# Patient Record
Sex: FEMALE | Race: WHITE | NOT HISPANIC OR LATINO | Employment: OTHER | URBAN - METROPOLITAN AREA
[De-identification: names, ages, dates, MRNs, and addresses within clinical notes are randomized per-mention and may not be internally consistent; named-entity substitution may affect disease eponyms.]

---

## 2017-01-12 ENCOUNTER — HOSPITAL ENCOUNTER (OUTPATIENT)
Dept: RADIOLOGY | Age: 77
Discharge: HOME | End: 2017-01-12
Payer: MEDICARE

## 2017-01-12 DIAGNOSIS — M85.80 OTHER SPECIFIED DISORDERS OF BONE DENSITY AND STRUCTURE, UNSPECIFIED SITE: ICD-10-CM

## 2017-01-12 PROCEDURE — 77080 DXA BONE DENSITY AXIAL: CPT

## 2017-01-13 ENCOUNTER — GENERIC CONVERSION - ENCOUNTER (OUTPATIENT)
Dept: OTHER | Facility: OTHER | Age: 77
End: 2017-01-13

## 2017-04-24 ENCOUNTER — TRANSCRIBE ORDERS (OUTPATIENT)
Dept: ADMINISTRATIVE | Age: 77
End: 2017-04-24

## 2017-04-24 ENCOUNTER — APPOINTMENT (OUTPATIENT)
Dept: LAB | Age: 77
End: 2017-04-24
Payer: MEDICARE

## 2017-04-24 DIAGNOSIS — E78.5 HYPERLIPIDEMIA: ICD-10-CM

## 2017-04-24 DIAGNOSIS — I10 ESSENTIAL (PRIMARY) HYPERTENSION: ICD-10-CM

## 2017-04-24 LAB
ALBUMIN SERPL BCP-MCNC: 3.5 G/DL (ref 3.5–5)
ALP SERPL-CCNC: 79 U/L (ref 46–116)
ALT SERPL W P-5'-P-CCNC: 34 U/L (ref 12–78)
ANION GAP SERPL CALCULATED.3IONS-SCNC: 8 MMOL/L (ref 4–13)
AST SERPL W P-5'-P-CCNC: 13 U/L (ref 5–45)
BASOPHILS # BLD AUTO: 0.04 THOUSANDS/ΜL (ref 0–0.1)
BASOPHILS NFR BLD AUTO: 1 % (ref 0–1)
BILIRUB SERPL-MCNC: 0.77 MG/DL (ref 0.2–1)
BUN SERPL-MCNC: 19 MG/DL (ref 5–25)
CA-I BLD-SCNC: 1.34 MMOL/L (ref 1.12–1.32)
CALCIUM SERPL-MCNC: 9.9 MG/DL (ref 8.3–10.1)
CHLORIDE SERPL-SCNC: 110 MMOL/L (ref 100–108)
CHOLEST SERPL-MCNC: 143 MG/DL (ref 50–200)
CO2 SERPL-SCNC: 24 MMOL/L (ref 21–32)
CREAT SERPL-MCNC: 0.78 MG/DL (ref 0.6–1.3)
EOSINOPHIL # BLD AUTO: 0.53 THOUSAND/ΜL (ref 0–0.61)
EOSINOPHIL NFR BLD AUTO: 8 % (ref 0–6)
ERYTHROCYTE [DISTWIDTH] IN BLOOD BY AUTOMATED COUNT: 13.6 % (ref 11.6–15.1)
GFR SERPL CREATININE-BSD FRML MDRD: >60 ML/MIN/1.73SQ M
GLUCOSE P FAST SERPL-MCNC: 93 MG/DL (ref 65–99)
HCT VFR BLD AUTO: 44.2 % (ref 34.8–46.1)
HDLC SERPL-MCNC: 51 MG/DL (ref 40–60)
HGB BLD-MCNC: 14.9 G/DL (ref 11.5–15.4)
LDLC SERPL DIRECT ASSAY-MCNC: 74 MG/DL (ref 0–100)
LYMPHOCYTES # BLD AUTO: 1.92 THOUSANDS/ΜL (ref 0.6–4.47)
LYMPHOCYTES NFR BLD AUTO: 29 % (ref 14–44)
MCH RBC QN AUTO: 31 PG (ref 26.8–34.3)
MCHC RBC AUTO-ENTMCNC: 33.7 G/DL (ref 31.4–37.4)
MCV RBC AUTO: 92 FL (ref 82–98)
MONOCYTES # BLD AUTO: 0.75 THOUSAND/ΜL (ref 0.17–1.22)
MONOCYTES NFR BLD AUTO: 11 % (ref 4–12)
NEUTROPHILS # BLD AUTO: 3.37 THOUSANDS/ΜL (ref 1.85–7.62)
NEUTS SEG NFR BLD AUTO: 51 % (ref 43–75)
NRBC BLD AUTO-RTO: 0 /100 WBCS
PLATELET # BLD AUTO: 225 THOUSANDS/UL (ref 149–390)
PMV BLD AUTO: 9.9 FL (ref 8.9–12.7)
POTASSIUM SERPL-SCNC: 4 MMOL/L (ref 3.5–5.3)
PROT SERPL-MCNC: 7.2 G/DL (ref 6.4–8.2)
PTH-INTACT SERPL-MCNC: 163.6 PG/ML (ref 14–72)
RBC # BLD AUTO: 4.8 MILLION/UL (ref 3.81–5.12)
SODIUM SERPL-SCNC: 142 MMOL/L (ref 136–145)
TRIGL SERPL-MCNC: 123 MG/DL
WBC # BLD AUTO: 6.63 THOUSAND/UL (ref 4.31–10.16)

## 2017-04-24 PROCEDURE — 83721 ASSAY OF BLOOD LIPOPROTEIN: CPT

## 2017-04-24 PROCEDURE — 80061 LIPID PANEL: CPT

## 2017-04-24 PROCEDURE — 83970 ASSAY OF PARATHORMONE: CPT

## 2017-04-24 PROCEDURE — 80053 COMPREHEN METABOLIC PANEL: CPT

## 2017-04-24 PROCEDURE — 85025 COMPLETE CBC W/AUTO DIFF WBC: CPT

## 2017-04-24 PROCEDURE — 36415 COLL VENOUS BLD VENIPUNCTURE: CPT

## 2017-04-24 PROCEDURE — 82330 ASSAY OF CALCIUM: CPT

## 2017-06-12 ENCOUNTER — ALLSCRIPTS OFFICE VISIT (OUTPATIENT)
Dept: OTHER | Facility: OTHER | Age: 77
End: 2017-06-12

## 2017-07-22 ENCOUNTER — TRANSCRIBE ORDERS (OUTPATIENT)
Dept: ADMINISTRATIVE | Age: 77
End: 2017-07-22

## 2017-07-22 ENCOUNTER — APPOINTMENT (OUTPATIENT)
Dept: LAB | Age: 77
End: 2017-07-22
Payer: MEDICARE

## 2017-07-22 DIAGNOSIS — K50.119 CROHN'S COLITIS, UNSPECIFIED COMPLICATION (HCC): Primary | ICD-10-CM

## 2017-07-22 DIAGNOSIS — K62.5 HEMORRHAGE OF RECTUM AND ANUS: ICD-10-CM

## 2017-07-22 DIAGNOSIS — K52.9 ACUTE GASTROENTERITIS: ICD-10-CM

## 2017-07-22 DIAGNOSIS — K21.9 GASTROESOPHAGEAL REFLUX DISEASE WITHOUT ESOPHAGITIS: ICD-10-CM

## 2017-07-22 LAB
BASOPHILS # BLD AUTO: 0.04 THOUSANDS/ΜL (ref 0–0.1)
BASOPHILS NFR BLD AUTO: 1 % (ref 0–1)
EOSINOPHIL # BLD AUTO: 0.43 THOUSAND/ΜL (ref 0–0.61)
EOSINOPHIL NFR BLD AUTO: 7 % (ref 0–6)
ERYTHROCYTE [DISTWIDTH] IN BLOOD BY AUTOMATED COUNT: 13.8 % (ref 11.6–15.1)
HCT VFR BLD AUTO: 41.6 % (ref 34.8–46.1)
HGB BLD-MCNC: 14.1 G/DL (ref 11.5–15.4)
LYMPHOCYTES # BLD AUTO: 1.67 THOUSANDS/ΜL (ref 0.6–4.47)
LYMPHOCYTES NFR BLD AUTO: 28 % (ref 14–44)
MCH RBC QN AUTO: 31.1 PG (ref 26.8–34.3)
MCHC RBC AUTO-ENTMCNC: 33.9 G/DL (ref 31.4–37.4)
MCV RBC AUTO: 92 FL (ref 82–98)
MONOCYTES # BLD AUTO: 0.65 THOUSAND/ΜL (ref 0.17–1.22)
MONOCYTES NFR BLD AUTO: 11 % (ref 4–12)
NEUTROPHILS # BLD AUTO: 3.14 THOUSANDS/ΜL (ref 1.85–7.62)
NEUTS SEG NFR BLD AUTO: 53 % (ref 43–75)
NRBC BLD AUTO-RTO: 0 /100 WBCS
PLATELET # BLD AUTO: 252 THOUSANDS/UL (ref 149–390)
PMV BLD AUTO: 10.2 FL (ref 8.9–12.7)
RBC # BLD AUTO: 4.53 MILLION/UL (ref 3.81–5.12)
WBC # BLD AUTO: 5.95 THOUSAND/UL (ref 4.31–10.16)

## 2017-07-22 PROCEDURE — 85025 COMPLETE CBC W/AUTO DIFF WBC: CPT

## 2017-07-22 PROCEDURE — 36415 COLL VENOUS BLD VENIPUNCTURE: CPT

## 2017-07-28 ENCOUNTER — GENERIC CONVERSION - ENCOUNTER (OUTPATIENT)
Dept: OTHER | Facility: OTHER | Age: 77
End: 2017-07-28

## 2017-07-28 ENCOUNTER — ANESTHESIA (OUTPATIENT)
Dept: GASTROENTEROLOGY | Facility: HOSPITAL | Age: 77
End: 2017-07-28
Payer: MEDICARE

## 2017-07-28 ENCOUNTER — HOSPITAL ENCOUNTER (OUTPATIENT)
Facility: HOSPITAL | Age: 77
Setting detail: OUTPATIENT SURGERY
Discharge: HOME | End: 2017-07-28
Attending: COLON & RECTAL SURGERY | Admitting: COLON & RECTAL SURGERY
Payer: MEDICARE

## 2017-07-28 ENCOUNTER — ANESTHESIA EVENT (OUTPATIENT)
Dept: GASTROENTEROLOGY | Facility: HOSPITAL | Age: 77
End: 2017-07-28
Payer: MEDICARE

## 2017-07-28 VITALS
BODY MASS INDEX: 30.93 KG/M2 | WEIGHT: 185.63 LBS | TEMPERATURE: 97.1 F | HEART RATE: 59 BPM | HEIGHT: 65 IN | RESPIRATION RATE: 16 BRPM | DIASTOLIC BLOOD PRESSURE: 65 MMHG | SYSTOLIC BLOOD PRESSURE: 147 MMHG | OXYGEN SATURATION: 98 %

## 2017-07-28 DIAGNOSIS — K21.9 GERD (GASTROESOPHAGEAL REFLUX DISEASE): ICD-10-CM

## 2017-07-28 DIAGNOSIS — K62.5 RECTAL BLEEDING: ICD-10-CM

## 2017-07-28 PROCEDURE — 88342 IMHCHEM/IMCYTCHM 1ST ANTB: CPT | Performed by: INTERNAL MEDICINE

## 2017-07-28 PROCEDURE — 88305 TISSUE EXAM BY PATHOLOGIST: CPT | Performed by: INTERNAL MEDICINE

## 2017-07-28 PROCEDURE — 88305 TISSUE EXAM BY PATHOLOGIST: CPT | Performed by: COLON & RECTAL SURGERY

## 2017-07-28 PROCEDURE — 88342 IMHCHEM/IMCYTCHM 1ST ANTB: CPT | Performed by: COLON & RECTAL SURGERY

## 2017-07-28 RX ORDER — PROPOFOL 10 MG/ML
INJECTION, EMULSION INTRAVENOUS AS NEEDED
Status: DISCONTINUED | OUTPATIENT
Start: 2017-07-28 | End: 2017-07-28 | Stop reason: SURG

## 2017-07-28 RX ORDER — ATENOLOL 25 MG/1
25 TABLET ORAL 2 TIMES DAILY
COMMUNITY
End: 2021-05-11 | Stop reason: SDUPTHER

## 2017-07-28 RX ORDER — IRBESARTAN 300 MG/1
300 TABLET ORAL
COMMUNITY
End: 2021-11-02 | Stop reason: SDUPTHER

## 2017-07-28 RX ORDER — RANITIDINE 150 MG/1
150 TABLET ORAL DAILY
COMMUNITY
End: 2018-12-31 | Stop reason: ALTCHOICE

## 2017-07-28 RX ORDER — LANSOPRAZOLE 30 MG/1
30 CAPSULE, DELAYED RELEASE ORAL DAILY
COMMUNITY
End: 2017-08-21

## 2017-07-28 RX ORDER — MESALAMINE 800 MG/1
800 TABLET, DELAYED RELEASE ORAL 3 TIMES DAILY
Qty: 270 TABLET | Refills: 4 | Status: SHIPPED | OUTPATIENT
Start: 2017-07-28 | End: 2019-05-06 | Stop reason: SDUPTHER

## 2017-07-28 RX ORDER — HYDROCHLOROTHIAZIDE 25 MG/1
12.5 TABLET ORAL DAILY
COMMUNITY
End: 2021-06-30 | Stop reason: ALTCHOICE

## 2017-07-28 RX ORDER — BRINZOLAMIDE 10 MG/ML
1 SUSPENSION/ DROPS OPHTHALMIC 2 TIMES DAILY
COMMUNITY

## 2017-07-28 RX ORDER — OMEGA-3-ACID ETHYL ESTERS 1 G/1
2 CAPSULE, LIQUID FILLED ORAL 2 TIMES DAILY
COMMUNITY
End: 2021-09-24

## 2017-07-28 RX ORDER — ROSUVASTATIN CALCIUM 5 MG/1
7.5 TABLET, COATED ORAL DAILY
COMMUNITY
End: 2021-10-27 | Stop reason: SDUPTHER

## 2017-07-28 RX ORDER — SODIUM CHLORIDE, SODIUM LACTATE, POTASSIUM CHLORIDE, CALCIUM CHLORIDE 600; 310; 30; 20 MG/100ML; MG/100ML; MG/100ML; MG/100ML
125 INJECTION, SOLUTION INTRAVENOUS CONTINUOUS
Status: DISCONTINUED | OUTPATIENT
Start: 2017-07-28 | End: 2017-07-28 | Stop reason: HOSPADM

## 2017-07-28 RX ADMIN — PROPOFOL 100 MG: 10 INJECTION, EMULSION INTRAVENOUS at 08:03

## 2017-07-28 RX ADMIN — SODIUM CHLORIDE, SODIUM LACTATE, POTASSIUM CHLORIDE, AND CALCIUM CHLORIDE 125 ML/HR: .6; .31; .03; .02 INJECTION, SOLUTION INTRAVENOUS at 07:25

## 2017-07-28 RX ADMIN — PROPOFOL 100 MG: 10 INJECTION, EMULSION INTRAVENOUS at 08:10

## 2017-07-28 RX ADMIN — PROPOFOL 100 MG: 10 INJECTION, EMULSION INTRAVENOUS at 07:42

## 2017-07-28 RX ADMIN — PROPOFOL 100 MG: 10 INJECTION, EMULSION INTRAVENOUS at 07:45

## 2017-08-03 ENCOUNTER — GENERIC CONVERSION - ENCOUNTER (OUTPATIENT)
Dept: OTHER | Facility: OTHER | Age: 77
End: 2017-08-03

## 2017-08-18 ENCOUNTER — APPOINTMENT (OUTPATIENT)
Dept: LAB | Facility: HOSPITAL | Age: 77
DRG: 386 | End: 2017-08-18
Attending: COLON & RECTAL SURGERY
Payer: MEDICARE

## 2017-08-18 DIAGNOSIS — K62.5 HEMORRHAGE OF RECTUM AND ANUS: ICD-10-CM

## 2017-08-18 DIAGNOSIS — K50.119 CROHN'S COLITIS, UNSPECIFIED COMPLICATION (HCC): ICD-10-CM

## 2017-08-18 DIAGNOSIS — K52.9 ACUTE GASTROENTERITIS: ICD-10-CM

## 2017-08-18 LAB
ALBUMIN SERPL BCP-MCNC: 2.8 G/DL (ref 3.5–5)
ANION GAP SERPL CALCULATED.3IONS-SCNC: 9 MMOL/L (ref 4–13)
BASOPHILS # BLD AUTO: 0.04 THOUSANDS/ΜL (ref 0–0.1)
BASOPHILS NFR BLD AUTO: 0 % (ref 0–1)
BUN SERPL-MCNC: 20 MG/DL (ref 5–25)
CALCIUM ALBUM COR SERPL-MCNC: 11.4 MG/DL (ref 8.3–10.1)
CALCIUM SERPL-MCNC: 10.4 MD/DL (ref 8.3–10.1)
CALCIUM SERPL-MCNC: 10.4 MG/DL (ref 8.3–10.1)
CHLORIDE SERPL-SCNC: 100 MMOL/L (ref 100–108)
CO2 SERPL-SCNC: 23 MMOL/L (ref 21–32)
CREAT SERPL-MCNC: 1.55 MG/DL (ref 0.6–1.3)
EOSINOPHIL # BLD AUTO: 0.16 THOUSAND/ΜL (ref 0–0.61)
EOSINOPHIL NFR BLD AUTO: 1 % (ref 0–6)
ERYTHROCYTE [DISTWIDTH] IN BLOOD BY AUTOMATED COUNT: 13.4 % (ref 11.6–15.1)
GFR SERPL CREATININE-BSD FRML MDRD: 33 ML/MIN/1.73SQ M
GLUCOSE P FAST SERPL-MCNC: 101 MG/DL (ref 65–99)
HCT VFR BLD AUTO: 40.2 % (ref 34.8–46.1)
HGB BLD-MCNC: 13.6 G/DL (ref 11.5–15.4)
LYMPHOCYTES # BLD AUTO: 1.19 THOUSANDS/ΜL (ref 0.6–4.47)
LYMPHOCYTES NFR BLD AUTO: 8 % (ref 14–44)
MCH RBC QN AUTO: 30.7 PG (ref 26.8–34.3)
MCHC RBC AUTO-ENTMCNC: 33.8 G/DL (ref 31.4–37.4)
MCV RBC AUTO: 91 FL (ref 82–98)
MONOCYTES # BLD AUTO: 1.84 THOUSAND/ΜL (ref 0.17–1.22)
MONOCYTES NFR BLD AUTO: 12 % (ref 4–12)
NEUTROPHILS # BLD AUTO: 11.57 THOUSANDS/ΜL (ref 1.85–7.62)
NEUTS SEG NFR BLD AUTO: 79 % (ref 43–75)
NRBC BLD AUTO-RTO: 0 /100 WBCS
PLATELET # BLD AUTO: 290 THOUSANDS/UL (ref 149–390)
PMV BLD AUTO: 10.3 FL (ref 8.9–12.7)
POTASSIUM SERPL-SCNC: 3.3 MMOL/L (ref 3.5–5.3)
RBC # BLD AUTO: 4.43 MILLION/UL (ref 3.81–5.12)
SODIUM SERPL-SCNC: 132 MMOL/L (ref 136–145)
WBC # BLD AUTO: 14.89 THOUSAND/UL (ref 4.31–10.16)

## 2017-08-18 PROCEDURE — 80048 BASIC METABOLIC PNL TOTAL CA: CPT

## 2017-08-18 PROCEDURE — 82040 ASSAY OF SERUM ALBUMIN: CPT

## 2017-08-18 PROCEDURE — 82310 ASSAY OF CALCIUM: CPT

## 2017-08-18 PROCEDURE — 36415 COLL VENOUS BLD VENIPUNCTURE: CPT

## 2017-08-18 PROCEDURE — 85025 COMPLETE CBC W/AUTO DIFF WBC: CPT

## 2017-08-21 ENCOUNTER — APPOINTMENT (EMERGENCY)
Dept: RADIOLOGY | Facility: HOSPITAL | Age: 77
DRG: 386 | End: 2017-08-21
Payer: MEDICARE

## 2017-08-21 ENCOUNTER — HOSPITAL ENCOUNTER (INPATIENT)
Facility: HOSPITAL | Age: 77
LOS: 3 days | Discharge: HOME | DRG: 386 | End: 2017-08-24
Attending: EMERGENCY MEDICINE | Admitting: COLON & RECTAL SURGERY
Payer: MEDICARE

## 2017-08-21 DIAGNOSIS — E87.1 HYPONATREMIA: ICD-10-CM

## 2017-08-21 DIAGNOSIS — R11.2 NAUSEA AND VOMITING: ICD-10-CM

## 2017-08-21 DIAGNOSIS — K62.5 RECTAL BLEEDING: ICD-10-CM

## 2017-08-21 DIAGNOSIS — N17.9 AKI (ACUTE KIDNEY INJURY) (HCC): ICD-10-CM

## 2017-08-21 DIAGNOSIS — R10.819 ABDOMINAL TENDERNESS: ICD-10-CM

## 2017-08-21 DIAGNOSIS — K50.90 CROHN'S DISEASE (HCC): Primary | ICD-10-CM

## 2017-08-21 DIAGNOSIS — D72.829 LEUKOCYTOSIS: ICD-10-CM

## 2017-08-21 PROBLEM — K52.9 INFLAMMATORY BOWEL DISEASE: Status: ACTIVE | Noted: 2017-08-21

## 2017-08-21 LAB
ALBUMIN SERPL BCP-MCNC: 2.4 G/DL (ref 3.5–5)
ALP SERPL-CCNC: 148 U/L (ref 46–116)
ALT SERPL W P-5'-P-CCNC: 48 U/L (ref 12–78)
ANION GAP SERPL CALCULATED.3IONS-SCNC: 9 MMOL/L (ref 4–13)
AST SERPL W P-5'-P-CCNC: 34 U/L (ref 5–45)
BASOPHILS # BLD MANUAL: 0.13 THOUSAND/UL (ref 0–0.1)
BASOPHILS NFR MAR MANUAL: 1 % (ref 0–1)
BILIRUB SERPL-MCNC: 0.55 MG/DL (ref 0.2–1)
BUN SERPL-MCNC: 15 MG/DL (ref 5–25)
CALCIUM SERPL-MCNC: 9.8 MG/DL (ref 8.3–10.1)
CHLORIDE SERPL-SCNC: 94 MMOL/L (ref 100–108)
CO2 SERPL-SCNC: 24 MMOL/L (ref 21–32)
CREAT SERPL-MCNC: 1.36 MG/DL (ref 0.6–1.3)
EOSINOPHIL # BLD MANUAL: 0.39 THOUSAND/UL (ref 0–0.4)
EOSINOPHIL NFR BLD MANUAL: 3 % (ref 0–6)
ERYTHROCYTE [DISTWIDTH] IN BLOOD BY AUTOMATED COUNT: 13 % (ref 11.6–15.1)
GFR SERPL CREATININE-BSD FRML MDRD: 38 ML/MIN/1.73SQ M
GLUCOSE SERPL-MCNC: 107 MG/DL (ref 65–140)
HCT VFR BLD AUTO: 36.3 % (ref 34.8–46.1)
HGB BLD-MCNC: 12.9 G/DL (ref 11.5–15.4)
LACTATE SERPL-SCNC: 1.5 MMOL/L (ref 0.5–2)
LIPASE SERPL-CCNC: 146 U/L (ref 73–393)
LYMPHOCYTES # BLD AUTO: 0.92 THOUSAND/UL (ref 0.6–4.47)
LYMPHOCYTES # BLD AUTO: 7 % (ref 14–44)
MAGNESIUM SERPL-MCNC: 2 MG/DL (ref 1.6–2.6)
MCH RBC QN AUTO: 30.9 PG (ref 26.8–34.3)
MCHC RBC AUTO-ENTMCNC: 35.5 G/DL (ref 31.4–37.4)
MCV RBC AUTO: 87 FL (ref 82–98)
MONOCYTES # BLD AUTO: 1.31 THOUSAND/UL (ref 0–1.22)
MONOCYTES NFR BLD: 10 % (ref 4–12)
NEUTROPHILS # BLD MANUAL: 10.36 THOUSAND/UL (ref 1.85–7.62)
NEUTS SEG NFR BLD AUTO: 79 % (ref 43–75)
NRBC BLD AUTO-RTO: 0 /100 WBCS
PHOSPHATE SERPL-MCNC: 2.7 MG/DL (ref 2.3–4.1)
PLATELET # BLD AUTO: 303 THOUSANDS/UL (ref 149–390)
PLATELET BLD QL SMEAR: ADEQUATE
PMV BLD AUTO: 9.3 FL (ref 8.9–12.7)
POIKILOCYTOSIS BLD QL SMEAR: PRESENT
POTASSIUM SERPL-SCNC: 2.8 MMOL/L (ref 3.5–5.3)
PROT SERPL-MCNC: 7 G/DL (ref 6.4–8.2)
RBC # BLD AUTO: 4.18 MILLION/UL (ref 3.81–5.12)
RBC MORPH BLD: PRESENT
SODIUM SERPL-SCNC: 127 MMOL/L (ref 136–145)
WBC # BLD AUTO: 13.12 THOUSAND/UL (ref 4.31–10.16)

## 2017-08-21 PROCEDURE — 84100 ASSAY OF PHOSPHORUS: CPT | Performed by: EMERGENCY MEDICINE

## 2017-08-21 PROCEDURE — 36415 COLL VENOUS BLD VENIPUNCTURE: CPT | Performed by: EMERGENCY MEDICINE

## 2017-08-21 PROCEDURE — 99285 EMERGENCY DEPT VISIT HI MDM: CPT

## 2017-08-21 PROCEDURE — 96375 TX/PRO/DX INJ NEW DRUG ADDON: CPT

## 2017-08-21 PROCEDURE — 83690 ASSAY OF LIPASE: CPT | Performed by: EMERGENCY MEDICINE

## 2017-08-21 PROCEDURE — 96365 THER/PROPH/DIAG IV INF INIT: CPT

## 2017-08-21 PROCEDURE — 87493 C DIFF AMPLIFIED PROBE: CPT | Performed by: EMERGENCY MEDICINE

## 2017-08-21 PROCEDURE — 80053 COMPREHEN METABOLIC PANEL: CPT | Performed by: EMERGENCY MEDICINE

## 2017-08-21 PROCEDURE — 85007 BL SMEAR W/DIFF WBC COUNT: CPT | Performed by: EMERGENCY MEDICINE

## 2017-08-21 PROCEDURE — 83605 ASSAY OF LACTIC ACID: CPT | Performed by: EMERGENCY MEDICINE

## 2017-08-21 PROCEDURE — 85027 COMPLETE CBC AUTOMATED: CPT | Performed by: EMERGENCY MEDICINE

## 2017-08-21 PROCEDURE — 83735 ASSAY OF MAGNESIUM: CPT | Performed by: EMERGENCY MEDICINE

## 2017-08-21 PROCEDURE — 74176 CT ABD & PELVIS W/O CONTRAST: CPT

## 2017-08-21 RX ORDER — POTASSIUM CHLORIDE 14.9 MG/ML
20 INJECTION INTRAVENOUS ONCE
Status: COMPLETED | OUTPATIENT
Start: 2017-08-21 | End: 2017-08-22

## 2017-08-21 RX ORDER — ONDANSETRON 2 MG/ML
4 INJECTION INTRAMUSCULAR; INTRAVENOUS ONCE
Status: COMPLETED | OUTPATIENT
Start: 2017-08-21 | End: 2017-08-21

## 2017-08-21 RX ORDER — ONDANSETRON 2 MG/ML
4 INJECTION INTRAMUSCULAR; INTRAVENOUS EVERY 6 HOURS PRN
Status: DISCONTINUED | OUTPATIENT
Start: 2017-08-21 | End: 2017-08-23

## 2017-08-21 RX ORDER — METHYLPREDNISOLONE SODIUM SUCCINATE 40 MG/ML
20 INJECTION, POWDER, LYOPHILIZED, FOR SOLUTION INTRAMUSCULAR; INTRAVENOUS DAILY
Status: DISCONTINUED | OUTPATIENT
Start: 2017-08-21 | End: 2017-08-24 | Stop reason: HOSPADM

## 2017-08-21 RX ORDER — OXYCODONE HYDROCHLORIDE 5 MG/1
5 TABLET ORAL EVERY 4 HOURS PRN
Status: DISCONTINUED | OUTPATIENT
Start: 2017-08-21 | End: 2017-08-24 | Stop reason: HOSPADM

## 2017-08-21 RX ORDER — PRAVASTATIN SODIUM 40 MG
40 TABLET ORAL
Status: DISCONTINUED | OUTPATIENT
Start: 2017-08-22 | End: 2017-08-24 | Stop reason: HOSPADM

## 2017-08-21 RX ORDER — POTASSIUM CHLORIDE 20 MEQ/1
40 TABLET, EXTENDED RELEASE ORAL ONCE
Status: COMPLETED | OUTPATIENT
Start: 2017-08-21 | End: 2017-08-21

## 2017-08-21 RX ORDER — HEPARIN SODIUM 5000 [USP'U]/ML
5000 INJECTION, SOLUTION INTRAVENOUS; SUBCUTANEOUS EVERY 8 HOURS SCHEDULED
Status: DISCONTINUED | OUTPATIENT
Start: 2017-08-21 | End: 2017-08-24 | Stop reason: HOSPADM

## 2017-08-21 RX ORDER — MESALAMINE 400 MG/1
800 CAPSULE, DELAYED RELEASE ORAL 3 TIMES DAILY
Status: DISCONTINUED | OUTPATIENT
Start: 2017-08-21 | End: 2017-08-23

## 2017-08-21 RX ORDER — SODIUM CHLORIDE 9 MG/ML
75 INJECTION, SOLUTION INTRAVENOUS CONTINUOUS
Status: DISCONTINUED | OUTPATIENT
Start: 2017-08-21 | End: 2017-08-22

## 2017-08-21 RX ORDER — OXYCODONE HYDROCHLORIDE 10 MG/1
10 TABLET ORAL EVERY 4 HOURS PRN
Status: DISCONTINUED | OUTPATIENT
Start: 2017-08-21 | End: 2017-08-24 | Stop reason: HOSPADM

## 2017-08-21 RX ORDER — MAGNESIUM HYDROXIDE/ALUMINUM HYDROXICE/SIMETHICONE 120; 1200; 1200 MG/30ML; MG/30ML; MG/30ML
30 SUSPENSION ORAL EVERY 6 HOURS PRN
Status: DISCONTINUED | OUTPATIENT
Start: 2017-08-21 | End: 2017-08-24 | Stop reason: HOSPADM

## 2017-08-21 RX ORDER — ACETAMINOPHEN 325 MG/1
650 TABLET ORAL EVERY 6 HOURS PRN
Status: DISCONTINUED | OUTPATIENT
Start: 2017-08-21 | End: 2017-08-24 | Stop reason: HOSPADM

## 2017-08-21 RX ORDER — BRINZOLAMIDE 10 MG/ML
1 SUSPENSION/ DROPS OPHTHALMIC 2 TIMES DAILY
Status: DISCONTINUED | OUTPATIENT
Start: 2017-08-22 | End: 2017-08-24 | Stop reason: HOSPADM

## 2017-08-21 RX ADMIN — ONDANSETRON 4 MG: 2 INJECTION INTRAMUSCULAR; INTRAVENOUS at 19:16

## 2017-08-21 RX ADMIN — METHYLPREDNISOLONE SODIUM SUCCINATE 20 MG: 40 INJECTION, POWDER, FOR SOLUTION INTRAMUSCULAR; INTRAVENOUS at 21:22

## 2017-08-21 RX ADMIN — HEPARIN SODIUM 5000 UNITS: 5000 INJECTION, SOLUTION INTRAVENOUS; SUBCUTANEOUS at 22:20

## 2017-08-21 RX ADMIN — POTASSIUM CHLORIDE 40 MEQ: 1500 TABLET, EXTENDED RELEASE ORAL at 22:20

## 2017-08-21 RX ADMIN — POTASSIUM CHLORIDE 20 MEQ: 200 INJECTION, SOLUTION INTRAVENOUS at 19:34

## 2017-08-21 RX ADMIN — POTASSIUM CHLORIDE 20 MEQ: 200 INJECTION, SOLUTION INTRAVENOUS at 22:20

## 2017-08-21 RX ADMIN — SODIUM CHLORIDE 1000 ML: 0.9 INJECTION, SOLUTION INTRAVENOUS at 19:16

## 2017-08-21 RX ADMIN — SODIUM CHLORIDE 100 ML/HR: 0.9 INJECTION, SOLUTION INTRAVENOUS at 22:22

## 2017-08-22 LAB
ANION GAP SERPL CALCULATED.3IONS-SCNC: 10 MMOL/L (ref 4–13)
ANION GAP SERPL CALCULATED.3IONS-SCNC: 12 MMOL/L (ref 4–13)
ANISOCYTOSIS BLD QL SMEAR: PRESENT
BASOPHILS # BLD MANUAL: 0 THOUSAND/UL (ref 0–0.1)
BASOPHILS NFR MAR MANUAL: 0 % (ref 0–1)
BUN SERPL-MCNC: 13 MG/DL (ref 5–25)
BUN SERPL-MCNC: 14 MG/DL (ref 5–25)
BURR CELLS BLD QL SMEAR: PRESENT
C DIFF TOX GENS STL QL NAA+PROBE: NORMAL
CALCIUM SERPL-MCNC: 10 MG/DL (ref 8.3–10.1)
CALCIUM SERPL-MCNC: 9.7 MG/DL (ref 8.3–10.1)
CHLORIDE SERPL-SCNC: 104 MMOL/L (ref 100–108)
CHLORIDE SERPL-SCNC: 98 MMOL/L (ref 100–108)
CO2 SERPL-SCNC: 22 MMOL/L (ref 21–32)
CO2 SERPL-SCNC: 23 MMOL/L (ref 21–32)
CREAT SERPL-MCNC: 0.91 MG/DL (ref 0.6–1.3)
CREAT SERPL-MCNC: 0.96 MG/DL (ref 0.6–1.3)
EOSINOPHIL # BLD MANUAL: 0 THOUSAND/UL (ref 0–0.4)
EOSINOPHIL NFR BLD MANUAL: 0 % (ref 0–6)
ERYTHROCYTE [DISTWIDTH] IN BLOOD BY AUTOMATED COUNT: 13.2 % (ref 11.6–15.1)
GFR SERPL CREATININE-BSD FRML MDRD: 58 ML/MIN/1.73SQ M
GFR SERPL CREATININE-BSD FRML MDRD: 62 ML/MIN/1.73SQ M
GLUCOSE SERPL-MCNC: 115 MG/DL (ref 65–140)
GLUCOSE SERPL-MCNC: 132 MG/DL (ref 65–140)
HCT VFR BLD AUTO: 34.8 % (ref 34.8–46.1)
HCT VFR BLD AUTO: 36.8 % (ref 34.8–46.1)
HGB BLD-MCNC: 12.4 G/DL (ref 11.5–15.4)
HGB BLD-MCNC: 12.6 G/DL (ref 11.5–15.4)
LYMPHOCYTES # BLD AUTO: 0.44 THOUSAND/UL (ref 0.6–4.47)
LYMPHOCYTES # BLD AUTO: 4 % (ref 14–44)
MAGNESIUM SERPL-MCNC: 2 MG/DL (ref 1.6–2.6)
MCH RBC QN AUTO: 30.1 PG (ref 26.8–34.3)
MCHC RBC AUTO-ENTMCNC: 34.2 G/DL (ref 31.4–37.4)
MCV RBC AUTO: 88 FL (ref 82–98)
MONOCYTES # BLD AUTO: 0.11 THOUSAND/UL (ref 0–1.22)
MONOCYTES NFR BLD: 1 % (ref 4–12)
NEUTROPHILS # BLD MANUAL: 10.41 THOUSAND/UL (ref 1.85–7.62)
NEUTS SEG NFR BLD AUTO: 95 % (ref 43–75)
NRBC BLD AUTO-RTO: 0 /100 WBCS
PLATELET # BLD AUTO: 296 THOUSANDS/UL (ref 149–390)
PLATELET # BLD AUTO: 397 THOUSANDS/UL (ref 149–390)
PLATELET BLD QL SMEAR: ADEQUATE
PMV BLD AUTO: 8.7 FL (ref 8.9–12.7)
PMV BLD AUTO: 9.3 FL (ref 8.9–12.7)
POIKILOCYTOSIS BLD QL SMEAR: PRESENT
POTASSIUM SERPL-SCNC: 3.3 MMOL/L (ref 3.5–5.3)
POTASSIUM SERPL-SCNC: 4.1 MMOL/L (ref 3.5–5.3)
RBC # BLD AUTO: 4.18 MILLION/UL (ref 3.81–5.12)
RBC MORPH BLD: PRESENT
SODIUM SERPL-SCNC: 132 MMOL/L (ref 136–145)
SODIUM SERPL-SCNC: 137 MMOL/L (ref 136–145)
WBC # BLD AUTO: 10.96 THOUSAND/UL (ref 4.31–10.16)

## 2017-08-22 PROCEDURE — 85007 BL SMEAR W/DIFF WBC COUNT: CPT | Performed by: SURGERY

## 2017-08-22 PROCEDURE — 85049 AUTOMATED PLATELET COUNT: CPT | Performed by: SURGERY

## 2017-08-22 PROCEDURE — 85027 COMPLETE CBC AUTOMATED: CPT | Performed by: SURGERY

## 2017-08-22 PROCEDURE — 85014 HEMATOCRIT: CPT | Performed by: STUDENT IN AN ORGANIZED HEALTH CARE EDUCATION/TRAINING PROGRAM

## 2017-08-22 PROCEDURE — 80048 BASIC METABOLIC PNL TOTAL CA: CPT | Performed by: SURGERY

## 2017-08-22 PROCEDURE — 85018 HEMOGLOBIN: CPT | Performed by: STUDENT IN AN ORGANIZED HEALTH CARE EDUCATION/TRAINING PROGRAM

## 2017-08-22 PROCEDURE — 83735 ASSAY OF MAGNESIUM: CPT | Performed by: SURGERY

## 2017-08-22 RX ORDER — POTASSIUM CHLORIDE 14.9 MG/ML
20 INJECTION INTRAVENOUS
Status: COMPLETED | OUTPATIENT
Start: 2017-08-22 | End: 2017-08-22

## 2017-08-22 RX ORDER — POTASSIUM CHLORIDE 29.8 MG/ML
40 INJECTION INTRAVENOUS ONCE
Status: DISCONTINUED | OUTPATIENT
Start: 2017-08-22 | End: 2017-08-22 | Stop reason: SDUPTHER

## 2017-08-22 RX ADMIN — BRINZOLAMIDE 1 DROP: 10 SUSPENSION/ DROPS OPHTHALMIC at 08:02

## 2017-08-22 RX ADMIN — PRAVASTATIN SODIUM 40 MG: 40 TABLET ORAL at 16:32

## 2017-08-22 RX ADMIN — ACETAMINOPHEN 650 MG: 325 TABLET, FILM COATED ORAL at 23:15

## 2017-08-22 RX ADMIN — BIMATOPROST 1 DROP: 0.1 SOLUTION/ DROPS OPHTHALMIC at 21:23

## 2017-08-22 RX ADMIN — HEPARIN SODIUM 5000 UNITS: 5000 INJECTION, SOLUTION INTRAVENOUS; SUBCUTANEOUS at 21:23

## 2017-08-22 RX ADMIN — SODIUM CHLORIDE 100 ML/HR: 0.9 INJECTION, SOLUTION INTRAVENOUS at 08:07

## 2017-08-22 RX ADMIN — BRINZOLAMIDE 1 DROP: 10 SUSPENSION/ DROPS OPHTHALMIC at 00:12

## 2017-08-22 RX ADMIN — BIMATOPROST 1 DROP: 0.1 SOLUTION/ DROPS OPHTHALMIC at 00:09

## 2017-08-22 RX ADMIN — BRINZOLAMIDE 1 DROP: 10 SUSPENSION/ DROPS OPHTHALMIC at 21:23

## 2017-08-22 RX ADMIN — HEPARIN SODIUM 5000 UNITS: 5000 INJECTION, SOLUTION INTRAVENOUS; SUBCUTANEOUS at 13:30

## 2017-08-22 RX ADMIN — METHYLPREDNISOLONE SODIUM SUCCINATE 20 MG: 40 INJECTION, POWDER, FOR SOLUTION INTRAMUSCULAR; INTRAVENOUS at 08:01

## 2017-08-22 RX ADMIN — MESALAMINE 800 MG: 400 CAPSULE, DELAYED RELEASE ORAL at 21:23

## 2017-08-22 RX ADMIN — ONDANSETRON 4 MG: 2 INJECTION INTRAMUSCULAR; INTRAVENOUS at 23:15

## 2017-08-22 RX ADMIN — MESALAMINE 800 MG: 400 CAPSULE, DELAYED RELEASE ORAL at 08:01

## 2017-08-22 RX ADMIN — POTASSIUM CHLORIDE 20 MEQ: 200 INJECTION, SOLUTION INTRAVENOUS at 03:50

## 2017-08-22 RX ADMIN — POTASSIUM CHLORIDE 20 MEQ: 200 INJECTION, SOLUTION INTRAVENOUS at 01:17

## 2017-08-22 RX ADMIN — HEPARIN SODIUM 5000 UNITS: 5000 INJECTION, SOLUTION INTRAVENOUS; SUBCUTANEOUS at 05:15

## 2017-08-22 RX ADMIN — MESALAMINE 800 MG: 400 CAPSULE, DELAYED RELEASE ORAL at 16:32

## 2017-08-22 RX ADMIN — MESALAMINE 800 MG: 400 CAPSULE, DELAYED RELEASE ORAL at 00:08

## 2017-08-23 LAB
ANION GAP SERPL CALCULATED.3IONS-SCNC: 9 MMOL/L (ref 4–13)
BASOPHILS # BLD MANUAL: 0 THOUSAND/UL (ref 0–0.1)
BASOPHILS NFR MAR MANUAL: 0 % (ref 0–1)
BUN SERPL-MCNC: 17 MG/DL (ref 5–25)
BURR CELLS BLD QL SMEAR: PRESENT
CALCIUM SERPL-MCNC: 10.2 MG/DL (ref 8.3–10.1)
CHLORIDE SERPL-SCNC: 105 MMOL/L (ref 100–108)
CO2 SERPL-SCNC: 22 MMOL/L (ref 21–32)
CREAT SERPL-MCNC: 0.85 MG/DL (ref 0.6–1.3)
EOSINOPHIL # BLD MANUAL: 0 THOUSAND/UL (ref 0–0.4)
EOSINOPHIL NFR BLD MANUAL: 0 % (ref 0–6)
ERYTHROCYTE [DISTWIDTH] IN BLOOD BY AUTOMATED COUNT: 13.6 % (ref 11.6–15.1)
GFR SERPL CREATININE-BSD FRML MDRD: 67 ML/MIN/1.73SQ M
GLUCOSE SERPL-MCNC: 119 MG/DL (ref 65–140)
HCT VFR BLD AUTO: 30.3 % (ref 34.8–46.1)
HCT VFR BLD AUTO: 32.3 % (ref 34.8–46.1)
HGB BLD-MCNC: 10.3 G/DL (ref 11.5–15.4)
HGB BLD-MCNC: 11 G/DL (ref 11.5–15.4)
LYMPHOCYTES # BLD AUTO: 1.02 THOUSAND/UL (ref 0.6–4.47)
LYMPHOCYTES # BLD AUTO: 5 % (ref 14–44)
MCH RBC QN AUTO: 30.4 PG (ref 26.8–34.3)
MCHC RBC AUTO-ENTMCNC: 34 G/DL (ref 31.4–37.4)
MCV RBC AUTO: 89 FL (ref 82–98)
METAMYELOCYTES NFR BLD MANUAL: 2 % (ref 0–1)
MONOCYTES # BLD AUTO: 0.82 THOUSAND/UL (ref 0–1.22)
MONOCYTES NFR BLD: 4 % (ref 4–12)
MYELOCYTES NFR BLD MANUAL: 2 % (ref 0–1)
NEUTROPHILS # BLD MANUAL: 17.75 THOUSAND/UL (ref 1.85–7.62)
NEUTS BAND NFR BLD MANUAL: 2 % (ref 0–8)
NEUTS SEG NFR BLD AUTO: 85 % (ref 43–75)
NRBC BLD AUTO-RTO: 0 /100 WBCS
PLATELET # BLD AUTO: 366 THOUSANDS/UL (ref 149–390)
PLATELET BLD QL SMEAR: ADEQUATE
PMV BLD AUTO: 9.5 FL (ref 8.9–12.7)
POIKILOCYTOSIS BLD QL SMEAR: PRESENT
POTASSIUM SERPL-SCNC: 3.7 MMOL/L (ref 3.5–5.3)
RBC # BLD AUTO: 3.39 MILLION/UL (ref 3.81–5.12)
RBC MORPH BLD: PRESENT
SODIUM SERPL-SCNC: 136 MMOL/L (ref 136–145)
TOXIC GRANULES BLD QL SMEAR: PRESENT
WBC # BLD AUTO: 20.4 THOUSAND/UL (ref 4.31–10.16)

## 2017-08-23 PROCEDURE — 85018 HEMOGLOBIN: CPT | Performed by: PHYSICIAN ASSISTANT

## 2017-08-23 PROCEDURE — 80048 BASIC METABOLIC PNL TOTAL CA: CPT | Performed by: PHYSICIAN ASSISTANT

## 2017-08-23 PROCEDURE — 85007 BL SMEAR W/DIFF WBC COUNT: CPT | Performed by: PHYSICIAN ASSISTANT

## 2017-08-23 PROCEDURE — 85014 HEMATOCRIT: CPT | Performed by: PHYSICIAN ASSISTANT

## 2017-08-23 PROCEDURE — 85027 COMPLETE CBC AUTOMATED: CPT | Performed by: PHYSICIAN ASSISTANT

## 2017-08-23 RX ORDER — MESALAMINE 400 MG/1
800 CAPSULE, DELAYED RELEASE ORAL 4 TIMES DAILY
Status: DISCONTINUED | OUTPATIENT
Start: 2017-08-23 | End: 2017-08-24 | Stop reason: HOSPADM

## 2017-08-23 RX ORDER — ONDANSETRON 2 MG/ML
4 INJECTION INTRAMUSCULAR; INTRAVENOUS EVERY 4 HOURS PRN
Status: DISCONTINUED | OUTPATIENT
Start: 2017-08-23 | End: 2017-08-24 | Stop reason: HOSPADM

## 2017-08-23 RX ADMIN — HEPARIN SODIUM 5000 UNITS: 5000 INJECTION, SOLUTION INTRAVENOUS; SUBCUTANEOUS at 05:48

## 2017-08-23 RX ADMIN — BRINZOLAMIDE 1 DROP: 10 SUSPENSION/ DROPS OPHTHALMIC at 08:38

## 2017-08-23 RX ADMIN — MESALAMINE 800 MG: 400 CAPSULE, DELAYED RELEASE ORAL at 17:25

## 2017-08-23 RX ADMIN — MESALAMINE 800 MG: 400 CAPSULE, DELAYED RELEASE ORAL at 21:48

## 2017-08-23 RX ADMIN — MESALAMINE 800 MG: 400 CAPSULE, DELAYED RELEASE ORAL at 08:37

## 2017-08-23 RX ADMIN — ONDANSETRON 4 MG: 2 INJECTION INTRAMUSCULAR; INTRAVENOUS at 17:29

## 2017-08-23 RX ADMIN — ONDANSETRON 4 MG: 2 INJECTION INTRAMUSCULAR; INTRAVENOUS at 21:48

## 2017-08-23 RX ADMIN — METHYLPREDNISOLONE SODIUM SUCCINATE 20 MG: 40 INJECTION, POWDER, FOR SOLUTION INTRAMUSCULAR; INTRAVENOUS at 08:37

## 2017-08-23 RX ADMIN — PRAVASTATIN SODIUM 40 MG: 40 TABLET ORAL at 17:25

## 2017-08-23 RX ADMIN — BRINZOLAMIDE 1 DROP: 10 SUSPENSION/ DROPS OPHTHALMIC at 21:48

## 2017-08-23 RX ADMIN — HEPARIN SODIUM 5000 UNITS: 5000 INJECTION, SOLUTION INTRAVENOUS; SUBCUTANEOUS at 13:31

## 2017-08-23 RX ADMIN — BIMATOPROST 1 DROP: 0.1 SOLUTION/ DROPS OPHTHALMIC at 21:48

## 2017-08-24 VITALS
HEART RATE: 92 BPM | SYSTOLIC BLOOD PRESSURE: 120 MMHG | RESPIRATION RATE: 18 BRPM | TEMPERATURE: 98.2 F | OXYGEN SATURATION: 97 % | HEIGHT: 64 IN | DIASTOLIC BLOOD PRESSURE: 63 MMHG | WEIGHT: 168 LBS | BODY MASS INDEX: 28.68 KG/M2

## 2017-08-24 RX ORDER — PREDNISONE 10 MG/1
40 TABLET ORAL DAILY
Qty: 300 TABLET | Refills: 0 | Status: SHIPPED | OUTPATIENT
Start: 2017-08-24 | End: 2018-12-31 | Stop reason: ALTCHOICE

## 2017-08-24 RX ORDER — ONDANSETRON 4 MG/1
4 TABLET, ORALLY DISINTEGRATING ORAL EVERY 8 HOURS PRN
Qty: 20 TABLET | Refills: 0 | Status: SHIPPED | OUTPATIENT
Start: 2017-08-24 | End: 2018-12-31 | Stop reason: ALTCHOICE

## 2017-08-24 RX ADMIN — METHYLPREDNISOLONE SODIUM SUCCINATE 20 MG: 40 INJECTION, POWDER, FOR SOLUTION INTRAMUSCULAR; INTRAVENOUS at 08:52

## 2017-08-24 RX ADMIN — MESALAMINE 800 MG: 400 CAPSULE, DELAYED RELEASE ORAL at 08:52

## 2017-08-24 RX ADMIN — HEPARIN SODIUM 5000 UNITS: 5000 INJECTION, SOLUTION INTRAVENOUS; SUBCUTANEOUS at 05:41

## 2017-08-24 RX ADMIN — BRINZOLAMIDE 1 DROP: 10 SUSPENSION/ DROPS OPHTHALMIC at 08:54

## 2017-09-05 ENCOUNTER — ALLSCRIPTS OFFICE VISIT (OUTPATIENT)
Dept: OTHER | Facility: OTHER | Age: 77
End: 2017-09-05

## 2017-09-13 ENCOUNTER — TRANSCRIBE ORDERS (OUTPATIENT)
Dept: ADMINISTRATIVE | Age: 77
End: 2017-09-13

## 2017-09-13 ENCOUNTER — APPOINTMENT (OUTPATIENT)
Dept: LAB | Age: 77
End: 2017-09-13
Payer: MEDICARE

## 2017-09-13 DIAGNOSIS — I10 ESSENTIAL HYPERTENSION, MALIGNANT: ICD-10-CM

## 2017-09-13 DIAGNOSIS — E78.5 OTHER AND UNSPECIFIED HYPERLIPIDEMIA: Primary | ICD-10-CM

## 2017-09-13 DIAGNOSIS — E78.5 OTHER AND UNSPECIFIED HYPERLIPIDEMIA: ICD-10-CM

## 2017-09-13 LAB
ALBUMIN SERPL BCP-MCNC: 3.2 G/DL (ref 3.5–5)
ALP SERPL-CCNC: 67 U/L (ref 46–116)
ALT SERPL W P-5'-P-CCNC: 27 U/L (ref 12–78)
ANION GAP SERPL CALCULATED.3IONS-SCNC: 8 MMOL/L (ref 4–13)
AST SERPL W P-5'-P-CCNC: 11 U/L (ref 5–45)
BASOPHILS # BLD AUTO: 0.02 THOUSANDS/ΜL (ref 0–0.1)
BASOPHILS NFR BLD AUTO: 0 % (ref 0–1)
BILIRUB SERPL-MCNC: 0.61 MG/DL (ref 0.2–1)
BUN SERPL-MCNC: 18 MG/DL (ref 5–25)
CALCIUM ALBUM COR SERPL-MCNC: 10.8 MG/DL (ref 8.3–10.1)
CALCIUM SERPL-MCNC: 10.2 MG/DL (ref 8.3–10.1)
CHLORIDE SERPL-SCNC: 107 MMOL/L (ref 100–108)
CO2 SERPL-SCNC: 26 MMOL/L (ref 21–32)
CREAT SERPL-MCNC: 0.86 MG/DL (ref 0.6–1.3)
CRP SERPL QL: <3 MG/L
EOSINOPHIL # BLD AUTO: 0.41 THOUSAND/ΜL (ref 0–0.61)
EOSINOPHIL NFR BLD AUTO: 4 % (ref 0–6)
ERYTHROCYTE [DISTWIDTH] IN BLOOD BY AUTOMATED COUNT: 16.7 % (ref 11.6–15.1)
ERYTHROCYTE [SEDIMENTATION RATE] IN BLOOD: 12 MM/HOUR (ref 0–20)
GFR SERPL CREATININE-BSD FRML MDRD: 66 ML/MIN/1.73SQ M
GLUCOSE SERPL-MCNC: 78 MG/DL (ref 65–140)
HCT VFR BLD AUTO: 33.8 % (ref 34.8–46.1)
HGB BLD-MCNC: 10.7 G/DL (ref 11.5–15.4)
LYMPHOCYTES # BLD AUTO: 1.43 THOUSANDS/ΜL (ref 0.6–4.47)
LYMPHOCYTES NFR BLD AUTO: 15 % (ref 14–44)
MCH RBC QN AUTO: 30.4 PG (ref 26.8–34.3)
MCHC RBC AUTO-ENTMCNC: 31.7 G/DL (ref 31.4–37.4)
MCV RBC AUTO: 96 FL (ref 82–98)
MONOCYTES # BLD AUTO: 0.66 THOUSAND/ΜL (ref 0.17–1.22)
MONOCYTES NFR BLD AUTO: 7 % (ref 4–12)
NEUTROPHILS # BLD AUTO: 7.19 THOUSANDS/ΜL (ref 1.85–7.62)
NEUTS SEG NFR BLD AUTO: 74 % (ref 43–75)
NRBC BLD AUTO-RTO: 0 /100 WBCS
PLATELET # BLD AUTO: 285 THOUSANDS/UL (ref 149–390)
PMV BLD AUTO: 9.6 FL (ref 8.9–12.7)
POTASSIUM SERPL-SCNC: 3.7 MMOL/L (ref 3.5–5.3)
PROT SERPL-MCNC: 6.5 G/DL (ref 6.4–8.2)
RBC # BLD AUTO: 3.52 MILLION/UL (ref 3.81–5.12)
SODIUM SERPL-SCNC: 141 MMOL/L (ref 136–145)
WBC # BLD AUTO: 9.74 THOUSAND/UL (ref 4.31–10.16)

## 2017-09-13 PROCEDURE — 86140 C-REACTIVE PROTEIN: CPT

## 2017-09-13 PROCEDURE — 85652 RBC SED RATE AUTOMATED: CPT

## 2017-09-13 PROCEDURE — 80053 COMPREHEN METABOLIC PANEL: CPT

## 2017-09-13 PROCEDURE — 36415 COLL VENOUS BLD VENIPUNCTURE: CPT

## 2017-09-13 PROCEDURE — 85025 COMPLETE CBC W/AUTO DIFF WBC: CPT

## 2017-09-20 ENCOUNTER — GENERIC CONVERSION - ENCOUNTER (OUTPATIENT)
Dept: OTHER | Facility: OTHER | Age: 77
End: 2017-09-20

## 2017-09-22 DIAGNOSIS — Z12.31 ENCOUNTER FOR SCREENING MAMMOGRAM FOR MALIGNANT NEOPLASM OF BREAST: ICD-10-CM

## 2017-09-28 ENCOUNTER — HOSPITAL ENCOUNTER (OUTPATIENT)
Dept: RADIOLOGY | Age: 77
Discharge: HOME | End: 2017-09-28
Payer: MEDICARE

## 2017-09-28 DIAGNOSIS — Z12.31 ENCOUNTER FOR SCREENING MAMMOGRAM FOR MALIGNANT NEOPLASM OF BREAST: ICD-10-CM

## 2017-09-28 PROCEDURE — G0202 SCR MAMMO BI INCL CAD: HCPCS

## 2017-10-03 ENCOUNTER — GENERIC CONVERSION - ENCOUNTER (OUTPATIENT)
Dept: OTHER | Facility: OTHER | Age: 77
End: 2017-10-03

## 2017-10-03 ENCOUNTER — GENERIC CONVERSION - ENCOUNTER (OUTPATIENT)
Dept: INTERNAL MEDICINE CLINIC | Facility: CLINIC | Age: 77
End: 2017-10-03

## 2017-12-01 DIAGNOSIS — I10 ESSENTIAL (PRIMARY) HYPERTENSION: ICD-10-CM

## 2017-12-01 DIAGNOSIS — E53.8 DEFICIENCY OF OTHER SPECIFIED B GROUP VITAMINS: ICD-10-CM

## 2017-12-01 DIAGNOSIS — E78.5 HYPERLIPIDEMIA: ICD-10-CM

## 2018-01-01 DIAGNOSIS — E55.9 VITAMIN D DEFICIENCY: ICD-10-CM

## 2018-01-10 NOTE — RESULT NOTES
Message   Recorded as Task   Date: 09/22/2016 02:28 PM, Created By: Compa Downey   Task Name: Follow Up   Assigned To: Ysabel Plasencia   Regarding Patient: Charissa Angeles, Status: In Progress   Comment:    Dax Moya - 22 Sep 2016 2:28 PM     TASK CREATED  Please let Mrs Carmen Singh know that her Mammography was normal and follow in 1 year  [address] - 97 QA 4275 2:32 PM     TASK IN PROGRESS   Walker Andrade - 22 Sep 2016 2:44 PM     TASK EDITED   I lm of this for pt   Walker Andrade - 22 Sep 2016 2:44 PM     TASK EDITED        Signatures   Electronically signed by :  Josleo Perales, ; Sep 22 2016  2:45PM EST                       (Author)

## 2018-01-11 NOTE — MISCELLANEOUS
Assessment   1  Acute colitis (558 9) (K52 9)  2  Hypertension (401 9) (I10)  3  Hyperparathyroidism (252 00) (E21 3)  4  Hyperlipidemia (272 4) (E78 5)  5  Osteopenia (733 90) (M85 80)     #1 recent noted bright red blood per rectum with endoscopic findings on colonoscopy of colitis  Colorectal physician feels based on appearance consistent with Crohn's disease  Biopsy read as colitis but no evidence of granuloma  Stool for C  difficile negative  Responding to anti-inflammatories  She is currently on prednisone 40 mg daily in addition to mesalamine  Further dosing per colorectal physician  I also recommended for completeness she was told for C&S as well as O&P weekly Ã3  #2 dyspepsia-endoscopy shows hilar hernia, some erythema in the antrum  Biopsy read as some gastric mucosa in the esophagus but no metaplasia  Symptoms much improved  I did however recommend she stay an H2 blocker for now because of her prednisone use  #3 hoarseness-ENT physician feels she has LPR with some erythema the glottis on exam  Previously was on an PPI as well as an H2 blocker  Had some breakthrough symptoms when she cut back on a PPI-at this point stable-watching carefully on the H2 blocker alone  #4 vitamin D deficiency-continue replacement  #5 primary hyperparathyroidism with hypercalcemia  Parathyroid scan shows inferior parathyroid adenoma  She declined surgery previously but should consider this in the future  Endocrinology felt we could monitor  She has not had any nephrolithiasis  Most recent bone density shows decline at -2 2 from prior value of -1 9  We also now have the issue of steroid use   WE WILL DISCUSS IN DETAIL AT NEXT VISIT-has steroid dose is tapered and she is less immunosuppressed I feel we need to think about surgery and we will review this again with the patient next visit  #6 hyperlipidemia-continue current therapy-expect some exacerbation with steroid use  #7 hypertension-secondary screen negative-stable on current therapy-watch for exacerbation with steroid use  #8 carotid stenosis-status post right carotid endarterectomy with vein patch angioplasty for 99% stenosis with right hemispheric CVA-recovered nicely  Had been on Aggrenox but switched aspirin  Continue aspirin 81 mg a day  She is yearly carotid Doppler via her vascular surgeon-Dr Hayward-she was told this was stable    MEDICAL REGIMEN: Atenolol 25 mg twice a day, Avapro 300 mg daily, baby aspirin daily, hydrochlorothiazide 25 MGs-half tab daily, Crestor 7 5 mg daily using 5 mg tablets, amlodipine 5 mg daily, vitamin D 3/4000 units a day, fish oil one daily, Zantac 150 mg half hour before breakfast and evening meal, prednisone currently at 40 mg daily, Asacol 800 mg 3 times a day    CBC SMA over the next couple weeks  Appointment in one month  At next visit need to review D in detail use of steroids, worsening of potential metabolic bone disease and need for parathyroid surgery in the future    Addendum-labs done September the 13th show hemoglobin 10 7 with otherwise normal CBC, C-reactive protein normal, chemistry profile normal other than calcium of 10 2 with albumin of 3 2 meaning corrected calcium is higher, sedimentation rate normal at 12  Plan  Hyperlipidemia, Hypertension    · (1) CBC/PLT/DIFF; Status:Active; Requested PBZ:[id number];   Perform:[facility]; CFS:[id number]; Ordered; For:Hyperlipidemia, Hypertension; Ordered   By:OMERO Martin;   · (1) COMPREHENSIVE METABOLIC PANEL; Status:Active; Requested VGD:[id number];   Perform:[facility]; WOH:[id number]; Ordered; For:Hyperlipidemia, Hypertension; Ordered   By:OMERO Matrin;   · (1) C-REACTIVE PROTEIN; Status:Active; Requested VMY:[id number];   Perform:[facility]; GKL:[id number]; Ordered; For:Hyperlipidemia, Hypertension; Ordered   By:OMERO Martin;   · (1) SED RATE; Status:Active; Requested TIJ:[id number];   Perform:[facility]; PGA:[id number]; Ordered; For:Hyperlipidemia, Hypertension; Ordered   By:OMERO Martin [place];  Stomach pain    · (1) OVA AND PARASITES EXAM; Status:Active; Requested AUT:[id number];   Perform:[facility]; SKH:[id number]; Ordered; For:Stomach pain; Ordered   By:OMERO Martin;   · (1) OVA AND PARASITES EXAM; Status:Active; Requested DBE:[id number];   Perform:[facility]; VRC:[id number]; Ordered; For:Stomach pain; Ordered   By:OMERO Martin;   · (1) OVA AND PARASITES EXAM; Status:Active; Requested DHQ:[id number];   Perform:[facility]; UYP:[id number]; Ordered; For:Stomach pain; Ordered   By:OMERO Martin;   · (1) STOOL CULTURE; Source:Rectum; Status:Active; Requested PNH:[id number];   Perform:[facility]; BSM:[id number]; Ordered; For:Stomach pain; Ordered   By:OMERO Martin;    Obtain stool specimens  Follow-up labs over the next couple weeks  Steroid tapering as per colorectal physician  Takes Zantac as 150 mg half hour before breakfast and half hour before eating poorly while on steroids  Obtain flu shot in October  Watch carefully for new joint pain, low back pain etc      History of Present Illness  TCM Communication [facility]: The patient is being contacted for follow-up after hospitalization  Hospital records were reviewed  She was hospitalized at [facility]  The date of admission: 08/21/2017, date of discharge: 08/24/2017  Diagnosis: Colorectal Surgery  She was discharged to home  She scheduled a follow up appointment  Follow-up appointments with other specialists: Pt scheduled with Dr Martin on 8/30/17, patient stated the time that was offered was not a good time for her and she proceeded to cancel her appt, she will call at a later date to set up an appt  Omi Parmar  Communication performed and completed by Dane Moy   HPI: She is seen today after recent hospitalization  She developed some lower tract GI symptoms with minimal amounts of rectal bleeding   She had a colonoscopy performed on July the 28th which showed moderately severe Crohn's found in the ascending colon and sigmoid colon  She had mildly severe diverticular disease  Attempt to enter terminal ileum was not possible plus mucosal is normal  Biopsies were reviewed and tissue read as mild active colitis but no definite granulomas  She had worsening symptoms was admitted to the hospital with increasing bleeding  Was felt likely that this was on the basis of Crohn's colitis  She was having difficulty with by mouth intake prior to admission  She received IV and then was switched over to oral steroids as it is much improved at this point  She is now home on prednisone 40 mg daily  Stool for C  difficile was negative  She denies recent antibiotic use  Denies family history of inflammatory bowel disease  We are long discussion today regarding the above  I explained to the patient that colorectal physician felt based on appearance this is very suspicious for Crohn's colitis  She wanted to know about prednisone taper and timing of this will be dictated by the colorectal physician  She also had an endoscopy because of some upper tract symptoms  This was reviewed and shows normal duodenal bulb, erythema in the antrum  Hilar hernia  She had 1 cm circumferential columnar mucosa with biopsy taken-negative for H  pylori e- there was no evidence of metaplasia  This patient has a history of osteopenia so she with hyperparathyroidism  This will become more of an issue with her steroid use and we will BE reviewing that in detail next visit  We are long discussion today regarding types of colitis  We reviewed that inflammatory bowel disease is usually a younger age but can be seen later in life  She has a history of hypertension  We have to watch for exacerbation with use of the corticosteroids  She already avoids solids decongestants  Denies simultaneous pounding of her heart sweats and headache    She is not having major dyspepsia present   Because of the use of steroids and her history of some reflux we elected to maintain her on an H2 blocker at this point, we have to watch his long-term with her history of metabolic bone disease         Review of Systems  Complete-Female:   Constitutional: No fever, no chills, feels well, no tiredness, no recent weight gain or weight loss  Eyes: No complaints of eye pain, no red eyes, no eyesight problems, no discharge, no dry eyes, no itching of eyes  ENT: hoarseness, but no earache, no nosebleeds, no sore throat, no hearing loss and no nasal discharge  Cardiovascular: No complaints of slow heart rate, no fast heart rate, no chest pain, no palpitations, no leg claudication, no lower extremity edema  Respiratory: No complaints of shortness of breath, no wheezing, no cough, no SOB on exertion, no orthopnea, no PND  Gastrointestinal: bloody stools, but no abdominal pain, no nausea, no vomiting, no constipation and no diarrhea  Genitourinary: No complaints of dysuria, no incontinence, no pelvic pain, no dysmenorrhea, no vaginal discharge or bleeding  Musculoskeletal: No complaints of arthralgias, no myalgias, no joint swelling or stiffness, no limb pain or swelling  Integumentary: No complaints of skin rash or lesions, no itching, no skin wounds, no breast pain or lump  Neurological: No complaints of headache, no confusion, no convulsions, no numbness, no dizziness or fainting, no tingling, no limb weakness, no difficulty walking  Psychiatric: Not suicidal, no sleep disturbance, no anxiety or depression, no change in personality, no emotional problems  Endocrine: No complaints of proptosis, no hot flashes, no muscle weakness, no deepening of the voice, no feelings of weakness  Hematologic/Lymphatic: No complaints of swollen glands, no swollen glands in the neck, does not bleed easily, does not bruise easily  Active Problems   1  Abnormal female pelvic exam (793 5) (Z01 411)  2   Atherosclerosis (440 9) (I70 90)  3  Carotid artery narrowing (433 10) (I65 29)  4  Colon cancer screening (V76 51) (Z12 11)  5  Diverticulosis (562 10) (K57 90)  6  Esophageal reflux (530 81) (K21 9)  7  Glaucoma (365 9) (H40 9)  8  Hoarseness (784 42) (R49 0)  9  Hypercalcemia (275 42) (E83 52)  10  Hyperlipidemia (272 4) (E78 5)  11  Hyperparathyroidism (252 00) (E21 3)  12  Hypertension (401 9) (I10)  13  Laryngopharyngeal reflux (478 79) (K21 9)  14  Osteopenia (733 90) (M85 80)  15  Severe cervical dysplasia, histologically confirmed (233 1) (D06 9)  16  Vitamin B 12 deficiency (266 2) (E53 8)  17  Vitamin D deficiency (268 9) (E55 9)    Past Medical History   1  History of Atherosclerosis (440 9) (I70 90)  2  History of Cough (786 2) (R05)  3  History of  2  4  History of carotid artery stenosis (V12 59) (Z86 79)  5  History of hyperlipidemia (V12 29) (Z86 39)  6  History of hyperparathyroidism (V12 29) (Z86 39)  7  History of hypertension (V12 59) (Z86 79)  8  History of stroke (V12 54) (Z86 73)  9  History of thyroid nodule (V12 29) (Z86 39)  10  History of upper respiratory infection (V12 09) (Z87 09)  11  History of Hot Flashes After Recent Gynecological Surgery  12  History of Need for pneumococcal vaccination (V03 82) (Z23)  13  History of Paresthesia of arm (782 0) (R20 2)  14  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)  15  History of Severe dysplasia of cervix (TAMIR III) (233 1) (D06 9)  16  History of  (spontaneous vaginal delivery) (650) (O80)  17  History of Visit for screening mammogram (V76 12) (Z12 31)    Surgical History   1  History of Appendectomy  2  History of Breast Surgery Reduction Procedure  3  History of Carotid Artery Exploration  4  History of Carotid Thromboendarterectomy  5  History of Complete Colonoscopy  6  History of Diagnostic Esophagogastroduodenoscopy  7  History of Hysterectomy  Surgical History Reviewed: The surgical history was reviewed and updated today  Family History  Mother   1  Family history of osteoporosis (V17 81) (Z82 62)  2  Family history of Osteoarthritis (V17 7)  Father   3  Family history of Heart problem  Family History   4  Family history of Atherosclerosis (V17 49)  5  Family history of Hyperlipidemia  Family History Reviewed: The family history was reviewed and updated today  Social History    · Being A Social Drinker   · Currently sexually active   · Drinks coffee   · Drinks wine (V49 89) (Z78 9)   · Denied: History of Drug Use   · Lack of exercise (V69 0) (Z72 3)   ·    · Never A Smoker   · Retired From Work  Social History Reviewed: The social history was reviewed and updated today  The social history was reviewed and is unchanged  Current Meds  1  AmLODIPine Besylate 5 MG Oral Tablet; TAKE 1 TABLET DAILY AS DIRECTED; Therapy: 93LWD3758 to (Evaluate:39Xzi3830)  Requested for: 31ZZW4524 Recorded  2  Atenolol 25 MG Oral Tablet; TAKE 1 TABLET TWICE DAILY; Therapy: 75LMO7861 to (Evaluate:10Hts8232)  Requested for: 01DMI4725 Recorded  3  Avapro 300 MG Oral Tablet; TAKE 1 TABLET DAILY; Therapy: 00IXV9226 to (Evaluate:03Cve3709)  Requested for: 09VWC9964 Recorded  4  Crestor 5 MG Oral Tablet; 1 1/2 tablet daily; Therapy: 55ANA6234 to (Evaluate:60Yqs9313)  Requested for: 81FIG3045 Recorded  5  HydroCHLOROthiazide 12 5 MG Oral Tablet; 1/2 TABLET DAILY; Therapy: 92QLH3527 to (Evaluate:70Dyf3818)  Requested for: 97SCT6410 Recorded  6  Vitamin D 1000 UNIT CAPS; 3 DAILY; Therapy: (Recorded:24Phr3586) to Recorded  Medication List Reviewed: The medication list was reviewed and updated today  Allergies   1   No Known Drug Allergies    Vitals  Signs   Recorded: 41RGC7640 07:47AM   Heart Rate: 72  Respiration: 14  Systolic: 188  Diastolic: 80  Recorded: 66YSR5490 11:07AM   Temperature: 98 1 F  Height: 5 ft 5 in  Weight: 176 lb 2 oz  BMI Calculated: 29 31  BSA Calculated: 1 87    Physical Exam    Constitutional   General appearance: No acute distress, well appearing and well nourished  Head and Face   Head and face: Normal     Palpation of the face and sinuses: No sinus tenderness  Eyes   Conjunctiva and lids: No swelling, erythema or discharge  Pupils and irises: Equal, round, reactive to light  Ears, Nose, Mouth, and Throat   External inspection of ears and nose: Normal     Otoscopic examination: Tympanic membranes translucent with normal light reflex  Canals patent without erythema  Hearing: Normal     Nasal mucosa, septum, and turbinates: Normal without edema or erythema  Lips, teeth, and gums: Normal, good dentition  Oropharynx: Normal with no erythema, edema, exudate or lesions  Neck   Neck: Supple, symmetric, trachea midline, no masses  Thyroid: Normal, no thyromegaly  Pulmonary   Respiratory effort: No increased work of breathing or signs of respiratory distress  Percussion of chest: Normal     Palpation of chest: Normal     Auscultation of lungs: Clear to auscultation  Cardiovascular   Palpation of heart: Normal PMI, no thrills  Auscultation of heart: Normal rate and rhythm, normal S1 and S2, no murmurs  Carotid pulses: 2+ bilaterally  Abdominal aorta: Normal     Femoral pulses: 2+ bilaterally  Pedal pulses: 2+ bilaterally  Peripheral vascular exam: Normal     Examination of extremities for edema and/or varicosities: Abnormal   Superficial venous disease  Chest   Breasts: Normal, no dimpling or skin changes appreciated  Palpation of breasts and axillae: Normal, no masses palpated  Chest: Normal     Abdomen   Abdomen: Non-tender, no masses  Liver and spleen: No hepatomegaly or splenomegaly  Examination for hernias: No hernia appreciated  Anus, perineum, and rectum: Normal sphincter tone, no masses, no prolapse  Genitourinary   Uterus: Normal size, no tenderness, no masses  Adnexa/Parametria: Normal, no masses or tenderness      Lymphatic   Palpation of lymph nodes in neck: No lymphadenopathy  Palpation of lymph nodes in axillae: No lymphadenopathy  Palpation of lymph nodes in groin: No lymphadenopathy  Palpation of lymph nodes in other areas: No lymphadenopathy  Musculoskeletal   Gait and station: Normal     Digits and nails: Normal without clubbing or cyanosis  Joints, bones, and muscles: Normal     Range of motion: Normal     Stability: Normal     Muscle strength/tone: Normal     Skin   Skin and subcutaneous tissue: Normal without rashes or lesions  Palpation of skin and subcutaneous tissue: Normal turgor  Neurologic   Cranial nerves: Cranial nerves II-XII intact  Reflexes: 2+ and symmetric  Sensation: No sensory loss  Psychiatric   Judgment and insight: Normal     Orientation to person, place, and time: Normal     Recent and remote memory: Intact  Mood and affect: Normal        Future Appointments    Date/Time Provider Specialty Site   10/03/2017 04:00 PM PAULETTE Curry  Internal Medicine Bindu Gomes MD   12/11/2017 11:30 AM PAULETTE Curry   Internal Medicine Bindu Gomes MD     Signatures   Electronically signed by : PAULETTE Dangelo ; Sep  6 2017  8:00AM EST                       (Author)    Electronically signed by : PAULETTE Dangelo ; Sep 17 2017 11:43AM EST                       (Author)

## 2018-01-12 NOTE — MISCELLANEOUS
Message   Recorded as Task   Date: 09/20/2017 11:01 AM, Created By: Jackie Costa   Task Name: Care Coordination   Assigned To: Eve Mancilla   Regarding Patient: Jj Adam, Status: Active   CommentEsmond Marchi - 20 Sep 2017 11:01 AM     TASK CREATED  Caller: Self; [phone] (Home); [phone] (Work)  pt requested nitin order,faxed to [facility]        Active Problems    1  Abnormal female pelvic exam (793 5) (Z01 411)   2  Acute colitis (558 9) (K52 9)   3  Atherosclerosis (440 9) (I70 90)   4  Carotid artery narrowing (433 10) (I65 29)   5  Colon cancer screening (V76 51) (Z12 11)   6  Diverticulosis (562 10) (K57 90)   7  Encounter for screening mammogram for malignant neoplasm of breast (V76 12)   (Z12 31)   8  Esophageal reflux (530 81) (K21 9)   9  Glaucoma (365 9) (H40 9)   10  Hoarseness (784 42) (R49 0)   11  Hypercalcemia (275 42) (E83 52)   12  Hyperlipidemia (272 4) (E78 5)   13  Hyperparathyroidism (252 00) (E21 3)   14  Hypertension (401 9) (I10)   15  Laryngopharyngeal reflux (478 79) (K21 9)   16  Osteopenia (733 90) (M85 80)   17  Severe cervical dysplasia, histologically confirmed (233 1) (D06 9)   18  Stomach pain (536 8) (R10 9)   19  Vitamin B 12 deficiency (266 2) (E53 8)   20  Vitamin D deficiency (268 9) (E55 9)    Current Meds   1  AmLODIPine Besylate 5 MG Oral Tablet; TAKE 1 TABLET DAILY AS DIRECTED; Therapy: 88LBC8493 to (Evaluate:69Kps9967)  Requested for: 62NZZ3528 Recorded   2  Atenolol 25 MG Oral Tablet; TAKE 1 TABLET TWICE DAILY; Therapy: 43FYI4126 to (Evaluate:18Trh9421)  Requested for: 19AFF2571 Recorded   3  Avapro 300 MG Oral Tablet (Irbesartan); TAKE 1 TABLET DAILY; Therapy: 02YAL1053 to (Evaluate:18Kmu1245)  Requested for: 45LYX1508 Recorded   4  Crestor 5 MG Oral Tablet (Rosuvastatin Calcium); 1 1/2 tablet daily; Therapy: 48VUY2178 to (Evaluate:29Roo3959)  Requested for: 71NHU8633 Recorded   5   HydroCHLOROthiazide 12 5 MG Oral Tablet; 1/2 TABLET DAILY; Therapy: 41GZB5737 to (Evaluate:65Ncy9281)  Requested for: 13XYL0258 Recorded   6  Vitamin D 1000 UNIT CAPS; 3 DAILY; Therapy: (Recorded:99Xym0566) to Recorded    Allergies    1  No Known Drug Allergies    Plan  Encounter for screening mammogram for malignant neoplasm of breast    · * MAMMO SCREENING BILATERAL W CAD; Status:Hold For - Scheduling,Retrospective  By Protocol Authorization;  Requested OFS:[id number];     Signatures   Electronically signed by : Ru Prakash, ; Sep 20 2017 11:02AM EST                       (Author)

## 2018-01-14 VITALS
RESPIRATION RATE: 14 BRPM | DIASTOLIC BLOOD PRESSURE: 80 MMHG | TEMPERATURE: 98.1 F | BODY MASS INDEX: 29.34 KG/M2 | HEIGHT: 65 IN | WEIGHT: 176.13 LBS | HEART RATE: 72 BPM | SYSTOLIC BLOOD PRESSURE: 130 MMHG

## 2018-01-14 VITALS
WEIGHT: 189 LBS | RESPIRATION RATE: 14 BRPM | DIASTOLIC BLOOD PRESSURE: 76 MMHG | HEIGHT: 65 IN | HEART RATE: 68 BPM | BODY MASS INDEX: 31.49 KG/M2 | SYSTOLIC BLOOD PRESSURE: 130 MMHG

## 2018-01-14 NOTE — RESULT NOTES
Discussion/Summary   Please inform the patient that biopsies were upper endoscopy were normal, no infection with H  pylori, no evidence of Harkins's esophagus  Please have the patient call Dr Pritchard to discuss results of her colonoscopy biopsies  Verified Results  (1) TISSUE EXAM [id number] 07:43AM Chris Fraga     Test Name Result Flag Reference   LAB AP CASE REPORT (Report)     Surgical Pathology Report             Case: [id number]                   Authorizing Provider: Jaspal Mims MD      Collected:      07/28/2017 0743        Ordering Location:   [facility]    Received:      07/28/2017 [address] [place] [address] Endoscopy                               Pathologist:      Jesica Bryan MD                                 Specimens:  A) - Stomach, Cold forcep biopsy gastric, r/o H pylori                         B) - Esophagus, Cold forcep biopsy, r/o Harkins's                           C) - Large Intestine, Cecum, Cold forcep biopsy cecum                         D) - Colon, Cold forcep biopsy hepatic                                 E) - Large Intestine, Transverse Colon, Cold forcep biopsy transverse colon              F) - Colon, Cold forcep biopsy splenic                                 G) - Large Intestine, Left/Descending Colon, Cold forcep biopsy descending               H) - Large Intestine, Sigmoid Colon, Cold forcep biopsy sigmoid                    I) - Rectum, Cold forcep biopsy rectum   LAB AP FINAL DIAGNOSIS (Report)     A  Stomach (biopsy):    - Minimal chronic inactive gastritis involving antral mucosa  - Immunostain for H  pylori (with appropriate positive control) is   negative  - No intestinal metaplasia, dysplasia or neoplasia identified  B  Esophagus (biopsy):    - Gastric oxyntic and foveolar mucosa with mild chronic inflammation     - No squamous mucosa present for evaluation     - No intestinal metaplasia, dysplasia or neoplasia identified      Cece Perez (biopsy):    - Colonic mucosa with no significant pathologic abnormalities  - No active inflammation or histologic evidence of a microscopic   (lymphocytic)     or collagenous colitis noted  - No granulomas, dysplasia or neoplasia identified  D  Hepatic flexure, colon (biopsy):    - Colonic mucosa with no significant pathologic abnormalities  - No active inflammation or histologic evidence of a microscopic   (lymphocytic)     or collagenous colitis noted  - No granulomas, dysplasia or neoplasia identified  E  Transverse colon (biopsy):    - Colonic mucosa with no significant pathologic abnormalities  - No active inflammation or histologic evidence of a microscopic   (lymphocytic)     or collagenous colitis noted  - Mildly pigmented histiocytes noted  - No granulomas, dysplasia or neoplasia identified  F  Splenic flexure, colon (biopsy):    - Colonic mucosa with no significant pathologic abnormalities  - No active inflammation or histologic evidence of a microscopic   (lymphocytic)     or collagenous colitis noted  - Mildly pigmented histiocytes noted  - No granulomas, dysplasia or neoplasia identified  G  Descending colon (biopsy):    - Colonic mucosa with no significant pathologic abnormalities  - No active inflammation or histologic evidence of a microscopic   (lymphocytic)     or collagenous colitis noted  - Mildly pigmented histiocytes noted  - No granulomas, dysplasia or neoplasia identified  H  Sigmoid colon (biopsy):    - Mild active colitis with suggestion of chronic inflammatory changes  - CMV study pending      - No granulomas, dysplasia or neoplasia identified  I  Rectum (biopsy):    - Colonic mucosa with no significant pathologic abnormalities  - No active inflammation or histologic evidence of a microscopic   (lymphocytic)     or collagenous colitis noted  - No granulomas, dysplasia or neoplasia identified    Electronically signed by Emy Vines MD Michelle on 8/1/2017 at 1:01 PM   LAB AP NOTE      Interpretation performed at [facility], [address], [address] [facility]  LAB AP SURGICAL ADDITIONAL INFORMATION (Report)     All controls performed with the immunohistochemical stains reported above   reacted appropriately  These tests were developed and their performance   characteristics determined by [facility] or   [employer]  They may not be cleared or approved by the [place]  Food and Drug Administration  The FDA has determined that such clearance   or approval is not necessary  These tests are used for clinical purposes  They should not be regarded as investigational or for research  This   laboratory has been approved by [facility], designated as a high-complexity   laboratory and is qualified to perform these tests  LAB AP GROSS DESCRIPTION (Report)     A  The specimen is received in formalin, labeled with the patient's name   and hospital number, and is designated Gastric biopsy  The specimen   consists of 4 tan-pink soft tissue fragments measuring 0 2 cm, 0 3 cm, 0 4   cm, and 0 5 cm in greatest dimension  Entirely submitted  One cassette  B  The specimen is received in formalin, labeled with the patient's name   and hospital number, and is designated Esophagus biopsy  The specimen   consists of 4 tan-pink soft tissue fragments measuring 0 2 cm, 0 3 cm, 0 5   cm, and 0 5 cm in greatest dimension  Entirely submitted  One cassette  C  The specimen is received in formalin, labeled with the patient's name   and hospital number, and is designated Cecum biopsy  The specimen   consists of one tan-pink soft tissue fragment measuring 0 3 cm in greatest   dimension  Entirely submitted  One cassette  D  The specimen is received in formalin, labeled with the patient's name   and hospital number, and is designated Hepatic biopsy   The specimen   consists of one tan-pink soft tissue fragment measuring 0 4 cm in greatest   dimension  Entirely submitted  One cassette  E  The specimen is received in formalin, labeled with the patient's name   and hospital number, and is designated  Transverse colon biopsy  The   specimen consists of one tan-pink soft tissue fragment measuring 0 9 cm in   greatest dimension  Entirely submitted  One cassette  F  The specimen is received in formalin, labeled with the patient's name   and hospital number, and is designated Splenic biopsy  The specimen   consists of one tan-pink soft tissue fragment measuring 0 2 cm in greatest   dimension  Entirely submitted  One cassette  G  The specimen is received in formalin, labeled with the patient's name   and hospital number, and is designated Descending colon biopsy  The   specimen consists of one tan-pink soft tissue fragment measuring 0 5 cm in   greatest dimension  Entirely submitted  One cassette  H  The specimen is received in formalin, labeled with the patient's name   and hospital number, and is designated Sigmoid colon biopsy  The   specimen consists of 2 tan-pink soft tissue fragments measuring 0 5 cm and   0 6 cm in greatest dimension  Entirely submitted  One cassette  I  The specimen is received in formalin, labeled with the patient's name   and hospital number, and is designated Rectum biopsy  The specimen   consists of 2 tan-pink soft tissue fragments each measuring 0 3 cm in   greatest dimension  Entirely submitted  One cassette  Note: The estimated total formalin fixation time based upon information   provided by the submitting clinician and the standard processing schedule   is 18 25 hours  MAS   LAB AP CLINICAL INFORMATION      Rectal bleeding  GERD (gastroesophageal reflux disease)  R/o h pylori   LAB AP ADDENDUM 1      CMV stain performed on the sigmoid colon biopsy (part H, with appropriate   control) is negative    Addendum electronically signed by Edu Bowman MD on 8/2/2017 at 10:49 AM

## 2018-01-14 NOTE — MISCELLANEOUS
Message   Recorded as Task   Date: 01/13/2017 08:39 AM, Created By: Cayden Rizzo   Task Name: Care Coordination   Assigned To: [facility],Team   Regarding Patient: Charlette Gomez, Status: In Progress   Sue Vivas - 13 Jan 2017 8:39 AM     TASK CREATED  Please let Mrs Jeff Jc know that BMD is minimally different (-2 2 in left femoral neck, where it was -2 0 in 2014  ) She does not have osteoporosis, and does not need new Rx  While we would ask her to push dietary calcium (along with 1000 U Vit D per day) , and weight bearing exercise, I would have her follow Dr Rod Faria recommendation on the Calcium intake because of her hypercalcemia that he is following  Zoila Brooks - 13 Jan 2017 8:59 AM     TASK IN PROGRESS   Marcy Herrera - 13 Jan 2017 9:02 AM     TASK EDITED  pt informed dexa results and jm's instructions        Active Problems    1  Abnormal female pelvic exam (793 5) (Z01 411)   2  Atherosclerosis (440 9) (I70 90)   3  Carotid artery narrowing (433 10) (I65 29)   4  Diverticulosis (562 10) (K57 90)   5  Encounter for gynecologic examination for high-risk patient covered by Medicare   (V72 31,V15 89) (Z91 89)   6  Encounter for gynecological examination without abnormal finding (V72 31) (Z01 419)   7  Encounter for screening mammogram for malignant neoplasm of breast (V76 12)   (Z12 31)   8  Esophageal reflux (530 81) (K21 9)   9  Glaucoma (365 9) (H40 9)   10  History of long-term treatment with high-risk medication (V58 69) (Z92 29)   11  Hoarseness (784 42) (R49 0)   12  Hypercalcemia (275 42) (E83 52)   13  Hyperlipidemia (272 4) (E78 5)   14  Hyperparathyroidism (252 00) (E21 3)   15  Hypertension (401 9) (I10)   16  Laryngopharyngeal reflux (478 79) (J38 7)   17  Need for influenza vaccination (V04 81) (Z23)   18  Osteopenia (733 90) (M85 80)   19  Severe cervical dysplasia, histologically confirmed (233 1) (D06 9)   20   Vitamin D deficiency (268 9) (E55 9)    Current Meds   1  Aggrenox  MG Oral Capsule Extended Release 12 Hour (Aspirin-Dipyridamole   ER); TAKE 1 CAPSULE TWICE DAILY; Therapy: 72VTX3502 to (Evaluate:06Feb2013)  Requested for: 31QJO5903 Recorded   2  AmLODIPine Besylate 5 MG Oral Tablet; TAKE 1 TABLET DAILY AS DIRECTED; Therapy: 19RCF5407 to (Evaluate:06Feb2013)  Requested for: 91WAE6627 Recorded   3  Atenolol 25 MG Oral Tablet; TAKE 1 TABLET TWICE DAILY; Therapy: 49XCW7864 to (Evaluate:06Feb2013)  Requested for: 54XMU2365 Recorded   4  Avapro 300 MG Oral Tablet (Irbesartan); TAKE 1 TABLET DAILY; Therapy: 19OIT2618 to (Evaluate:06Feb2013)  Requested for: 27EYR3844 Recorded   5  Azopt 1 % Ophthalmic Suspension; Therapy: 82WMU0264 to (Last Rx:20Jan2011)  Requested for: 68DQL9851 Ordered   6  Crestor 5 MG Oral Tablet (Rosuvastatin Calcium); 1 1/2 tablet daily; Therapy: 63KXG8599 to (Evaluate:06Feb2013)  Requested for: 89QZQ7762 Recorded   7  HydroCHLOROthiazide 12 5 MG Oral Tablet; 1/2 TABLET DAILY; Therapy: 14WAY3289 to (Evaluate:06Feb2013)  Requested for: 53BUN5906 Recorded   8  Lovaza 1 GM Oral Capsule (Omega-3-acid Ethyl Esters); 1 tablet twicw daily; Therapy: 11PWM1134 to (Evaluate:06Feb2013)  Requested for: 03IVR9134 Recorded   9  Lumigan 0 01 % Ophthalmic Solution; Therapy: 52ZPT3654 to (Last Rx:20Jan2011)  Requested for: 55GAZ4402 Ordered   10  Vitamin D 1000 UNIT CAPS; 3 DAILY; Therapy: (Recorded:94Jjb9445) to Recorded    Allergies    1   No Known Drug Allergies    Signatures   Electronically signed by : Eduard Weller, ; Jan 13 2017  9:02AM EST                       (Author)

## 2018-01-22 VITALS — DIASTOLIC BLOOD PRESSURE: 80 MMHG | SYSTOLIC BLOOD PRESSURE: 130 MMHG | RESPIRATION RATE: 14 BRPM | HEART RATE: 72 BPM

## 2018-01-31 ENCOUNTER — APPOINTMENT (OUTPATIENT)
Dept: LAB | Age: 78
End: 2018-01-31
Payer: MEDICARE

## 2018-01-31 ENCOUNTER — TRANSCRIBE ORDERS (OUTPATIENT)
Dept: ADMINISTRATIVE | Age: 78
End: 2018-01-31

## 2018-01-31 DIAGNOSIS — E55.9 VITAMIN D DEFICIENCY: ICD-10-CM

## 2018-01-31 DIAGNOSIS — E78.5 HYPERLIPIDEMIA: ICD-10-CM

## 2018-01-31 DIAGNOSIS — I10 ESSENTIAL (PRIMARY) HYPERTENSION: ICD-10-CM

## 2018-01-31 DIAGNOSIS — E53.8 DEFICIENCY OF OTHER SPECIFIED B GROUP VITAMINS: ICD-10-CM

## 2018-01-31 LAB
25(OH)D3 SERPL-MCNC: 37.5 NG/ML (ref 30–100)
ALBUMIN SERPL BCP-MCNC: 3.5 G/DL (ref 3.5–5)
ALP SERPL-CCNC: 86 U/L (ref 46–116)
ALT SERPL W P-5'-P-CCNC: 18 U/L (ref 12–78)
ANION GAP SERPL CALCULATED.3IONS-SCNC: 7 MMOL/L (ref 4–13)
AST SERPL W P-5'-P-CCNC: 11 U/L (ref 5–45)
BILIRUB SERPL-MCNC: 0.73 MG/DL (ref 0.2–1)
BUN SERPL-MCNC: 17 MG/DL (ref 5–25)
CALCIUM SERPL-MCNC: 9.8 MG/DL (ref 8.3–10.1)
CHLORIDE SERPL-SCNC: 109 MMOL/L (ref 100–108)
CHOLEST SERPL-MCNC: 99 MG/DL (ref 50–200)
CK SERPL-CCNC: 38 U/L (ref 26–192)
CO2 SERPL-SCNC: 24 MMOL/L (ref 21–32)
CREAT SERPL-MCNC: 0.7 MG/DL (ref 0.6–1.3)
GFR SERPL CREATININE-BSD FRML MDRD: 84 ML/MIN/1.73SQ M
GLUCOSE P FAST SERPL-MCNC: 90 MG/DL (ref 65–99)
HDLC SERPL-MCNC: 45 MG/DL (ref 40–60)
LDLC SERPL CALC-MCNC: 37 MG/DL (ref 0–100)
LDLC SERPL DIRECT ASSAY-MCNC: 46 MG/DL (ref 0–100)
MAGNESIUM SERPL-MCNC: 2.4 MG/DL (ref 1.6–2.6)
POTASSIUM SERPL-SCNC: 3.9 MMOL/L (ref 3.5–5.3)
PROT SERPL-MCNC: 7.4 G/DL (ref 6.4–8.2)
PTH-INTACT SERPL-MCNC: 169.4 PG/ML (ref 14–72)
SODIUM SERPL-SCNC: 140 MMOL/L (ref 136–145)
TRIGL SERPL-MCNC: 86 MG/DL
VIT B12 SERPL-MCNC: 336 PG/ML (ref 100–900)

## 2018-01-31 PROCEDURE — 80061 LIPID PANEL: CPT

## 2018-01-31 PROCEDURE — 82550 ASSAY OF CK (CPK): CPT

## 2018-01-31 PROCEDURE — 83970 ASSAY OF PARATHORMONE: CPT

## 2018-01-31 PROCEDURE — 82607 VITAMIN B-12: CPT

## 2018-01-31 PROCEDURE — 83918 ORGANIC ACIDS TOTAL QUANT: CPT

## 2018-01-31 PROCEDURE — 80053 COMPREHEN METABOLIC PANEL: CPT

## 2018-01-31 PROCEDURE — 83721 ASSAY OF BLOOD LIPOPROTEIN: CPT

## 2018-01-31 PROCEDURE — 36415 COLL VENOUS BLD VENIPUNCTURE: CPT

## 2018-01-31 PROCEDURE — 82306 VITAMIN D 25 HYDROXY: CPT

## 2018-01-31 PROCEDURE — 83735 ASSAY OF MAGNESIUM: CPT

## 2018-02-03 LAB — METHYLMALONATE SERPL-SCNC: 377 NMOL/L (ref 0–378)

## 2018-02-04 PROBLEM — K52.9 ACUTE COLITIS: Status: ACTIVE | Noted: 2017-09-06

## 2018-02-04 PROBLEM — E53.8 VITAMIN B 12 DEFICIENCY: Status: ACTIVE | Noted: 2017-06-12

## 2018-02-04 PROBLEM — K52.9 ACUTE COLITIS: Chronic | Status: ACTIVE | Noted: 2017-09-06

## 2018-02-04 PROBLEM — K52.9 INFLAMMATORY BOWEL DISEASE: Chronic | Status: ACTIVE | Noted: 2017-08-21

## 2018-02-05 ENCOUNTER — OFFICE VISIT (OUTPATIENT)
Dept: INTERNAL MEDICINE CLINIC | Facility: CLINIC | Age: 78
End: 2018-02-05
Payer: MEDICARE

## 2018-02-05 VITALS
SYSTOLIC BLOOD PRESSURE: 128 MMHG | HEIGHT: 65 IN | HEART RATE: 72 BPM | BODY MASS INDEX: 28.92 KG/M2 | RESPIRATION RATE: 14 BRPM | DIASTOLIC BLOOD PRESSURE: 78 MMHG | WEIGHT: 173.6 LBS

## 2018-02-05 DIAGNOSIS — I10 ESSENTIAL HYPERTENSION: Chronic | ICD-10-CM

## 2018-02-05 DIAGNOSIS — K52.9 INFLAMMATORY BOWEL DISEASE: Primary | Chronic | ICD-10-CM

## 2018-02-05 DIAGNOSIS — K21.9 GASTROESOPHAGEAL REFLUX DISEASE, ESOPHAGITIS PRESENCE NOT SPECIFIED: Chronic | ICD-10-CM

## 2018-02-05 DIAGNOSIS — E78.5 HYPERLIPIDEMIA, UNSPECIFIED HYPERLIPIDEMIA TYPE: Chronic | ICD-10-CM

## 2018-02-05 DIAGNOSIS — M85.80 OSTEOPENIA, UNSPECIFIED LOCATION: Chronic | ICD-10-CM

## 2018-02-05 DIAGNOSIS — E55.9 VITAMIN D DEFICIENCY: ICD-10-CM

## 2018-02-05 DIAGNOSIS — E83.52 HYPERCALCEMIA: ICD-10-CM

## 2018-02-05 PROCEDURE — 99214 OFFICE O/P EST MOD 30 MIN: CPT | Performed by: INTERNAL MEDICINE

## 2018-02-05 NOTE — PROGRESS NOTES
Assessment/Plan:  1  Upper respiratory tract infection-likely viral-treating with symptomatic therapy  She will call if it does not continue to improve  2  Colitis-Crohn's disease-biopsy read as colitis but no definite granulomatous  Stool studies-Celsius diff negative  Responded to prednisone  Stable at this point asymptomatic on mesalamine  #2 dyspepsia-endoscopy shows hilar hernia and some erythema in the antrum  Biopsy read as some gastric mucosa in the esophagus but no metaplasia  I wanted her to stay on an H2 blocker until off prednisone but she stopped it  At this point she will stay off it as she is doing relatively well  #3 hoarseness-ENT physician feels she has LPR with some erythema glottis on exam  Previously was on a PPI as well as an H2 blocker  At this point off both meds and stable  4 vitamin D deficiency-continue supplement  #5 primary hyperparathyroidism with hypercalcemia  Parathyroid scan shows inferior parathyroid adenoma  He declined surgery  Endocrinology felt she could be monitored  Has not had any nephrolithiasis  Most recent bone density shows decline -2 2 from prior value of -1 9  We reviewed other bones will father decline because of the steroid use  She is thinking about surgery in the future and nose that we definitely need to consider this if she is frequently using prednisone  #6 osteopenia-DEXA scan as above  Most recent DEXA was in January 2017  Needs repeat scheduled in January 2019  I explained the patient if she continues to decline she should very seriously think about parathyroidectomy  #7 hyperlipidemia-continue current therapy  #8 hypertension-secondary screen negative  Stable on current therapy  Watching for exacerbation with steroid use  #9 carotid stenosis-status post right carotid endarterectomy with vein patch angioplasty for 99% stenosis with right hemispheric CVA-recovered nicely  Had been on Aggrenox but switched aspirin  On aspirin 81 mg a day   Has yearly carotid Doppler via her vascular surgeon-Dr Hayward-was told this was stable    Appointment in several months with prior chemistry profile, cholesterol profile, PTH level    MEDICAL REGIMEN: Atenolol 25 mg twice a day, Avapro 300 mg daily, baby aspirin daily, hydrochlorothiazide 25 MGs-half tab daily, Crestor 7 5 mg a using 5 mg tablets, amlodipine 5 mg daily, vitamin D 3/4000 units a day, fish oil one daily, Asacol 800 mg 3 times a day  Appointment in several months with prior chemistry profile, cholesterol profile, PTH level    No problem-specific Assessment & Plan notes found for this encounter  Diagnoses and all orders for this visit:    Inflammatory bowel disease    Gastroesophageal reflux disease, esophagitis presence not specified    Essential hypertension  -     CBC and differential; Future  -     Comprehensive metabolic panel; Future  -     Cholesterol, total; Future  -     HDL cholesterol; Future  -     LDL cholesterol, direct; Future  -     PTH, intact; Future    Osteopenia, unspecified location    Hyperlipidemia, unspecified hyperlipidemia type  -     CBC and differential; Future  -     Comprehensive metabolic panel; Future  -     Cholesterol, total; Future  -     HDL cholesterol; Future  -     LDL cholesterol, direct; Future  -     PTH, intact; Future    Hypercalcemia    Vitamin D deficiency          Subjective:      Patient ID: Jessi Cochran is a 69 y o  female  She is overall relatively stable  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  Laboratory work done prior to this visit shows vitamin-D 37 5, magnesium normal   PTH level elevated at 169 with normal up to 72  This is increased slightly from prior visit 8 months ago  B12 as well as methylmalonic acid level normal, LDL 46 cholesterol 99 triglycerides 86 HDL slightly low at 45  Chemistry profile normal with a creatinine of 0 70  Fasting sugar is 90  She has known hypertension    BP adequately controlled on current regimen  She is avoiding salt and decongestants  Denies hematemesis pounding of her heart sweats and headache  Inflammatory bowel disease well controlled on current dose of meds  She has not had any further rectal bleeding or diarrhea  She sees a Colorectal group periodically  This patient denies any systemic symptoms  Specifically there has been no evidence of fever, night sweats, significant weight loss or significant decrease in appetite  We talked about the current treatment of hyperparathyroidism  As noted she has osteopenia  She has not had any nephrolithiasis  PTH continues to slowly climb  I told her she should consider surgery-she wants to see how she does 1 more visit  I explained her if she has further decline in her next bone density study in the year that she needs to think about this very seriously  Most recent bone density shows decline to -2 2 from prior value of 1 9  This patient wanted to know their preferred analgesic agent  Because of their various comorbidities I recommended that this be acetaminophen  This patient has no history of chronic liver disease that would put them at greater risk for use of acetaminophen  This patient may use up to 500-650 mg of acetaminophen at a time and no more than 3 g a day total  Nonsteroidal anti-inflammatory agents have the potential to exacerbate hypertension, hypercoagulability, chronic renal failure, congestive heart failure, and various allergic tendencies  Because of her comorbidities we wanted to use acetaminophen rather than nonsteroidals-she is aware this    She remains on her statin  She understands the difference between status associated myalgias and arthralgias from degenerative arthritis  She has known carotid stenosis  Has periodic studies via the vascular surgeon  Denies chest pain or pressure, focal weakness or numbness    Denies any claudication symptoms of her legs    She talked about her recent URTI  Some cough and nasal congestion  Present for several days  No fever  No severe myalgias or headache  She feels as though this is improving        The following portions of the patient's history were reviewed and updated as appropriate: current medications, past family history, past medical history, past social history, past surgical history and problem list     Review of Systems   Constitutional: Positive for fatigue  HENT: Positive for congestion  Respiratory: Positive for cough  Cardiovascular: Negative  Gastrointestinal: Negative  Endocrine: Negative  Genitourinary: Negative  Musculoskeletal: Negative  Neurological: Negative  Hematological: Negative  Psychiatric/Behavioral: Negative  Objective:     Physical Exam   Constitutional: She is oriented to person, place, and time  She appears well-developed and well-nourished  No distress  HENT:   Head: Normocephalic and atraumatic  Right Ear: External ear normal    Left Ear: External ear normal    Nose: Nose normal    Mouth/Throat: Oropharynx is clear and moist  No oropharyngeal exudate  Eyes: Conjunctivae and EOM are normal  Pupils are equal, round, and reactive to light  Right eye exhibits no discharge  Left eye exhibits no discharge  No scleral icterus  Neck: Normal range of motion  Neck supple  No JVD present  No tracheal deviation present  No thyromegaly present  Cardiovascular: Normal rate, regular rhythm, normal heart sounds and intact distal pulses  Exam reveals no gallop and no friction rub  No murmur heard  Pulmonary/Chest: Effort normal and breath sounds normal  No stridor  No respiratory distress  She has no wheezes  She has no rales  She exhibits no tenderness  Trace rhonchi   Abdominal: Soft  Bowel sounds are normal  She exhibits no distension and no mass  There is no tenderness  There is no rebound and no guarding  Musculoskeletal: Normal range of motion   She exhibits no edema or deformity  Slight crepitance on range of motion of knees bilaterally   Lymphadenopathy:     She has no cervical adenopathy  Neurological: She is alert and oriented to person, place, and time  She has normal reflexes  She displays normal reflexes  No cranial nerve deficit  She exhibits normal muscle tone  Coordination normal    Skin: Skin is warm  No rash noted  She is not diaphoretic  No erythema  No pallor  Psychiatric: She has a normal mood and affect  Her behavior is normal  Judgment and thought content normal    Vitals reviewed

## 2018-05-08 ENCOUNTER — APPOINTMENT (OUTPATIENT)
Dept: LAB | Age: 78
End: 2018-05-08
Payer: MEDICARE

## 2018-05-08 ENCOUNTER — TRANSCRIBE ORDERS (OUTPATIENT)
Dept: ADMINISTRATIVE | Age: 78
End: 2018-05-08

## 2018-05-08 DIAGNOSIS — K50.119 CROHN'S DISEASE OF LARGE INTESTINE WITH COMPLICATION (HCC): Primary | ICD-10-CM

## 2018-05-08 DIAGNOSIS — K50.119 CROHN'S DISEASE OF LARGE INTESTINE WITH COMPLICATION (HCC): ICD-10-CM

## 2018-05-08 LAB
BASOPHILS # BLD AUTO: 0.02 THOUSANDS/ΜL (ref 0–0.1)
BASOPHILS NFR BLD AUTO: 0 % (ref 0–1)
EOSINOPHIL # BLD AUTO: 0.61 THOUSAND/ΜL (ref 0–0.61)
EOSINOPHIL NFR BLD AUTO: 9 % (ref 0–6)
ERYTHROCYTE [DISTWIDTH] IN BLOOD BY AUTOMATED COUNT: 14.2 % (ref 11.6–15.1)
HCT VFR BLD AUTO: 39.1 % (ref 34.8–46.1)
HGB BLD-MCNC: 13 G/DL (ref 11.5–15.4)
LYMPHOCYTES # BLD AUTO: 1.4 THOUSANDS/ΜL (ref 0.6–4.47)
LYMPHOCYTES NFR BLD AUTO: 20 % (ref 14–44)
MCH RBC QN AUTO: 29.3 PG (ref 26.8–34.3)
MCHC RBC AUTO-ENTMCNC: 33.2 G/DL (ref 31.4–37.4)
MCV RBC AUTO: 88 FL (ref 82–98)
MONOCYTES # BLD AUTO: 0.59 THOUSAND/ΜL (ref 0.17–1.22)
MONOCYTES NFR BLD AUTO: 8 % (ref 4–12)
NEUTROPHILS # BLD AUTO: 4.42 THOUSANDS/ΜL (ref 1.85–7.62)
NEUTS SEG NFR BLD AUTO: 63 % (ref 43–75)
NRBC BLD AUTO-RTO: 0 /100 WBCS
PLATELET # BLD AUTO: 271 THOUSANDS/UL (ref 149–390)
PMV BLD AUTO: 9.8 FL (ref 8.9–12.7)
RBC # BLD AUTO: 4.43 MILLION/UL (ref 3.81–5.12)
WBC # BLD AUTO: 7.06 THOUSAND/UL (ref 4.31–10.16)

## 2018-05-08 PROCEDURE — 85025 COMPLETE CBC W/AUTO DIFF WBC: CPT

## 2018-05-08 PROCEDURE — 36415 COLL VENOUS BLD VENIPUNCTURE: CPT

## 2018-07-02 ENCOUNTER — APPOINTMENT (OUTPATIENT)
Dept: LAB | Age: 78
End: 2018-07-02
Payer: MEDICARE

## 2018-07-02 ENCOUNTER — TRANSCRIBE ORDERS (OUTPATIENT)
Dept: ADMINISTRATIVE | Age: 78
End: 2018-07-02

## 2018-07-02 DIAGNOSIS — I10 ESSENTIAL HYPERTENSION: Chronic | ICD-10-CM

## 2018-07-02 DIAGNOSIS — E78.5 HYPERLIPIDEMIA, UNSPECIFIED HYPERLIPIDEMIA TYPE: Chronic | ICD-10-CM

## 2018-07-02 LAB
ALBUMIN SERPL BCP-MCNC: 3.3 G/DL (ref 3.5–5)
ALP SERPL-CCNC: 80 U/L (ref 46–116)
ALT SERPL W P-5'-P-CCNC: 22 U/L (ref 12–78)
ANION GAP SERPL CALCULATED.3IONS-SCNC: 5 MMOL/L (ref 4–13)
AST SERPL W P-5'-P-CCNC: 12 U/L (ref 5–45)
BASOPHILS # BLD AUTO: 0.06 THOUSANDS/ΜL (ref 0–0.1)
BASOPHILS NFR BLD AUTO: 1 % (ref 0–1)
BILIRUB SERPL-MCNC: 0.53 MG/DL (ref 0.2–1)
BUN SERPL-MCNC: 18 MG/DL (ref 5–25)
CALCIUM SERPL-MCNC: 9.7 MG/DL (ref 8.3–10.1)
CHLORIDE SERPL-SCNC: 109 MMOL/L (ref 100–108)
CHOLEST SERPL-MCNC: 102 MG/DL (ref 50–200)
CO2 SERPL-SCNC: 25 MMOL/L (ref 21–32)
CREAT SERPL-MCNC: 0.83 MG/DL (ref 0.6–1.3)
EOSINOPHIL # BLD AUTO: 0.46 THOUSAND/ΜL (ref 0–0.61)
EOSINOPHIL NFR BLD AUTO: 9 % (ref 0–6)
ERYTHROCYTE [DISTWIDTH] IN BLOOD BY AUTOMATED COUNT: 13.7 % (ref 11.6–15.1)
GFR SERPL CREATININE-BSD FRML MDRD: 69 ML/MIN/1.73SQ M
GLUCOSE P FAST SERPL-MCNC: 90 MG/DL (ref 65–99)
HCT VFR BLD AUTO: 37.8 % (ref 34.8–46.1)
HDLC SERPL-MCNC: 41 MG/DL (ref 40–60)
HGB BLD-MCNC: 12 G/DL (ref 11.5–15.4)
IMM GRANULOCYTES # BLD AUTO: 0.02 THOUSAND/UL (ref 0–0.2)
IMM GRANULOCYTES NFR BLD AUTO: 0 % (ref 0–2)
LDLC SERPL DIRECT ASSAY-MCNC: 48 MG/DL (ref 0–100)
LYMPHOCYTES # BLD AUTO: 1.33 THOUSANDS/ΜL (ref 0.6–4.47)
LYMPHOCYTES NFR BLD AUTO: 25 % (ref 14–44)
MCH RBC QN AUTO: 28.6 PG (ref 26.8–34.3)
MCHC RBC AUTO-ENTMCNC: 31.7 G/DL (ref 31.4–37.4)
MCV RBC AUTO: 90 FL (ref 82–98)
MONOCYTES # BLD AUTO: 0.71 THOUSAND/ΜL (ref 0.17–1.22)
MONOCYTES NFR BLD AUTO: 13 % (ref 4–12)
NEUTROPHILS # BLD AUTO: 2.74 THOUSANDS/ΜL (ref 1.85–7.62)
NEUTS SEG NFR BLD AUTO: 52 % (ref 43–75)
NRBC BLD AUTO-RTO: 0 /100 WBCS
PLATELET # BLD AUTO: 303 THOUSANDS/UL (ref 149–390)
PMV BLD AUTO: 9.8 FL (ref 8.9–12.7)
POTASSIUM SERPL-SCNC: 4.1 MMOL/L (ref 3.5–5.3)
PROT SERPL-MCNC: 7.1 G/DL (ref 6.4–8.2)
PTH-INTACT SERPL-MCNC: 148 PG/ML (ref 18.4–80.1)
RBC # BLD AUTO: 4.2 MILLION/UL (ref 3.81–5.12)
SODIUM SERPL-SCNC: 139 MMOL/L (ref 136–145)
WBC # BLD AUTO: 5.32 THOUSAND/UL (ref 4.31–10.16)

## 2018-07-02 PROCEDURE — 80053 COMPREHEN METABOLIC PANEL: CPT

## 2018-07-02 PROCEDURE — 85025 COMPLETE CBC W/AUTO DIFF WBC: CPT

## 2018-07-02 PROCEDURE — 83718 ASSAY OF LIPOPROTEIN: CPT

## 2018-07-02 PROCEDURE — 82465 ASSAY BLD/SERUM CHOLESTEROL: CPT

## 2018-07-02 PROCEDURE — 83721 ASSAY OF BLOOD LIPOPROTEIN: CPT

## 2018-07-02 PROCEDURE — 36415 COLL VENOUS BLD VENIPUNCTURE: CPT

## 2018-07-02 PROCEDURE — 83970 ASSAY OF PARATHORMONE: CPT

## 2018-07-11 ENCOUNTER — OFFICE VISIT (OUTPATIENT)
Dept: INTERNAL MEDICINE CLINIC | Facility: CLINIC | Age: 78
End: 2018-07-11
Payer: MEDICARE

## 2018-07-11 VITALS
SYSTOLIC BLOOD PRESSURE: 128 MMHG | HEART RATE: 72 BPM | WEIGHT: 169.2 LBS | BODY MASS INDEX: 28.16 KG/M2 | RESPIRATION RATE: 14 BRPM | DIASTOLIC BLOOD PRESSURE: 78 MMHG

## 2018-07-11 DIAGNOSIS — I10 HYPERTENSION, UNSPECIFIED TYPE: ICD-10-CM

## 2018-07-11 DIAGNOSIS — E55.9 VITAMIN D DEFICIENCY: ICD-10-CM

## 2018-07-11 DIAGNOSIS — E21.3 HYPERPARATHYROIDISM (HCC): ICD-10-CM

## 2018-07-11 DIAGNOSIS — E78.5 HYPERLIPIDEMIA, UNSPECIFIED HYPERLIPIDEMIA TYPE: ICD-10-CM

## 2018-07-11 DIAGNOSIS — R05.9 COUGH: Primary | ICD-10-CM

## 2018-07-11 PROCEDURE — 99213 OFFICE O/P EST LOW 20 MIN: CPT | Performed by: INTERNAL MEDICINE

## 2018-07-11 PROCEDURE — G0438 PPPS, INITIAL VISIT: HCPCS | Performed by: INTERNAL MEDICINE

## 2018-07-11 NOTE — PROGRESS NOTES
Assessment and Plan:  Problem List Items Addressed This Visit     None        Health Maintenance Due   Topic Date Due    Depression Screening PHQ-9  1940    DTaP,Tdap,and Td Vaccines (1 - Tdap) 12/09/1962    Fall Risk  12/09/2006    Urinary Incontinence Screening  12/09/2006    PNEUMOCOCCAL POLYSACCHARIDE VACCINE AGE 65 AND OVER  12/09/2006    GLAUCOMA SCREENING 65 + YR  12/09/2008         HPI:  Felix Cason is a 69 y o  female here for her Initial Wellness Visit  Patient Active Problem List   Diagnosis    Inflammatory bowel disease    Abnormal female pelvic exam    Acute colitis    Atherosclerosis    Diverticulosis    Esophageal reflux    Glaucoma    Hoarseness    Hypercalcemia    Hyperlipidemia    [facility]parathyroidism ([facility] [address]    Hypertension    Laryngopharyngeal reflux    Occlusion and stenosis of carotid artery    Osteopenia    Severe cervical dysplasia, histologically confirmed    Vitamin B 12 deficiency    Vitamin D deficiency     Past Medical History:   Diagnosis Date    Atherosclerosis     Carotid artery narrowing     Coronary artery disease     Diverticulosis     GERD (gastroesophageal reflux disease)     Glaucoma     Hypercalcemia     Hyperlipidemia     Hyperparathyroidism ([address]     Hypertension     Osteopenia     Severe cervical dysplasia, histologically confirmed     Stroke ([facility] )     Thyroid nodule     Vitamin D deficiency      Past Surgical History:   Procedure Laterality Date    APPENDECTOMY      BREAST SURGERY      reduction procedure    CAROTID ENDARTARECTOMY Right     carotid thromboendarterectomy     COLONOSCOPY      EXPLORATION CAROTID ARTERY      HYSTERECTOMY      ID COLONOSCOPY FLX DX W/COLLJ SPEC WHEN PFRMD N/A 7/28/2017    Procedure: EGD AND COLONOSCOPY;  Surgeon: Valeriy Garcia MD;  [facility];   Service: Colorectal     Family History   Problem Relation Age of Onset    Osteoporosis Mother     Osteoarthritis Mother    Josefa Leon Other Father         heart problem    Coronary artery disease Family     Hyperlipidemia Family      History   Smoking Status    Never Smoker   Smokeless Tobacco    Never Used     History   Alcohol Use    4 2 oz/week    7 Glasses of wine per week     Comment: being a social drinker; drinks wine      History   Drug Use No         Current Outpatient Prescriptions   Medication Sig Dispense Refill    AMLODIPINE BESYLATE PO Take 5 mg by mouth daily      aspirin 81 MG tablet Take 81 mg by mouth daily      atenolol (TENORMIN) 25 mg tablet Take 25 mg by mouth 2 (two) times a day      bimatoprost (LUMIGAN) 0 01 % ophthalmic drops Administer 1 drop to both eyes daily at bedtime      brinzolamide (AZOPT) 1 % ophthalmic suspension 1 drop 2 (two) times a day      Cholecalciferol (VITAMIN D3 PO) Take 2,000 Units by mouth 2 (two) times a day      hydrochlorothiazide (HYDRODIURIL) 12 5 mg tablet Take 6 5 mg by mouth daily      irbesartan (AVAPRO) 300 mg tablet Take 300 mg by mouth daily at bedtime      mesalamine (ASACOL) 800 MG EC tablet Take 1 tablet by mouth 3 (three) times a day for 90 days Continue medication and see you office in 6 months follow up 270 tablet 4    omega-3-acid ethyl esters (LOVAZA) 1 g capsule Take 2 g by mouth 2 (two) times a day      ondansetron (ZOFRAN-ODT) 4 mg disintegrating tablet Take 1 tablet by mouth every 8 (eight) hours as needed for nausea 20 tablet 0    predniSONE 10 mg tablet Take 4 tablets by mouth daily 300 tablet 0    ranitidine (ZANTAC) 150 mg tablet Take 150 mg by mouth daily      rosuvastatin (CRESTOR) 5 mg tablet Take 7 5 mg by mouth daily       No current facility-administered medications for this visit        No Known Allergies  Immunization History   Administered Date(s) Administered    Influenza Quadrivalent Preservative Free 3 years and older IM 10/15/2014    Influenza Split High Dose Preservative Free IM 11/11/2015, 10/17/2016, 10/03/2017    Influenza TIV (IM) 01/01/2001, 10/01/2011, 11/08/2012, 10/22/2013    Zoster 04/13/2010       Patient Care Team:  Jerrod Waters MD as PCP - MD Conrado Gonzales MD Bethena Pate, MD    Medicare Screening Tests and Risk Assessments:  AWV Clinical     ISAR:   Previous hospitalizations?:  Yes   How many hospitalizations have you had in the last year?:  1-2   Additional Comments:  issues after having colonoscopy       Once in a Lifetime Medicare Screening:       Medicare Screening Tests and Risk Assessment:   AAA Risk Assessment    Osteoporosis Risk Assessment    HIV Risk Assessment        Drug and Alcohol Use:   Tobacco use    Cigarettes:  never smoker    Smokeless:  never used smokeless tobacco    Tobacco use duration    Tobacco Cessation Readiness    Alcohol use    Alcohol use:  rare use    Amount of alcohol consumed:  a glass of wine once in a while   Concern about alcohol use:  No    Alcohol Treatment Readiness   Illicit Drug Use    Drug use:  never        Diet & Exercise:   Diet   What is your diet?:  Regular   How many servings a day of the following:   Fruits and Vegetables:  1-2 Meat:  1-2   Whole Grains:  1    Dairy:  1 Soda:  1   Coffee:  1 Tea:  2   Exercise    Do you currently exercise?:  yes    Frequency:  rarely    Type of exercise:  walking       Cognitive Impairment Screening:   Depression screening preformed:  No    Cognitive Impairment Screening    Do you have difficulty learning or retaining new information?:  No Do you have difficulty handling new tasks?:  No   Do you have difficulty with reasoning?:  No Do you have difficulty with spatial ability and orientation?:  No   Do you have difficulty with language?:  No Do you have difficulty with behavior?:  No       Functional Ability/Level of Safety:   Hearing    Hearing difficulties:  No Bilateral:  normal   Hearing aid:  No    Hearing Impairment Assessment    Hearing status:  No impairment   Do your family members ever complain that you turn on the radio or T V  too loudly?:  No Do you find that other people have to repeat themselves when talking to you?:  No   Do you have difficulty hearing while talking on the phone?:  No Has anyone ever told you that you are speaking too loudly when talking with them?:  No   Do you have trouble hearing the doorbell or phone ringing?:  No Do you have difficulty hearing such that you feel frustrated talking to people?:  No   Do you feel sad because you cannot hear well?:  No Do you feel inconvienced due to your hearing problem?:  No   Do you think you would be a happier person if you could hear better?:  No Would you be willing to go for a hearing aid fitting if suggested?:  No   Current Activities    Help needed with the folllowing:    Using the phone:  No Transportation:  No   Shopping:  No Preparing Meals:  No   Doing Housework:  No Doing Laundry:  No   Managing Medications:  No Managing Money:  No   ADL    Feeding:  Independant   Oral hygiene and Facial grooming:  Independant   Bathing:  Independant   Upper Body Dressing:  Independant   Lower Body Dressing:  Independant   Toileting:  Independant   Bed Mobility:  Independant   Fall Risk   Have you fallen in the last 12 months?:  No Are you unsteady on your feet?:  No    Are you taking any medications that may cause fatigue or dizziness?:  No    Do you rush to the bathroom potentially risking a fall?:  No   Injury History       Home Safety:   Are there hazards in your environment?:  No   If you fell, would you need help to get back up from the ground?:  No Do you have problems or concerns getting in/out of a bed, chair, tub, or toilet?:  No   Do you feel unsteady when walking?:  No Is your activity limited by pain?:  No   Do you have handrails and grab-bars in the home?:  Yes Are emergency numbers kept by the phone and regularly updated?:  No   Are you and/or family members aware of the dangers of smoking in bed?:  No    Do you have working smoke alarms and fire extinguisher?:  Yes Do all household members know how to use them?:  Yes   Have you left the stove on unsupervised?:  No    Home Safety Risk Factors   Unfamilar with surroundings:  No Uneven floors:  No   Stairs or handrail saftey risk:  No Loose rugs:  No   Household clutter:  No Poor household lighting:  No   No grab bars in bathroom:  No Further evaluation needed:  No       Advanced Directives:   Advanced Directives    Living Will:  Yes Durable POA for healthcare:   Yes   Advanced directive:  Yes    Patient's End of Life Decisions        Urinary Incontinence:   Do you have urinary incontinence?:  No Do you have incomplete emptying?:  No   Do you urinate frequently?:  No Do you have urinary urgency?:  No   Do you have urinary hesitancy?:  No Do you have dysuria (painful and/or difficult urination)?:  No   Do you have nocturia (waking up to urinate)?:  Yes Do you strain when urinating (have to push to urinate)?:  No   Do you have a weak stream when urinating?:  No Do you have intermittent streaming when urinating?:  No   Do you dribble urine after finishing?:  No    Do you have vaginal pressure?:  No Do you have vaginal dryness?:  No       Glaucoma:            Provider Screening     Preventative Screening/Counseling:   Cardiovascular Screening/Counseling:   (Labs Q5 years, EKG optional one-time)   General:  Risks and Benefits Discussed, Screening Current           Diabetes Screening/Counseling:   (2 tests/year if Pre-Diabetes or 1 test/year if no Diabetes)   General:  Risks and Benefits Discussed, Screening Current           Colorectal Cancer Screening/Counseling:   (FOBT Q1 yr; Flex Sig Q4 yrs or Q10 yrs after Screening Colonoscopy; Screening Colonoscpy Q2 yrs High Risk or Q10 yrs Low Risk; Barium Enema Q2 yrs High Risk or Q4 yrs Low Risk)   General:  Risks and Benefits Discussed, Screening Current           Prostate Cancer Screening/Counseling:   (Annual)          Breast Cancer Screening/Counseling:   (Baseline Age 28 - 43; Annual Age 36+)   General:  Risks and Benefits Discussed          Cervical Cancer Screening/Counseling:   (Annual for High Risk or Childbearing Age with Abnormal Pap in Last 3 yrs; Every 2 all others)   General:  Risks and Benefits Discussed, Screening Current           Osteoporosis Screening/Counseling:   (Every 2 Yrs if at risk or more if medically necessary)   General:  Risks and Benefits Discussed, Screening Current           AAA Screening/Counseling:   (Once per Lifetime with risk factors)    General:  Risks and Benefits Discussed, Screening Current           Glaucoma Screening/Counseling:   (Annual)   General:  Risks and Benefits Discussed, Screening Current          HIV Screening/Counseling:   (Voluntary; Once annually for high risk OR 3 times for Pregnancy at diagnosis of IUP; 3rd trimester; and at Labor   General:  Screening Not Indicated           Hepatitis C Screening:             Immunizations:   Influenza (annual):  Risks & Benefits Discussed, Influenza UTD This Year   Pneumococcal (Once in a Lifetime):  Risks & Benefits Discussed, Lifetime Vaccine Completed   Hepatitis B Series (low risk patients):  Series Not Indicated   Zostavax (Medicare D Coverage, Pt >66 yo):  Risks & Benefits Discussed, Zostavax Vaccine UTD   TD (Non-Medicare Wellness  Visit required):  Risks & Benefits Discussed, Td Vaccine UTD   Tdap (Non-Medicare Wellness Visit required):  Risks & Benefits Discussed, Tdap Vaccine UTD       Other Preventative Couseling (Non-Medicare Wellness Visit Required): Increased physical activity counseling given       Referrals (Non-Medicare Wellness Visit Required):   Surgeon (comment)       Medical Equipment/Suppliers:           No exam data present    Physical Exam :  Respiratory ROS: positive for - cough  Physical Exam   Constitutional: She appears well-developed and well-nourished  HENT:   Head: Normocephalic and atraumatic     Mouth/Throat: Oropharynx is clear and moist    Eyes: Conjunctivae and EOM are normal  Pupils are equal, round, and reactive to light  Neck: Normal range of motion  Cardiovascular: Normal rate, regular rhythm, normal heart sounds and intact distal pulses  Exam reveals no gallop and no friction rub  No murmur heard  Pulmonary/Chest: Effort normal and breath sounds normal  No respiratory distress  She has no wheezes  She has no rales  Abdominal: Soft  Bowel sounds are normal  She exhibits no distension and no mass  There is no tenderness  There is no rebound and no guarding  Musculoskeletal: Normal range of motion  She exhibits no edema  Neurological: She is alert  She displays normal reflexes  No cranial nerve deficit or sensory deficit  She exhibits normal muscle tone  Coordination normal    Skin: Skin is warm  Psychiatric: She has a normal mood and affect  Her behavior is normal  Thought content normal    Vitals reviewed  Patient has had both types of pneumococcal vaccine, prior herpes zoster vaccine, Adacel vaccine  Will be having follow-up colonoscopy over the next year  Will be having bone density study in January of 2019    Given prescription today forShingrix vaccine

## 2018-07-11 NOTE — PROGRESS NOTES
Assessment/Plan:  1  Health maintenance-given prescription today for Shingrix vaccine  2  Upper respiratory tract infection-concerned based on clinical course about secondary bacterial infection-placed empirically on doxycycline 100 milligrams b i d  for 1 week  3  Colitis-Crohn's disease-biopsy read as colitis but no definite granulomatous  Stool studies-Clostridium difficile negative  Responded to prednisone  Stable at this point  Asymptomatic on mesalamine-she has schedule herself for follow-up colonoscopy with [facility]  4 vitamin D deficiency-continue supplement is this is this is this is his is  #5 primary hyperparathyroidism with hypercalcemia  Parathyroid scan shows inferior parathyroid adenoma  He declined surgery  Endocrinology felt she could be monitored  Has not had any nephrolithiasis  Most recent bone density shows decline -2 2 from prior value of -1 9  We reviewed other bones will father decline because of the steroid use  She is thinking about surgery in the future and nose that we definitely need to consider this if she is frequently using prednisone  #6 osteopenia-DEXA scan as above  Most recent DEXA was in January 2017  Needs repeat scheduled in January 2019  I explained the patient if she continues to decline she should very seriously think about parathyroidectomy  #7 hyperlipidemia-continue current therapy this is  #8 hypertension-secondary screen negative  Stable on current therapy  Watching for exacerbation with steroid use  #9 carotid stenosis-status post right carotid endarterectomy with vein patch angioplasty for 99% stenosis with right hemispheric CVA-recovered nicely  Had been on Aggrenox but switched aspirin  On aspirin 81 mg a day  Has yearly carotid Doppler via her vascular surgeon-Dr Hayward-was told this was stable  10  Dyspepsia-endoscopy shows hilar hernia some erythema the antrum    Biopsy read as gastric mucosa in the esophagus but no metaplasia -therefore this does not meet the criteria for Harkins's esophagus  Had been on an H2 blocker but now off that -monitor  11  Hoarseness-ENT physician felt she has LPR with some erythema the glottis on exam   Previously was on a PPI as well as an H2 blocker  At this point off both meds and stable     Appointment in several months with prior chemistry profile, cholesterol profile, PTH level     MEDICAL REGIMEN: Atenolol 25 mg twice a day, Avapro 300 mg daily, baby aspirin daily, hydrochlorothiazide 25 MGs-half tab daily, Crestor 7 5 mg a using 5 mg tablets, amlodipine 5 mg daily, vitamin D 3/4000 units a day, fish oil one daily, Asacol 800 mg 3 times a day  Appointment in several months with prior chemistry profile, cholesterol profile, PTH level  No problem-specific Assessment & Plan notes found for this encounter  Diagnoses and all orders for this visit:    Cough          Subjective:      Patient ID: Maru Alex is a 69 y o  female  She was here for her Medicare well visit today and talked about an illness  [place] had traveled [place]-she said she felt poorly and had chills but then felt better  She was okay for 2 weeks but then 3 days ago began to feel worse again  She had sore throat which resolved but has persistent significant cough  She does not think she had fever chills  She denies hemoptysis  Denies severe shortness of breath or wheezing  Recent labs show  with normal up to 80 LDL 40 HDL 41 cholesterol 102 normal chemistry profile other than a borderline albumin of 3 3      This patient denies any systemic symptoms  Specifically there has been no evidence of fever, night sweats, significant weight loss or significant decrease in appetite                The following portions of the patient's history were reviewed and updated as appropriate: current medications, past family history, past medical history, past social history, past surgical history and problem list     Review of Systems   Constitutional: Negative  Respiratory: Positive for cough  Cardiovascular: Negative  Gastrointestinal: Negative  Endocrine: Negative  Genitourinary: Negative  Musculoskeletal: Negative  Neurological: Negative  Hematological: Negative  Psychiatric/Behavioral: Negative  Objective: Wt 76 7 kg (169 lb 3 2 oz)   BMI 28 16 kg/m²          Physical Exam   Constitutional: She is oriented to person, place, and time  She appears well-developed and well-nourished  No distress  HENT:   Head: Normocephalic and atraumatic  Right Ear: External ear normal    Left Ear: External ear normal    Nose: Nose normal    Mouth/Throat: Oropharynx is clear and moist  No oropharyngeal exudate  Eyes: Conjunctivae and EOM are normal  Pupils are equal, round, and reactive to light  Right eye exhibits no discharge  Left eye exhibits no discharge  No scleral icterus  Neck: Normal range of motion  Neck supple  No JVD present  No tracheal deviation present  No thyromegaly present  Cardiovascular: Normal rate, regular rhythm, normal heart sounds and intact distal pulses  Exam reveals no gallop and no friction rub  No murmur heard  Pulmonary/Chest: Effort normal and breath sounds normal  No respiratory distress  She has no wheezes  She has no rales  She exhibits no tenderness  Abdominal: Soft  Bowel sounds are normal  She exhibits no distension and no mass  There is no tenderness  There is no rebound and no guarding  Genitourinary:   Genitourinary Comments: Evidence of prior breast surgery   Musculoskeletal: Normal range of motion  She exhibits no edema or deformity  Lymphadenopathy:     She has no cervical adenopathy  Neurological: She is alert and oriented to person, place, and time  She has normal reflexes  No cranial nerve deficit  She exhibits normal muscle tone  Coordination normal    Skin: Skin is warm and dry  No rash noted   No erythema  Psychiatric: She has a normal mood and affect  Her behavior is normal  Judgment and thought content normal    Vitals reviewed

## 2018-10-04 ENCOUNTER — HOSPITAL ENCOUNTER (OUTPATIENT)
Dept: RADIOLOGY | Age: 78
Discharge: HOME | End: 2018-10-04
Payer: MEDICARE

## 2018-10-04 ENCOUNTER — TRANSCRIBE ORDERS (OUTPATIENT)
Dept: ADMINISTRATIVE | Age: 78
End: 2018-10-04

## 2018-10-04 DIAGNOSIS — Z12.39 SCREENING FOR MALIGNANT NEOPLASM OF BREAST: ICD-10-CM

## 2018-10-04 DIAGNOSIS — Z12.39 SCREENING FOR MALIGNANT NEOPLASM OF BREAST: Primary | ICD-10-CM

## 2018-10-04 PROCEDURE — 77067 SCR MAMMO BI INCL CAD: CPT

## 2018-10-30 ENCOUNTER — IMMUNIZATION (OUTPATIENT)
Dept: INTERNAL MEDICINE CLINIC | Facility: CLINIC | Age: 78
End: 2018-10-30
Payer: MEDICARE

## 2018-10-30 DIAGNOSIS — Z23 NEED FOR INFLUENZA VACCINATION: Primary | ICD-10-CM

## 2018-10-30 PROCEDURE — G0008 ADMIN INFLUENZA VIRUS VAC: HCPCS

## 2018-10-30 PROCEDURE — 90662 IIV NO PRSV INCREASED AG IM: CPT

## 2018-11-01 PROBLEM — K50.919 CROHN'S DISEASE WITH COMPLICATION (HCC): Status: ACTIVE | Noted: 2018-11-01

## 2018-11-01 PROBLEM — K50.10 CROHN'S COLITIS (HCC): Chronic | Status: ACTIVE | Noted: 2017-09-06

## 2018-12-29 ENCOUNTER — APPOINTMENT (OUTPATIENT)
Dept: LAB | Facility: HOSPITAL | Age: 78
End: 2018-12-29
Payer: MEDICARE

## 2018-12-29 DIAGNOSIS — E55.9 VITAMIN D DEFICIENCY: ICD-10-CM

## 2018-12-29 DIAGNOSIS — E78.5 HYPERLIPIDEMIA, UNSPECIFIED HYPERLIPIDEMIA TYPE: ICD-10-CM

## 2018-12-29 DIAGNOSIS — E21.3 HYPERPARATHYROIDISM (HCC): ICD-10-CM

## 2018-12-29 LAB
25(OH)D3 SERPL-MCNC: 44.6 NG/ML (ref 30–100)
ALBUMIN SERPL BCP-MCNC: 3.2 G/DL (ref 3.5–5)
ALP SERPL-CCNC: 85 U/L (ref 46–116)
ALT SERPL W P-5'-P-CCNC: 23 U/L (ref 12–78)
ANION GAP SERPL CALCULATED.3IONS-SCNC: 4 MMOL/L (ref 4–13)
AST SERPL W P-5'-P-CCNC: 12 U/L (ref 5–45)
BASOPHILS # BLD AUTO: 0.06 THOUSANDS/ΜL (ref 0–0.1)
BASOPHILS NFR BLD AUTO: 1 % (ref 0–1)
BILIRUB SERPL-MCNC: 0.37 MG/DL (ref 0.2–1)
BUN SERPL-MCNC: 14 MG/DL (ref 5–25)
CALCIUM SERPL-MCNC: 9.4 MG/DL (ref 8.3–10.1)
CHLORIDE SERPL-SCNC: 112 MMOL/L (ref 100–108)
CHOLEST SERPL-MCNC: 130 MG/DL (ref 50–200)
CO2 SERPL-SCNC: 24 MMOL/L (ref 21–32)
CREAT SERPL-MCNC: 0.74 MG/DL (ref 0.6–1.3)
EOSINOPHIL # BLD AUTO: 0.6 THOUSAND/ΜL (ref 0–0.61)
EOSINOPHIL NFR BLD AUTO: 11 % (ref 0–6)
ERYTHROCYTE [DISTWIDTH] IN BLOOD BY AUTOMATED COUNT: 15.2 % (ref 11.6–15.1)
GFR SERPL CREATININE-BSD FRML MDRD: 78 ML/MIN/1.73SQ M
GLUCOSE P FAST SERPL-MCNC: 90 MG/DL (ref 65–99)
HCT VFR BLD AUTO: 38.2 % (ref 34.8–46.1)
HDLC SERPL-MCNC: 57 MG/DL (ref 40–60)
HGB BLD-MCNC: 11.9 G/DL (ref 11.5–15.4)
IMM GRANULOCYTES # BLD AUTO: 0.03 THOUSAND/UL (ref 0–0.2)
IMM GRANULOCYTES NFR BLD AUTO: 1 % (ref 0–2)
LDLC SERPL DIRECT ASSAY-MCNC: 57 MG/DL (ref 0–100)
LYMPHOCYTES # BLD AUTO: 1.37 THOUSANDS/ΜL (ref 0.6–4.47)
LYMPHOCYTES NFR BLD AUTO: 24 % (ref 14–44)
MCH RBC QN AUTO: 26.6 PG (ref 26.8–34.3)
MCHC RBC AUTO-ENTMCNC: 31.2 G/DL (ref 31.4–37.4)
MCV RBC AUTO: 86 FL (ref 82–98)
MONOCYTES # BLD AUTO: 0.68 THOUSAND/ΜL (ref 0.17–1.22)
MONOCYTES NFR BLD AUTO: 12 % (ref 4–12)
NEUTROPHILS # BLD AUTO: 2.92 THOUSANDS/ΜL (ref 1.85–7.62)
NEUTS SEG NFR BLD AUTO: 51 % (ref 43–75)
NRBC BLD AUTO-RTO: 0 /100 WBCS
PLATELET # BLD AUTO: 264 THOUSANDS/UL (ref 149–390)
PMV BLD AUTO: 9.3 FL (ref 8.9–12.7)
POTASSIUM SERPL-SCNC: 4.2 MMOL/L (ref 3.5–5.3)
PROT SERPL-MCNC: 7.3 G/DL (ref 6.4–8.2)
PTH-INTACT SERPL-MCNC: 168.7 PG/ML (ref 18.4–80.1)
RBC # BLD AUTO: 4.47 MILLION/UL (ref 3.81–5.12)
SODIUM SERPL-SCNC: 140 MMOL/L (ref 136–145)
TRIGL SERPL-MCNC: 76 MG/DL
WBC # BLD AUTO: 5.66 THOUSAND/UL (ref 4.31–10.16)

## 2018-12-29 PROCEDURE — 36415 COLL VENOUS BLD VENIPUNCTURE: CPT

## 2018-12-29 PROCEDURE — 83970 ASSAY OF PARATHORMONE: CPT

## 2018-12-29 PROCEDURE — 80061 LIPID PANEL: CPT

## 2018-12-29 PROCEDURE — 80053 COMPREHEN METABOLIC PANEL: CPT

## 2018-12-29 PROCEDURE — 83721 ASSAY OF BLOOD LIPOPROTEIN: CPT

## 2018-12-29 PROCEDURE — 82306 VITAMIN D 25 HYDROXY: CPT

## 2018-12-29 PROCEDURE — 85025 COMPLETE CBC W/AUTO DIFF WBC: CPT

## 2018-12-31 ENCOUNTER — OFFICE VISIT (OUTPATIENT)
Dept: INTERNAL MEDICINE CLINIC | Facility: CLINIC | Age: 78
End: 2018-12-31
Payer: MEDICARE

## 2018-12-31 VITALS
HEIGHT: 64 IN | HEART RATE: 72 BPM | BODY MASS INDEX: 29.19 KG/M2 | SYSTOLIC BLOOD PRESSURE: 120 MMHG | DIASTOLIC BLOOD PRESSURE: 70 MMHG | WEIGHT: 171 LBS | RESPIRATION RATE: 14 BRPM

## 2018-12-31 DIAGNOSIS — K50.10 CROHN'S DISEASE OF COLON WITHOUT COMPLICATION (HCC): Chronic | ICD-10-CM

## 2018-12-31 DIAGNOSIS — E21.3 HYPERPARATHYROIDISM (HCC): ICD-10-CM

## 2018-12-31 DIAGNOSIS — E55.9 VITAMIN D DEFICIENCY: ICD-10-CM

## 2018-12-31 DIAGNOSIS — E78.01 FAMILIAL HYPERCHOLESTEROLEMIA: Primary | ICD-10-CM

## 2018-12-31 DIAGNOSIS — I10 ESSENTIAL HYPERTENSION: ICD-10-CM

## 2018-12-31 DIAGNOSIS — M81.0 AGE-RELATED OSTEOPOROSIS WITHOUT CURRENT PATHOLOGICAL FRACTURE: ICD-10-CM

## 2018-12-31 PROCEDURE — 99214 OFFICE O/P EST MOD 30 MIN: CPT | Performed by: INTERNAL MEDICINE

## 2018-12-31 NOTE — PROGRESS NOTES
Assessment/Plan:  1  Health maintenance-has prescription forShingrix vaccine  2  Colitis = Crohn's disease-biopsy read as colitis but no definite granulomas  All stool studies negative  Stable at this point  Asymptomatic on mesalamine  Seen by the GI group in November felt to be stable  Scheduled for a a follow-up colonoscopy in the summer of 2019  3  Vitamin-D deficiency-continue supplement  4  Primary hyperparathyroidism-at this point without hypercalcemia  Parathyroid scan shows inferior parathyroid adenoma  She has declined surgery  Endocrine felt she could be monitored  No history nephrolithiasis  Most recent bone density showed decline  That is -2 2 from prior value of -1 9  Scheduled for repeat bone density at this point and she is aware that if this continues to worsen she should strongly consider surgery as had been recommended previously  5  Osteopenia-scheduled for follow-up bone density study  6  Hyperlipidemia-continue current dose of statin-adequate control on current regimen  7  Hypertension-secondary screen negative  Stable on current therapy  Watching for exacerbation with steroid use previously but not a problem at present  #8 carotid stenosis-status post right carotid endarterectomy with vein patch angioplasty for 99% stenosis with right hemispheric CVA-recovered nicely  Had been on Aggrenox but switched aspirin  On aspirin 81 mg a day  Has yearly carotid Doppler via her vascular surgeon-Dr Hayward-was told this was stable  9  Dyspepsia-endoscopy shows hilar hernia some erythema the antrum  Biopsy read as gastric mucosa in the esophagus but no metaplasia -therefore  this does not meet the criteria for Harkins's esophagus  Had been on an H2 blocker but now off that -monitor  10  Hoarseness-ENT physician felt she has LPR with some erythema the glottis on exam   Previously was on a PPI as well as an H2 blocker    At this point off both meds and stable     Appointment in several months with prior chemistry profile, cholesterol profile, PTH level     MEDICAL REGIMEN: Atenolol 25 mg twice a day, Avapro 300 mg daily, baby aspirin daily, hydrochlorothiazide 25 MGs-half tab daily, Crestor 7 5 mg daily using 5 mg tablets, amlodipine 5 mg daily, vitamin D 3/4000 units a day, fish oil one daily, Asacol 800 mg 3 times a day    Scheduled for bone density study  Appointment in several months with prior chemistry profile, cholesterol profile, vitamin-D level    Addendum -DEXA scan done in February 2019 shows L-spine normal -likely false-positive and hip of -2 1 -this is similar to prior study [facility]ACH[facility] CHART NEXT VISIT DEXA SCAN DONE IN February 2019  No problem-specific Assessment & Plan notes found for this encounter  Diagnoses and all orders for this visit:    Familial hypercholesterolemia  -     CBC and differential; Future  -     Comprehensive metabolic panel; Future  -     Cholesterol, total; Future  -     Triglycerides; Future  -     HDL cholesterol; Future  -     LDL cholesterol, direct; Future  -     Vitamin D 25 hydroxy; Future  -     PTH, intact; Future    Essential hypertension  -     CBC and differential; Future  -     Comprehensive metabolic panel; Future  -     Cholesterol, total; Future  -     Triglycerides; Future  -     HDL cholesterol; Future  -     LDL cholesterol, direct; Future  -     Vitamin D 25 hydroxy; Future  -     PTH, intact; Future    Hyperparathyroidism ([address] )  -     CBC and differential; Future  -     Comprehensive metabolic panel; Future  -     Cholesterol, total; Future  -     Triglycerides; Future  -     HDL cholesterol; Future  -     LDL cholesterol, direct; Future  -     Vitamin D 25 hydroxy; Future  -     PTH, intact; Future    Vitamin D deficiency  -     Vitamin D 25 hydroxy; Future    Age-related osteoporosis without current pathological fracture  -     DXA bone density spine hip and pelvis;  Future          Subjective:      Patient ID: Maritza Talamantes is a 69 y o  female  She still has evidence of hyperparathyroidism  Labs done prior to this visit show PTH of 168 similar to before vitamin-D level of 44 6 LDL 57 HDL 57 triglycerides 76 cholesterol 130 creatinine 0 74 CBC is normal   Is we talked about the indications for parathyroid surgery  She has known osteopenia borderline osteoporosis and will be having follow-up bone density study  I feel this continues to worsen she should again consider strongly surgery  Her calcium level was normal   She has not had any recent nephrolithiasis  She wants to discuss this with her  who is a retired physician  She will be having repeat bone density study in understands if she continues to deteriorate she needs to strongly consider the surgery  We reviewed surgical techniques including intraoperative measurement of serum PTH level  She has known hypertension  BP adequately controlled on current regimen  Avoiding salt and decongestants  Denies hematemesis pounding of heart sweats and headaches  She does not use nonsteroidals excessively    She remains on her statin  She understands the difference between status associated myalgias and arthralgias from degenerative arthritis  She has established vascular disease with prior carotid endarterectomy  She denies any weakness of 1 arm and leg compared to the other denies numbness of 1 arm and leg compared to the other  Denies blurred or double vision difficulty with her speech  We talked about vaccination  She did receive influenza vaccine  She has had both types of pneumococcal vaccine  She had Adacel vaccine in the year 2012  She is on the list at [facility] for the new zoster vaccine  We reviewed this is a 2 part vaccine  We reviewed it is more effective than prior vaccine but with higher incidence of side effects      This patient denies any systemic symptoms   Specifically there has been no evidence of fever, night sweats, significant weight loss or significant decrease in appetite  Reference her GI status she is felt to be in complete remission with 1-2 bowel movements per day with no mucus diarrhea urgency her bleeding  She is scheduled for follow-up colonoscopy over the summer        The following portions of the patient's history were reviewed and updated as appropriate: allergies, current medications, past family history, past medical history, past social history, past surgical history and problem list     Review of Systems   Constitutional: Negative  Respiratory: Negative  Cardiovascular: Negative  Gastrointestinal: Negative  Endocrine: Negative  Genitourinary: Negative  Musculoskeletal: Positive for arthralgias  Neurological: Negative  Hematological: Negative  Psychiatric/Behavioral: Negative  Objective:      Ht 5' 4" (1 626 m)   Wt 77 6 kg (171 lb)   BMI 29 35 kg/m²          Physical Exam   Constitutional: She is oriented to person, place, and time  She appears well-developed and well-nourished  No distress  HENT:   Head: Normocephalic and atraumatic  Right Ear: External ear normal    Left Ear: External ear normal    Nose: Nose normal    Mouth/Throat: Oropharynx is clear and moist  No oropharyngeal exudate  Eyes: Pupils are equal, round, and reactive to light  Conjunctivae and EOM are normal  Right eye exhibits no discharge  Left eye exhibits no discharge  No scleral icterus  Neck: Normal range of motion  Neck supple  No JVD present  No tracheal deviation present  No thyromegaly present  Cardiovascular: Normal rate, regular rhythm, normal heart sounds and intact distal pulses  Exam reveals no gallop and no friction rub  No murmur heard  Pulmonary/Chest: Effort normal and breath sounds normal  No stridor  No respiratory distress  She has no wheezes  She has no rales  She exhibits no tenderness  Abdominal: Soft  Bowel sounds are normal  She exhibits no distension and no mass   There is no tenderness  There is no rebound and no guarding  Genitourinary:   Genitourinary Comments: Evidence of prior breast surgery  Musculoskeletal: Normal range of motion  She exhibits no edema or deformity  Some crepitance on range of motion of both knees   Lymphadenopathy:     She has no cervical adenopathy  Neurological: She is alert and oriented to person, place, and time  She has normal reflexes  No cranial nerve deficit  She exhibits normal muscle tone  Coordination normal    Skin: Skin is warm and dry  No rash noted  No erythema  Psychiatric: She has a normal mood and affect  Her behavior is normal  Judgment and thought content normal    Vitals reviewed

## 2019-02-07 ENCOUNTER — TRANSCRIBE ORDERS (OUTPATIENT)
Dept: RADIOLOGY | Facility: CLINIC | Age: 79
End: 2019-02-07

## 2019-02-13 ENCOUNTER — HOSPITAL ENCOUNTER (OUTPATIENT)
Dept: RADIOLOGY | Age: 79
Discharge: HOME | End: 2019-02-13
Payer: MEDICARE

## 2019-02-13 DIAGNOSIS — M81.0 AGE-RELATED OSTEOPOROSIS WITHOUT CURRENT PATHOLOGICAL FRACTURE: ICD-10-CM

## 2019-02-13 PROCEDURE — 77080 DXA BONE DENSITY AXIAL: CPT

## 2019-02-14 ENCOUNTER — TELEPHONE (OUTPATIENT)
Dept: INTERNAL MEDICINE CLINIC | Facility: CLINIC | Age: 79
End: 2019-02-14

## 2019-02-14 NOTE — TELEPHONE ENCOUNTER
----- Message from Amy Walsh MD sent at 2/13/2019  6:32 PM EST -----   Please call patient letter no DEXA scan shows osteopenia -similar to study 2 years ago -[facility]

## 2019-04-25 ENCOUNTER — ANESTHESIA EVENT (OUTPATIENT)
Dept: GASTROENTEROLOGY | Facility: AMBULARY SURGERY CENTER | Age: 79
End: 2019-04-25

## 2019-05-10 ENCOUNTER — ANESTHESIA (OUTPATIENT)
Dept: GASTROENTEROLOGY | Facility: AMBULARY SURGERY CENTER | Age: 79
End: 2019-05-10

## 2019-05-31 ENCOUNTER — APPOINTMENT (OUTPATIENT)
Dept: LAB | Age: 79
End: 2019-05-31
Payer: MEDICARE

## 2019-05-31 DIAGNOSIS — E55.9 VITAMIN D DEFICIENCY: ICD-10-CM

## 2019-05-31 DIAGNOSIS — I10 ESSENTIAL HYPERTENSION: ICD-10-CM

## 2019-05-31 DIAGNOSIS — E78.01 FAMILIAL HYPERCHOLESTEROLEMIA: ICD-10-CM

## 2019-05-31 DIAGNOSIS — E21.3 HYPERPARATHYROIDISM (HCC): ICD-10-CM

## 2019-05-31 LAB
25(OH)D3 SERPL-MCNC: 30.9 NG/ML (ref 30–100)
ALBUMIN SERPL BCP-MCNC: 3.6 G/DL (ref 3.5–5)
ALP SERPL-CCNC: 96 U/L (ref 46–116)
ALT SERPL W P-5'-P-CCNC: 27 U/L (ref 12–78)
ANION GAP SERPL CALCULATED.3IONS-SCNC: 6 MMOL/L (ref 4–13)
AST SERPL W P-5'-P-CCNC: 15 U/L (ref 5–45)
BASOPHILS # BLD AUTO: 0.06 THOUSANDS/ΜL (ref 0–0.1)
BASOPHILS NFR BLD AUTO: 1 % (ref 0–1)
BILIRUB SERPL-MCNC: 0.54 MG/DL (ref 0.2–1)
BUN SERPL-MCNC: 15 MG/DL (ref 5–25)
CALCIUM SERPL-MCNC: 9.5 MG/DL (ref 8.3–10.1)
CHLORIDE SERPL-SCNC: 111 MMOL/L (ref 100–108)
CHOLEST SERPL-MCNC: 106 MG/DL (ref 50–200)
CO2 SERPL-SCNC: 22 MMOL/L (ref 21–32)
CREAT SERPL-MCNC: 0.86 MG/DL (ref 0.6–1.3)
EOSINOPHIL # BLD AUTO: 0.48 THOUSAND/ΜL (ref 0–0.61)
EOSINOPHIL NFR BLD AUTO: 6 % (ref 0–6)
ERYTHROCYTE [DISTWIDTH] IN BLOOD BY AUTOMATED COUNT: 14.9 % (ref 11.6–15.1)
GFR SERPL CREATININE-BSD FRML MDRD: 65 ML/MIN/1.73SQ M
GLUCOSE P FAST SERPL-MCNC: 90 MG/DL (ref 65–99)
HCT VFR BLD AUTO: 39.8 % (ref 34.8–46.1)
HDLC SERPL-MCNC: 50 MG/DL (ref 40–60)
HGB BLD-MCNC: 12.9 G/DL (ref 11.5–15.4)
IMM GRANULOCYTES # BLD AUTO: 0.02 THOUSAND/UL (ref 0–0.2)
IMM GRANULOCYTES NFR BLD AUTO: 0 % (ref 0–2)
LDLC SERPL DIRECT ASSAY-MCNC: 53 MG/DL (ref 0–100)
LYMPHOCYTES # BLD AUTO: 1.12 THOUSANDS/ΜL (ref 0.6–4.47)
LYMPHOCYTES NFR BLD AUTO: 14 % (ref 14–44)
MCH RBC QN AUTO: 28.1 PG (ref 26.8–34.3)
MCHC RBC AUTO-ENTMCNC: 32.4 G/DL (ref 31.4–37.4)
MCV RBC AUTO: 87 FL (ref 82–98)
MONOCYTES # BLD AUTO: 0.69 THOUSAND/ΜL (ref 0.17–1.22)
MONOCYTES NFR BLD AUTO: 9 % (ref 4–12)
NEUTROPHILS # BLD AUTO: 5.68 THOUSANDS/ΜL (ref 1.85–7.62)
NEUTS SEG NFR BLD AUTO: 70 % (ref 43–75)
NRBC BLD AUTO-RTO: 0 /100 WBCS
PLATELET # BLD AUTO: 287 THOUSANDS/UL (ref 149–390)
PMV BLD AUTO: 10 FL (ref 8.9–12.7)
POTASSIUM SERPL-SCNC: 4.2 MMOL/L (ref 3.5–5.3)
PROT SERPL-MCNC: 7.5 G/DL (ref 6.4–8.2)
PTH-INTACT SERPL-MCNC: 209.4 PG/ML (ref 18.4–80.1)
RBC # BLD AUTO: 4.59 MILLION/UL (ref 3.81–5.12)
SODIUM SERPL-SCNC: 139 MMOL/L (ref 136–145)
TRIGL SERPL-MCNC: 73 MG/DL
WBC # BLD AUTO: 8.05 THOUSAND/UL (ref 4.31–10.16)

## 2019-05-31 PROCEDURE — 80061 LIPID PANEL: CPT

## 2019-05-31 PROCEDURE — 36415 COLL VENOUS BLD VENIPUNCTURE: CPT

## 2019-05-31 PROCEDURE — 83721 ASSAY OF BLOOD LIPOPROTEIN: CPT

## 2019-05-31 PROCEDURE — 80053 COMPREHEN METABOLIC PANEL: CPT

## 2019-05-31 PROCEDURE — 83970 ASSAY OF PARATHORMONE: CPT

## 2019-05-31 PROCEDURE — 82306 VITAMIN D 25 HYDROXY: CPT

## 2019-05-31 PROCEDURE — 85025 COMPLETE CBC W/AUTO DIFF WBC: CPT

## 2019-06-05 ENCOUNTER — OFFICE VISIT (OUTPATIENT)
Dept: INTERNAL MEDICINE CLINIC | Facility: CLINIC | Age: 79
End: 2019-06-05
Payer: MEDICARE

## 2019-06-05 VITALS
DIASTOLIC BLOOD PRESSURE: 78 MMHG | BODY MASS INDEX: 28.99 KG/M2 | HEART RATE: 72 BPM | HEIGHT: 64 IN | SYSTOLIC BLOOD PRESSURE: 128 MMHG | RESPIRATION RATE: 14 BRPM | WEIGHT: 169.8 LBS

## 2019-06-05 DIAGNOSIS — Z23 NEED FOR SHINGLES VACCINE: ICD-10-CM

## 2019-06-05 DIAGNOSIS — E83.52 HYPERCALCEMIA: ICD-10-CM

## 2019-06-05 DIAGNOSIS — K52.9 INFLAMMATORY BOWEL DISEASE: Chronic | ICD-10-CM

## 2019-06-05 DIAGNOSIS — E78.5 HYPERLIPIDEMIA, UNSPECIFIED HYPERLIPIDEMIA TYPE: Chronic | ICD-10-CM

## 2019-06-05 DIAGNOSIS — Z23 ENCOUNTER FOR IMMUNIZATION: ICD-10-CM

## 2019-06-05 DIAGNOSIS — I10 HYPERTENSION, UNSPECIFIED TYPE: Chronic | ICD-10-CM

## 2019-06-05 DIAGNOSIS — E21.3 HYPERPARATHYROIDISM (HCC): Primary | Chronic | ICD-10-CM

## 2019-06-05 PROCEDURE — 99214 OFFICE O/P EST MOD 30 MIN: CPT | Performed by: INTERNAL MEDICINE

## 2019-06-05 PROCEDURE — 90750 HZV VACC RECOMBINANT IM: CPT

## 2019-06-05 PROCEDURE — 90471 IMMUNIZATION ADMIN: CPT

## 2019-08-12 ENCOUNTER — TELEPHONE (OUTPATIENT)
Dept: GASTROENTEROLOGY | Facility: CLINIC | Age: 79
End: 2019-08-12

## 2019-08-12 NOTE — TELEPHONE ENCOUNTER
New pt    Dr Jorge L Romero requesting to sched wife to w/Dr Sylvia Romero state he is a pt of Dr Sylvia Diallo  Pt need to be seen for crohn's disease  I explained that Dr Sylvia Diallo was no longer accepting new pt's    Pt can be reached at [phone] or [phone]

## 2019-08-15 ENCOUNTER — ANESTHESIA EVENT (OUTPATIENT)
Dept: GASTROENTEROLOGY | Facility: HOSPITAL | Age: 79
End: 2019-08-15

## 2019-08-16 ENCOUNTER — TELEPHONE (OUTPATIENT)
Dept: GASTROENTEROLOGY | Facility: CLINIC | Age: 79
End: 2019-08-16

## 2019-08-16 ENCOUNTER — HOSPITAL ENCOUNTER (OUTPATIENT)
Dept: GASTROENTEROLOGY | Facility: HOSPITAL | Age: 79
Setting detail: OUTPATIENT SURGERY
Discharge: HOME | End: 2019-08-16
Attending: INTERNAL MEDICINE | Admitting: INTERNAL MEDICINE
Payer: MEDICARE

## 2019-08-16 ENCOUNTER — ANESTHESIA (OUTPATIENT)
Dept: GASTROENTEROLOGY | Facility: HOSPITAL | Age: 79
End: 2019-08-16

## 2019-08-16 VITALS
WEIGHT: 168 LBS | OXYGEN SATURATION: 95 % | SYSTOLIC BLOOD PRESSURE: 130 MMHG | BODY MASS INDEX: 28.84 KG/M2 | HEART RATE: 70 BPM | RESPIRATION RATE: 16 BRPM | DIASTOLIC BLOOD PRESSURE: 82 MMHG

## 2019-08-16 DIAGNOSIS — K51.211 ULCERATIVE PROCTITIS WITH RECTAL BLEEDING (HCC): ICD-10-CM

## 2019-08-16 PROCEDURE — 88305 TISSUE EXAM BY PATHOLOGIST: CPT | Performed by: PATHOLOGY

## 2019-08-16 PROCEDURE — 45331 SIGMOIDOSCOPY AND BIOPSY: CPT | Performed by: INTERNAL MEDICINE

## 2019-08-16 RX ORDER — SODIUM CHLORIDE 9 MG/ML
INJECTION, SOLUTION INTRAVENOUS CONTINUOUS PRN
Status: DISCONTINUED | OUTPATIENT
Start: 2019-08-16 | End: 2019-08-16 | Stop reason: SURG

## 2019-08-16 RX ORDER — SODIUM CHLORIDE 9 MG/ML
125 INJECTION, SOLUTION INTRAVENOUS CONTINUOUS
Status: CANCELLED | OUTPATIENT
Start: 2019-08-16

## 2019-08-16 RX ORDER — PROPOFOL 10 MG/ML
INJECTION, EMULSION INTRAVENOUS AS NEEDED
Status: DISCONTINUED | OUTPATIENT
Start: 2019-08-16 | End: 2019-08-16 | Stop reason: SURG

## 2019-08-16 RX ORDER — BUDESONIDE 9 MG/1
9 TABLET, FILM COATED, EXTENDED RELEASE ORAL DAILY
Qty: 90 TABLET | Refills: 0 | Status: SHIPPED | OUTPATIENT
Start: 2019-08-16 | End: 2019-08-23 | Stop reason: DRUGHIGH

## 2019-08-16 RX ORDER — HYDROCORTISONE 100 MG/60ML
100 SUSPENSION RECTAL
Qty: 90 ENEMA | Refills: 1 | Status: SHIPPED | OUTPATIENT
Start: 2019-08-16 | End: 2019-12-02 | Stop reason: SDUPTHER

## 2019-08-16 RX ADMIN — PROPOFOL 20 MG: 10 INJECTION, EMULSION INTRAVENOUS at 10:28

## 2019-08-16 RX ADMIN — PROPOFOL 30 MG: 10 INJECTION, EMULSION INTRAVENOUS at 10:26

## 2019-08-16 RX ADMIN — PROPOFOL 100 MG: 10 INJECTION, EMULSION INTRAVENOUS at 10:22

## 2019-08-16 RX ADMIN — PROPOFOL 30 MG: 10 INJECTION, EMULSION INTRAVENOUS at 10:24

## 2019-08-16 RX ADMIN — SODIUM CHLORIDE: 0.9 INJECTION, SOLUTION INTRAVENOUS at 10:18

## 2019-08-16 NOTE — H&P
History and Physical -  [facility]  Alexsandra Foreman 69 y o  female MRN: [id number]                  HPI: Alexsandra Foreman is a 69y o  year old female who presents for rectal bleeding and diarrhea  For colonoscopy now  REVIEW OF SYSTEMS: Per the HPI, and otherwise unremarkable  Historical Information   Past Medical History:   Diagnosis Date    Atherosclerosis     Carotid artery narrowing     Coronary artery disease     Diverticulosis     GERD (gastroesophageal reflux disease)     Glaucoma     Hypercalcemia     Hyperlipidemia     [facility]par[facility] [address]     Hypertension     Osteopenia     Severe cervical dysplasia, histologically confirmed     Stroke ([facility] )     Thyroid nodule     Vitamin D deficiency      Past Surgical History:   Procedure Laterality Date    APPENDECTOMY      BREAST SURGERY      reduction procedure    CAROTID ENDARTARECTOMY Right     carotid thromboendarterectomy     COLONOSCOPY      EXPLORATION CAROTID ARTERY      HYSTERECTOMY      DE COLONOSCOPY FLX DX W/COLLJ SPEC WHEN PFRMD N/A 7/28/2017    Procedure: EGD AND COLONOSCOPY;  Surgeon: Debbie Benjamin MD;  [facility];   Service: Colorectal     Social History   Social History     Substance and Sexual Activity   Alcohol Use Yes    Alcohol/week: 7 0 standard drinks    Types: 7 Glasses of wine per week    Comment: being a social drinker; drinks wine     Social History     Substance and Sexual Activity   Drug Use No     Social History     Tobacco Use   Smoking Status Never Smoker   Smokeless Tobacco Never Used     Family History   Problem Relation Age of Onset    Osteoporosis Mother     Osteoarthritis Mother     Other Father         heart problem    Coronary artery disease Family     Hyperlipidemia Family        Meds/Allergies       (Not in a hospital admission)    No Known Allergies    Objective     /71   Pulse 69   Resp 20   Wt 76 2 kg (168 lb)   SpO2 95%   BMI 28 84 kg/m² PHYSICAL EXAM    Gen: NAD  CV: RRR  CHEST: Clear  ABD: soft, NT/ND  EXT: no edema      ASSESSMENT/PLAN:  This is a 69y o  year old female here for rectal bleeding an diarrhea; history of, and she is stable and optimized for her procedure

## 2019-08-16 NOTE — ANESTHESIA PREPROCEDURE EVALUATION
Review of Systems/Medical History  Patient summary reviewed  Chart reviewed      Cardiovascular  Exercise tolerance (METS): >4,  Hyperlipidemia, Hypertension controlled, CAD ,    Pulmonary  Negative pulmonary ROS        GI/Hepatic    GERD well controlled, Bowel prep            Endo/Other  History of thyroid disease , hypothyroidism,      GYN  Negative gynecology ROS          Hematology  Negative hematology ROS      Musculoskeletal  Negative musculoskeletal ROS        Neurology    CVA , no residual symptoms,   Comment: S/p right CEA  Psychology   Negative psychology ROS              Physical Exam    Airway    Mallampati score: II  TM Distance: >3 FB  Neck ROM: full     Dental   No notable dental hx     Cardiovascular  Cardiovascular exam normal    Pulmonary  Pulmonary exam normal     Other Findings        Anesthesia Plan  ASA Score- 2     Anesthesia Type- IV sedation with anesthesia with ASA Monitors  Additional Monitors:   Airway Plan:         Plan Factors-  Patient did not smoke on day of surgery  Induction- intravenous  Postoperative Plan-     Informed Consent- Anesthetic plan and risks discussed with patient  I personally reviewed this patient with the CRNA  Discussed and agreed on the Anesthesia Plan with the CRNA  Evelin Tovar

## 2019-08-16 NOTE — ANESTHESIA POSTPROCEDURE EVALUATION
Post-Op Assessment Note    CV Status:  Stable  Pain Score: 0    Pain management: adequate     Mental Status:  Alert and awake   Hydration Status:  Euvolemic   PONV Controlled:  Controlled   Airway Patency:  Patent   Post Op Vitals Reviewed: Yes      Staff: CRNA, Anesthesiologist           BP   110/50   Temp      Pulse     Resp      SpO2

## 2019-08-17 ENCOUNTER — APPOINTMENT (OUTPATIENT)
Dept: LAB | Age: 79
End: 2019-08-17
Payer: MEDICARE

## 2019-08-17 DIAGNOSIS — K51.211 ULCERATIVE PROCTITIS WITH RECTAL BLEEDING (HCC): ICD-10-CM

## 2019-08-17 LAB
CRP SERPL QL: 39.9 MG/L
RUBV IGG SERPL IA-ACNC: >175 IU/ML

## 2019-08-17 PROCEDURE — 87340 HEPATITIS B SURFACE AG IA: CPT

## 2019-08-17 PROCEDURE — 86706 HEP B SURFACE ANTIBODY: CPT

## 2019-08-17 PROCEDURE — 86140 C-REACTIVE PROTEIN: CPT

## 2019-08-17 PROCEDURE — 36415 COLL VENOUS BLD VENIPUNCTURE: CPT

## 2019-08-17 PROCEDURE — 86735 MUMPS ANTIBODY: CPT

## 2019-08-17 PROCEDURE — 86480 TB TEST CELL IMMUN MEASURE: CPT

## 2019-08-17 PROCEDURE — 86705 HEP B CORE ANTIBODY IGM: CPT

## 2019-08-17 PROCEDURE — 86765 RUBEOLA ANTIBODY: CPT

## 2019-08-17 PROCEDURE — 86762 RUBELLA ANTIBODY: CPT

## 2019-08-18 LAB
HBV CORE IGM SER QL: NORMAL
HBV SURFACE AB SER-ACNC: <3.1 MIU/ML
HBV SURFACE AG SER QL: NORMAL

## 2019-08-19 ENCOUNTER — APPOINTMENT (OUTPATIENT)
Dept: LAB | Age: 79
End: 2019-08-19
Payer: MEDICARE

## 2019-08-19 ENCOUNTER — TELEPHONE (OUTPATIENT)
Dept: INTERNAL MEDICINE CLINIC | Facility: CLINIC | Age: 79
End: 2019-08-19

## 2019-08-19 DIAGNOSIS — K51.211 ULCERATIVE PROCTITIS WITH RECTAL BLEEDING (HCC): ICD-10-CM

## 2019-08-19 PROCEDURE — 87493 C DIFF AMPLIFIED PROBE: CPT

## 2019-08-19 PROCEDURE — 83993 ASSAY FOR CALPROTECTIN FECAL: CPT

## 2019-08-19 NOTE — TELEPHONE ENCOUNTER
----- Message from Jahaira Stein MD sent at 8/17/2019  6:00 AM EDT -----   Please call patient and let her know we received input from [address]- her new GI physician -she wants her to have the new zoster vaccine relatively soon because of potential other meds needed for her colitis- bring her into the office then give her Shingrix vaccine with repeat in 2 months- if she has  already received 2 doses of the vaccine she does not need to come in

## 2019-08-19 NOTE — TELEPHONE ENCOUNTER
SPOKE WITH PT, GAVE MESSAGE   SHE STATED THAT SHE ALREADY HAD THE 1ST DOSE HERE AT HER LAST APPT, SO WE SCHEDULED HER FOR THE SECOND DOES FOR THIS Wednesday

## 2019-08-20 ENCOUNTER — TELEPHONE (OUTPATIENT)
Dept: GASTROENTEROLOGY | Facility: AMBULARY SURGERY CENTER | Age: 79
End: 2019-08-20

## 2019-08-20 LAB
C DIFF TOX GENS STL QL NAA+PROBE: NORMAL
MEV IGG SER QL: NORMAL
MUV IGG SER QL: NORMAL

## 2019-08-20 NOTE — TELEPHONE ENCOUNTER
Dr Laura Lucero patient Hx- ulcerative colitis/ colonoscopy 8/16/19  Patient call to discuss newly prescribed medication- budesonide and hydrocortisone  She verbalized understanding  She also inquired about possible future treatment with Entyvio  Calprotectin results pending  C diff negative  Patient continues to have 5 bloody BMs per day with urgency  Denies pain or n/v  She will use Lomotil as needed to decrease episodes of diarrhea

## 2019-08-20 NOTE — TELEPHONE ENCOUNTER
chaput patient      She called due to medication questions and also wants to know what she can do to control  diarrhea      Call back [phone]

## 2019-08-21 ENCOUNTER — CLINICAL SUPPORT (OUTPATIENT)
Dept: INTERNAL MEDICINE CLINIC | Facility: CLINIC | Age: 79
End: 2019-08-21
Payer: MEDICARE

## 2019-08-21 DIAGNOSIS — K51.211 ULCERATIVE PROCTITIS WITH RECTAL BLEEDING (HCC): Primary | ICD-10-CM

## 2019-08-21 DIAGNOSIS — Z23 NEED FOR VACCINATION FOR ZOSTER: Primary | ICD-10-CM

## 2019-08-21 LAB
CALPROTECTIN STL-MCNT: 3253 UG/G (ref 0–120)
GAMMA INTERFERON BACKGROUND BLD IA-ACNC: 0.03 IU/ML
M TB IFN-G BLD-IMP: NEGATIVE
M TB IFN-G CD4+ BCKGRND COR BLD-ACNC: -0.02 IU/ML
M TB IFN-G CD4+ BCKGRND COR BLD-ACNC: -0.02 IU/ML
MITOGEN IGNF BCKGRD COR BLD-ACNC: 1.49 IU/ML

## 2019-08-21 PROCEDURE — 90750 HZV VACC RECOMBINANT IM: CPT

## 2019-08-21 PROCEDURE — 90471 IMMUNIZATION ADMIN: CPT

## 2019-08-21 RX ORDER — MESALAMINE 1000 MG/1
1000 SUPPOSITORY RECTAL
Qty: 30 SUPPOSITORY | Refills: 3 | Status: SHIPPED | OUTPATIENT
Start: 2019-08-21 | End: 2019-12-02 | Stop reason: SDUPTHER

## 2019-08-21 NOTE — TELEPHONE ENCOUNTER
Please see attached encounter    Patient called to report she started using prescribed cortenema  The first night she was able to tolerate, but last night she was not able to hold the enema and her sleep was disrupted from feeling of urgency  Recommendation for other option? Suppository?

## 2019-08-21 NOTE — TELEPHONE ENCOUNTER
Patient is aware mesalamine suppository script sent to her pharmacy  She understands the suppository will only covers the rectum  She will call back with any concerns or questions

## 2019-08-23 ENCOUNTER — TELEPHONE (OUTPATIENT)
Dept: GASTROENTEROLOGY | Facility: CLINIC | Age: 79
End: 2019-08-23

## 2019-08-23 DIAGNOSIS — K51.211 ULCERATIVE PROCTITIS WITH RECTAL BLEEDING (HCC): Primary | ICD-10-CM

## 2019-08-23 RX ORDER — ACETAMINOPHEN 325 MG/1
650 TABLET ORAL ONCE
Status: CANCELLED | OUTPATIENT
Start: 2019-09-12

## 2019-08-23 RX ORDER — DIPHENHYDRAMINE HCL 25 MG
25 TABLET ORAL ONCE
Status: CANCELLED | OUTPATIENT
Start: 2019-09-12

## 2019-08-23 RX ORDER — SODIUM CHLORIDE 9 MG/ML
20 INJECTION, SOLUTION INTRAVENOUS ONCE
Status: CANCELLED | OUTPATIENT
Start: 2019-09-12

## 2019-08-23 RX ORDER — PREDNISONE 1 MG/1
TABLET ORAL
Qty: 252 TABLET | Refills: 0 | Status: SHIPPED | OUTPATIENT
Start: 2019-08-23 | End: 2019-12-02 | Stop reason: CLARIF

## 2019-08-23 RX ORDER — METHYLPREDNISOLONE SODIUM SUCCINATE 40 MG/ML
40 INJECTION, POWDER, LYOPHILIZED, FOR SOLUTION INTRAMUSCULAR; INTRAVENOUS ONCE
Status: CANCELLED | OUTPATIENT
Start: 2019-09-12

## 2019-08-23 NOTE — TELEPHONE ENCOUNTER
Spoke with patient by phone  Patient reports she has been taking Uceris for the past week, but has been continued to have bloody bowel movements for the past week (5x/day), is having difficulty holding in rowasa enemas, and so transitioned to canasa suppositories last PM, which she was able to hold in for 2 hours  Discussed case with Dr Reilly Engel, and Uceris discontinued, and patient started on prednisone taper (40mg x 1 week, 35mg x 1 week, 30mg x 1 week, decreasing by 5mg weekly) to manage acute symptoms as she will soon begin on Entyvio  Pt to f/u with NA Bol[place]ar on 9/16  Reviewed this recommendation with pt  All pt questions answered

## 2019-08-26 ENCOUNTER — DOCUMENTATION (OUTPATIENT)
Dept: GASTROENTEROLOGY | Facility: CLINIC | Age: 79
End: 2019-08-26

## 2019-08-27 ENCOUNTER — TELEPHONE (OUTPATIENT)
Dept: GASTROENTEROLOGY | Facility: AMBULARY SURGERY CENTER | Age: 79
End: 2019-08-27

## 2019-08-27 ENCOUNTER — CLINICAL SUPPORT (OUTPATIENT)
Dept: INTERNAL MEDICINE CLINIC | Facility: CLINIC | Age: 79
End: 2019-08-27
Payer: MEDICARE

## 2019-08-27 DIAGNOSIS — Z23 NEED FOR VACCINATION WITH DIPHTHERIA-TETANUS-PERTUSSIS (DTP), HAEMOPHILUS INFLUENZAE TYPE B CONJUGATE, AND HEPATITIS B VACCINE: Primary | ICD-10-CM

## 2019-08-27 PROCEDURE — 90746 HEPB VACCINE 3 DOSE ADULT IM: CPT

## 2019-08-27 PROCEDURE — G0010 ADMIN HEPATITIS B VACCINE: HCPCS

## 2019-08-27 NOTE — PROGRESS NOTES
Patient in office getting her Hep B vaccine  She is aware she needs 3 dose of this vaccine , per Dr Brown Body are made

## 2019-08-27 NOTE — TELEPHONE ENCOUNTER
DR ALEJANDRE'S PT    Pt called requesting to speak to George Leary regarding a denial of a medication from [facility]   Please assist

## 2019-08-28 NOTE — TELEPHONE ENCOUNTER
I called the patient and informed her that Dr Harry Christianson is appealing this decision, I also mentioned that once I hear back about that I will call her to let her know the final determination

## 2019-09-03 ENCOUNTER — TRANSCRIBE ORDERS (OUTPATIENT)
Dept: GASTROENTEROLOGY | Facility: CLINIC | Age: 79
End: 2019-09-03

## 2019-09-05 ENCOUNTER — TRANSCRIBE ORDERS (OUTPATIENT)
Dept: GASTROENTEROLOGY | Facility: CLINIC | Age: 79
End: 2019-09-05

## 2019-09-05 ENCOUNTER — APPOINTMENT (OUTPATIENT)
Dept: LAB | Age: 79
End: 2019-09-05
Payer: MEDICARE

## 2019-09-05 ENCOUNTER — TRANSCRIBE ORDERS (OUTPATIENT)
Dept: ADMINISTRATIVE | Age: 79
End: 2019-09-05

## 2019-09-05 DIAGNOSIS — E21.3 HYPERPARATHYROIDISM (HCC): Chronic | ICD-10-CM

## 2019-09-05 LAB
ALBUMIN SERPL BCP-MCNC: 2.9 G/DL (ref 3.5–5)
ALP SERPL-CCNC: 67 U/L (ref 46–116)
ALT SERPL W P-5'-P-CCNC: 29 U/L (ref 12–78)
ANION GAP SERPL CALCULATED.3IONS-SCNC: 6 MMOL/L (ref 4–13)
AST SERPL W P-5'-P-CCNC: 10 U/L (ref 5–45)
BILIRUB SERPL-MCNC: 0.44 MG/DL (ref 0.2–1)
BUN SERPL-MCNC: 22 MG/DL (ref 5–25)
CALCIUM SERPL-MCNC: 9.7 MG/DL (ref 8.3–10.1)
CHLORIDE SERPL-SCNC: 107 MMOL/L (ref 100–108)
CO2 SERPL-SCNC: 25 MMOL/L (ref 21–32)
CREAT SERPL-MCNC: 0.91 MG/DL (ref 0.6–1.3)
GFR SERPL CREATININE-BSD FRML MDRD: 61 ML/MIN/1.73SQ M
GLUCOSE SERPL-MCNC: 102 MG/DL (ref 65–140)
POTASSIUM SERPL-SCNC: 3.3 MMOL/L (ref 3.5–5.3)
PROT SERPL-MCNC: 6.9 G/DL (ref 6.4–8.2)
SODIUM SERPL-SCNC: 138 MMOL/L (ref 136–145)

## 2019-09-05 PROCEDURE — 36415 COLL VENOUS BLD VENIPUNCTURE: CPT

## 2019-09-05 PROCEDURE — 80053 COMPREHEN METABOLIC PANEL: CPT

## 2019-09-09 ENCOUNTER — TELEPHONE (OUTPATIENT)
Dept: GASTROENTEROLOGY | Facility: AMBULARY SURGERY CENTER | Age: 79
End: 2019-09-09

## 2019-09-09 ENCOUNTER — TELEPHONE (OUTPATIENT)
Dept: GASTROENTEROLOGY | Facility: CLINIC | Age: 79
End: 2019-09-09

## 2019-09-09 ENCOUNTER — PREP FOR PROCEDURE (OUTPATIENT)
Dept: GASTROENTEROLOGY | Facility: CLINIC | Age: 79
End: 2019-09-09

## 2019-09-09 NOTE — TELEPHONE ENCOUNTER
anurag pt    Please call back to nando connect in regards to missing info for the [address]

## 2019-09-09 NOTE — TELEPHONE ENCOUNTER
I called and spoke with her  Ed who is a physician and let him know her potassium was 3 3, he will make sure she eats a banana or 2

## 2019-09-09 NOTE — TELEPHONE ENCOUNTER
Vamsi Sarabia office call to informed the office of pt recent blood work which indicates low potassium

## 2019-09-12 ENCOUNTER — HOSPITAL ENCOUNTER (OUTPATIENT)
Dept: INFUSION CENTER | Facility: HOSPITAL | Age: 79
Discharge: HOME | End: 2019-09-12
Payer: MEDICARE

## 2019-09-12 VITALS
WEIGHT: 161.82 LBS | DIASTOLIC BLOOD PRESSURE: 54 MMHG | HEART RATE: 56 BPM | TEMPERATURE: 96.7 F | SYSTOLIC BLOOD PRESSURE: 108 MMHG | RESPIRATION RATE: 16 BRPM | BODY MASS INDEX: 27.78 KG/M2

## 2019-09-12 DIAGNOSIS — K51.211 ULCERATIVE PROCTITIS WITH RECTAL BLEEDING (HCC): Primary | ICD-10-CM

## 2019-09-12 PROCEDURE — 96413 CHEMO IV INFUSION 1 HR: CPT

## 2019-09-12 PROCEDURE — 96375 TX/PRO/DX INJ NEW DRUG ADDON: CPT

## 2019-09-12 RX ORDER — DIPHENHYDRAMINE HCL 25 MG
25 TABLET ORAL ONCE
Status: COMPLETED | OUTPATIENT
Start: 2019-09-12 | End: 2019-09-12

## 2019-09-12 RX ORDER — DIPHENHYDRAMINE HCL 25 MG
25 TABLET ORAL ONCE
Status: CANCELLED | OUTPATIENT
Start: 2019-09-26

## 2019-09-12 RX ORDER — ACETAMINOPHEN 325 MG/1
650 TABLET ORAL ONCE
Status: COMPLETED | OUTPATIENT
Start: 2019-09-12 | End: 2019-09-12

## 2019-09-12 RX ORDER — METHYLPREDNISOLONE SODIUM SUCCINATE 40 MG/ML
40 INJECTION, POWDER, LYOPHILIZED, FOR SOLUTION INTRAMUSCULAR; INTRAVENOUS ONCE
Status: CANCELLED | OUTPATIENT
Start: 2019-09-26

## 2019-09-12 RX ORDER — SODIUM CHLORIDE 9 MG/ML
20 INJECTION, SOLUTION INTRAVENOUS ONCE
Status: CANCELLED | OUTPATIENT
Start: 2019-09-26

## 2019-09-12 RX ORDER — SODIUM CHLORIDE 9 MG/ML
20 INJECTION, SOLUTION INTRAVENOUS ONCE
Status: COMPLETED | OUTPATIENT
Start: 2019-09-12 | End: 2019-09-12

## 2019-09-12 RX ORDER — ACETAMINOPHEN 325 MG/1
650 TABLET ORAL ONCE
Status: CANCELLED | OUTPATIENT
Start: 2019-09-26

## 2019-09-12 RX ORDER — METHYLPREDNISOLONE SODIUM SUCCINATE 40 MG/ML
40 INJECTION, POWDER, LYOPHILIZED, FOR SOLUTION INTRAMUSCULAR; INTRAVENOUS ONCE
Status: COMPLETED | OUTPATIENT
Start: 2019-09-12 | End: 2019-09-12

## 2019-09-12 RX ADMIN — METHYLPREDNISOLONE SODIUM SUCCINATE 40 MG: 40 INJECTION, POWDER, FOR SOLUTION INTRAMUSCULAR; INTRAVENOUS at 14:28

## 2019-09-12 RX ADMIN — VEDOLIZUMAB 300 MG: 300 INJECTION, POWDER, LYOPHILIZED, FOR SOLUTION INTRAVENOUS at 14:59

## 2019-09-12 RX ADMIN — DIPHENHYDRAMINE HCL 25 MG: 25 TABLET, COATED ORAL at 14:18

## 2019-09-12 RX ADMIN — ACETAMINOPHEN 650 MG: 325 TABLET ORAL at 14:18

## 2019-09-12 RX ADMIN — SODIUM CHLORIDE 20 ML/HR: 0.9 INJECTION, SOLUTION INTRAVENOUS at 14:30

## 2019-09-12 NOTE — PLAN OF CARE
Problem: Potential for Falls  Goal: Patient will remain free of falls  Description  INTERVENTIONS:  - Assess patient frequently for physical needs  -  Identify cognitive and physical deficits and behaviors that affect risk of falls    -  [place] fall precautions as indicated by assessment   - Educate patient/family on patient safety including physical limitations  - Instruct patient to call for assistance with activity based on assessment  - Modify environment to reduce risk of injury  - Consider OT/PT consult to assist with strengthening/mobility  Outcome: Progressing

## 2019-09-16 ENCOUNTER — OFFICE VISIT (OUTPATIENT)
Dept: GASTROENTEROLOGY | Facility: CLINIC | Age: 79
End: 2019-09-16
Payer: MEDICARE

## 2019-09-16 VITALS
HEART RATE: 46 BPM | SYSTOLIC BLOOD PRESSURE: 125 MMHG | BODY MASS INDEX: 27.42 KG/M2 | HEIGHT: 64 IN | TEMPERATURE: 98.5 F | WEIGHT: 160.6 LBS | DIASTOLIC BLOOD PRESSURE: 70 MMHG

## 2019-09-16 DIAGNOSIS — K51.211 ULCERATIVE PROCTITIS WITH RECTAL BLEEDING (HCC): Primary | ICD-10-CM

## 2019-09-16 PROCEDURE — 99214 OFFICE O/P EST MOD 30 MIN: CPT | Performed by: PHYSICIAN ASSISTANT

## 2019-09-16 RX ORDER — DIPHENOXYLATE HYDROCHLORIDE AND ATROPINE SULFATE 2.5; .025 MG/1; MG/1
TABLET ORAL
Refills: 4 | COMMUNITY
Start: 2019-09-12 | End: 2021-03-15 | Stop reason: SDUPTHER

## 2019-09-16 NOTE — PROGRESS NOTES
[facility] - Outpatient Follow-up Note  Mason Forrester 69 y o  female MRN: [id number]  Encounter: [id number]          ASSESSMENT AND PLAN:      Diagnoses and all orders for this visit:    1  Ulcerative proctitis with rectal bleeding [facility])    Patient with severe left sided colitis on recent colonoscopy, just started on Entyvio  Will continue with Entyvio infusion induction, then Q8 weeks  She may continue asacol and canasa suppositories for now  Will continue to taper prednisone by 5mg weekly until complete  Will plan a follow up appointment in 8-12 weeks  Discussed recommendations for annual influenza vaccination  She is in the process of completing Hep B vaccination series  Recommended annual dermatology screenings  She will discuss with her PCP recommendation for annual GYN visit   ______________________________________________________________________    SUBJECTIVE:  Whit Dudley is a 31-year-old female with a history of ulcerative colitis who presents for follow up  She was seen for colonoscopy by Dr Hari Pedroza on 8/16/19 with symptoms of diarrhea and rectal bleeding  The colonoscopy revealed severe left sided colitis with bx consistent with ulcerative colitis  Her fecal calprotectin was significantly elevated at 3,253 and CRP at 39 9  She is currently taking 25mg prednisone daily, tapering by 5mg each week  She also continues Asacol 800mg TID and canasa suppositories 1000mg QHS  She was unable to retain the mesalamine enemas and these have been discontinued  She had her first infusion of Entyvio 300mg on 9/12/19 and this was well tolerated  She is scheduled for repeat infusion in 2 weeks  Her preliminary labs showed no immunity to hepatitis B and she has started the vaccination series  She has not yet received her flu shot  She states overall she is feeling better  She has less urgency/frequency associated with BMs  She has not seen blood with her BMs in several days  They are no longer loose/watery but soft  She has been following a lower-fiber diet  She is having some difficulty falling asleep at night and attributes this to the steroids  No fevers/chills, fatigue or night sweats  No nausea/vomiting  REVIEW OF SYSTEMS IS OTHERWISE NEGATIVE  Historical Information   Past Medical History:   Diagnosis Date    Atherosclerosis     Carotid artery narrowing     Coronary artery disease     Diverticulosis     GERD (gastroesophageal reflux disease)     Glaucoma     Hypercalcemia     Hyperlipidemia     [facility]parathyroidism ([facility] )     Hypertension     Osteopenia     Severe cervical dysplasia, histologically confirmed     Stroke ([facility] )     Thyroid nodule     Vitamin D deficiency      Past Surgical History:   Procedure Laterality Date    APPENDECTOMY      BREAST SURGERY      reduction procedure    CAROTID ENDARTARECTOMY Right     carotid thromboendarterectomy     COLONOSCOPY      EXPLORATION CAROTID ARTERY      HYSTERECTOMY      MD COLONOSCOPY FLX DX W/COLLJ SPEC WHEN PFRMD N/A 7/28/2017    Procedure: EGD AND COLONOSCOPY;  Surgeon: Joby Dawn MD;  [facility];   Service: Colorectal     Social History   Social History     Substance and Sexual Activity   Alcohol Use Yes    Alcohol/week: 7 0 standard drinks    Types: 7 Glasses of wine per week    Comment: being a social drinker; drinks wine     Social History     Substance and Sexual Activity   Drug Use No     Social History     Tobacco Use   Smoking Status Never Smoker   Smokeless Tobacco Never Used     Family History   Problem Relation Age of Onset    Osteoporosis Mother     Osteoarthritis Mother     Other Father         heart problem    Coronary artery disease Family     Hyperlipidemia Family        Meds/Allergies       Current Outpatient Medications:     AMLODIPINE BESYLATE PO    aspirin 81 MG tablet    atenolol (TENORMIN) 25 mg tablet    bimatoprost (LUMIGAN) 0 01 % ophthalmic drops    brinzolamide (AZOPT) 1 % ophthalmic suspension    Cholecalciferol (VITAMIN D3 PO)    diphenoxylate-atropine (LOMOTIL) 2 5-0 025 mg per tablet    hydrochlorothiazide (HYDRODIURIL) 25 mg tablet    hydrocortisone (CORTENEMA) 100 mg/60 mL enema    irbesartan (AVAPRO) 300 mg tablet    mesalamine (ASACOL) 800 MG EC tablet    mesalamine (CANASA) 1,000 mg suppository    omega-3-acid ethyl esters (LOVAZA) 1 g capsule    predniSONE 5 mg tablet    rosuvastatin (CRESTOR) 5 mg tablet    triamcinolone (KENALOG) 0 1 % ointment    No Known Allergies        Objective     Blood pressure 125/70, pulse (!) 46, temperature 98 5 °F (36 9 °C), temperature source Tympanic, height 5' 4" (1 626 m), weight 72 8 kg (160 lb 9 6 oz)  Body mass index is 27 57 kg/m²  PHYSICAL EXAM:      General Appearance:   Alert, cooperative, no distress   HEENT:   Normocephalic, atraumatic, sclera anicteric      Neck:  Supple, symmetrical, no lymphadenopathy, trachea midline   Lungs:   Clear to auscultation bilaterally; no rales, rhonchi or wheezing; respirations unlabored    Heart[de-identified]   Regular rate and rhythm; no murmur, rub, or gallop     Abdomen:   Soft, non-tender without rebound or guarding, non-distended; positive bowel sounds; no masses, no organomegaly    Genitalia:   Deferred    Rectal:   Deferred    Extremities:  No cyanosis, clubbing or edema    Pulses:  2+ and symmetric    Skin:  No jaundice, rashes, or lesions              Jose Manuel Aguero PA-C

## 2019-09-18 ENCOUNTER — CONSULT (OUTPATIENT)
Dept: VASCULAR SURGERY | Facility: CLINIC | Age: 79
End: 2019-09-18
Payer: MEDICARE

## 2019-09-18 VITALS
DIASTOLIC BLOOD PRESSURE: 70 MMHG | WEIGHT: 156 LBS | TEMPERATURE: 98.3 F | HEIGHT: 64 IN | SYSTOLIC BLOOD PRESSURE: 116 MMHG | BODY MASS INDEX: 26.63 KG/M2 | HEART RATE: 54 BPM

## 2019-09-18 DIAGNOSIS — I65.23 BILATERAL CAROTID ARTERY STENOSIS: Primary | Chronic | ICD-10-CM

## 2019-09-18 PROCEDURE — 99204 OFFICE O/P NEW MOD 45 MIN: CPT | Performed by: SURGERY

## 2019-09-18 NOTE — PROGRESS NOTES
Assessment/Plan:    Carotid stenosis    1  History of right carotid artery stenosis status post carotid endarterectomy approximately 10 years ago for symptomatic disease  She is doing well in this regard and would be followed with periodic duplex imaging and continued medical management with antiplatelet and statin therapy  2  Mild left carotid artery duplex as identified on previous duplex evaluation over a year ago at an outside facility  We discussed this finding along with the suggested follow-up which would include annual duplex follow-up which will be scheduled in the near future  Diagnoses and all orders for this visit:    Bilateral carotid artery stenosis  -     VAS carotid complete study; Future          Subjective:      Patient ID: Robby Swanson is a 69 y o  female  New patient, presenting today for carotid artery stenosis  Patient has hx of R CEA in 2008  She has not had any recent testing  Patient is asymptomatic        20-year-old with history of left carotid artery stenosis treated with carotid endarterectomy by Dr Dwain Tilley after an episode in which she noted right arm weakness and tingling wall on a trip to [place]  Since that time she has had no further neurologic event  She has historically been followed with annual duplex at an outside facility and now seeks continued vascular care  On evaluation today she has no complaints  She denies any limitations  She specifically denies any chest pain, shortness of breath or claudication symptoms  She denies any focal neurologic event which would be consistent with TIA, CVA or amaurosis fugax  The following portions of the patient's history were reviewed and updated as appropriate: allergies, current medications, past family history, past medical history, past social history, past surgical history and problem list     The ROS as bellow was reviewed and changes made as indictated       Review of Systems   Constitutional: Negative  HENT: Negative  Eyes: Negative  Respiratory: Negative  Cardiovascular: Negative  Gastrointestinal: Positive for diarrhea  Endocrine: Negative  Genitourinary: Negative  Musculoskeletal: Negative  Skin: Negative  Allergic/Immunologic: Negative  Neurological: Negative  Hematological: Negative  Psychiatric/Behavioral: Negative  Objective:      /70 (BP Location: Right arm, Patient Position: Sitting)   Pulse (!) 54   Temp 98 3 °F (36 8 °C) (Tympanic)   Ht 5' 4" (1 626 m)   Wt 70 8 kg (156 lb)   BMI 26 78 kg/m²          Physical Exam   Constitutional: She is oriented to person, place, and time  She appears well-developed and well-nourished  HENT:   Head: Normocephalic and atraumatic  Eyes: Conjunctivae and EOM are normal    Neck: Normal range of motion  Neck supple  No JVD present  Carotid bruit is present (  Left high-pitched carotid bruit)  Healed right neck incision status post endarterectomy   Cardiovascular: Normal rate, regular rhythm, S1 normal, S2 normal and normal heart sounds  No murmur heard  Pulses:       Carotid pulses are 2+ on the right side, and 2+ on the left side  Radial pulses are 2+ on the right side, and 2+ on the left side  Femoral pulses are 2+ on the right side, and 2+ on the left side  Popliteal pulses are 2+ on the right side, and 2+ on the left side  Dorsalis pedis pulses are 2+ on the right side, and 2+ on the left side  Pulmonary/Chest: Effort normal and breath sounds normal    Abdominal: Soft  Normal appearance and normal aorta  She exhibits no abdominal bruit and no pulsatile midline mass  There is no tenderness  No hernia  Musculoskeletal: Normal range of motion  She exhibits no edema, tenderness or deformity  Neurological: She is alert and oriented to person, place, and time  She has normal strength  No sensory deficit  Skin: Skin is warm, dry and intact  No pallor     Psychiatric: She has a normal mood and affect   Her speech is normal and behavior is normal

## 2019-09-18 NOTE — PATIENT INSTRUCTIONS
Carotid stenosis    1  History of right carotid artery stenosis status post carotid endarterectomy approximately 10 years ago for symptomatic disease  She is doing well in this regard and would be followed with periodic duplex imaging and continued medical management with antiplatelet and statin therapy  2  Mild left carotid artery duplex as identified on previous duplex evaluation over a year ago at an outside facility  We discussed this finding along with the suggested follow-up which would include annual duplex follow-up which will be scheduled in the near future

## 2019-09-18 NOTE — ASSESSMENT & PLAN NOTE
1  History of right carotid artery stenosis status post carotid endarterectomy approximately 10 years ago for symptomatic disease  She is doing well in this regard and would be followed with periodic duplex imaging and continued medical management with antiplatelet and statin therapy  2  Mild left carotid artery duplex as identified on previous duplex evaluation over a year ago at an outside facility  We discussed this finding along with the suggested follow-up which would include annual duplex follow-up which will be scheduled in the near future

## 2019-09-26 ENCOUNTER — HOSPITAL ENCOUNTER (OUTPATIENT)
Dept: INFUSION CENTER | Facility: HOSPITAL | Age: 79
Discharge: HOME | End: 2019-09-26
Attending: INTERNAL MEDICINE
Payer: MEDICARE

## 2019-09-26 VITALS
RESPIRATION RATE: 18 BRPM | DIASTOLIC BLOOD PRESSURE: 70 MMHG | HEART RATE: 76 BPM | SYSTOLIC BLOOD PRESSURE: 122 MMHG | TEMPERATURE: 97.3 F

## 2019-09-26 DIAGNOSIS — K51.211 ULCERATIVE PROCTITIS WITH RECTAL BLEEDING (HCC): Primary | ICD-10-CM

## 2019-09-26 PROCEDURE — 96413 CHEMO IV INFUSION 1 HR: CPT

## 2019-09-26 PROCEDURE — 96375 TX/PRO/DX INJ NEW DRUG ADDON: CPT

## 2019-09-26 RX ORDER — DIPHENHYDRAMINE HCL 25 MG
25 TABLET ORAL ONCE
Status: CANCELLED | OUTPATIENT
Start: 2019-10-24

## 2019-09-26 RX ORDER — DIPHENHYDRAMINE HCL 25 MG
25 TABLET ORAL ONCE
Status: COMPLETED | OUTPATIENT
Start: 2019-09-26 | End: 2019-09-26

## 2019-09-26 RX ORDER — ACETAMINOPHEN 325 MG/1
650 TABLET ORAL ONCE
Status: CANCELLED | OUTPATIENT
Start: 2019-10-24

## 2019-09-26 RX ORDER — ACETAMINOPHEN 325 MG/1
650 TABLET ORAL ONCE
Status: COMPLETED | OUTPATIENT
Start: 2019-09-26 | End: 2019-09-26

## 2019-09-26 RX ORDER — METHYLPREDNISOLONE SODIUM SUCCINATE 40 MG/ML
40 INJECTION, POWDER, LYOPHILIZED, FOR SOLUTION INTRAMUSCULAR; INTRAVENOUS ONCE
Status: COMPLETED | OUTPATIENT
Start: 2019-09-26 | End: 2019-09-26

## 2019-09-26 RX ORDER — SODIUM CHLORIDE 9 MG/ML
20 INJECTION, SOLUTION INTRAVENOUS ONCE
Status: COMPLETED | OUTPATIENT
Start: 2019-09-26 | End: 2019-09-26

## 2019-09-26 RX ORDER — SODIUM CHLORIDE 9 MG/ML
20 INJECTION, SOLUTION INTRAVENOUS ONCE
Status: CANCELLED | OUTPATIENT
Start: 2019-10-24

## 2019-09-26 RX ORDER — METHYLPREDNISOLONE SODIUM SUCCINATE 40 MG/ML
40 INJECTION, POWDER, LYOPHILIZED, FOR SOLUTION INTRAMUSCULAR; INTRAVENOUS ONCE
Status: CANCELLED | OUTPATIENT
Start: 2019-10-24

## 2019-09-26 RX ADMIN — SODIUM CHLORIDE 20 ML/HR: 0.9 INJECTION, SOLUTION INTRAVENOUS at 13:09

## 2019-09-26 RX ADMIN — METHYLPREDNISOLONE SODIUM SUCCINATE 40 MG: 40 INJECTION, POWDER, FOR SOLUTION INTRAMUSCULAR; INTRAVENOUS at 13:10

## 2019-09-26 RX ADMIN — ACETAMINOPHEN 650 MG: 325 TABLET ORAL at 13:09

## 2019-09-26 RX ADMIN — DIPHENHYDRAMINE HCL 25 MG: 25 TABLET, COATED ORAL at 13:10

## 2019-09-26 RX ADMIN — VEDOLIZUMAB 300 MG: 300 INJECTION, POWDER, LYOPHILIZED, FOR SOLUTION INTRAVENOUS at 13:37

## 2019-09-26 NOTE — PLAN OF CARE
Problem: Potential for Falls  Goal: Patient will remain free of falls  Description  INTERVENTIONS:  - Assess patient frequently for physical needs  -  Identify cognitive and physical deficits and behaviors that affect risk of falls    -  [place] fall precautions as indicated by assessment   - Educate patient/family on patient safety including physical limitations  - Instruct patient to call for assistance with activity based on assessment  - Modify environment to reduce risk of injury  - Consider OT/PT consult to assist with strengthening/mobility  Outcome: Progressing

## 2019-10-01 ENCOUNTER — CLINICAL SUPPORT (OUTPATIENT)
Dept: INTERNAL MEDICINE CLINIC | Facility: CLINIC | Age: 79
End: 2019-10-01
Payer: MEDICARE

## 2019-10-01 DIAGNOSIS — Z23 ENCOUNTER FOR IMMUNIZATION: Primary | ICD-10-CM

## 2019-10-01 PROCEDURE — 99211 OFF/OP EST MAY X REQ PHY/QHP: CPT

## 2019-10-01 PROCEDURE — G0010 ADMIN HEPATITIS B VACCINE: HCPCS

## 2019-10-01 PROCEDURE — 90743 HEPB VACC 2 DOSE ADOLESC IM: CPT

## 2019-10-07 ENCOUNTER — HOSPITAL ENCOUNTER (OUTPATIENT)
Dept: RADIOLOGY | Age: 79
Discharge: HOME | End: 2019-10-07
Payer: MEDICARE

## 2019-10-07 ENCOUNTER — HOSPITAL ENCOUNTER (OUTPATIENT)
Dept: NON INVASIVE DIAGNOSTICS | Facility: CLINIC | Age: 79
Discharge: HOME | End: 2019-10-07
Payer: MEDICARE

## 2019-10-07 VITALS — HEIGHT: 64 IN | BODY MASS INDEX: 26.63 KG/M2 | WEIGHT: 156 LBS

## 2019-10-07 DIAGNOSIS — I65.23 BILATERAL CAROTID ARTERY STENOSIS: Chronic | ICD-10-CM

## 2019-10-07 DIAGNOSIS — Z12.31 ENCOUNTER FOR SCREENING MAMMOGRAM FOR MALIGNANT NEOPLASM OF BREAST: ICD-10-CM

## 2019-10-07 PROCEDURE — 1124F ACP DISCUSS-NO DSCNMKR DOCD: CPT | Performed by: SURGERY

## 2019-10-07 PROCEDURE — 77067 SCR MAMMO BI INCL CAD: CPT

## 2019-10-07 PROCEDURE — 93880 EXTRACRANIAL BILAT STUDY: CPT | Performed by: SURGERY

## 2019-10-07 PROCEDURE — 93880 EXTRACRANIAL BILAT STUDY: CPT

## 2019-10-15 ENCOUNTER — TELEPHONE (OUTPATIENT)
Dept: GASTROENTEROLOGY | Facility: CLINIC | Age: 79
End: 2019-10-15

## 2019-10-15 NOTE — TELEPHONE ENCOUNTER
Patients GI provider:  Dr Rhoda Guillermo    Number to return call: [phone]    Reason for call: Pt calling to question if she should be continuing the use of Mesalamine     Scheduled procedure/appointment date if applicable: Apt/procedure - n/a

## 2019-10-15 NOTE — TELEPHONE ENCOUNTER
I called Hema Salgado and explained that she can stop mesalamine after she receives all 3 loading doses of Entyvio  She expressed understanding and all questions were answered

## 2019-10-18 DIAGNOSIS — K50.10 CROHN'S DISEASE OF COLON WITHOUT COMPLICATION (HCC): ICD-10-CM

## 2019-10-18 RX ORDER — MESALAMINE 800 MG/1
800 TABLET, DELAYED RELEASE ORAL 3 TIMES DAILY
Qty: 45 TABLET | Refills: 0 | Status: SHIPPED | OUTPATIENT
Start: 2019-10-18 | End: 2019-12-02 | Stop reason: CLARIF

## 2019-10-18 NOTE — TELEPHONE ENCOUNTER
Pt called wanting to know if a script can be called into the pharmacy for half of the mesalamine since she is not going to have to take all of it   Pt also wanted to know when should she sched to see a dermatologist? Pt can be reaced at [phone] or [phone]

## 2019-10-18 NOTE — TELEPHONE ENCOUNTER
Please let her know that I sent a prescription for 15 days worth of the mesalamine, she can schedule to see a dermatologist any time at her convenience for a yearly skin exam

## 2019-10-24 ENCOUNTER — HOSPITAL ENCOUNTER (OUTPATIENT)
Dept: INFUSION CENTER | Facility: HOSPITAL | Age: 79
Discharge: HOME | End: 2019-10-24
Attending: INTERNAL MEDICINE
Payer: MEDICARE

## 2019-10-24 VITALS
SYSTOLIC BLOOD PRESSURE: 126 MMHG | HEART RATE: 73 BPM | TEMPERATURE: 97.9 F | RESPIRATION RATE: 16 BRPM | DIASTOLIC BLOOD PRESSURE: 62 MMHG

## 2019-10-24 DIAGNOSIS — K51.211 ULCERATIVE PROCTITIS WITH RECTAL BLEEDING (HCC): Primary | ICD-10-CM

## 2019-10-24 PROCEDURE — 96413 CHEMO IV INFUSION 1 HR: CPT

## 2019-10-24 PROCEDURE — 96375 TX/PRO/DX INJ NEW DRUG ADDON: CPT

## 2019-10-24 RX ORDER — SODIUM CHLORIDE 9 MG/ML
20 INJECTION, SOLUTION INTRAVENOUS ONCE
Status: COMPLETED | OUTPATIENT
Start: 2019-10-24 | End: 2019-10-24

## 2019-10-24 RX ORDER — METHYLPREDNISOLONE SODIUM SUCCINATE 40 MG/ML
40 INJECTION, POWDER, LYOPHILIZED, FOR SOLUTION INTRAMUSCULAR; INTRAVENOUS ONCE
Status: COMPLETED | OUTPATIENT
Start: 2019-10-24 | End: 2019-10-24

## 2019-10-24 RX ORDER — DIPHENHYDRAMINE HCL 25 MG
25 TABLET ORAL ONCE
Status: CANCELLED | OUTPATIENT
Start: 2019-12-19

## 2019-10-24 RX ORDER — SODIUM CHLORIDE 9 MG/ML
20 INJECTION, SOLUTION INTRAVENOUS ONCE
Status: CANCELLED | OUTPATIENT
Start: 2019-12-19

## 2019-10-24 RX ORDER — ACETAMINOPHEN 325 MG/1
650 TABLET ORAL ONCE
Status: COMPLETED | OUTPATIENT
Start: 2019-10-24 | End: 2019-10-24

## 2019-10-24 RX ORDER — METHYLPREDNISOLONE SODIUM SUCCINATE 40 MG/ML
40 INJECTION, POWDER, LYOPHILIZED, FOR SOLUTION INTRAMUSCULAR; INTRAVENOUS ONCE
Status: CANCELLED | OUTPATIENT
Start: 2019-12-19

## 2019-10-24 RX ORDER — ACETAMINOPHEN 325 MG/1
650 TABLET ORAL ONCE
Status: CANCELLED | OUTPATIENT
Start: 2019-12-19

## 2019-10-24 RX ORDER — DIPHENHYDRAMINE HCL 25 MG
25 TABLET ORAL ONCE
Status: COMPLETED | OUTPATIENT
Start: 2019-10-24 | End: 2019-10-24

## 2019-10-24 RX ADMIN — DIPHENHYDRAMINE HCL 25 MG: 25 TABLET, COATED ORAL at 13:35

## 2019-10-24 RX ADMIN — SODIUM CHLORIDE 20 ML/HR: 0.9 INJECTION, SOLUTION INTRAVENOUS at 13:36

## 2019-10-24 RX ADMIN — VEDOLIZUMAB 300 MG: 300 INJECTION, POWDER, LYOPHILIZED, FOR SOLUTION INTRAVENOUS at 14:14

## 2019-10-24 RX ADMIN — METHYLPREDNISOLONE SODIUM SUCCINATE 40 MG: 40 INJECTION, POWDER, FOR SOLUTION INTRAMUSCULAR; INTRAVENOUS at 13:52

## 2019-10-24 RX ADMIN — ACETAMINOPHEN 650 MG: 325 TABLET ORAL at 13:35

## 2019-10-25 NOTE — TELEPHONE ENCOUNTER
Would suggest stopping both medications and see how her symptoms are  If things worsen please however give us a call back and we can discuss whether we need to restart    Sallyann Ion

## 2019-10-25 NOTE — TELEPHONE ENCOUNTER
Pt called stating she finished her 3rd dose of entyvio and wanted to know if she should stop taking the mesalamine and the suppositories?

## 2019-11-30 ENCOUNTER — APPOINTMENT (OUTPATIENT)
Dept: LAB | Age: 79
End: 2019-11-30
Payer: MEDICARE

## 2019-11-30 DIAGNOSIS — E78.5 HYPERLIPIDEMIA, UNSPECIFIED HYPERLIPIDEMIA TYPE: Chronic | ICD-10-CM

## 2019-11-30 DIAGNOSIS — E21.3 HYPERPARATHYROIDISM (HCC): Chronic | ICD-10-CM

## 2019-11-30 DIAGNOSIS — I10 HYPERTENSION, UNSPECIFIED TYPE: Chronic | ICD-10-CM

## 2019-11-30 LAB
ALBUMIN SERPL BCP-MCNC: 3.5 G/DL (ref 3.5–5)
ALP SERPL-CCNC: 81 U/L (ref 46–116)
ALT SERPL W P-5'-P-CCNC: 15 U/L (ref 12–78)
ANION GAP SERPL CALCULATED.3IONS-SCNC: 6 MMOL/L (ref 4–13)
AST SERPL W P-5'-P-CCNC: 11 U/L (ref 5–45)
BASOPHILS # BLD AUTO: 0.09 THOUSANDS/ΜL (ref 0–0.1)
BASOPHILS NFR BLD AUTO: 1 % (ref 0–1)
BILIRUB SERPL-MCNC: 0.4 MG/DL (ref 0.2–1)
BUN SERPL-MCNC: 13 MG/DL (ref 5–25)
CALCIUM SERPL-MCNC: 10 MG/DL (ref 8.3–10.1)
CHLORIDE SERPL-SCNC: 113 MMOL/L (ref 100–108)
CHOLEST SERPL-MCNC: 120 MG/DL (ref 50–200)
CO2 SERPL-SCNC: 23 MMOL/L (ref 21–32)
CREAT SERPL-MCNC: 0.8 MG/DL (ref 0.6–1.3)
EOSINOPHIL # BLD AUTO: 0.82 THOUSAND/ΜL (ref 0–0.61)
EOSINOPHIL NFR BLD AUTO: 11 % (ref 0–6)
ERYTHROCYTE [DISTWIDTH] IN BLOOD BY AUTOMATED COUNT: 14.8 % (ref 11.6–15.1)
GFR SERPL CREATININE-BSD FRML MDRD: 71 ML/MIN/1.73SQ M
GLUCOSE P FAST SERPL-MCNC: 91 MG/DL (ref 65–99)
HCT VFR BLD AUTO: 33.6 % (ref 34.8–46.1)
HDLC SERPL-MCNC: 48 MG/DL
HGB BLD-MCNC: 10.2 G/DL (ref 11.5–15.4)
IMM GRANULOCYTES # BLD AUTO: 0.02 THOUSAND/UL (ref 0–0.2)
IMM GRANULOCYTES NFR BLD AUTO: 0 % (ref 0–2)
LDLC SERPL DIRECT ASSAY-MCNC: 54 MG/DL (ref 0–100)
LYMPHOCYTES # BLD AUTO: 1.39 THOUSANDS/ΜL (ref 0.6–4.47)
LYMPHOCYTES NFR BLD AUTO: 18 % (ref 14–44)
MCH RBC QN AUTO: 25.4 PG (ref 26.8–34.3)
MCHC RBC AUTO-ENTMCNC: 30.4 G/DL (ref 31.4–37.4)
MCV RBC AUTO: 84 FL (ref 82–98)
MONOCYTES # BLD AUTO: 0.98 THOUSAND/ΜL (ref 0.17–1.22)
MONOCYTES NFR BLD AUTO: 13 % (ref 4–12)
NEUTROPHILS # BLD AUTO: 4.47 THOUSANDS/ΜL (ref 1.85–7.62)
NEUTS SEG NFR BLD AUTO: 57 % (ref 43–75)
NRBC BLD AUTO-RTO: 0 /100 WBCS
PLATELET # BLD AUTO: 300 THOUSANDS/UL (ref 149–390)
PMV BLD AUTO: 9.7 FL (ref 8.9–12.7)
POTASSIUM SERPL-SCNC: 4 MMOL/L (ref 3.5–5.3)
PROT SERPL-MCNC: 6.9 G/DL (ref 6.4–8.2)
PTH-INTACT SERPL-MCNC: 171.2 PG/ML (ref 18.4–80.1)
RBC # BLD AUTO: 4.01 MILLION/UL (ref 3.81–5.12)
SODIUM SERPL-SCNC: 142 MMOL/L (ref 136–145)
TRIGL SERPL-MCNC: 83 MG/DL
WBC # BLD AUTO: 7.77 THOUSAND/UL (ref 4.31–10.16)

## 2019-11-30 PROCEDURE — 85025 COMPLETE CBC W/AUTO DIFF WBC: CPT

## 2019-11-30 PROCEDURE — 80053 COMPREHEN METABOLIC PANEL: CPT

## 2019-11-30 PROCEDURE — 80061 LIPID PANEL: CPT

## 2019-11-30 PROCEDURE — 83721 ASSAY OF BLOOD LIPOPROTEIN: CPT

## 2019-11-30 PROCEDURE — 83970 ASSAY OF PARATHORMONE: CPT

## 2019-11-30 PROCEDURE — 36415 COLL VENOUS BLD VENIPUNCTURE: CPT

## 2019-12-02 ENCOUNTER — OFFICE VISIT (OUTPATIENT)
Dept: GASTROENTEROLOGY | Facility: CLINIC | Age: 79
End: 2019-12-02
Payer: MEDICARE

## 2019-12-02 VITALS
WEIGHT: 160 LBS | DIASTOLIC BLOOD PRESSURE: 64 MMHG | HEART RATE: 67 BPM | HEIGHT: 64 IN | TEMPERATURE: 98.2 F | BODY MASS INDEX: 27.31 KG/M2 | SYSTOLIC BLOOD PRESSURE: 99 MMHG

## 2019-12-02 DIAGNOSIS — K51.211 ULCERATIVE PROCTITIS WITH RECTAL BLEEDING (HCC): Primary | ICD-10-CM

## 2019-12-02 PROBLEM — K50.919 CROHN'S DISEASE WITH COMPLICATION (HCC): Status: RESOLVED | Noted: 2018-11-01 | Resolved: 2019-12-02

## 2019-12-02 PROBLEM — K52.9 INFLAMMATORY BOWEL DISEASE: Chronic | Status: RESOLVED | Noted: 2017-08-21 | Resolved: 2019-12-02

## 2019-12-02 PROBLEM — K50.10 CROHN'S COLITIS (HCC): Chronic | Status: RESOLVED | Noted: 2017-09-06 | Resolved: 2019-12-02

## 2019-12-02 PROCEDURE — 99213 OFFICE O/P EST LOW 20 MIN: CPT | Performed by: INTERNAL MEDICINE

## 2019-12-02 NOTE — PROGRESS NOTES
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, on entvyio now, clinically improved, will reassess CRP and fecal calprotectin as both were elevated   - C-reactive protein; Future  - Calprotectin,Fecal; Future    ______________________________________________________________________    SUBJECTIVE:  Pt here for follow up  She is feeling quite well  Couldn't tolerate the mesalamine enemas  Is compliant with his entyvio  REVIEW OF SYSTEMS IS OTHERWISE NEGATIVE  Historical Information   Past Medical History:   Diagnosis Date    Atherosclerosis     Carotid artery narrowing     Coronary artery disease     Diverticulosis     GERD (gastroesophageal reflux disease)     Glaucoma     Hypercalcemia     Hyperlipidemia     Hyperparathyroidism ([facility] [address] )     Hypertension     Osteopenia     Severe cervical dysplasia, histologically confirmed     Skin cancer     squamous cell    [facility]     Thyroid nodule     Vitamin D deficiency      Past Surgical History:   Procedure Laterality Date    APPENDECTOMY      BREAST SURGERY      reduction procedure    CAROTID ENDARTARECTOMY Right     carotid thromboendarterectomy     COLONOSCOPY      EXPLORATION CAROTID ARTERY      HYSTERECTOMY      age 55    OOPHORECTOMY Bilateral     age 55    WI COLONOSCOPY FLX DX W/COLLJ SPEC WHEN PFRMD N/A 7/28/2017    Procedure: EGD AND COLONOSCOPY;  Surgeon: Mundo Quinteros MD;  [facility];   Service: Colorectal    REDUCTION MAMMAPLASTY Bilateral 1987     Social History   Social History     Substance and Sexual Activity   Alcohol Use Yes    Alcohol/week: 7 0 standard drinks    Types: 7 Glasses of wine per week    Comment: being a social drinker; drinks wine     Social History     Substance and Sexual Activity   Drug Use No     Social History     Tobacco Use   Smoking Status Never Smoker   Smokeless Tobacco Never Used     Family History   Problem Relation Age of Onset    Osteoporosis Mother     Osteoarthritis Mother     Other Father         heart problem    Coronary artery disease Family     Hyperlipidemia Family     No Known Problems Sister     No Known Problems Daughter     No Known Problems Maternal Grandmother     No Known Problems Maternal Grandfather     No Known Problems Paternal Grandmother     No Known Problems Paternal Grandfather     No Known Problems Maternal Aunt     No Known Problems Maternal Aunt     No Known Problems Maternal Aunt     No Known Problems Maternal Aunt     No Known Problems Maternal Aunt     No Known Problems Maternal Aunt     No Known Problems Maternal Aunt     Stroke Paternal Aunt     No Known Problems Paternal Aunt     No Known Problems Paternal Aunt        Meds/Allergies       Current Outpatient Medications:     AMLODIPINE BESYLATE PO    aspirin 81 MG tablet    atenolol (TENORMIN) 25 mg tablet    bimatoprost (LUMIGAN) 0 01 % ophthalmic drops    brinzolamide (AZOPT) 1 % ophthalmic suspension    Cholecalciferol (VITAMIN D3 PO)    diphenoxylate-atropine (LOMOTIL) 2 5-0 025 mg per tablet    hydrochlorothiazide (HYDRODIURIL) 25 mg tablet    irbesartan (AVAPRO) 300 mg tablet    omega-3-acid ethyl esters (LOVAZA) 1 g capsule    rosuvastatin (CRESTOR) 5 mg tablet    triamcinolone (KENALOG) 0 1 % ointment    No Known Allergies        Objective     Blood pressure 99/64, pulse 67, temperature 98 2 °F (36 8 °C), temperature source Tympanic, height 5' 4" (1 626 m), weight 72 6 kg (160 lb)  Body mass index is 27 46 kg/m²        PHYSICAL EXAM:      General Appearance:   Alert, cooperative, no distress   HEENT:   Normocephalic, atraumatic, anicteric      Neck:  Supple, symmetrical, trachea midline   Lungs:   Clear to auscultation bilaterally; no rales, rhonchi or wheezing; respirations unlabored    Heart[de-identified]   Regular rate and rhythm; no murmur, rub, or gallop  Abdomen:   Soft, non-tender, non-distended; normal bowel sounds; no masses, no organomegaly    Genitalia:   Deferred    Rectal:   Deferred    Extremities:  No cyanosis, clubbing or edema    Pulses:  2+ and symmetric    Skin:  No jaundice, rashes, or lesions    Lymph nodes:  No palpable cervical lymphadenopathy        Lab Results:   No visits with results within 1 Day(s) from this visit     Latest known visit with results is:   Appointment on 11/30/2019   Component Date Value    WBC 11/30/2019 7 77     RBC 11/30/2019 4 01     Hemoglobin 11/30/2019 10 2*    Hematocrit 11/30/2019 33 6*    MCV 11/30/2019 84     MCH 11/30/2019 25 4*    MCHC 11/30/2019 30 4*    RDW 11/30/2019 14 8     MPV 11/30/2019 9 7     Platelets 56/84/3956 300     nRBC 11/30/2019 0     Neutrophils Relative 11/30/2019 57     Immat GRANS % 11/30/2019 0     Lymphocytes Relative 11/30/2019 18     Monocytes Relative 11/30/2019 13*    Eosinophils Relative 11/30/2019 11*    Basophils Relative 11/30/2019 1     Neutrophils Absolute 11/30/2019 4 47     Immature Grans Absolute 11/30/2019 0 02     Lymphocytes Absolute 11/30/2019 1 39     Monocytes Absolute 11/30/2019 0 98     Eosinophils Absolute 11/30/2019 0 82*    Basophils Absolute 11/30/2019 0 09     Sodium 11/30/2019 142     Potassium 11/30/2019 4 0     Chloride 11/30/2019 113*    CO2 11/30/2019 23     ANION GAP 11/30/2019 6     BUN 11/30/2019 13     Creatinine 11/30/2019 0 80     Glucose, Fasting 11/30/2019 91     Calcium 11/30/2019 10 0     AST 11/30/2019 11     ALT 11/30/2019 15     Alkaline Phosphatase 11/30/2019 81     Total Protein 11/30/2019 6 9     Albumin 11/30/2019 3 5     Total Bilirubin 11/30/2019 0 40     eGFR 11/30/2019 71     Cholesterol 11/30/2019 120     Triglycerides 11/30/2019 83     HDL, Direct 11/30/2019 48     LDL Direct 11/30/2019 54     PTH 11/30/2019 171 2*         Radiology Results:   No results found

## 2019-12-04 ENCOUNTER — OFFICE VISIT (OUTPATIENT)
Dept: INTERNAL MEDICINE CLINIC | Facility: CLINIC | Age: 79
End: 2019-12-04
Payer: MEDICARE

## 2019-12-04 VITALS
RESPIRATION RATE: 14 BRPM | HEIGHT: 64 IN | WEIGHT: 159.4 LBS | DIASTOLIC BLOOD PRESSURE: 80 MMHG | BODY MASS INDEX: 27.21 KG/M2 | HEART RATE: 72 BPM | SYSTOLIC BLOOD PRESSURE: 130 MMHG

## 2019-12-04 DIAGNOSIS — E21.3 HYPERPARATHYROIDISM (HCC): Primary | Chronic | ICD-10-CM

## 2019-12-04 DIAGNOSIS — Z11.59 ENCOUNTER FOR SCREENING FOR OTHER VIRAL DISEASES: ICD-10-CM

## 2019-12-04 DIAGNOSIS — K51.211 ULCERATIVE PROCTITIS WITH RECTAL BLEEDING (HCC): ICD-10-CM

## 2019-12-04 DIAGNOSIS — E78.5 HYPERLIPIDEMIA, UNSPECIFIED HYPERLIPIDEMIA TYPE: Chronic | ICD-10-CM

## 2019-12-04 DIAGNOSIS — I65.23 BILATERAL CAROTID ARTERY STENOSIS: Chronic | ICD-10-CM

## 2019-12-04 DIAGNOSIS — I70.90 ATHEROSCLEROSIS: Chronic | ICD-10-CM

## 2019-12-04 DIAGNOSIS — I10 HYPERTENSION, UNSPECIFIED TYPE: Chronic | ICD-10-CM

## 2019-12-04 PROCEDURE — G0439 PPPS, SUBSEQ VISIT: HCPCS | Performed by: INTERNAL MEDICINE

## 2019-12-04 PROCEDURE — 99213 OFFICE O/P EST LOW 20 MIN: CPT | Performed by: INTERNAL MEDICINE

## 2019-12-04 NOTE — PATIENT INSTRUCTIONS
Medicare Preventive Visit Patient Instructions  Thank you for completing your Welcome to Medicare Visit or Medicare Annual Wellness Visit today  Your next wellness visit will be due in one year (12/4/2020)  The screening/preventive services that you may require over the next 5-10 years are detailed below  Some tests may not apply to you based off risk factors and/or age  Screening tests ordered at today's visit but not completed yet may show as past due  Also, please note that scanned in results may not display below  Preventive Screenings:  Service Recommendations Previous Testing/Comments   Colorectal Cancer Screening  * Colonoscopy    * Fecal Occult Blood Test (FOBT)/Fecal Immunochemical Test (FIT)  * Fecal DNA/Cologuard Test  * Flexible Sigmoidoscopy Age: 54-65 years old   Colonoscopy: every 10 years (may be performed more frequently if at higher risk)  OR  FOBT/FIT: every 1 year  OR  Cologuard: every 3 years  OR  Sigmoidoscopy: every 5 years  Screening may be recommended earlier than age 48 if at higher risk for colorectal cancer  Also, an individualized decision between you and your healthcare provider will decide whether screening between the ages of 74-80 would be appropriate  Colonoscopy: 08/16/2019  FOBT/FIT: Not on file  Cologuard: Not on file  Sigmoidoscopy: Not on file         Breast Cancer Screening Age: 36 years old  Frequency: every 1-2 years  Not required if history of left and right mastectomy Mammogram: 10/07/2019    Screening Current   Cervical Cancer Screening Between the ages of 21-29, pap smear recommended once every 3 years  Between the ages of 33-67, can perform pap smear with HPV co-testing every 5 years     Recommendations may differ for women with a history of total hysterectomy, cervical cancer, or abnormal pap smears in past  Pap Smear: 04/06/2016    History Cervical Cancer   Hepatitis C Screening Once for adults born between 1945 and 1965  More frequently in patients at high risk for Hepatitis C Hep C Antibody: Not on file       Diabetes Screening 1-2 times per year if you're at risk for diabetes or have pre-diabetes Fasting glucose: 91 mg/dL   A1C: No results in last 5 years    Screening Current   Cholesterol Screening Once every 5 years if you don't have a lipid disorder  May order more often based on risk factors  Lipid panel: 01/31/2018    Screening Not Indicated  History Lipid Disorder     Other Preventive Screenings Covered by Medicare:  1  Abdominal Aortic Aneurysm (AAA) Screening: covered once if your at risk  You're considered to be at risk if you have a family history of AAA  2  Lung Cancer Screening: covers low dose CT scan once per year if you meet all of the following conditions: (1) Age 50-69; (2) No signs or symptoms of lung cancer; (3) Current smoker or have quit smoking within the last 15 years; (4) You have a tobacco smoking history of at least 30 pack years (packs per day multiplied by number of years you smoked); (5) You get a written order from a healthcare provider  3  Glaucoma Screening: covered annually if you're considered high risk: (1) You have diabetes OR (2) Family history of glaucoma OR (3)  aged 48 and older OR (3)  American aged 72 and older  3  Osteoporosis Screening: covered every 2 years if you meet one of the following conditions: (1) You're estrogen deficient and at risk for osteoporosis based off medical history and other findings; (2) Have a vertebral abnormality; (3) On glucocorticoid therapy for more than 3 months; (4) Have primary hyperparathyroidism; (5) On osteoporosis medications and need to assess response to drug therapy  · Last bone density test (DXA Scan): 02/13/2019   5  HIV Screening: covered annually if you're between the age of 15-65  Also covered annually if you are younger than 13 and older than 72 with risk factors for HIV infection   For pregnant patients, it is covered up to 3 times per pregnancy  Immunizations:  Immunization Recommendations   Influenza Vaccine Annual influenza vaccination during flu season is recommended for all persons aged >= 6 months who do not have contraindications   Pneumococcal Vaccine (Prevnar and Pneumovax)  * Prevnar = PCV13  * Pneumovax = PPSV23   Adults 25-60 years old: 1-3 doses may be recommended based on certain risk factors  Adults 72 years old: Prevnar (PCV13) vaccine recommended followed by Pneumovax (PPSV23) vaccine  If already received PPSV23 since turning 65, then PCV13 recommended at least one year after PPSV23 dose  Hepatitis B Vaccine 3 dose series if at intermediate or high risk (ex: diabetes, end stage renal disease, liver disease)   Tetanus (Td) Vaccine - COST NOT COVERED BY MEDICARE PART B Following completion of primary series, a booster dose should be given every 10 years to maintain immunity against tetanus  Td may also be given as tetanus wound prophylaxis  Tdap Vaccine - COST NOT COVERED BY MEDICARE PART B Recommended at least once for all adults  For pregnant patients, recommended with each pregnancy  Shingles Vaccine (Shingrix) - COST NOT COVERED BY MEDICARE PART B  2 shot series recommended in those aged 48 and above     Health Maintenance Due:  There are no preventive care reminders to display for this patient  Immunizations Due:      Topic Date Due    Pneumococcal Vaccine: 65+ Years (1 of 2 - PCV13) 12/09/2006    Influenza Vaccine  07/01/2019     Advance Directives   What are advance directives? Advance directives are legal documents that state your wishes and plans for medical care  These plans are made ahead of time in case you lose your ability to make decisions for yourself  Advance directives can apply to any medical decision, such as the treatments you want, and if you want to donate organs  What are the types of advance directives?   There are many types of advance directives, and each state has rules about how to use them  You may choose a combination of any of the following:  · Living will: This is a written record of the treatment you want  You can also choose which treatments you do not want, which to limit, and which to stop at a certain time  This includes surgery, medicine, IV fluid, and tube feedings  · Durable power of  for healthcare [place] [facility]): This is a written record that states who you want to make healthcare choices for you when you are unable to make them for yourself  This person, called a proxy, is usually a family member or a friend  You may choose more than 1 proxy  · Do not resuscitate (DNR) order:  A DNR order is used in case your heart stops beating or you stop breathing  It is a request not to have certain forms of treatment, such as CPR  A DNR order may be included in other types of advance directives  · Medical directive: This covers the care that you want if you are in a coma, near death, or unable to make decisions for yourself  You can list the treatments you want for each condition  Treatment may include pain medicine, surgery, blood transfusions, dialysis, IV or tube feedings, and a ventilator (breathing machine)  · Values history: This document has questions about your views, beliefs, and how you feel and think about life  This information can help others choose the care that you would choose  Why are advance directives important? An advance directive helps you control your care  Although spoken wishes may be used, it is better to have your wishes written down  Spoken wishes can be misunderstood, or not followed  Treatments may be given even if you do not want them  An advance directive may make it easier for your family to make difficult choices about your care  Weight Management   Why it is important to manage your weight:  Being overweight increases your risk of health conditions such as heart disease, high blood pressure, type 2 diabetes, and certain types of cancer   It can also increase your risk for osteoarthritis, sleep apnea, and other respiratory problems  Aim for a slow, steady weight loss  Even a small amount of weight loss can lower your risk of health problems  How to lose weight safely:  A safe and healthy way to lose weight is to eat fewer calories and get regular exercise  You can lose up about 1 pound a week by decreasing the number of calories you eat by 500 calories each day  Healthy meal plan for weight management:  A healthy meal plan includes a variety of foods, contains fewer calories, and helps you stay healthy  A healthy meal plan includes the following:  · Eat whole-grain foods more often  A healthy meal plan should contain fiber  Fiber is the part of grains, fruits, and vegetables that is not broken down by your body  Whole-grain foods are healthy and provide extra fiber in your diet  Some examples of whole-grain foods are whole-wheat breads and pastas, oatmeal, brown rice, and bulgur  · Eat a variety of vegetables every day  Include dark, leafy greens such as spinach, kale, kendal greens, and mustard greens  Eat yellow and orange vegetables such as carrots, sweet potatoes, and winter squash  · Eat a variety of fruits every day  Choose fresh or canned fruit (canned in its own juice or light syrup) instead of juice  Fruit juice has very little or no fiber  · Eat low-fat dairy foods  Drink fat-free (skim) milk or 1% milk  Eat fat-free yogurt and low-fat cottage cheese  Try low-fat cheeses such as mozzarella and other reduced-fat cheeses  · Choose meat and other protein foods that are low in fat  Choose beans or other legumes such as split peas or lentils  Choose fish, skinless poultry (chicken or turkey), or lean cuts of red meat (beef or pork)  Before you cook meat or poultry, cut off any visible fat  · Use less fat and oil  Try baking foods instead of frying them   Add less fat, such as margarine, sour cream, regular salad dressing and mayonnaise to foods  Eat fewer high-fat foods  Some examples of high-fat foods include french fries, doughnuts, ice cream, and cakes  · Eat fewer sweets  Limit foods and drinks that are high in sugar  This includes candy, cookies, regular soda, and sweetened drinks  Exercise:  Exercise at least 30 minutes per day on most days of the week  Some examples of exercise include walking, biking, dancing, and swimming  You can also fit in more physical activity by taking the stairs instead of the elevator or parking farther away from stores  Ask your healthcare provider about the best exercise plan for you  © Copyright [employer] 2018 Information is for End User's use only and may not be sold, redistributed or otherwise used for commercial purposes   All illustrations and images included in CareNotes® are the copyrighted property of [employer]  or [address] [place], [place] [address]

## 2019-12-04 NOTE — PROGRESS NOTES
Assessment and Plan:     Problem List Items Addressed This Visit        Digestive    Ulcerative proctitis with rectal bleeding ([address] )    Relevant Orders    CBC and differential    Comprehensive metabolic panel    Cholesterol, total    Triglycerides    HDL cholesterol    LDL cholesterol, direct    Vitamin D 25 hydroxy    PTH, intact    Hepatitis B surface antibody       Endocrine    Hyperparathyroidism (HCC) - Primary (Chronic)    Relevant Orders    Comprehensive metabolic panel    PTH, intact    CBC and differential    Comprehensive metabolic panel    Cholesterol, total    Triglycerides    HDL cholesterol    LDL cholesterol, direct    Vitamin D 25 hydroxy    PTH, intact    Hepatitis B surface antibody       Cardiovascular and Mediastinum    Hypertension (Chronic)    Relevant Orders    CBC and differential    Comprehensive metabolic panel    Cholesterol, total    Triglycerides    HDL cholesterol    LDL cholesterol, direct    Vitamin D 25 hydroxy    PTH, intact    Hepatitis B surface antibody       Other    Hyperlipidemia (Chronic)    Relevant Orders    CBC and differential    Comprehensive metabolic panel    Cholesterol, total    Triglycerides    HDL cholesterol    LDL cholesterol, direct    Vitamin D 25 hydroxy    PTH, intact    Hepatitis B surface antibody      Other Visit Diagnoses     Encounter for screening for other viral diseases         Relevant Orders    Hepatitis B surface antibody           Preventive health issues were discussed with patient, and age appropriate screening tests were ordered as noted in patient's After Visit Summary  Personalized health advice and appropriate referrals for health education or preventive services given if needed, as noted in patient's After Visit Summary       History of Present Illness:     Patient presents for Medicare Annual Wellness visit    Patient Care Team:  Katty Ritchie MD as PCP - General  MD Carlyn Jeong MD Gwendolyn Cable, MD Hargis Perks Carolina Gastelum MD (Colon and Rectal Surgery)  Anastasia Galo MD as Endoscopist  Ritesh Newberry MD (Vascular Surgery)     Problem List:     Patient Active Problem List   Diagnosis    Abnormal female pelvic exam    Atherosclerosis    Diverticulosis    Esophageal reflux    Glaucoma    Hoarseness    Hypercalcemia    Hyperlipidemia    [facility]par[facility]    Hypertension    Carotid stenosis    Osteopenia    Severe cervical dysplasia, histologically confirmed    Vitamin B 12 deficiency    Vitamin D deficiency    Ulcerative proctitis with rectal bleeding [facility])      Past Medical and Surgical History:     Past Medical History:   Diagnosis Date    Atherosclerosis     Carotid artery narrowing     Coronary artery disease     Diverticulosis     GERD (gastroesophageal reflux disease)     Glaucoma     Hypercalcemia     Hyperlipidemia     Hyperparathyroidism ([facility]     Hypertension     Osteopenia     Severe cervical dysplasia, histologically confirmed     Skin cancer     squamous cell    Stroke ([facility] )     Thyroid nodule     Vitamin D deficiency      Past Surgical History:   Procedure Laterality Date    APPENDECTOMY      BREAST SURGERY      reduction procedure    CAROTID ENDARTARECTOMY Right     carotid thromboendarterectomy     COLONOSCOPY      EXPLORATION CAROTID ARTERY      HYSTERECTOMY      age 55    OOPHORECTOMY Bilateral     age 55    OH COLONOSCOPY FLX DX W/COLLJ SPEC WHEN PFRMD N/A 7/28/2017    Procedure: EGD AND COLONOSCOPY;  Surgeon: Anastasia Galo MD;  [facility];   Service: Colorectal    REDUCTION MAMMAPLASTY Bilateral 1987      Family History:     Family History   Problem Relation Age of Onset    Osteoporosis Mother     Osteoarthritis Mother     Other Father         heart problem    Coronary artery disease Family     Hyperlipidemia Family     No Known Problems Sister     No Known Problems Daughter     No Known Problems Maternal Grandmother     No Known Problems Maternal Grandfather     No Known Problems Paternal Grandmother     No Known Problems Paternal Grandfather     No Known Problems Maternal Aunt     No Known Problems Maternal Aunt     No Known Problems Maternal Aunt     No Known Problems Maternal Aunt     No Known Problems Maternal Aunt     No Known Problems Maternal Aunt     No Known Problems Maternal Aunt     Stroke Paternal Aunt     No Known Problems Paternal Aunt     No Known Problems Paternal Aunt       Social History:     Social History     Socioeconomic History    Marital status: /Civil Union     Spouse name: Not on file    Number of children: Not on file    Years of education: Not on file    Highest education level: Not on file   Occupational History    Occupation: Retired   Social Needs    Financial resource strain: Not on file    Food insecurity:     Worry: Not on file     Inability: Not on file   [employer] needs:     Medical: Not on file     Non-medical: Not on file   Tobacco Use    Smoking status: Never Smoker    Smokeless tobacco: Never Used   Substance and Sexual Activity    Alcohol use:  Yes     Alcohol/week: 7 0 standard drinks     Types: 7 Glasses of wine per week     Comment: being a social drinker; drinks wine    Drug use: No    Sexual activity: Yes     Birth control/protection: Surgical   Lifestyle    Physical activity:     Days per week: Not on file     Minutes per session: Not on file    Stress: Not on file   Relationships    Social connections:     Talks on phone: Not on file     Gets together: Not on file     Attends Congregational service: Not on file     Active member of club or organization: Not on file     Attends meetings of clubs or organizations: Not on file     Relationship status: Not on file    Intimate partner violence:     Fear of current or ex partner: Not on file     Emotionally abused: Not on file     Physically abused: Not on file     Forced sexual activity: Not on file   Other Topics Concern    Not on file   Social History Narrative    Drinks 2 cups coffee/day    Lack of exercise       Medications and Allergies:     Current Outpatient Medications   Medication Sig Dispense Refill    AMLODIPINE BESYLATE PO Take 5 mg by mouth daily      aspirin 81 MG tablet Take 81 mg by mouth daily      atenolol (TENORMIN) 25 mg tablet Take 25 mg by mouth 2 (two) times a day      bimatoprost (LUMIGAN) 0 01 % ophthalmic drops Administer 1 drop to both eyes daily at bedtime      brinzolamide (AZOPT) 1 % ophthalmic suspension 1 drop 2 (two) times a day      Cholecalciferol (VITAMIN D3 PO) Take 2,000 Units by mouth 2 (two) times a day      diphenoxylate-atropine (LOMOTIL) 2 5-0 025 mg per tablet TAKE 1 TO 2 TABS FOUR TIMES A DAY  4    hydrochlorothiazide (HYDRODIURIL) 25 mg tablet Take 12 5 mg by mouth daily        irbesartan (AVAPRO) 300 mg tablet Take 300 mg by mouth daily at bedtime      omega-3-acid ethyl esters (LOVAZA) 1 g capsule Take 2 g by mouth 2 (two) times a day      rosuvastatin (CRESTOR) 5 mg tablet Take 7 5 mg by mouth daily      triamcinolone (KENALOG) 0 1 % ointment APPLY TO AFFECTED AREA(S) TWO TIMES DAILY FOR 2 TO 3 WEEKS  3     No current facility-administered medications for this visit  No Known Allergies   Immunizations:     Immunization History   Administered Date(s) Administered    Hep B, adult 08/27/2019, 10/01/2019    INFLUENZA 10/15/2014, 11/11/2015, 11/12/2015, 10/17/2016, 10/03/2017, 10/30/2018    Influenza Quadrivalent Preservative Free 3 years and older IM 10/15/2014    Influenza Split High Dose Preservative Free IM 11/11/2015, 10/17/2016, 10/03/2017    Influenza TIV (IM) 01/01/2001, 10/01/2011, 11/08/2012, 10/22/2013    Influenza, high dose seasonal 0 5 mL 10/30/2018    Zoster 04/13/2010    Zoster Vaccine Recombinant 04/12/2010, 06/05/2019, 08/21/2019      Health Maintenance: There are no preventive care reminders to display for this patient  Topic Date Due    Pneumococcal Vaccine: 65+ Years (1 of 2 - PCV13) 12/09/2006    Influenza Vaccine  07/01/2019      Medicare Health Risk Assessment:     Ht 5' 4" (1 626 m)   Wt 72 3 kg (159 lb 6 4 oz)   BMI 27 36 kg/m²      Rosalva Chandra is here for her Subsequent Wellness visit  Last Medicare Wellness visit information reviewed, patient interviewed and updates made to the record today  Health Risk Assessment:   Patient rates overall health as good  Patient feels that their physical health rating is same  Eyesight was rated as same  Hearing was rated as same  Patient feels that their emotional and mental health rating is same  Pain experienced in the last 7 days has been some  Patient's pain rating has been 3/10  Depression Screening:   PHQ-2 Score: 0      Fall Risk Screening: In the past year, patient has experienced: no history of falling in past year      Urinary Incontinence Screening:   Patient has not leaked urine accidently in the last six months  Home Safety:  Patient does not have trouble with stairs inside or outside of their home  Patient has working smoke alarms and has working carbon monoxide detector  Home safety hazards include: none  Nutrition:   Current diet is Regular  Medications:   Patient is currently taking over-the-counter supplements  OTC medications include: see medication list  Patient is able to manage medications  Activities of Daily Living (ADLs)/Instrumental Activities of Daily Living (IADLs):   Walk and transfer into and out of bed and chair?: Yes  Dress and groom yourself?: Yes    Bathe or shower yourself?: Yes    Feed yourself?  Yes  Do your laundry/housekeeping?: Yes  Manage your money, pay your bills and track your expenses?: Yes  Make your own meals?: Yes    Do your own shopping?: Yes    Previous Hospitalizations:   Any hospitalizations or ED visits within the last 12 months?: No      Advance Care Planning:   Living will: Yes    Durable POA for healthcare: Yes    Advanced directive: Yes    Five wishes given: No      Cognitive Screening:   Provider or family/friend/caregiver concerned regarding cognition?: No    PREVENTIVE SCREENINGS      Cardiovascular Screening:    General: History Lipid Disorder and Risks and Benefits Discussed      Diabetes Screening:     General: Screening Current and Risks and Benefits Discussed      Colorectal Cancer Screening:     General: Risks and Benefits Discussed and Screening Current      Breast Cancer Screening:     General: Screening Current and Risks and Benefits Discussed      Cervical Cancer Screening:    General: Risks and Benefits Discussed and Screening Current      Osteoporosis Screening:    General: Risks and Benefits Discussed and Screening Current      Abdominal Aortic Aneurysm (AAA) Screening:        General: Risks and Benefits Discussed and Screening Current      Hepatitis C Screening:    General: Screening Current      Naomi Middleton MD

## 2019-12-05 NOTE — PROGRESS NOTES
Assessment/Plan:    1  Health maintenance -had testing prior to bilateral therapy -TB testing normal serology for mumps rubella and rubeola all normal   Hepatitis-B surface antigen negative  Hepatitis-B surface antibody negative  She was vaccinated with hepatitis B and will be completing this series over the next several months  I did order hepatitis-B titer prior to her next visit  2  Recent noted lumbar radiculopathy -still has occasional symptoms - this is now more prominent since she is off the prednisone which was being used to treat her inflammatory bowel disease  3  Hyperparathyroidism -primary -at this point with borderline hypercalcemia  Prior scan shows inferior parathyroid adenoma  She declined surgery  Endocrine felt she could be monitored  No history of nephrolithiasis  DEXA scan in 2019 that is February 2019 shows false positive normal lumbar spine and hip of -2 1 which is similar to prior DEXA  PTH level remains similar to before although calcium slightly higher - I recommended repeat PTH and calcium over the next 6 weeks and prior to her next visit  4  Inflammatory bowel disease -now being told all ulcer colitis and not Crohn's disease  On Entyvio  And appears to be responding -further treatment as per the GI group  5  Vitamin-D deficiency -continue supplement  6  Carotid stenosis -status post right carotid endarterectomy for 99% stenosis with right hemispheric CVA-she has recovered and has been stable over the last few years  Now seen at [facility] group  Carotid Doppler done in October of 2019 shows surgery side white open left side less than 50%-needs repeat in the year which would be October of 2020   7  Hyperlipidemia -continue current dose of statin  8  Osteopenia -DEXA scan as noted  This was done in February 2019-needs repeat in 2 years which would be March 2021  9  Hypertension -secondary screen negative -stable on current  Medication  10   Dyspepsia -endoscopy shows hiatal hernia with some erythema in the antrum  Biopsy read as gastric mucosa the esophagus but no metaplasia  -Therefore this does not meet the criteria for Harkins's  Had been on an H2 blocker but is now off that  11  Hoarseness-ENT physician felt she had LPR with some erythema the glottis on exam   Previously was on a PPI as well as an H2 blocker -at this point off all meds and stable       MEDICAL REGIMEN:      Entyvio With dose per GI, atenolol 25 milligrams b i d , Avapro 300 milligrams daily, baby aspirin daily, hydrochlorothiazide 25 milligrams- half tab daily, Crestor 7 5 milligrams daily using 5 milligram tablets, amlodipine 5 milligrams daily, vitamin D3/ 4000 units a day, fish oil 1 daily    Appointment over the next few months with prior labs     Addendum- BC follow-up note from GI in March of 2020-they list her as ulcerative pancolitis with rectal bleeding and felt to be a nonresponder to vedolizumab -she was placed on infliximab at 10 milligrams/kilogram dose with her 1st maintenance stones schedule that week 13 -she remains on prednisone 30 milligrams daily is being seen every 4 weeks until symptoms paulette    Addendum- received correspondence from the GI group in May- she is currently on prednisone 30 milligrams daily and will drop down to 20 milligrams  She will also be started on Remicade - this had been deferred recently because of the COVID-19 pandemic  She has completed vaccination with hepatitis- B  Hepatitis a antibody ordered prior to her visit with me in June  She will also be due for pneumococcal 23 vaccine next visit  REVIEW NEXT VISIT- NOTE ALSO I SENT CORRESPONDENCE TO GI IN REFERENCE TO ALL OF THIS  No problem-specific Assessment & Plan notes found for this encounter  Diagnoses and all orders for this visit:    Hyperparathyroidism ([facility] )  -     Comprehensive metabolic panel; Future  -     PTH, intact;  Future  -     CBC and differential; Future  -     Comprehensive metabolic panel; Future  -     Cholesterol, total; Future  -     Triglycerides; Future  -     HDL cholesterol; Future  -     LDL cholesterol, direct; Future  -     Vitamin D 25 hydroxy; Future  -     PTH, intact; Future  -     Hepatitis B surface antibody; Future    Hypertension, unspecified type  -     CBC and differential; Future  -     Comprehensive metabolic panel; Future  -     Cholesterol, total; Future  -     Triglycerides; Future  -     HDL cholesterol; Future  -     LDL cholesterol, direct; Future  -     Vitamin D 25 hydroxy; Future  -     PTH, intact; Future  -     Hepatitis B surface antibody; Future    Hyperlipidemia, unspecified hyperlipidemia type  -     CBC and differential; Future  -     Comprehensive metabolic panel; Future  -     Cholesterol, total; Future  -     Triglycerides; Future  -     HDL cholesterol; Future  -     LDL cholesterol, direct; Future  -     Vitamin D 25 hydroxy; Future  -     PTH, intact; Future  -     Hepatitis B surface antibody; Future    Ulcerative proctitis with rectal bleeding (HCC)  -     CBC and differential; Future  -     Comprehensive metabolic panel; Future  -     Cholesterol, total; Future  -     Triglycerides; Future  -     HDL cholesterol; Future  -     LDL cholesterol, direct; Future  -     Vitamin D 25 hydroxy; Future  -     PTH, intact; Future  -     Hepatitis B surface antibody; Future    Encounter for screening for other viral diseases   -     Hepatitis B surface antibody; Future    Bilateral carotid artery stenosis    Atherosclerosis          Subjective:      Patient ID: Edmond El is a 69 y o  female  She was here for her Medicare wellness today wanted to go over couple of other issues  She has seen vascular group  She had a carotid Doppler done in October 2019 that showed endarterectomy side white open on the and less than 50% stenosis on the left    She had colonoscopy done diffuse erythema consistent with severe ulcerative colitis  A she had testing anticipation of biologic therapy  TB testing normal   Serology for mumps rubella and rubeola all normal   Hepatitis-B testing normal   They vaccinated her with hepatitis-B    She is now off prednisone on therapy with a Entyvio a - she is aware of mechanism of action of this drug    Results for orders placed or performed in visit on 11/30/19  -CBC and differential       Result                      Value             Ref Range           WBC                         7 77              4 31 - 10 16*       RBC                         4 01              3 81 - 5 12 *       Hemoglobin                  10 2 (L)          11 5 - 15 4 *       Hematocrit                  33 6 (L)          34 8 - 46 1 %       MCV                         84                82 - 98 fL          MCH                         25 4 (L)          26 8 - 34 3 *       MCHC                        30 4 (L)          31 4 - 37 4 *       RDW                         14 8              11 6 - 15 1 %       MPV                         9 7               8 9 - 12 7 fL       Platelets                   300               149 - 390 Th*       nRBC                        0                 /100 WBCs           Neutrophils Relative        57                43 - 75 %           Immat GRANS %               0                 0 - 2 %             Lymphocytes Relative        18                14 - 44 %           Monocytes Relative          13 (H)            4 - 12 %            Eosinophils Relative        11 (H)            0 - 6 %             Basophils Relative          1                 0 - 1 %             Neutrophils Absolute        4 47              1 85 - 7 62 *       Immature Grans Absolute     0 02              0 00 - 0 20 *       Lymphocytes Absolute        1 39              0 60 - 4 47 *       Monocytes Absolute          0 98              0 17 - 1 22 *       Eosinophils Absolute        0 82 (H)          0 00 - 0 61 *       Basophils Absolute          0 09              0 00 - 0 10 *  -Comprehensive metabolic panel       Result                      Value             Ref Range           Sodium                      142               136 - 145 mm*       Potassium                   4 0               3 5 - 5 3 mm*       Chloride                    113 (H)           100 - 108 mm*       CO2                         23                21 - 32 mmol*       ANION GAP                   6                 4 - 13 mmol/L       BUN                         13                5 - 25 mg/dL        Creatinine                  0 80              0 60 - 1 30 *       Glucose, Fasting            91                65 - 99 mg/dL       Calcium                     10 0              8 3 - 10 1 m*       AST                         11                5 - 45 U/L          ALT                         15                12 - 78 U/L         Alkaline Phosphatase        81                46 - 116 U/L        Total Protein               6 9               6 4 - 8 2 g/*       Albumin                     3 5               3 5 - 5 0 g/*       Total Bilirubin             0 40              0 20 - 1 00 *       eGFR                        71                ml/min/1 73s*  -Cholesterol, total       Result                      Value             Ref Range           Cholesterol                 120               50 - 200 mg/*  -Triglycerides       Result                      Value             Ref Range           Triglycerides               83                <=150 mg/dL    -HDL cholesterol       Result                      Value             Ref Range           HDL, Direct                 48                >=40 mg/dL     -LDL cholesterol, direct       Result                      Value             Ref Range           LDL Direct                  54                0 - 100 mg/dl  -PTH, intact       Result                      Value             Ref Range           PTH                         171 2 (H)         18 4 - 80 1 *    She remains on her statin    She understands she needs his further history documented vascular disease prior endarterectomy  GI is now telling her that her diagnosis call long has been also a colitis and not Crohn's disease  She is off mesalamine  She is off prednisone  The following portions of the patient's history were reviewed and updated as appropriate: allergies, current medications, past family history, past medical history, past social history, past surgical history and problem list     Review of Systems   Constitutional: Negative  Respiratory: Negative  Cardiovascular: Negative  Gastrointestinal: Positive for diarrhea  Endocrine: Negative  Genitourinary: Negative  Musculoskeletal: Negative  Neurological: Negative  Hematological: Negative  Psychiatric/Behavioral: Negative  Objective:      /80   Pulse 72   Resp 14   Ht 5' 4" (1 626 m)   Wt 72 3 kg (159 lb 6 4 oz)   BMI 27 36 kg/m²          Physical Exam   Constitutional: She is oriented to person, place, and time  She appears well-developed and well-nourished  No distress  HENT:   Head: Normocephalic and atraumatic  Right Ear: External ear normal    Left Ear: External ear normal    Nose: Nose normal    Mouth/Throat: Oropharynx is clear and moist  No oropharyngeal exudate  Eyes: Pupils are equal, round, and reactive to light  Conjunctivae and EOM are normal  Right eye exhibits no discharge  Left eye exhibits no discharge  No scleral icterus  Neck: Normal range of motion  Neck supple  No JVD present  No tracheal deviation present  No thyromegaly present  Cardiovascular: Normal rate, regular rhythm, normal heart sounds and intact distal pulses  Exam reveals no gallop and no friction rub  No murmur heard  Pulmonary/Chest: Effort normal and breath sounds normal  No respiratory distress  She has no wheezes  She has no rales  She exhibits no tenderness  Abdominal: Soft   Bowel sounds are normal  She exhibits no distension and no mass  There is no tenderness  There is no rebound and no guarding  Musculoskeletal: Normal range of motion  She exhibits no edema or deformity  Lymphadenopathy:     She has no cervical adenopathy  Neurological: She is alert and oriented to person, place, and time  She has normal reflexes  She displays normal reflexes  No cranial nerve deficit  She exhibits normal muscle tone  Coordination normal    Skin: Skin is warm and dry  No rash noted  No erythema  Psychiatric: She has a normal mood and affect   Her behavior is normal  Judgment and thought content normal

## 2019-12-10 ENCOUNTER — APPOINTMENT (OUTPATIENT)
Dept: LAB | Age: 79
End: 2019-12-10
Payer: MEDICARE

## 2019-12-10 DIAGNOSIS — K51.211 ULCERATIVE PROCTITIS WITH RECTAL BLEEDING (HCC): ICD-10-CM

## 2019-12-10 LAB — CRP SERPL QL: 6.4 MG/L

## 2019-12-10 PROCEDURE — 86140 C-REACTIVE PROTEIN: CPT

## 2019-12-10 PROCEDURE — 36415 COLL VENOUS BLD VENIPUNCTURE: CPT

## 2019-12-10 PROCEDURE — 83993 ASSAY FOR CALPROTECTIN FECAL: CPT

## 2019-12-12 LAB — CALPROTECTIN STL-MCNT: 616 UG/G (ref 0–120)

## 2019-12-19 ENCOUNTER — HOSPITAL ENCOUNTER (OUTPATIENT)
Dept: INFUSION CENTER | Facility: HOSPITAL | Age: 79
Discharge: HOME | End: 2019-12-19
Attending: INTERNAL MEDICINE
Payer: MEDICARE

## 2019-12-19 VITALS
HEART RATE: 84 BPM | DIASTOLIC BLOOD PRESSURE: 62 MMHG | RESPIRATION RATE: 20 BRPM | TEMPERATURE: 96.4 F | SYSTOLIC BLOOD PRESSURE: 110 MMHG

## 2019-12-19 DIAGNOSIS — K51.211 ULCERATIVE PROCTITIS WITH RECTAL BLEEDING (HCC): Primary | ICD-10-CM

## 2019-12-19 PROCEDURE — 96413 CHEMO IV INFUSION 1 HR: CPT

## 2019-12-19 PROCEDURE — 96375 TX/PRO/DX INJ NEW DRUG ADDON: CPT

## 2019-12-19 RX ORDER — METHYLPREDNISOLONE SODIUM SUCCINATE 40 MG/ML
40 INJECTION, POWDER, LYOPHILIZED, FOR SOLUTION INTRAMUSCULAR; INTRAVENOUS ONCE
Status: CANCELLED | OUTPATIENT
Start: 2020-02-13

## 2019-12-19 RX ORDER — DIPHENHYDRAMINE HCL 25 MG
25 TABLET ORAL ONCE
Status: CANCELLED | OUTPATIENT
Start: 2020-02-13

## 2019-12-19 RX ORDER — SODIUM CHLORIDE 9 MG/ML
20 INJECTION, SOLUTION INTRAVENOUS ONCE
Status: CANCELLED | OUTPATIENT
Start: 2020-02-13

## 2019-12-19 RX ORDER — ACETAMINOPHEN 325 MG/1
650 TABLET ORAL ONCE
Status: COMPLETED | OUTPATIENT
Start: 2019-12-19 | End: 2019-12-19

## 2019-12-19 RX ORDER — SODIUM CHLORIDE 9 MG/ML
20 INJECTION, SOLUTION INTRAVENOUS ONCE
Status: COMPLETED | OUTPATIENT
Start: 2019-12-19 | End: 2019-12-19

## 2019-12-19 RX ORDER — ACETAMINOPHEN 325 MG/1
650 TABLET ORAL ONCE
Status: CANCELLED | OUTPATIENT
Start: 2020-02-13

## 2019-12-19 RX ORDER — DIPHENHYDRAMINE HCL 25 MG
25 TABLET ORAL ONCE
Status: COMPLETED | OUTPATIENT
Start: 2019-12-19 | End: 2019-12-19

## 2019-12-19 RX ORDER — METHYLPREDNISOLONE SODIUM SUCCINATE 40 MG/ML
40 INJECTION, POWDER, LYOPHILIZED, FOR SOLUTION INTRAMUSCULAR; INTRAVENOUS ONCE
Status: COMPLETED | OUTPATIENT
Start: 2019-12-19 | End: 2019-12-19

## 2019-12-19 RX ADMIN — DIPHENHYDRAMINE HCL 25 MG: 25 TABLET ORAL at 13:11

## 2019-12-19 RX ADMIN — VEDOLIZUMAB 300 MG: 300 INJECTION, POWDER, LYOPHILIZED, FOR SOLUTION INTRAVENOUS at 13:49

## 2019-12-19 RX ADMIN — ACETAMINOPHEN 650 MG: 325 TABLET ORAL at 13:11

## 2019-12-19 RX ADMIN — SODIUM CHLORIDE 20 ML/HR: 0.9 INJECTION, SOLUTION INTRAVENOUS at 13:07

## 2019-12-19 RX ADMIN — METHYLPREDNISOLONE SODIUM SUCCINATE 40 MG: 40 INJECTION, POWDER, FOR SOLUTION INTRAMUSCULAR; INTRAVENOUS at 13:12

## 2019-12-19 NOTE — PLAN OF CARE
Problem: Potential for Falls  Goal: Patient will remain free of falls  Description  INTERVENTIONS:  - Assess patient frequently for physical needs  -  Identify cognitive and physical deficits and behaviors that affect risk of falls    -  [place] fall precautions as indicated by assessment   - Educate patient/family on patient safety including physical limitations  - Instruct patient to call for assistance with activity based on assessment  - Modify environment to reduce risk of injury  - Consider OT/PT consult to assist with strengthening/mobility  Outcome: Progressing

## 2020-01-18 ENCOUNTER — APPOINTMENT (OUTPATIENT)
Dept: LAB | Age: 80
End: 2020-01-18
Payer: MEDICARE

## 2020-01-18 DIAGNOSIS — E21.3 HYPERPARATHYROIDISM (HCC): Chronic | ICD-10-CM

## 2020-01-18 LAB
ALBUMIN SERPL BCP-MCNC: 3.2 G/DL (ref 3.5–5)
ALP SERPL-CCNC: 93 U/L (ref 46–116)
ALT SERPL W P-5'-P-CCNC: 16 U/L (ref 12–78)
ANION GAP SERPL CALCULATED.3IONS-SCNC: 5 MMOL/L (ref 4–13)
AST SERPL W P-5'-P-CCNC: 8 U/L (ref 5–45)
BILIRUB SERPL-MCNC: 0.39 MG/DL (ref 0.2–1)
BUN SERPL-MCNC: 9 MG/DL (ref 5–25)
CALCIUM ALBUM COR SERPL-MCNC: 10.8 MG/DL (ref 8.3–10.1)
CALCIUM SERPL-MCNC: 10.2 MG/DL (ref 8.3–10.1)
CHLORIDE SERPL-SCNC: 113 MMOL/L (ref 100–108)
CO2 SERPL-SCNC: 26 MMOL/L (ref 21–32)
CREAT SERPL-MCNC: 0.76 MG/DL (ref 0.6–1.3)
GFR SERPL CREATININE-BSD FRML MDRD: 75 ML/MIN/1.73SQ M
GLUCOSE P FAST SERPL-MCNC: 90 MG/DL (ref 65–99)
POTASSIUM SERPL-SCNC: 4 MMOL/L (ref 3.5–5.3)
PROT SERPL-MCNC: 7.2 G/DL (ref 6.4–8.2)
PTH-INTACT SERPL-MCNC: 187.2 PG/ML (ref 18.4–80.1)
SODIUM SERPL-SCNC: 144 MMOL/L (ref 136–145)

## 2020-01-18 PROCEDURE — 36415 COLL VENOUS BLD VENIPUNCTURE: CPT

## 2020-01-18 PROCEDURE — 80053 COMPREHEN METABOLIC PANEL: CPT

## 2020-01-18 PROCEDURE — 83970 ASSAY OF PARATHORMONE: CPT

## 2020-01-29 ENCOUNTER — TELEPHONE (OUTPATIENT)
Dept: GASTROENTEROLOGY | Facility: CLINIC | Age: 80
End: 2020-01-29

## 2020-01-29 NOTE — TELEPHONE ENCOUNTER
Patients GI provider:  Dr Rachana Jones    Number to return call: [phone]  Reason for call: Pt calling to speak to Dr Reza Roman nurse regarding medical questions on entyvio and symptoms she's having   Patient also would like to know if its safe to take probiotic each day    Scheduled procedure/appointment date if applicable: Apt/procedure 3/17/20

## 2020-02-04 ENCOUNTER — TELEPHONE (OUTPATIENT)
Dept: GASTROENTEROLOGY | Facility: AMBULARY SURGERY CENTER | Age: 80
End: 2020-02-04

## 2020-02-04 NOTE — TELEPHONE ENCOUNTER
Hello,    Patient called in today to make us aware that her symptoms of collitis are not getting any better and would like to be seen before her next infusion which is scheduled on 2/13/2020  Is there anyway we could get her in for a follow up appt? She stated that Dr Jairo Vargas is her primary GI Dr   She said she would not mind seeing a PA  Thank you so much!   Will

## 2020-02-11 ENCOUNTER — OFFICE VISIT (OUTPATIENT)
Dept: GASTROENTEROLOGY | Facility: CLINIC | Age: 80
End: 2020-02-11
Payer: MEDICARE

## 2020-02-11 VITALS
SYSTOLIC BLOOD PRESSURE: 114 MMHG | DIASTOLIC BLOOD PRESSURE: 74 MMHG | HEIGHT: 64 IN | WEIGHT: 153 LBS | TEMPERATURE: 98.1 F | HEART RATE: 59 BPM | BODY MASS INDEX: 26.12 KG/M2

## 2020-02-11 DIAGNOSIS — K51.211 ULCERATIVE PROCTITIS WITH RECTAL BLEEDING (HCC): ICD-10-CM

## 2020-02-11 DIAGNOSIS — R19.7 BLOODY DIARRHEA: Primary | ICD-10-CM

## 2020-02-11 DIAGNOSIS — R10.9 ABDOMINAL CRAMPING: ICD-10-CM

## 2020-02-11 PROCEDURE — 3074F SYST BP LT 130 MM HG: CPT | Performed by: PHYSICIAN ASSISTANT

## 2020-02-11 PROCEDURE — 99214 OFFICE O/P EST MOD 30 MIN: CPT | Performed by: PHYSICIAN ASSISTANT

## 2020-02-11 PROCEDURE — 3078F DIAST BP <80 MM HG: CPT | Performed by: PHYSICIAN ASSISTANT

## 2020-02-11 PROCEDURE — 1036F TOBACCO NON-USER: CPT | Performed by: PHYSICIAN ASSISTANT

## 2020-02-11 PROCEDURE — 1160F RVW MEDS BY RX/DR IN RCRD: CPT | Performed by: PHYSICIAN ASSISTANT

## 2020-02-11 PROCEDURE — 3008F BODY MASS INDEX DOCD: CPT | Performed by: PHYSICIAN ASSISTANT

## 2020-02-11 RX ORDER — MESALAMINE 1000 MG/1
1000 SUPPOSITORY RECTAL
Qty: 30 SUPPOSITORY | Refills: 2 | Status: SHIPPED | OUTPATIENT
Start: 2020-02-11 | End: 2020-05-12 | Stop reason: CLARIF

## 2020-02-11 NOTE — PROGRESS NOTES
[facility] - Outpatient Follow-up Note  David Elena 67 y o  female MRN: [id number]  Encounter: [id number]          ASSESSMENT AND PLAN:      1  Bloody diarrhea:  2  Abdominal cramping  3  Ulcerative proctitis with rectal bleeding ([address] ): colonoscopy in 8/2019 with severe left sided colitis  She was started on entyvio, she has had induction dose and first 8 week maintenance dose  She is scheduled for her second maintenance dose this Thursday  She does not feel that she has had any improvement in her bloody diarrhea  She states that she continues to need Lomotil multiple times per day to prevent diarrhea and be able to leave the house  She did have repeat inflammatory markers that showed improvement in her CRP from 39-6 4 as well as fecal calprotectin from 3253-616  We did discuss that this shows that Alexandru Coley is improving her inflammation, but may take some time for symptoms to improve  We can repeat inflammatory markers at this time, check C diff and start mesalamine suppositories  If C diff is negative and suppositories without improvement, could consider steroid taper  - Calprotectin,Fecal  - C-reactive protein; Future  - mesalamine (CANASA) 1,000 mg suppository; Insert 1 suppository (1,000 mg total) into the rectum daily at bedtime  Dispense: 30 suppository; Refill: 2  -she already has a scheduled follow up in 1 month    ______________________________________________________________________    SUBJECTIVE:  Jessica Mendez is a pleasant 52-year-old female with a past medical history of ulcerative colitis who is here for follow-up of bloody diarrhea  She had a colonoscopy in August of 2019 due to diarrhea and rectal bleeding  This revealed severe left-sided colitis and biopsies were consistent with ulcerative colitis  Her fecal calprotectin was significantly elevated at that time at 3253 and a CRP of 39 9    She had been taking prednisone as well as Asacol in the past and Canasa suppositories  She has started [facility], her 1st infusion was 09/12/2019, she is scheduled for her 2nd 8 week maintenance dose this Thursday  However, she feels that she has had no improvement in her bloody diarrhea since starting infusions  However, her blood work did reveal improvement in her CRP to 6 4 in fecal calprotectin the 616  She states that she continues to take Lomotil secondary to her diarrhea  She still admits to feeling urgency/frequency  She is no longer taking Canasa suppositories  REVIEW OF SYSTEMS IS OTHERWISE NEGATIVE  Historical Information   Past Medical History:   Diagnosis Date    Atherosclerosis     Carotid artery narrowing     Coronary artery disease     Diverticulosis     GERD (gastroesophageal reflux disease)     Glaucoma     Hypercalcemia     Hyperlipidemia     [facility]par[facility] [address]     Hypertension     Osteopenia     Severe cervical dysplasia, histologically confirmed     Skin cancer     squamous cell    Stroke ([facility] )     Thyroid nodule     Vitamin D deficiency      Past Surgical History:   Procedure Laterality Date    APPENDECTOMY      BREAST SURGERY      reduction procedure    CAROTID ENDARTARECTOMY Right     carotid thromboendarterectomy     COLONOSCOPY      EXPLORATION CAROTID ARTERY      HYSTERECTOMY      age 55    OOPHORECTOMY Bilateral     age 55    [place] COLONOSCOPY FLX DX W/COLLJ SPEC WHEN PFRMD N/A 7/28/2017    Procedure: EGD AND COLONOSCOPY;  Surgeon: Lion Celeste MD;  [facility];   Service: Colorectal    REDUCTION MAMMAPLASTY Bilateral 1987     Social History   Social History     Substance and Sexual Activity   Alcohol Use Yes    Alcohol/week: 7 0 standard drinks    Types: 7 Glasses of wine per week    Comment: being a social drinker; drinks wine     Social History     Substance and Sexual Activity   Drug Use No     Social History     Tobacco Use   Smoking Status Never Smoker   Smokeless Tobacco Never Used     Family History   Problem Relation Age of Onset    Osteoporosis Mother     Osteoarthritis Mother     Other Father         heart problem    Coronary artery disease Family     Hyperlipidemia Family     No Known Problems Sister     No Known Problems Daughter     No Known Problems Maternal Grandmother     No Known Problems Maternal Grandfather     No Known Problems Paternal Grandmother     No Known Problems Paternal Grandfather     No Known Problems Maternal Aunt     No Known Problems Maternal Aunt     No Known Problems Maternal Aunt     No Known Problems Maternal Aunt     No Known Problems Maternal Aunt     No Known Problems Maternal Aunt     No Known Problems Maternal Aunt     Stroke Paternal Aunt     No Known Problems Paternal Aunt     No Known Problems Paternal Aunt        Meds/Allergies       Current Outpatient Medications:     AMLODIPINE BESYLATE PO    aspirin 81 MG tablet    atenolol (TENORMIN) 25 mg tablet    bimatoprost (LUMIGAN) 0 01 % ophthalmic drops    brinzolamide (AZOPT) 1 % ophthalmic suspension    Cholecalciferol (VITAMIN D3 PO)    diphenoxylate-atropine (LOMOTIL) 2 5-0 025 mg per tablet    hydrochlorothiazide (HYDRODIURIL) 25 mg tablet    irbesartan (AVAPRO) 300 mg tablet    omega-3-acid ethyl esters (LOVAZA) 1 g capsule    rosuvastatin (CRESTOR) 5 mg tablet    triamcinolone (KENALOG) 0 1 % ointment    mesalamine (CANASA) 1,000 mg suppository    No Known Allergies        Objective     Blood pressure 114/74, pulse 59, temperature 98 1 °F (36 7 °C), temperature source Tympanic, height 5' 4" (1 626 m), weight 69 4 kg (153 lb)  Body mass index is 26 26 kg/m²  PHYSICAL EXAM:      General Appearance:   Alert, cooperative, no distress   HEENT:   Normocephalic, atraumatic, anicteric  Right eye exhibits no discharge  Left eye exhibits no discharge   No scleral icterus    Neck:  Supple, symmetrical, trachea midline, no stridor    Lungs:   Clear to auscultation bilaterally; no rales, rhonchi or wheezing; respirations unlabored    Heart[de-identified]   Regular rate and rhythm; no murmur, rub, or gallop  Abdomen:   Soft, non-tender, non-distended; normal bowel sounds; no masses, no organomegaly    Genitalia:   Deferred    Rectal:   Deferred    Extremities:  No cyanosis, clubbing or edema        Skin:  No jaundice, rashes, or lesions          Lab Results:   No visits with results within 1 Day(s) from this visit  Latest known visit with results is:   Appointment on 01/18/2020   Component Date Value    Sodium 01/18/2020 144     Potassium 01/18/2020 4 0     Chloride 01/18/2020 113*    CO2 01/18/2020 26     ANION GAP 01/18/2020 5     BUN 01/18/2020 9     Creatinine 01/18/2020 0 76     Glucose, Fasting 01/18/2020 90     Calcium 01/18/2020 10 2*    Corrected Calcium 01/18/2020 10 8*    AST 01/18/2020 8     ALT 01/18/2020 16     Alkaline Phosphatase 01/18/2020 93     Total Protein 01/18/2020 7 2     Albumin 01/18/2020 3 2*    Total Bilirubin 01/18/2020 0 39     eGFR 01/18/2020 75     PTH 01/18/2020 187 2*         Radiology Results:   No results found

## 2020-02-12 ENCOUNTER — APPOINTMENT (OUTPATIENT)
Dept: LAB | Age: 80
End: 2020-02-12
Payer: MEDICARE

## 2020-02-12 DIAGNOSIS — R19.7 BLOODY DIARRHEA: ICD-10-CM

## 2020-02-12 LAB — CRP SERPL QL: <3 MG/L

## 2020-02-12 PROCEDURE — 86140 C-REACTIVE PROTEIN: CPT

## 2020-02-12 PROCEDURE — 36415 COLL VENOUS BLD VENIPUNCTURE: CPT

## 2020-02-12 PROCEDURE — 87493 C DIFF AMPLIFIED PROBE: CPT | Performed by: PHYSICIAN ASSISTANT

## 2020-02-13 ENCOUNTER — TELEPHONE (OUTPATIENT)
Dept: GASTROENTEROLOGY | Facility: AMBULARY SURGERY CENTER | Age: 80
End: 2020-02-13

## 2020-02-13 ENCOUNTER — HOSPITAL ENCOUNTER (OUTPATIENT)
Dept: INFUSION CENTER | Facility: HOSPITAL | Age: 80
Discharge: HOME | End: 2020-02-13
Attending: INTERNAL MEDICINE
Payer: MEDICARE

## 2020-02-13 VITALS
HEART RATE: 55 BPM | SYSTOLIC BLOOD PRESSURE: 100 MMHG | DIASTOLIC BLOOD PRESSURE: 51 MMHG | TEMPERATURE: 98 F | RESPIRATION RATE: 18 BRPM

## 2020-02-13 DIAGNOSIS — K51.211 ULCERATIVE PROCTITIS WITH RECTAL BLEEDING (HCC): Primary | ICD-10-CM

## 2020-02-13 LAB
C DIFF TOX A+B STL QL IA: NEGATIVE
C DIFF TOX GENS STL QL NAA+PROBE: NEGATIVE

## 2020-02-13 PROCEDURE — 96413 CHEMO IV INFUSION 1 HR: CPT

## 2020-02-13 PROCEDURE — 83993 ASSAY FOR CALPROTECTIN FECAL: CPT | Performed by: PHYSICIAN ASSISTANT

## 2020-02-13 RX ORDER — SODIUM CHLORIDE 9 MG/ML
20 INJECTION, SOLUTION INTRAVENOUS ONCE
Status: COMPLETED | OUTPATIENT
Start: 2020-02-13 | End: 2020-02-13

## 2020-02-13 RX ORDER — SODIUM CHLORIDE 9 MG/ML
20 INJECTION, SOLUTION INTRAVENOUS ONCE
Status: CANCELLED | OUTPATIENT
Start: 2020-04-09

## 2020-02-13 RX ADMIN — SODIUM CHLORIDE 20 ML/HR: 0.9 INJECTION, SOLUTION INTRAVENOUS at 13:31

## 2020-02-13 RX ADMIN — VEDOLIZUMAB 300 MG: 300 INJECTION, POWDER, LYOPHILIZED, FOR SOLUTION INTRAVENOUS at 14:17

## 2020-02-14 ENCOUNTER — TELEPHONE (OUTPATIENT)
Dept: GASTROENTEROLOGY | Facility: MEDICAL CENTER | Age: 80
End: 2020-02-14

## 2020-02-14 DIAGNOSIS — K51.211 ULCERATIVE PROCTITIS WITH RECTAL BLEEDING (HCC): Primary | ICD-10-CM

## 2020-02-14 NOTE — TELEPHONE ENCOUNTER
----- Message from Amara Eason PA-C sent at 2/14/2020  9:13 AM EST -----  Please call and let the patient know c diff was negative  If no relief with suppositories she should let us know and we can consider steroid taper   Thank you

## 2020-02-16 LAB — CALPROTECTIN STL-MCNT: 2828 UG/G (ref 0–120)

## 2020-02-19 DIAGNOSIS — K51.211 ULCERATIVE PROCTITIS WITH RECTAL BLEEDING (HCC): Primary | ICD-10-CM

## 2020-02-19 RX ORDER — PREDNISONE 10 MG/1
40 TABLET ORAL DAILY
Qty: 70 TABLET | Refills: 0 | Status: SHIPPED | OUTPATIENT
Start: 2020-02-19 | End: 2020-05-13 | Stop reason: SDUPTHER

## 2020-02-21 ENCOUNTER — TELEPHONE (OUTPATIENT)
Dept: GASTROENTEROLOGY | Facility: CLINIC | Age: 80
End: 2020-02-21

## 2020-02-21 NOTE — TELEPHONE ENCOUNTER
Patients GI provider:  Dr Stephen Manrique    Number to return call: [phone]    Reason for call: Pt calling wanting her colonoscopy report and egd sent to Dr Louisa Crockett office  Fax number [phone]  Their office needs the report/results by Monday   josy    Scheduled procedure/appointment date if applicable: Apt/procedure

## 2020-02-25 ENCOUNTER — CLINICAL SUPPORT (OUTPATIENT)
Dept: INTERNAL MEDICINE CLINIC | Facility: CLINIC | Age: 80
End: 2020-02-25
Payer: MEDICARE

## 2020-02-25 DIAGNOSIS — Z23 NEED FOR DIPHTHERIA, TETANUS, ACELLULAR PERTUSSIS, HAEMOPHILUS INFLUENZAE, AND HEPATITIS B VIRUS VACCINE: Primary | ICD-10-CM

## 2020-02-25 PROCEDURE — G0010 ADMIN HEPATITIS B VACCINE: HCPCS

## 2020-02-25 PROCEDURE — 90746 HEPB VACCINE 3 DOSE ADULT IM: CPT

## 2020-03-16 ENCOUNTER — TELEPHONE (OUTPATIENT)
Dept: GASTROENTEROLOGY | Facility: CLINIC | Age: 80
End: 2020-03-16

## 2020-03-16 ENCOUNTER — APPOINTMENT (OUTPATIENT)
Dept: LAB | Age: 80
End: 2020-03-16
Payer: MEDICARE

## 2020-03-16 DIAGNOSIS — K51.211 ULCERATIVE PROCTITIS WITH RECTAL BLEEDING (HCC): ICD-10-CM

## 2020-03-16 PROCEDURE — 36415 COLL VENOUS BLD VENIPUNCTURE: CPT

## 2020-03-16 PROCEDURE — 82397 CHEMILUMINESCENT ASSAY: CPT

## 2020-03-16 NOTE — TELEPHONE ENCOUNTER
I spoke with patient regarding bloodwork we ordered at last visit 2-14-20, patient stated she will get blood work done today

## 2020-03-17 ENCOUNTER — OFFICE VISIT (OUTPATIENT)
Dept: GASTROENTEROLOGY | Facility: CLINIC | Age: 80
End: 2020-03-17
Payer: MEDICARE

## 2020-03-17 VITALS
SYSTOLIC BLOOD PRESSURE: 126 MMHG | DIASTOLIC BLOOD PRESSURE: 74 MMHG | BODY MASS INDEX: 25.44 KG/M2 | WEIGHT: 149 LBS | HEIGHT: 64 IN | HEART RATE: 45 BPM | TEMPERATURE: 98.3 F

## 2020-03-17 DIAGNOSIS — K51.011 ULCERATIVE PANCOLITIS WITH RECTAL BLEEDING (HCC): Primary | ICD-10-CM

## 2020-03-17 DIAGNOSIS — Z79.899 IMMUNOSUPPRESSION DUE TO DRUG THERAPY (HCC): ICD-10-CM

## 2020-03-17 DIAGNOSIS — D84.821 IMMUNOSUPPRESSION DUE TO DRUG THERAPY (HCC): ICD-10-CM

## 2020-03-17 PROCEDURE — 1036F TOBACCO NON-USER: CPT | Performed by: INTERNAL MEDICINE

## 2020-03-17 PROCEDURE — 3078F DIAST BP <80 MM HG: CPT | Performed by: INTERNAL MEDICINE

## 2020-03-17 PROCEDURE — 99214 OFFICE O/P EST MOD 30 MIN: CPT | Performed by: INTERNAL MEDICINE

## 2020-03-17 PROCEDURE — 1160F RVW MEDS BY RX/DR IN RCRD: CPT | Performed by: INTERNAL MEDICINE

## 2020-03-17 PROCEDURE — 3074F SYST BP LT 130 MM HG: CPT | Performed by: INTERNAL MEDICINE

## 2020-03-17 RX ORDER — PREDNISONE 20 MG/1
30 TABLET ORAL DAILY
Qty: 90 TABLET | Refills: 0 | Status: SHIPPED | OUTPATIENT
Start: 2020-03-17 | End: 2020-05-13 | Stop reason: SDUPTHER

## 2020-03-18 ENCOUNTER — TELEPHONE (OUTPATIENT)
Dept: GASTROENTEROLOGY | Facility: CLINIC | Age: 80
End: 2020-03-18

## 2020-03-18 RX ORDER — ACETAMINOPHEN 325 MG/1
650 TABLET ORAL ONCE
Status: CANCELLED | OUTPATIENT
Start: 2020-03-25

## 2020-03-18 RX ORDER — METHYLPREDNISOLONE SODIUM SUCCINATE 40 MG/ML
40 INJECTION, POWDER, LYOPHILIZED, FOR SOLUTION INTRAMUSCULAR; INTRAVENOUS ONCE
Status: CANCELLED | OUTPATIENT
Start: 2020-03-25

## 2020-03-18 RX ORDER — SODIUM CHLORIDE 9 MG/ML
20 INJECTION, SOLUTION INTRAVENOUS ONCE
Status: CANCELLED | OUTPATIENT
Start: 2020-03-25

## 2020-03-18 NOTE — PROGRESS NOTES
[facility] - Outpatient Follow-up Note  Raisa Kidd 67 y o  female MRN: [id number]  Encounter: [id number]          ASSESSMENT AND PLAN:    13-year-old female here for follow-up  1  Ulcerative pancolitis with rectal bleeding ([facility] [address] )  -she is pretty much a nonresponder to vedolizumab, we will give her weight based infliximab at a 10 milligram/kilogram dose, and I will plan to check drug and antibody levels before her 1st maintenance dose at week 13 in order to be able to know how to dose adjust her for her maintenance dosing  -I will continue her on prednisone 30 milligrams until infliximab on board  -I will see her every 4 weeks until symptoms paulette and she is in a more steady state, I have advised her that I would like to see her and only me until she is improved dramatically   -she will need hepatitis-B vaccine, I instructed her to continue social distance seen and good hand hygiene given recent COVID outbreak and her advanced age      3  Immunosuppression due to drug therapy  - good hand hygiene and social distance seeing a recommended given her advanced age and initiation of a biologic    Follow-up in 1 months time      ______________________________________________________________________    SUBJECTIVE:  13-year-old female here for follow-up  She has pancolitis secondary to ulcerative colitis  She has been on entyvio since September  She has never really responded  I would consider her a primary nonresponder  She saw my PA a few weeks ago was started on a slow prednisone taper  She is currently on prednisone 30 milligrams and feeling so much better  Before the prednisone she was going to the bathroom over 8-10 bloody bowel movements a day  Currently only has see having 2 bowel movements without blood  She tells me that she felt no better on the 40 milligrams compared to 30 milligram prednisone  She has a good appetite  She has not lost weight    She is accompanied to the office by her  who is a retired plastic surgeon  REVIEW OF SYSTEMS IS OTHERWISE NEGATIVE  Historical Information   Past Medical History:   Diagnosis Date    Atherosclerosis     Carotid artery narrowing     Coronary artery disease     Diverticulosis     GERD (gastroesophageal reflux disease)     Glaucoma     Hypercalcemia     Hyperlipidemia     [facility]roidism ([facility] )     Hypertension     Osteopenia     Severe cervical dysplasia, histologically confirmed     Skin cancer     squamous cell    Stroke ([facility] )     Thyroid nodule     Vitamin D deficiency      Past Surgical History:   Procedure Laterality Date    APPENDECTOMY      BREAST SURGERY      reduction procedure    CAROTID ENDARTARECTOMY Right     carotid thromboendarterectomy     COLONOSCOPY      EXPLORATION CAROTID ARTERY      HYSTERECTOMY      age 55    OOPHORECTOMY Bilateral     age 55    [place] COLONOSCOPY FLX DX W/COLLJ SPEC WHEN PFRMD N/A 7/28/2017    Procedure: EGD AND COLONOSCOPY;  Surgeon: Laurie Encinas MD;  [facility];   Service: Colorectal    REDUCTION MAMMAPLASTY Bilateral 1987     Social History   Social History     Substance and Sexual Activity   Alcohol Use Yes    Alcohol/week: 7 0 standard drinks    Types: 7 Glasses of wine per week    Comment: being a social drinker; drinks wine     Social History     Substance and Sexual Activity   Drug Use No     Social History     Tobacco Use   Smoking Status Never Smoker   Smokeless Tobacco Never Used     Family History   Problem Relation Age of Onset    Osteoporosis Mother     Osteoarthritis Mother     Other Father         heart problem    Coronary artery disease Family     Hyperlipidemia Family     No Known Problems Sister     No Known Problems Daughter     No Known Problems Maternal Grandmother     No Known Problems Maternal Grandfather     No Known Problems Paternal Grandmother     No Known Problems Paternal Grandfather     No Known Problems Maternal Aunt     No Known Problems Maternal Aunt     No Known Problems Maternal Aunt     No Known Problems Maternal Aunt     No Known Problems Maternal Aunt     No Known Problems Maternal Aunt     No Known Problems Maternal Aunt     Stroke Paternal Aunt     No Known Problems Paternal Aunt     No Known Problems Paternal Aunt        Meds/Allergies       Current Outpatient Medications:     AMLODIPINE BESYLATE PO    aspirin 81 MG tablet    atenolol (TENORMIN) 25 mg tablet    bimatoprost (LUMIGAN) 0 01 % ophthalmic drops    brinzolamide (AZOPT) 1 % ophthalmic suspension    Cholecalciferol (VITAMIN D3 PO)    diphenoxylate-atropine (LOMOTIL) 2 5-0 025 mg per tablet    hydrochlorothiazide (HYDRODIURIL) 25 mg tablet    irbesartan (AVAPRO) 300 mg tablet    omega-3-acid ethyl esters (LOVAZA) 1 g capsule    predniSONE 10 mg tablet    rosuvastatin (CRESTOR) 5 mg tablet    mesalamine (CANASA) 1,000 mg suppository    predniSONE 20 mg tablet    triamcinolone (KENALOG) 0 1 % ointment    No Known Allergies    Objective     Blood pressure 126/74, pulse (!) 45, temperature 98 3 °F (36 8 °C), temperature source Tympanic, height 5' 4" (1 626 m), weight 67 6 kg (149 lb)  Body mass index is 25 58 kg/m²  PHYSICAL EXAM:      General Appearance:   Alert, cooperative, no distress   HEENT:   Normocephalic, atraumatic, anicteric                                                Lab Results:   MMR immune  QuantiFERON gold negative August 2019  She is not immune to hepatitis-B    Recent C diff negative  No visits with results within 1 Day(s) from this visit  Latest known visit with results is:   Appointment on 02/12/2020   Component Date Value    CRP 02/12/2020 <3 0      Fecal calprotectin in August 2019 was 3253, in December of 2019 it was 26, a repeat done in February was 5978  Radiology Results:   No results found

## 2020-03-18 NOTE — TELEPHONE ENCOUNTER
----- Message from Anson England DO sent at 3/18/2020 12:42 PM EDT -----  I just ordered Remicade for this patient at the [facility]  We need to start a prior authorization for this  She is on entyvio currently but I just canceled this

## 2020-03-19 ENCOUNTER — TELEPHONE (OUTPATIENT)
Dept: GASTROENTEROLOGY | Facility: CLINIC | Age: 80
End: 2020-03-19

## 2020-03-19 NOTE — TELEPHONE ENCOUNTER
I first called [facility] team and spoke with Myrtle Solorzano to confirm order has dropped to their work q, and of they changes from [place] to Remicade  I confirmed with Medicare no prior Mabeline Barnacle is required, I will still do investigation form ,and fax to  and scan to [facility] team    Once I receive email from [facility] team confirming approval I will set up  1st appt for Remicade Infusion  Patient is aware

## 2020-03-25 LAB — MISCELLANEOUS LAB TEST RESULT: NORMAL

## 2020-04-02 NOTE — TELEPHONE ENCOUNTER
Will cancel her first remicade for now, stay on pred 30 and then will discuss at her next visit on 4/22  Her main concern is getting exposed to COVID during her infusion and is feeling well on pred 30 for now  She understands that pred puts her at higher risk for covid so ideally would like to titrate this down but will stay on pred 30 for now given no other option to control her UC currently as she has failed her first biologic entyvio  Thanks Pia Delgado

## 2020-04-02 NOTE — TELEPHONE ENCOUNTER
Hi Dr Rico Andrews - Pt asked that I relay this question to you (at her specific request), so just forwarding:    Giles Quiros called in reporting that she's due to start infliximab on 4/9, is taking pred 30mg PO daily, and has been doing well with no further blood in stool and more formed BMs  She's asking if she can continue on pred and delay her remicade out of COVID-19 concerns  I discussed with her that missing remicade could pose risk for her UC flaring, which could ultimately lead to need for hospitalization, however she asked that I check with you in the end

## 2020-05-12 ENCOUNTER — TELEMEDICINE (OUTPATIENT)
Dept: GASTROENTEROLOGY | Facility: CLINIC | Age: 80
End: 2020-05-12
Payer: MEDICARE

## 2020-05-12 DIAGNOSIS — D84.821 IMMUNOSUPPRESSION DUE TO DRUG THERAPY (HCC): ICD-10-CM

## 2020-05-12 DIAGNOSIS — K51.211 ULCERATIVE PROCTITIS WITH RECTAL BLEEDING (HCC): Primary | ICD-10-CM

## 2020-05-12 DIAGNOSIS — Z79.899 IMMUNOSUPPRESSION DUE TO DRUG THERAPY (HCC): ICD-10-CM

## 2020-05-12 DIAGNOSIS — K21.9 GASTROESOPHAGEAL REFLUX DISEASE, ESOPHAGITIS PRESENCE NOT SPECIFIED: Chronic | ICD-10-CM

## 2020-05-12 DIAGNOSIS — K57.90 DIVERTICULOSIS: Chronic | ICD-10-CM

## 2020-05-12 PROCEDURE — 99441 PR PHYS/QHP TELEPHONE EVALUATION 5-10 MIN: CPT | Performed by: INTERNAL MEDICINE

## 2020-05-12 RX ORDER — SODIUM CHLORIDE 9 MG/ML
20 INJECTION, SOLUTION INTRAVENOUS ONCE
Status: CANCELLED | OUTPATIENT
Start: 2020-06-16

## 2020-05-12 RX ORDER — ACETAMINOPHEN 325 MG/1
650 TABLET ORAL ONCE
Status: CANCELLED | OUTPATIENT
Start: 2020-06-16

## 2020-05-12 RX ORDER — METHYLPREDNISOLONE SODIUM SUCCINATE 40 MG/ML
40 INJECTION, POWDER, LYOPHILIZED, FOR SOLUTION INTRAMUSCULAR; INTRAVENOUS ONCE
Status: CANCELLED | OUTPATIENT
Start: 2020-06-16

## 2020-05-13 ENCOUNTER — TELEPHONE (OUTPATIENT)
Dept: GASTROENTEROLOGY | Facility: CLINIC | Age: 80
End: 2020-05-13

## 2020-05-13 ENCOUNTER — TELEPHONE (OUTPATIENT)
Dept: INTERNAL MEDICINE CLINIC | Facility: CLINIC | Age: 80
End: 2020-05-13

## 2020-05-13 DIAGNOSIS — K51.919 ULCERATIVE COLITIS WITH COMPLICATION, UNSPECIFIED LOCATION (HCC): Primary | ICD-10-CM

## 2020-05-13 DIAGNOSIS — K51.011 ULCERATIVE PANCOLITIS WITH RECTAL BLEEDING (HCC): ICD-10-CM

## 2020-05-13 DIAGNOSIS — K51.211 ULCERATIVE PROCTITIS WITH RECTAL BLEEDING (HCC): ICD-10-CM

## 2020-05-13 RX ORDER — PREDNISONE 10 MG/1
10 TABLET ORAL DAILY
Qty: 60 TABLET | Refills: 0 | Status: SHIPPED | OUTPATIENT
Start: 2020-05-13 | End: 2020-05-13

## 2020-05-13 RX ORDER — PREDNISONE 20 MG/1
20 TABLET ORAL DAILY
Qty: 90 TABLET | Refills: 0 | Status: SHIPPED | OUTPATIENT
Start: 2020-05-13 | End: 2020-06-10

## 2020-06-04 LAB
25(OH)D3+25(OH)D2 SERPL-MCNC: 35.8 NG/ML (ref 30–100)
ALBUMIN SERPL-MCNC: 4.3 G/DL (ref 3.7–4.7)
ALBUMIN/GLOB SERPL: 2 {RATIO} (ref 1.2–2.2)
ALP SERPL-CCNC: 52 IU/L (ref 39–117)
ALT SERPL-CCNC: 17 IU/L (ref 0–32)
AST SERPL-CCNC: 12 IU/L (ref 0–40)
BASOPHILS # BLD AUTO: 0 X10E3/UL (ref 0–0.2)
BASOPHILS NFR BLD AUTO: 0 %
BILIRUB SERPL-MCNC: 0.4 MG/DL (ref 0–1.2)
BUN SERPL-MCNC: 20 MG/DL (ref 8–27)
BUN/CREAT SERPL: 24 (ref 12–28)
CALCIUM SERPL-MCNC: 10.6 MG/DL (ref 8.7–10.3)
CHLORIDE SERPL-SCNC: 107 MMOL/L (ref 96–106)
CHOLEST SERPL-MCNC: 157 MG/DL (ref 100–199)
CO2 SERPL-SCNC: 21 MMOL/L (ref 20–29)
CREAT SERPL-MCNC: 0.85 MG/DL (ref 0.57–1)
EOSINOPHIL # BLD AUTO: 0.2 X10E3/UL (ref 0–0.4)
EOSINOPHIL NFR BLD AUTO: 2 %
ERYTHROCYTE [DISTWIDTH] IN BLOOD BY AUTOMATED COUNT: 17.8 % (ref 11.7–15.4)
GLOBULIN SER-MCNC: 2.1 G/DL (ref 1.5–4.5)
GLUCOSE SERPL-MCNC: 76 MG/DL (ref 65–99)
HBV SURFACE AB SER QL: NON REACTIVE
HCT VFR BLD AUTO: 38.9 % (ref 34–46.6)
HDLC SERPL-MCNC: 83 MG/DL
HGB BLD-MCNC: 12.4 G/DL (ref 11.1–15.9)
IMM GRANULOCYTES # BLD: 0.1 X10E3/UL (ref 0–0.1)
IMM GRANULOCYTES NFR BLD: 1 %
LDLC SERPL DIRECT ASSAY-MCNC: 68 MG/DL (ref 0–99)
LYMPHOCYTES # BLD AUTO: 2.8 X10E3/UL (ref 0.7–3.1)
LYMPHOCYTES NFR BLD AUTO: 28 %
MCH RBC QN AUTO: 26 PG (ref 26.6–33)
MCHC RBC AUTO-ENTMCNC: 31.9 G/DL (ref 31.5–35.7)
MCV RBC AUTO: 82 FL (ref 79–97)
MONOCYTES # BLD AUTO: 1 X10E3/UL (ref 0.1–0.9)
MONOCYTES NFR BLD AUTO: 11 %
NEUTROPHILS # BLD AUTO: 5.8 X10E3/UL (ref 1.4–7)
NEUTROPHILS NFR BLD AUTO: 58 %
PLATELET # BLD AUTO: 331 X10E3/UL (ref 150–450)
POTASSIUM SERPL-SCNC: 3.8 MMOL/L (ref 3.5–5.2)
PROT SERPL-MCNC: 6.4 G/DL (ref 6–8.5)
PTH-INTACT SERPL-MCNC: 77 PG/ML (ref 15–65)
RBC # BLD AUTO: 4.77 X10E6/UL (ref 3.77–5.28)
SL AMB EGFR AFRICAN AMERICAN: 76 ML/MIN/1.73
SL AMB EGFR NON AFRICAN AMERICAN: 66 ML/MIN/1.73
SODIUM SERPL-SCNC: 141 MMOL/L (ref 134–144)
TRIGL SERPL-MCNC: 99 MG/DL (ref 0–149)
WBC # BLD AUTO: 9.9 X10E3/UL (ref 3.4–10.8)

## 2020-06-06 LAB — HAV IGM SERPL QL IA: NEGATIVE

## 2020-06-10 ENCOUNTER — OFFICE VISIT (OUTPATIENT)
Dept: INTERNAL MEDICINE CLINIC | Facility: CLINIC | Age: 80
End: 2020-06-10
Payer: MEDICARE

## 2020-06-10 VITALS — HEIGHT: 64 IN | WEIGHT: 154.2 LBS | BODY MASS INDEX: 26.32 KG/M2

## 2020-06-10 DIAGNOSIS — E78.5 HYPERLIPIDEMIA, UNSPECIFIED HYPERLIPIDEMIA TYPE: Chronic | ICD-10-CM

## 2020-06-10 DIAGNOSIS — I10 HYPERTENSION, UNSPECIFIED TYPE: Primary | Chronic | ICD-10-CM

## 2020-06-10 DIAGNOSIS — I65.23 BILATERAL CAROTID ARTERY STENOSIS: Chronic | ICD-10-CM

## 2020-06-10 DIAGNOSIS — K51.211 ULCERATIVE PROCTITIS WITH RECTAL BLEEDING (HCC): ICD-10-CM

## 2020-06-10 DIAGNOSIS — Z23 NEED FOR 23-POLYVALENT PNEUMOCOCCAL POLYSACCHARIDE VACCINE: ICD-10-CM

## 2020-06-10 PROCEDURE — 1036F TOBACCO NON-USER: CPT | Performed by: INTERNAL MEDICINE

## 2020-06-10 PROCEDURE — 90732 PPSV23 VACC 2 YRS+ SUBQ/IM: CPT

## 2020-06-10 PROCEDURE — 3078F DIAST BP <80 MM HG: CPT | Performed by: INTERNAL MEDICINE

## 2020-06-10 PROCEDURE — 3008F BODY MASS INDEX DOCD: CPT | Performed by: INTERNAL MEDICINE

## 2020-06-10 PROCEDURE — 1160F RVW MEDS BY RX/DR IN RCRD: CPT | Performed by: INTERNAL MEDICINE

## 2020-06-10 PROCEDURE — G0009 ADMIN PNEUMOCOCCAL VACCINE: HCPCS

## 2020-06-10 PROCEDURE — 3074F SYST BP LT 130 MM HG: CPT | Performed by: INTERNAL MEDICINE

## 2020-06-10 PROCEDURE — 4040F PNEUMOC VAC/ADMIN/RCVD: CPT | Performed by: INTERNAL MEDICINE

## 2020-06-10 PROCEDURE — 99215 OFFICE O/P EST HI 40 MIN: CPT | Performed by: INTERNAL MEDICINE

## 2020-06-10 RX ORDER — PREDNISONE 10 MG/1
TABLET ORAL
COMMUNITY
Start: 2020-05-13 | End: 2021-07-19

## 2020-06-15 DIAGNOSIS — K51.211 ULCERATIVE PROCTITIS WITH RECTAL BLEEDING (HCC): Primary | ICD-10-CM

## 2020-06-16 ENCOUNTER — HOSPITAL ENCOUNTER (OUTPATIENT)
Dept: INFUSION CENTER | Facility: CLINIC | Age: 80
Discharge: HOME | End: 2020-06-16
Payer: MEDICARE

## 2020-06-16 VITALS
RESPIRATION RATE: 18 BRPM | OXYGEN SATURATION: 95 % | TEMPERATURE: 98.2 F | BODY MASS INDEX: 26.52 KG/M2 | WEIGHT: 154.5 LBS | HEART RATE: 66 BPM

## 2020-06-16 DIAGNOSIS — K51.211 ULCERATIVE PROCTITIS WITH RECTAL BLEEDING (HCC): Primary | ICD-10-CM

## 2020-06-16 PROCEDURE — 96415 CHEMO IV INFUSION ADDL HR: CPT

## 2020-06-16 PROCEDURE — 96413 CHEMO IV INFUSION 1 HR: CPT

## 2020-06-16 PROCEDURE — 96375 TX/PRO/DX INJ NEW DRUG ADDON: CPT

## 2020-06-16 RX ORDER — ACETAMINOPHEN 325 MG/1
650 TABLET ORAL ONCE
Status: CANCELLED | OUTPATIENT
Start: 2020-06-30

## 2020-06-16 RX ORDER — ACETAMINOPHEN 325 MG/1
650 TABLET ORAL ONCE
Status: COMPLETED | OUTPATIENT
Start: 2020-06-16 | End: 2020-06-16

## 2020-06-16 RX ORDER — SODIUM CHLORIDE 9 MG/ML
20 INJECTION, SOLUTION INTRAVENOUS ONCE
Status: COMPLETED | OUTPATIENT
Start: 2020-06-16 | End: 2020-06-16

## 2020-06-16 RX ORDER — SODIUM CHLORIDE 9 MG/ML
20 INJECTION, SOLUTION INTRAVENOUS ONCE
Status: CANCELLED | OUTPATIENT
Start: 2020-06-30

## 2020-06-16 RX ORDER — ACETAMINOPHEN 325 MG/1
650 TABLET ORAL ONCE
Status: CANCELLED | OUTPATIENT
Start: 2020-07-01

## 2020-06-16 RX ORDER — SODIUM CHLORIDE 9 MG/ML
20 INJECTION, SOLUTION INTRAVENOUS ONCE
Status: CANCELLED | OUTPATIENT
Start: 2020-07-01

## 2020-06-16 RX ORDER — METHYLPREDNISOLONE SODIUM SUCCINATE 40 MG/ML
40 INJECTION, POWDER, LYOPHILIZED, FOR SOLUTION INTRAMUSCULAR; INTRAVENOUS ONCE
Status: CANCELLED | OUTPATIENT
Start: 2020-06-30

## 2020-06-16 RX ORDER — METHYLPREDNISOLONE SODIUM SUCCINATE 40 MG/ML
40 INJECTION, POWDER, LYOPHILIZED, FOR SOLUTION INTRAMUSCULAR; INTRAVENOUS ONCE
Status: CANCELLED | OUTPATIENT
Start: 2020-07-01

## 2020-06-16 RX ORDER — METHYLPREDNISOLONE SODIUM SUCCINATE 40 MG/ML
40 INJECTION, POWDER, LYOPHILIZED, FOR SOLUTION INTRAMUSCULAR; INTRAVENOUS ONCE
Status: COMPLETED | OUTPATIENT
Start: 2020-06-16 | End: 2020-06-16

## 2020-06-16 RX ADMIN — INFLIXIMAB 351 MG: 100 INJECTION, POWDER, LYOPHILIZED, FOR SOLUTION INTRAVENOUS at 13:20

## 2020-06-16 RX ADMIN — METHYLPREDNISOLONE SODIUM SUCCINATE 40 MG: 40 INJECTION, POWDER, FOR SOLUTION INTRAMUSCULAR; INTRAVENOUS at 13:02

## 2020-06-16 RX ADMIN — ACETAMINOPHEN 650 MG: 325 TABLET, FILM COATED ORAL at 13:02

## 2020-06-16 RX ADMIN — SODIUM CHLORIDE 20 ML/HR: 0.9 INJECTION, SOLUTION INTRAVENOUS at 12:57

## 2020-07-01 ENCOUNTER — HOSPITAL ENCOUNTER (OUTPATIENT)
Dept: INFUSION CENTER | Facility: CLINIC | Age: 80
Discharge: HOME | End: 2020-07-01
Payer: MEDICARE

## 2020-07-01 VITALS
OXYGEN SATURATION: 96 % | HEART RATE: 48 BPM | TEMPERATURE: 96.8 F | WEIGHT: 157 LBS | SYSTOLIC BLOOD PRESSURE: 136 MMHG | RESPIRATION RATE: 18 BRPM | DIASTOLIC BLOOD PRESSURE: 65 MMHG | BODY MASS INDEX: 26.95 KG/M2

## 2020-07-01 DIAGNOSIS — K51.211 ULCERATIVE PROCTITIS WITH RECTAL BLEEDING (HCC): Primary | ICD-10-CM

## 2020-07-01 PROCEDURE — 96415 CHEMO IV INFUSION ADDL HR: CPT

## 2020-07-01 PROCEDURE — 96413 CHEMO IV INFUSION 1 HR: CPT

## 2020-07-01 PROCEDURE — 96375 TX/PRO/DX INJ NEW DRUG ADDON: CPT

## 2020-07-01 RX ORDER — METHYLPREDNISOLONE SODIUM SUCCINATE 40 MG/ML
40 INJECTION, POWDER, LYOPHILIZED, FOR SOLUTION INTRAMUSCULAR; INTRAVENOUS ONCE
Status: COMPLETED | OUTPATIENT
Start: 2020-07-01 | End: 2020-07-01

## 2020-07-01 RX ORDER — ACETAMINOPHEN 325 MG/1
650 TABLET ORAL ONCE
Status: COMPLETED | OUTPATIENT
Start: 2020-07-01 | End: 2020-07-01

## 2020-07-01 RX ORDER — SODIUM CHLORIDE 9 MG/ML
20 INJECTION, SOLUTION INTRAVENOUS ONCE
Status: COMPLETED | OUTPATIENT
Start: 2020-07-01 | End: 2020-07-01

## 2020-07-01 RX ORDER — ACETAMINOPHEN 325 MG/1
650 TABLET ORAL ONCE
Status: CANCELLED | OUTPATIENT
Start: 2020-07-29

## 2020-07-01 RX ORDER — ACETAMINOPHEN 325 MG/1
650 TABLET ORAL ONCE
Status: CANCELLED | OUTPATIENT
Start: 2020-07-14

## 2020-07-01 RX ORDER — METHYLPREDNISOLONE SODIUM SUCCINATE 40 MG/ML
40 INJECTION, POWDER, LYOPHILIZED, FOR SOLUTION INTRAMUSCULAR; INTRAVENOUS ONCE
Status: CANCELLED | OUTPATIENT
Start: 2020-07-29

## 2020-07-01 RX ORDER — METHYLPREDNISOLONE SODIUM SUCCINATE 40 MG/ML
40 INJECTION, POWDER, LYOPHILIZED, FOR SOLUTION INTRAMUSCULAR; INTRAVENOUS ONCE
Status: CANCELLED | OUTPATIENT
Start: 2020-07-14

## 2020-07-01 RX ORDER — SODIUM CHLORIDE 9 MG/ML
20 INJECTION, SOLUTION INTRAVENOUS ONCE
Status: CANCELLED | OUTPATIENT
Start: 2020-07-29

## 2020-07-01 RX ORDER — SODIUM CHLORIDE 9 MG/ML
20 INJECTION, SOLUTION INTRAVENOUS ONCE
Status: CANCELLED | OUTPATIENT
Start: 2020-07-14

## 2020-07-01 RX ADMIN — SODIUM CHLORIDE 20 ML/HR: 0.9 INJECTION, SOLUTION INTRAVENOUS at 12:45

## 2020-07-01 RX ADMIN — INFLIXIMAB 351 MG: 100 INJECTION, POWDER, LYOPHILIZED, FOR SOLUTION INTRAVENOUS at 13:38

## 2020-07-01 RX ADMIN — METHYLPREDNISOLONE SODIUM SUCCINATE 40 MG: 40 INJECTION, POWDER, FOR SOLUTION INTRAMUSCULAR; INTRAVENOUS at 12:52

## 2020-07-01 RX ADMIN — ACETAMINOPHEN 650 MG: 325 TABLET, FILM COATED ORAL at 12:52

## 2020-07-01 NOTE — PROGRESS NOTES
Pt here for Remicade infusion  IV started with no issue and infusing with [facility] @ [facility]  Vital signs stable  Pt resting comfortably and has no further questions or concerns  Call bell with in reach

## 2020-07-07 ENCOUNTER — TELEPHONE (OUTPATIENT)
Dept: GASTROENTEROLOGY | Facility: CLINIC | Age: 80
End: 2020-07-07

## 2020-07-07 NOTE — TELEPHONE ENCOUNTER
Patients GI provider:  Dr Robert Medina    Number to return call: [phone]     Reason for call: Pt called requesting to speak to Dr Robert Medina wanting to know how subsceptible she is to infection with getting the infusions   Pt requested Dr Robert Medina only    Scheduled procedure/appointment date if applicable: NA

## 2020-07-10 NOTE — TELEPHONE ENCOUNTER
I called pt back  Answered all questions    Will see her in a follow up visit the first week of August

## 2020-07-13 ENCOUNTER — TELEPHONE (OUTPATIENT)
Dept: GASTROENTEROLOGY | Facility: CLINIC | Age: 80
End: 2020-07-13

## 2020-07-13 NOTE — TELEPHONE ENCOUNTER
----- Message from [employer], DO sent at 7/10/2020  4:01 PM EDT -----  Please make pt an appt with me on a virtual the first week of august       Thanks so much,  Leonel Schlatter

## 2020-07-13 NOTE — TELEPHONE ENCOUNTER
Pt called back to speak with dr Aurora Hussein   She has additional questions about infusion on 7-29-20 and risk for infection    Call back # [phone]

## 2020-07-29 ENCOUNTER — HOSPITAL ENCOUNTER (OUTPATIENT)
Dept: INFUSION CENTER | Facility: CLINIC | Age: 80
Discharge: HOME | End: 2020-07-29
Payer: MEDICARE

## 2020-07-29 VITALS
HEART RATE: 55 BPM | BODY MASS INDEX: 27.29 KG/M2 | RESPIRATION RATE: 18 BRPM | TEMPERATURE: 96.2 F | WEIGHT: 159 LBS | SYSTOLIC BLOOD PRESSURE: 121 MMHG | DIASTOLIC BLOOD PRESSURE: 55 MMHG

## 2020-07-29 DIAGNOSIS — K51.211 ULCERATIVE PROCTITIS WITH RECTAL BLEEDING (HCC): Primary | ICD-10-CM

## 2020-07-29 PROCEDURE — 96415 CHEMO IV INFUSION ADDL HR: CPT

## 2020-07-29 PROCEDURE — 96413 CHEMO IV INFUSION 1 HR: CPT

## 2020-07-29 PROCEDURE — 96375 TX/PRO/DX INJ NEW DRUG ADDON: CPT

## 2020-07-29 RX ORDER — SODIUM CHLORIDE 9 MG/ML
20 INJECTION, SOLUTION INTRAVENOUS ONCE
Status: CANCELLED | OUTPATIENT
Start: 2020-09-23

## 2020-07-29 RX ORDER — METHYLPREDNISOLONE SODIUM SUCCINATE 40 MG/ML
40 INJECTION, POWDER, LYOPHILIZED, FOR SOLUTION INTRAMUSCULAR; INTRAVENOUS ONCE
Status: COMPLETED | OUTPATIENT
Start: 2020-07-29 | End: 2020-07-29

## 2020-07-29 RX ORDER — SODIUM CHLORIDE 9 MG/ML
20 INJECTION, SOLUTION INTRAVENOUS ONCE
Status: COMPLETED | OUTPATIENT
Start: 2020-07-29 | End: 2020-07-29

## 2020-07-29 RX ORDER — METHYLPREDNISOLONE SODIUM SUCCINATE 40 MG/ML
40 INJECTION, POWDER, LYOPHILIZED, FOR SOLUTION INTRAMUSCULAR; INTRAVENOUS ONCE
Status: CANCELLED | OUTPATIENT
Start: 2020-09-23

## 2020-07-29 RX ORDER — ACETAMINOPHEN 325 MG/1
650 TABLET ORAL ONCE
Status: COMPLETED | OUTPATIENT
Start: 2020-07-29 | End: 2020-07-29

## 2020-07-29 RX ORDER — ACETAMINOPHEN 325 MG/1
650 TABLET ORAL ONCE
Status: CANCELLED | OUTPATIENT
Start: 2020-09-23

## 2020-07-29 RX ADMIN — METHYLPREDNISOLONE SODIUM SUCCINATE 40 MG: 40 INJECTION, POWDER, FOR SOLUTION INTRAMUSCULAR; INTRAVENOUS at 12:57

## 2020-07-29 RX ADMIN — ACETAMINOPHEN 650 MG: 325 TABLET, FILM COATED ORAL at 12:56

## 2020-07-29 RX ADMIN — INFLIXIMAB 356 MG: 100 INJECTION, POWDER, LYOPHILIZED, FOR SOLUTION INTRAVENOUS at 13:33

## 2020-07-29 RX ADMIN — SODIUM CHLORIDE 20 ML/HR: 0.9 INJECTION, SOLUTION INTRAVENOUS at 12:56

## 2020-07-29 NOTE — PROGRESS NOTES
Pt here for remicade infusion  Offers no complaints  No recent illness/abx use  Vitals stable  Treatment tolerated without any adverse reactions  Scheduling next appointment at this time   Marty CASTROS

## 2020-08-04 ENCOUNTER — TELEPHONE (OUTPATIENT)
Dept: INTERNAL MEDICINE CLINIC | Facility: CLINIC | Age: 80
End: 2020-08-04

## 2020-08-04 DIAGNOSIS — K51.211 ULCERATIVE PROCTITIS WITH RECTAL BLEEDING (HCC): Primary | ICD-10-CM

## 2020-08-06 ENCOUNTER — LAB (OUTPATIENT)
Dept: LAB | Age: 80
End: 2020-08-06
Payer: MEDICARE

## 2020-08-06 ENCOUNTER — TELEMEDICINE (OUTPATIENT)
Dept: GASTROENTEROLOGY | Facility: CLINIC | Age: 80
End: 2020-08-06
Payer: MEDICARE

## 2020-08-06 VITALS — BODY MASS INDEX: 26.49 KG/M2 | HEIGHT: 65 IN | WEIGHT: 159 LBS

## 2020-08-06 DIAGNOSIS — K57.90 DIVERTICULOSIS: Chronic | ICD-10-CM

## 2020-08-06 DIAGNOSIS — K51.211 ULCERATIVE PROCTITIS WITH RECTAL BLEEDING (HCC): ICD-10-CM

## 2020-08-06 DIAGNOSIS — K21.9 GASTROESOPHAGEAL REFLUX DISEASE, ESOPHAGITIS PRESENCE NOT SPECIFIED: Chronic | ICD-10-CM

## 2020-08-06 DIAGNOSIS — K51.211 ULCERATIVE PROCTITIS WITH RECTAL BLEEDING (HCC): Primary | ICD-10-CM

## 2020-08-06 PROCEDURE — 36415 COLL VENOUS BLD VENIPUNCTURE: CPT

## 2020-08-06 PROCEDURE — 99441 PR PHYS/QHP TELEPHONE EVALUATION 5-10 MIN: CPT | Performed by: INTERNAL MEDICINE

## 2020-08-06 PROCEDURE — 86708 HEPATITIS A ANTIBODY: CPT

## 2020-08-06 NOTE — PROGRESS NOTES
Virtual Brief Visit    Assessment/Plan:    Problem List Items Addressed This Visit        Digestive    Diverticulosis (Chronic)    Esophageal reflux (Chronic)    Ulcerative proctitis with rectal bleeding ([facility] [address] ) - Primary        76-year-old female here for follow-up of ulcerative colitis  She did not respond to Entyvio  She has now completed her induction doses of Remicade  I will see her in virtual visit in 4 weeks  We are titrating down her prednisone from 20 mg to 10 mg daily for week and then 5 mg daily for a week  At her next visit I will have her obtain Remicade drug and antibody levels before her next infusion  All the patient's questions were answered  We will follow up with her in 4-8 weeks  Reason for visit is   Chief Complaint   Patient presents with    Virtual Regular Visit     F/U INFUSIONS    Virtual Brief Visit        Encounter provider Krishna Urrutia DO    Provider located at [address] [place] [address] 1  [address] [place] [address]  [phone]    Recent Visits  No visits were found meeting these conditions  Showing recent visits within past 7 days and meeting all other requirements     Today's Visits  Date Type Provider Dept   08/06/20 [id number] [facility],D[address]esh [address], DO [facility]   Showing today's visits and meeting all other requirements     Future Appointments  No visits were found meeting these conditions  Showing future appointments within next 150 days and meeting all other requirements        After connecting through telephone, the patient was identified by name and date of birth  Tyra Head was informed that this is a telemedicine visit and that the visit is being conducted through telephone  My office door was closed  No one else was in the room  She acknowledged consent and understanding of privacy and security of the platform   The patient has agreed to participate and understands she can discontinue the visit at any time  Patient is aware this is a billable service  Jake White is a 67 y o  female here for follow up  She has completed her induction doses of remicade with three doses  She is currently on prednisone 20 mg daily  She is no longer seen rectal bleeding or diarrhea  Past Medical History:   Diagnosis Date    Atherosclerosis     Carotid artery narrowing     Coronary artery disease     Diverticulosis     GERD (gastroesophageal reflux disease)     Glaucoma     Hypercalcemia     Hyperlipidemia     [facility]par[facility]roidism ([facility] [address] )     Hypertension     Osteopenia     Severe cervical dysplasia, histologically confirmed     Skin cancer     squamous cell    Stroke ([facility] )     Thyroid nodule     Vitamin D deficiency        Past Surgical History:   Procedure Laterality Date    APPENDECTOMY      BREAST SURGERY      reduction procedure    CAROTID ENDARTARECTOMY Right     carotid thromboendarterectomy     COLONOSCOPY      EXPLORATION CAROTID ARTERY      HYSTERECTOMY      age 55    OOPHORECTOMY Bilateral     age 55    DE COLONOSCOPY FLX DX W/COLLJ SPEC WHEN PFRMD N/A 7/28/2017    Procedure: EGD AND COLONOSCOPY;  Surgeon: Elvie Neves MD;  [facility]: [facility];   Service: Colorectal    REDUCTION MAMMAPLASTY Bilateral 1987       Current Outpatient Medications   Medication Sig Dispense Refill    AMLODIPINE BESYLATE PO Take 5 mg by mouth daily      aspirin 81 MG tablet Take 81 mg by mouth daily      atenolol (TENORMIN) 25 mg tablet Take 25 mg by mouth 2 (two) times a day      bimatoprost (LUMIGAN) 0 01 % ophthalmic drops Administer 1 drop to both eyes daily at bedtime      brinzolamide (AZOPT) 1 % ophthalmic suspension 1 drop 2 (two) times a day      Cholecalciferol (VITAMIN D3 PO) Take 2,000 Units by mouth 2 (two) times a day      diphenoxylate-atropine (LOMOTIL) 2 5-0 025 mg per tablet TAKE 1 TO 2 TABS FOUR TIMES A DAY  4  hydrochlorothiazide (HYDRODIURIL) 25 mg tablet Take 12 5 mg by mouth daily        irbesartan (AVAPRO) 300 mg tablet Take 300 mg by mouth daily at bedtime      omega-3-acid ethyl esters (LOVAZA) 1 g capsule Take 2 g by mouth 2 (two) times a day      predniSONE 10 mg tablet       rosuvastatin (CRESTOR) 5 mg tablet Take 7 5 mg by mouth daily      timolol (TIMOPTIC) 0 5 % ophthalmic solution       triamcinolone (KENALOG) 0 1 % ointment APPLY TO AFFECTED AREA(S) TWO TIMES DAILY FOR 2 TO 3 WEEKS  3     No current facility-administered medications for this visit  No Known Allergies    REVIEW OF SYSTEMS:    CONSTITUTIONAL: Denies any fever, chills, rigors, and weight loss  HEENT: No earache or tinnitus  Denies hearing loss or visual disturbances  CARDIOVASCULAR: No chest pain or palpitations  RESPIRATORY: Denies any cough, hemoptysis, shortness of breath or dyspnea on exertion  GASTROINTESTINAL: As noted in the History of Present Illness  GENITOURINARY: No problems with urination  Denies any hematuria or dysuria  NEUROLOGIC: No dizziness or vertigo, denies headaches  MUSCULOSKELETAL: Denies any muscle or joint pain  SKIN: Denies skin rashes or itching  ENDOCRINE: Denies excessive thirst  Denies intolerance to heat or cold  PSYCHOSOCIAL: Denies depression or anxiety  Denies any recent memory loss  PHYSICAL EXAMINATION:    General Appearance:   Not done   HEENT:     Lungs:      Cardiovascular:      Abdomen:      Skin:      Musculoskeletal:      Psych:     Neuro:           Vitals:    08/06/20 0935   Weight: 72 1 kg (159 lb)   Height: 5' 4 5" (1 638 m)       I spent 6 minutes directly with the patient during this visit    VIRTUAL VISIT [facility] acknowledges that she has consented to an online visit or consultation   She understands that the online visit is based solely on information provided by her, and that, in the absence of a face-to-face physical evaluation by the physician, the diagnosis she receives is both limited and provisional in terms of accuracy and completeness  This is not intended to replace a full medical face-to-face evaluation by the physician  Martha Phan understands and accepts these terms

## 2020-08-06 NOTE — TELEPHONE ENCOUNTER
Sp/w pt to advise per doc needs to get another Hep A testing done known as IGG  Therefore HEP A TOTAL was ordered    Pt says she will try to go today or tommorow to have it done at [address] [place] [address]

## 2020-08-07 ENCOUNTER — TELEPHONE (OUTPATIENT)
Dept: INTERNAL MEDICINE CLINIC | Facility: CLINIC | Age: 80
End: 2020-08-07

## 2020-08-07 LAB — HAV AB SER QL IA: REACTIVE

## 2020-08-07 NOTE — RESULT ENCOUNTER NOTE
Please call the patient regarding her hepatits a test- she has adequate level and does not need vaccine foer that- she needs repeat vaccine for only hepatitis b- use Engerix one ml at 0,1 and 6 months

## 2020-08-07 NOTE — TELEPHONE ENCOUNTER
----- Message from Chente Burnette MD sent at 8/7/2020 12:44 PM EDT -----  Please call the patient regarding her hepatits a test- she has adequate level and does not need vaccine foer that- she needs repeat vaccine for only hepatitis b- use Engerix one ml at 0,1 and 6 months

## 2020-08-10 NOTE — TELEPHONE ENCOUNTER
Pt called back she is coming to get Hep B test tomorrow  Please confirm the following  she is scheduled tomorrow for 1st dose, then a month after will require 2nd dose  6 month dose will be done from the initial date of her first dose correct ?

## 2020-08-11 ENCOUNTER — CLINICAL SUPPORT (OUTPATIENT)
Dept: INTERNAL MEDICINE CLINIC | Facility: CLINIC | Age: 80
End: 2020-08-11
Payer: MEDICARE

## 2020-08-11 DIAGNOSIS — Z23 NEED FOR HEPATITIS B VACCINATION: Primary | ICD-10-CM

## 2020-08-11 PROCEDURE — 90746 HEPB VACCINE 3 DOSE ADULT IM: CPT

## 2020-08-11 PROCEDURE — G0010 ADMIN HEPATITIS B VACCINE: HCPCS

## 2020-09-22 ENCOUNTER — CLINICAL SUPPORT (OUTPATIENT)
Dept: INTERNAL MEDICINE CLINIC | Facility: CLINIC | Age: 80
End: 2020-09-22
Payer: MEDICARE

## 2020-09-22 DIAGNOSIS — Z23 ENCOUNTER FOR IMMUNIZATION: Primary | ICD-10-CM

## 2020-09-22 PROCEDURE — 90746 HEPB VACCINE 3 DOSE ADULT IM: CPT

## 2020-09-22 PROCEDURE — G0008 ADMIN INFLUENZA VIRUS VAC: HCPCS

## 2020-09-22 PROCEDURE — 90662 IIV NO PRSV INCREASED AG IM: CPT

## 2020-09-22 PROCEDURE — G0010 ADMIN HEPATITIS B VACCINE: HCPCS

## 2020-09-23 ENCOUNTER — HOSPITAL ENCOUNTER (OUTPATIENT)
Dept: INFUSION CENTER | Facility: CLINIC | Age: 80
Discharge: HOME | End: 2020-09-23
Payer: MEDICARE

## 2020-09-23 VITALS
DIASTOLIC BLOOD PRESSURE: 60 MMHG | TEMPERATURE: 97.3 F | WEIGHT: 160 LBS | SYSTOLIC BLOOD PRESSURE: 126 MMHG | OXYGEN SATURATION: 96 % | RESPIRATION RATE: 18 BRPM | BODY MASS INDEX: 27.04 KG/M2 | HEART RATE: 62 BPM

## 2020-09-23 DIAGNOSIS — K51.211 ULCERATIVE PROCTITIS WITH RECTAL BLEEDING (HCC): Primary | ICD-10-CM

## 2020-09-23 PROCEDURE — 96415 CHEMO IV INFUSION ADDL HR: CPT

## 2020-09-23 PROCEDURE — 96375 TX/PRO/DX INJ NEW DRUG ADDON: CPT

## 2020-09-23 PROCEDURE — 96413 CHEMO IV INFUSION 1 HR: CPT

## 2020-09-23 RX ORDER — METHYLPREDNISOLONE SODIUM SUCCINATE 40 MG/ML
40 INJECTION, POWDER, LYOPHILIZED, FOR SOLUTION INTRAMUSCULAR; INTRAVENOUS ONCE
Status: CANCELLED | OUTPATIENT
Start: 2020-11-18

## 2020-09-23 RX ORDER — ACETAMINOPHEN 325 MG/1
650 TABLET ORAL ONCE
Status: COMPLETED | OUTPATIENT
Start: 2020-09-23 | End: 2020-09-23

## 2020-09-23 RX ORDER — METHYLPREDNISOLONE SODIUM SUCCINATE 40 MG/ML
40 INJECTION, POWDER, LYOPHILIZED, FOR SOLUTION INTRAMUSCULAR; INTRAVENOUS ONCE
Status: COMPLETED | OUTPATIENT
Start: 2020-09-23 | End: 2020-09-23

## 2020-09-23 RX ORDER — SODIUM CHLORIDE 9 MG/ML
20 INJECTION, SOLUTION INTRAVENOUS ONCE
Status: CANCELLED | OUTPATIENT
Start: 2020-11-18

## 2020-09-23 RX ORDER — SODIUM CHLORIDE 9 MG/ML
20 INJECTION, SOLUTION INTRAVENOUS ONCE
Status: COMPLETED | OUTPATIENT
Start: 2020-09-23 | End: 2020-09-23

## 2020-09-23 RX ORDER — ACETAMINOPHEN 325 MG/1
650 TABLET ORAL ONCE
Status: CANCELLED | OUTPATIENT
Start: 2020-11-18

## 2020-09-23 RX ADMIN — ACETAMINOPHEN 650 MG: 325 TABLET, FILM COATED ORAL at 12:57

## 2020-09-23 RX ADMIN — INFLIXIMAB 363 MG: 100 INJECTION, POWDER, LYOPHILIZED, FOR SOLUTION INTRAVENOUS at 13:39

## 2020-09-23 RX ADMIN — SODIUM CHLORIDE 20 ML/HR: 0.9 INJECTION, SOLUTION INTRAVENOUS at 12:50

## 2020-09-23 RX ADMIN — METHYLPREDNISOLONE SODIUM SUCCINATE 40 MG: 40 INJECTION, POWDER, FOR SOLUTION INTRAMUSCULAR; INTRAVENOUS at 12:57

## 2020-09-23 NOTE — PROGRESS NOTES
Patient tolerated treatment without complications  Patient declined AVS  Patient aware to schedule next appointment on her way out

## 2020-09-23 NOTE — PROGRESS NOTES
Patient here for remicade infusion  Patient denies antibiotic use or illness  Patient resting with no complaints  Vitals stable  Call bell within reach of patient  Will continue to monitor

## 2020-09-25 ENCOUNTER — TELEPHONE (OUTPATIENT)
Dept: INTERNAL MEDICINE CLINIC | Facility: CLINIC | Age: 80
End: 2020-09-25

## 2020-09-28 ENCOUNTER — APPOINTMENT (OUTPATIENT)
Dept: LAB | Age: 80
End: 2020-09-28
Payer: MEDICARE

## 2020-09-28 ENCOUNTER — OFFICE VISIT (OUTPATIENT)
Dept: INTERNAL MEDICINE CLINIC | Facility: CLINIC | Age: 80
End: 2020-09-28
Payer: MEDICARE

## 2020-09-28 VITALS
TEMPERATURE: 98.1 F | RESPIRATION RATE: 14 BRPM | HEART RATE: 72 BPM | SYSTOLIC BLOOD PRESSURE: 130 MMHG | DIASTOLIC BLOOD PRESSURE: 80 MMHG

## 2020-09-28 DIAGNOSIS — R60.9 EDEMA, UNSPECIFIED TYPE: Primary | ICD-10-CM

## 2020-09-28 DIAGNOSIS — R31.9 HEMATURIA, UNSPECIFIED TYPE: ICD-10-CM

## 2020-09-28 DIAGNOSIS — E21.3 HYPERPARATHYROIDISM (HCC): Chronic | ICD-10-CM

## 2020-09-28 DIAGNOSIS — I10 HYPERTENSION, UNSPECIFIED TYPE: Chronic | ICD-10-CM

## 2020-09-28 DIAGNOSIS — E78.5 HYPERLIPIDEMIA, UNSPECIFIED HYPERLIPIDEMIA TYPE: Chronic | ICD-10-CM

## 2020-09-28 DIAGNOSIS — R60.9 EDEMA, UNSPECIFIED TYPE: ICD-10-CM

## 2020-09-28 DIAGNOSIS — K51.211 ULCERATIVE PROCTITIS WITH RECTAL BLEEDING (HCC): ICD-10-CM

## 2020-09-28 DIAGNOSIS — Z11.59 ENCOUNTER FOR SCREENING FOR OTHER VIRAL DISEASES: ICD-10-CM

## 2020-09-28 LAB
25(OH)D3 SERPL-MCNC: 36.9 NG/ML (ref 30–100)
ALBUMIN SERPL BCP-MCNC: 3.4 G/DL (ref 3.5–5)
ALP SERPL-CCNC: 76 U/L (ref 46–116)
ALT SERPL W P-5'-P-CCNC: 24 U/L (ref 12–78)
ANION GAP SERPL CALCULATED.3IONS-SCNC: 5 MMOL/L (ref 4–13)
AST SERPL W P-5'-P-CCNC: 15 U/L (ref 5–45)
BASOPHILS # BLD AUTO: 0.07 THOUSANDS/ΜL (ref 0–0.1)
BASOPHILS NFR BLD AUTO: 1 % (ref 0–1)
BILIRUB SERPL-MCNC: 0.44 MG/DL (ref 0.2–1)
BILIRUB UR QL STRIP: ABNORMAL
BUN SERPL-MCNC: 21 MG/DL (ref 5–25)
CALCIUM ALBUM COR SERPL-MCNC: 10.6 MG/DL (ref 8.3–10.1)
CALCIUM SERPL-MCNC: 10.1 MG/DL (ref 8.3–10.1)
CHLORIDE SERPL-SCNC: 112 MMOL/L (ref 100–108)
CHOLEST SERPL-MCNC: 131 MG/DL (ref 50–200)
CLARITY UR: CLEAR
CO2 SERPL-SCNC: 26 MMOL/L (ref 21–32)
COLOR UR: ABNORMAL
CREAT SERPL-MCNC: 0.83 MG/DL (ref 0.6–1.3)
EOSINOPHIL # BLD AUTO: 0.37 THOUSAND/ΜL (ref 0–0.61)
EOSINOPHIL NFR BLD AUTO: 5 % (ref 0–6)
ERYTHROCYTE [DISTWIDTH] IN BLOOD BY AUTOMATED COUNT: 14.9 % (ref 11.6–15.1)
GFR SERPL CREATININE-BSD FRML MDRD: 68 ML/MIN/1.73SQ M
GLUCOSE P FAST SERPL-MCNC: 97 MG/DL (ref 65–99)
GLUCOSE UR STRIP-MCNC: NEGATIVE MG/DL
HCT VFR BLD AUTO: 41.4 % (ref 34.8–46.1)
HDLC SERPL-MCNC: 57 MG/DL
HGB BLD-MCNC: 13.4 G/DL (ref 11.5–15.4)
HGB UR QL STRIP.AUTO: NEGATIVE
IMM GRANULOCYTES # BLD AUTO: 0.03 THOUSAND/UL (ref 0–0.2)
IMM GRANULOCYTES NFR BLD AUTO: 0 % (ref 0–2)
KETONES UR STRIP-MCNC: NEGATIVE MG/DL
LDLC SERPL DIRECT ASSAY-MCNC: 56 MG/DL (ref 0–100)
LEUKOCYTE ESTERASE UR QL STRIP: NEGATIVE
LYMPHOCYTES # BLD AUTO: 2.32 THOUSANDS/ΜL (ref 0.6–4.47)
LYMPHOCYTES NFR BLD AUTO: 29 % (ref 14–44)
MCH RBC QN AUTO: 29.1 PG (ref 26.8–34.3)
MCHC RBC AUTO-ENTMCNC: 32.4 G/DL (ref 31.4–37.4)
MCV RBC AUTO: 90 FL (ref 82–98)
MONOCYTES # BLD AUTO: 0.99 THOUSAND/ΜL (ref 0.17–1.22)
MONOCYTES NFR BLD AUTO: 12 % (ref 4–12)
NEUTROPHILS # BLD AUTO: 4.29 THOUSANDS/ΜL (ref 1.85–7.62)
NEUTS SEG NFR BLD AUTO: 53 % (ref 43–75)
NITRITE UR QL STRIP: NEGATIVE
NRBC BLD AUTO-RTO: 0 /100 WBCS
PH UR STRIP.AUTO: 5.5 [PH]
PLATELET # BLD AUTO: 271 THOUSANDS/UL (ref 149–390)
PMV BLD AUTO: 11 FL (ref 8.9–12.7)
POTASSIUM SERPL-SCNC: 3.9 MMOL/L (ref 3.5–5.3)
PROT SERPL-MCNC: 7.2 G/DL (ref 6.4–8.2)
PROT UR STRIP-MCNC: NEGATIVE MG/DL
PTH-INTACT SERPL-MCNC: 202.7 PG/ML (ref 18.4–80.1)
RBC # BLD AUTO: 4.61 MILLION/UL (ref 3.81–5.12)
SL AMB  POCT GLUCOSE, UA: ABNORMAL
SL AMB LEUKOCYTE ESTERASE,UA: ABNORMAL
SL AMB POCT BILIRUBIN,UA: ABNORMAL
SL AMB POCT BLOOD,UA: ABNORMAL
SL AMB POCT CLARITY,UA: ABNORMAL
SL AMB POCT COLOR,UA: YELLOW
SL AMB POCT KETONES,UA: ABNORMAL
SL AMB POCT NITRITE,UA: ABNORMAL
SL AMB POCT PH,UA: 6
SL AMB POCT SPECIFIC GRAVITY,UA: 1.03
SL AMB POCT URINE PROTEIN: ABNORMAL
SL AMB POCT UROBILINOGEN: 0.2
SODIUM SERPL-SCNC: 143 MMOL/L (ref 136–145)
SP GR UR STRIP.AUTO: 1.03 (ref 1–1.03)
TRIGL SERPL-MCNC: 178 MG/DL
UROBILINOGEN UR QL STRIP.AUTO: 1 E.U./DL
WBC # BLD AUTO: 8.07 THOUSAND/UL (ref 4.31–10.16)

## 2020-09-28 PROCEDURE — 83721 ASSAY OF BLOOD LIPOPROTEIN: CPT

## 2020-09-28 PROCEDURE — 85025 COMPLETE CBC W/AUTO DIFF WBC: CPT

## 2020-09-28 PROCEDURE — 80053 COMPREHEN METABOLIC PANEL: CPT

## 2020-09-28 PROCEDURE — 81003 URINALYSIS AUTO W/O SCOPE: CPT | Performed by: INTERNAL MEDICINE

## 2020-09-28 PROCEDURE — 82306 VITAMIN D 25 HYDROXY: CPT

## 2020-09-28 PROCEDURE — 87086 URINE CULTURE/COLONY COUNT: CPT

## 2020-09-28 PROCEDURE — 86709 HEPATITIS A IGM ANTIBODY: CPT

## 2020-09-28 PROCEDURE — 83970 ASSAY OF PARATHORMONE: CPT

## 2020-09-28 PROCEDURE — 99214 OFFICE O/P EST MOD 30 MIN: CPT | Performed by: INTERNAL MEDICINE

## 2020-09-28 PROCEDURE — 86708 HEPATITIS A ANTIBODY: CPT

## 2020-09-28 PROCEDURE — 86706 HEP B SURFACE ANTIBODY: CPT

## 2020-09-28 PROCEDURE — 80061 LIPID PANEL: CPT

## 2020-09-28 PROCEDURE — 81003 URINALYSIS AUTO W/O SCOPE: CPT

## 2020-09-28 PROCEDURE — 36415 COLL VENOUS BLD VENIPUNCTURE: CPT

## 2020-09-28 NOTE — PROGRESS NOTES
BMI Counseling: There is no height or weight on file to calculate BMI  The BMI is above normal  Nutrition recommendations include decreasing portion sizes, encouraging healthy choices of fruits and vegetables and moderation in carbohydrate intake  Exercise recommendations include exercising 3-5 times per week  No pharmacotherapy was ordered  Assessment/Plan:  1  Edema- May be related to amlodipine and underlying peripheral venous disease  Rule out component of hypoalbuminemia associated with her inflammatory bowel disease-Remicade use, rule out new renal disease with significant proteinuria although this was not evident on dipstick  I recommended she have a chemistry profile as well as urine for urinalysis and culture  Further therapy based on results  She is already on a baseline dose hydrochlorothiazide although as noted if this is from her amlodipine diuretic therapy is not the primary choice  Note also she is already on an angiotensin receptor blocker which she had to some degree help to decrease the edema associated with amlodipine use  2  Hypertension-stable on current dose of Avapro 300 milligrams daily, hydrochlorothiazide 12 5 milligrams daily, amlodipine 5 milligrams daily and atenolol 25 milligrams b i d -pending overall course may want to decrease amlodipine to 2 5 milligrams daily  3  General care-as noted patient hepatitis A testing that showed she was immune  She has been started on therapy with hepatitis-B vaccine taken as a dose as 1 milliliter of Engerix at 0, 1 in 6 months-patient's would not responded 2 rounds of hepatitis-B are unlikely to respond to additional vaccination-she has already been vaccinated once with hepatitis-B but is noted titers are still low ongoing for 2nd vaccine  4  General care-patient had vaccinations to prepare her for Remicade  Hepatitis-B vaccine as above    TB testing normal   Serology for mumps rubella rubeola all normal   5  Inflammatory bowel disease  Felt to represent ulcerative colitis  Failed on Entyvio in outs appears to be responding to Remicade  she is off prednisone  6  previously noted lumbar radiculopathy -still has occasional symptoms - this is now more prominent since she is off the prednisone which was being used to treat her inflammatory bowel disease  7  Hyperparathyroidism -primary -at this point with borderline hypercalcemia   Prior scan shows inferior parathyroid adenoma   She declined surgery   Endocrine felt she could be monitored   No history of nephrolithiasis   DEXA scan in 2019 that is February 2019 shows false positive normal lumbar spine and hip of -2 1 which is similar to prior DEXA   PTH level remains similar to before although calcium slightly higher - I recommended repeat PTH and calcium  Prior to her next visit  8  Vitamin-D deficiency -continue supplement  9  Carotid stenosis -status post right carotid endarterectomy for 99% stenosis with right hemispheric CVA-she has recovered and has been stable over the last few years   Now seen at [address] [place] [address] Doppler done in October of 2019 shows surgery side white open left side less than 50%-needs repeat in the year which would be October of 2020   10  Hyperlipidemia -continue current dose of statin  11  Osteopenia -DEXA scan as noted   This was done in February 2019-needs repeat in 2 years which would be March 2021 WATCHING CLOSELY WITH RECENT PREDNISONE USE  12  Dyspepsia -endoscopy shows hiatal hernia with some erythema in the antrum   Biopsy read as gastric mucosa the esophagus but no metaplasia   -Therefore this does not meet the criteria for Harkins's   Had been on an H2 blocker but is now off that  13   Hoarseness-ENT physician felt she had LPR with some erythema the glottis on exam   Previously was on a PPI as well as an H2 blocker -at this point off all meds and stable         MEDICAL REGIMEN:                                               Remicade dosing per GI prednisone dose as per GI atenolol 25 milligrams b i d , Avapro 300 milligrams daily, baby aspirin daily, hydrochlorothiazide 25 milligrams- half tab daily, Crestor 7 5 milligrams daily using 5 milligram tablets, amlodipine 5 milligrams daily, vitamin D3/ 4000 units a day, fish oil 1 daily    Scheduled for chemistry profile urine for urinalysis and culture with further therapy based on results    Addendum- laboratory testing done on September the 28 shows SMA normal other than albumin of 3 4 and elevated calcium with a corrected calcium of 10 6 -prior corrected calcium was 10 8  HDL 57 cholesterol 131 triglycerides 178-previously 99 LDL 56 vitamin-D 37 urinalysis normal CBC normal parathyroid hormone level 202 with  With prior value of 187-normal being up to a 80 a -I will be in touch with the patient regarding the above  At this point we will decrease amlodipine to 2 5 milligrams daily and she will return for follow-up blood pressure check over the next 6 weeks    Addendum- patient had normal mammogram in October 2020- [address] [place] [address] a a  No problem-specific Assessment & Plan notes found for this encounter  Diagnoses and all orders for this visit:    Edema, unspecified type  -     POCT urine dip auto non-scope  -     CBC and differential; Future  -     Comprehensive metabolic panel; Future  -     Urine culture; Future  -     UA w Reflex to Microscopic w Reflex to Culture -Lab Collect; Future    Hematuria, unspecified type  -     CBC and differential; Future  -     Comprehensive metabolic panel; Future  -     Urine culture; Future  -     UA w Reflex to Microscopic w Reflex to Culture -Lab Collect; Future    Hypertension, unspecified type    Ulcerative proctitis with rectal bleeding (HCC)          Subjective:      Patient ID: Keshia Ardon is a 67 y o  female  Seen today with chief complaint of ankle edema present for about 1 week    She is now on therapy with Remicade and has had several effusions and will be switching to maintenance infusion  She has not noted shortness of breath with activity  She denies orthopnea or proximal nocturnal dyspnea  Appetite is stable  She has not noted any foamy urine  She is not taking any nonsteroidals  Only new med is timolol eyedrops for glaucoma  She was worried about underlying pathology  She denies any chest pain or pressure with activity  She is on amlodipine with her hypertension which can lead to edema  This patient denies any systemic symptoms  Specifically there has been no evidence of fever, night sweats, significant weight loss or significant decrease in appetite  Results for orders placed or performed in visit on 09/28/20  -POCT urine dip auto non-scope       Result                      Value             Ref Range            COLOR,UA                   yellow                                CLARITY,UA                  transparent                           SPECIFIC GRAVITY,UA         1 030                                  PH,UA                      6 0                                   LEUKOCYTE ESTERASE,UA       neg                                   NITRITE,UA                  neg                                   GLUCOSE, UA                 neg                                   KETONES,UA                  neg                                   BILIRUBIN,UA                neg                                   BLOOD,UA                    1+                                    POCT URINE PROTEIN          +-                                    SL AMB POCT UROBILINOG*     0 2      urine was did today and did not show any significant proteinuria although did show 1+ blood    Recent labs reviewed labs she had earlier this year showed no evidence of hypoalbuminemia  She denies any abrupt onset of shortness of breath at rest   She has known hypertension  BP adequately controlled on current regimen  She avoid salt and decongestants      She does feel since she started Remicade at her rectal bleeding has stopped  She has periodic evaluation by GI  We had a long discussion today regarding edema  We reviewed potential pathophysiology  This was a 25 minutes visit with more than 50% of the time spent counseling the patient formulating a treatment plan  Multiple questions were answered                                The following portions of the patient's history were reviewed and updated as appropriate: allergies, current medications, past family history, past medical history, past social history, past surgical history and problem list     Review of Systems   Constitutional: Negative  HENT: Negative  Respiratory: Negative  Cardiovascular: Positive for leg swelling  Gastrointestinal: Negative  Endocrine: Negative  Genitourinary: Negative  Musculoskeletal: Negative  Skin: Negative  Neurological: Negative  Hematological: Negative  Psychiatric/Behavioral: Negative  Objective: There were no vitals taken for this visit  Physical Exam  Vitals signs reviewed  Constitutional:       General: She is not in acute distress  Appearance: Normal appearance  She is not ill-appearing, toxic-appearing or diaphoretic  HENT:      Head: Normocephalic and atraumatic  Right Ear: Tympanic membrane, ear canal and external ear normal       Left Ear: Tympanic membrane, ear canal and external ear normal       Nose: Nose normal  No congestion or rhinorrhea  Mouth/Throat:      Mouth: Mucous membranes are moist       Pharynx: Oropharynx is clear  No oropharyngeal exudate or posterior oropharyngeal erythema  Eyes:      General: No scleral icterus  Right eye: No discharge  Left eye: No discharge  Extraocular Movements: Extraocular movements intact  Pupils: Pupils are equal, round, and reactive to light  Neck:      Musculoskeletal: Normal range of motion and neck supple   No neck rigidity or muscular tenderness  Vascular: No carotid bruit  Cardiovascular:      Rate and Rhythm: Normal rate and regular rhythm  Pulses: Normal pulses  Heart sounds: Normal heart sounds  No murmur  No friction rub  No gallop  Pulmonary:      Effort: Pulmonary effort is normal  No respiratory distress  Breath sounds: Normal breath sounds  No stridor  No wheezing, rhonchi or rales  Chest:      Chest wall: No tenderness  Abdominal:      General: Abdomen is flat  Bowel sounds are normal  There is no distension  Palpations: Abdomen is soft  There is no mass  Tenderness: There is no abdominal tenderness  There is no right CVA tenderness, left CVA tenderness, guarding or rebound  Hernia: No hernia is present  Musculoskeletal: Normal range of motion  General: No swelling, tenderness, deformity or signs of injury  Right lower leg: Edema present  Left lower leg: Edema present  Comments: Trace edema both ankles   Lymphadenopathy:      Cervical: No cervical adenopathy  Skin:     General: Skin is warm and dry  Coloration: Skin is not jaundiced or pale  Findings: No bruising, erythema, lesion or rash  Neurological:      General: No focal deficit present  Mental Status: She is alert and oriented to person, place, and time  Mental status is at baseline  Cranial Nerves: No cranial nerve deficit  Sensory: No sensory deficit  Motor: No weakness  Coordination: Coordination normal       Gait: Gait normal       Deep Tendon Reflexes: Reflexes normal    Psychiatric:         Mood and Affect: Mood normal          Behavior: Behavior normal          Thought Content:  Thought content normal          Judgment: Judgment normal

## 2020-09-29 ENCOUNTER — TELEPHONE (OUTPATIENT)
Dept: INTERNAL MEDICINE CLINIC | Facility: CLINIC | Age: 80
End: 2020-09-29

## 2020-09-29 LAB
BACTERIA UR CULT: NORMAL
HAV AB SER QL IA: REACTIVE
HAV IGM SER QL: NORMAL
HBV SURFACE AB SER-ACNC: <3.1 MIU/ML

## 2020-09-29 NOTE — TELEPHONE ENCOUNTER
----- Message from Urban Ruiz MD sent at 9/29/2020  1:03 PM EDT -----   I called and spoke with patient about her laboratory work -she is adjusting her meds but needs follow-up appointment for recheck of blood pressure-please bring her in on Wednesday November 4th for a follow-up appointment -there are multiple slots open that day

## 2020-10-01 ENCOUNTER — TELEMEDICINE (OUTPATIENT)
Dept: GASTROENTEROLOGY | Facility: CLINIC | Age: 80
End: 2020-10-01
Payer: MEDICARE

## 2020-10-01 VITALS — BODY MASS INDEX: 26.66 KG/M2 | HEIGHT: 65 IN | WEIGHT: 160 LBS

## 2020-10-01 DIAGNOSIS — K57.90 DIVERTICULOSIS: Chronic | ICD-10-CM

## 2020-10-01 DIAGNOSIS — Z00.00 HEALTHCARE MAINTENANCE: ICD-10-CM

## 2020-10-01 DIAGNOSIS — K51.311 ULCERATIVE RECTOSIGMOIDITIS WITH RECTAL BLEEDING (HCC): Primary | ICD-10-CM

## 2020-10-01 DIAGNOSIS — K21.9 GASTROESOPHAGEAL REFLUX DISEASE, UNSPECIFIED WHETHER ESOPHAGITIS PRESENT: Chronic | ICD-10-CM

## 2020-10-01 PROCEDURE — 99214 OFFICE O/P EST MOD 30 MIN: CPT | Performed by: INTERNAL MEDICINE

## 2020-10-12 ENCOUNTER — HOSPITAL ENCOUNTER (OUTPATIENT)
Dept: RADIOLOGY | Age: 80
Discharge: HOME | End: 2020-10-12
Payer: MEDICARE

## 2020-10-12 VITALS — WEIGHT: 159 LBS | HEIGHT: 65 IN | BODY MASS INDEX: 26.49 KG/M2

## 2020-10-12 DIAGNOSIS — Z12.31 ENCOUNTER FOR SCREENING MAMMOGRAM FOR MALIGNANT NEOPLASM OF BREAST: ICD-10-CM

## 2020-10-12 PROCEDURE — 77063 BREAST TOMOSYNTHESIS BI: CPT

## 2020-10-12 PROCEDURE — 77067 SCR MAMMO BI INCL CAD: CPT

## 2020-10-23 DIAGNOSIS — I65.23 BILATERAL CAROTID ARTERY STENOSIS: Primary | Chronic | ICD-10-CM

## 2020-10-26 ENCOUNTER — HOSPITAL ENCOUNTER (OUTPATIENT)
Dept: NON INVASIVE DIAGNOSTICS | Facility: CLINIC | Age: 80
Discharge: HOME | End: 2020-10-26
Payer: MEDICARE

## 2020-10-26 DIAGNOSIS — I65.23 BILATERAL CAROTID ARTERY STENOSIS: Chronic | ICD-10-CM

## 2020-10-26 PROCEDURE — 93880 EXTRACRANIAL BILAT STUDY: CPT | Performed by: SURGERY

## 2020-10-26 PROCEDURE — 93880 EXTRACRANIAL BILAT STUDY: CPT

## 2020-11-04 ENCOUNTER — OFFICE VISIT (OUTPATIENT)
Dept: INTERNAL MEDICINE CLINIC | Facility: CLINIC | Age: 80
End: 2020-11-04
Payer: MEDICARE

## 2020-11-04 ENCOUNTER — TELEPHONE (OUTPATIENT)
Dept: INTERNAL MEDICINE CLINIC | Facility: CLINIC | Age: 80
End: 2020-11-04

## 2020-11-04 VITALS
BODY MASS INDEX: 27.06 KG/M2 | HEIGHT: 65 IN | HEART RATE: 72 BPM | DIASTOLIC BLOOD PRESSURE: 78 MMHG | SYSTOLIC BLOOD PRESSURE: 130 MMHG | RESPIRATION RATE: 14 BRPM | WEIGHT: 162.4 LBS

## 2020-11-04 DIAGNOSIS — I65.23 BILATERAL CAROTID ARTERY STENOSIS: Chronic | ICD-10-CM

## 2020-11-04 DIAGNOSIS — R60.9 EDEMA, UNSPECIFIED TYPE: ICD-10-CM

## 2020-11-04 DIAGNOSIS — E21.3 HYPERPARATHYROIDISM (HCC): Chronic | ICD-10-CM

## 2020-11-04 DIAGNOSIS — E78.5 HYPERLIPIDEMIA, UNSPECIFIED HYPERLIPIDEMIA TYPE: Chronic | ICD-10-CM

## 2020-11-04 DIAGNOSIS — K51.311 ULCERATIVE RECTOSIGMOIDITIS WITH RECTAL BLEEDING (HCC): ICD-10-CM

## 2020-11-04 DIAGNOSIS — N20.0 NEPHROLITHIASIS: Primary | ICD-10-CM

## 2020-11-04 DIAGNOSIS — I10 HYPERTENSION, UNSPECIFIED TYPE: Chronic | ICD-10-CM

## 2020-11-04 PROCEDURE — 99214 OFFICE O/P EST MOD 30 MIN: CPT | Performed by: INTERNAL MEDICINE

## 2020-11-11 ENCOUNTER — LAB (OUTPATIENT)
Dept: LAB | Age: 80
End: 2020-11-11
Payer: MEDICARE

## 2020-11-11 DIAGNOSIS — K51.311 ULCERATIVE RECTOSIGMOIDITIS WITH RECTAL BLEEDING (HCC): ICD-10-CM

## 2020-11-11 LAB — CRP SERPL QL: <3 MG/L

## 2020-11-11 PROCEDURE — 36415 COLL VENOUS BLD VENIPUNCTURE: CPT

## 2020-11-11 PROCEDURE — 82397 CHEMILUMINESCENT ASSAY: CPT

## 2020-11-11 PROCEDURE — 86140 C-REACTIVE PROTEIN: CPT

## 2020-11-11 PROCEDURE — 80230 DRUG ASSAY INFLIXIMAB: CPT

## 2020-11-17 ENCOUNTER — HOSPITAL ENCOUNTER (OUTPATIENT)
Dept: RADIOLOGY | Age: 80
Discharge: HOME | End: 2020-11-17
Payer: MEDICARE

## 2020-11-17 DIAGNOSIS — N20.0 NEPHROLITHIASIS: ICD-10-CM

## 2020-11-17 PROCEDURE — 76770 US EXAM ABDO BACK WALL COMP: CPT

## 2020-11-18 ENCOUNTER — HOSPITAL ENCOUNTER (OUTPATIENT)
Dept: INFUSION CENTER | Facility: CLINIC | Age: 80
Discharge: HOME | End: 2020-11-18
Payer: MEDICARE

## 2020-11-18 VITALS
TEMPERATURE: 95.3 F | RESPIRATION RATE: 20 BRPM | HEART RATE: 54 BPM | SYSTOLIC BLOOD PRESSURE: 112 MMHG | BODY MASS INDEX: 27.55 KG/M2 | WEIGHT: 163 LBS | DIASTOLIC BLOOD PRESSURE: 55 MMHG

## 2020-11-18 DIAGNOSIS — K51.311 ULCERATIVE RECTOSIGMOIDITIS WITH RECTAL BLEEDING (HCC): Primary | ICD-10-CM

## 2020-11-18 PROCEDURE — 96415 CHEMO IV INFUSION ADDL HR: CPT

## 2020-11-18 PROCEDURE — 96375 TX/PRO/DX INJ NEW DRUG ADDON: CPT

## 2020-11-18 PROCEDURE — 96413 CHEMO IV INFUSION 1 HR: CPT

## 2020-11-18 RX ORDER — ACETAMINOPHEN 325 MG/1
650 TABLET ORAL ONCE
Status: CANCELLED | OUTPATIENT
Start: 2021-01-13

## 2020-11-18 RX ORDER — SODIUM CHLORIDE 9 MG/ML
20 INJECTION, SOLUTION INTRAVENOUS ONCE
Status: COMPLETED | OUTPATIENT
Start: 2020-11-18 | End: 2020-11-18

## 2020-11-18 RX ORDER — METHYLPREDNISOLONE SODIUM SUCCINATE 40 MG/ML
40 INJECTION, POWDER, LYOPHILIZED, FOR SOLUTION INTRAMUSCULAR; INTRAVENOUS ONCE
Status: CANCELLED | OUTPATIENT
Start: 2021-01-13

## 2020-11-18 RX ORDER — METHYLPREDNISOLONE SODIUM SUCCINATE 40 MG/ML
40 INJECTION, POWDER, LYOPHILIZED, FOR SOLUTION INTRAMUSCULAR; INTRAVENOUS ONCE
Status: COMPLETED | OUTPATIENT
Start: 2020-11-18 | End: 2020-11-18

## 2020-11-18 RX ORDER — ACETAMINOPHEN 325 MG/1
650 TABLET ORAL ONCE
Status: COMPLETED | OUTPATIENT
Start: 2020-11-18 | End: 2020-11-18

## 2020-11-18 RX ORDER — SODIUM CHLORIDE 9 MG/ML
20 INJECTION, SOLUTION INTRAVENOUS ONCE
Status: CANCELLED | OUTPATIENT
Start: 2021-01-13

## 2020-11-18 RX ADMIN — INFLIXIMAB 370 MG: 100 INJECTION, POWDER, LYOPHILIZED, FOR SOLUTION INTRAVENOUS at 13:56

## 2020-11-18 RX ADMIN — METHYLPREDNISOLONE SODIUM SUCCINATE 40 MG: 40 INJECTION, POWDER, FOR SOLUTION INTRAMUSCULAR; INTRAVENOUS at 13:18

## 2020-11-18 RX ADMIN — SODIUM CHLORIDE 20 ML/HR: 0.9 INJECTION, SOLUTION INTRAVENOUS at 13:09

## 2020-11-18 RX ADMIN — ACETAMINOPHEN 650 MG: 325 TABLET, FILM COATED ORAL at 13:13

## 2020-11-19 ENCOUNTER — TELEPHONE (OUTPATIENT)
Dept: INTERNAL MEDICINE CLINIC | Facility: CLINIC | Age: 80
End: 2020-11-19

## 2020-11-22 LAB
INFLIXIMAB AB SERPL-MCNC: 81 NG/ML
INFLIXIMAB SERPL-MCNC: 1.6 UG/ML

## 2020-11-24 DIAGNOSIS — K51.311 ULCERATIVE RECTOSIGMOIDITIS WITH RECTAL BLEEDING (HCC): Primary | ICD-10-CM

## 2020-11-24 RX ORDER — SODIUM CHLORIDE 9 MG/ML
20 INJECTION, SOLUTION INTRAVENOUS ONCE
Status: CANCELLED | OUTPATIENT
Start: 2021-01-13

## 2020-11-24 RX ORDER — ACETAMINOPHEN 325 MG/1
650 TABLET ORAL ONCE
Status: CANCELLED | OUTPATIENT
Start: 2021-01-13

## 2020-11-24 RX ORDER — METHYLPREDNISOLONE SODIUM SUCCINATE 40 MG/ML
40 INJECTION, POWDER, LYOPHILIZED, FOR SOLUTION INTRAMUSCULAR; INTRAVENOUS ONCE
Status: CANCELLED | OUTPATIENT
Start: 2021-01-13

## 2020-11-24 RX ORDER — DIPHENHYDRAMINE HCL 25 MG
25 TABLET ORAL ONCE
Status: CANCELLED | OUTPATIENT
Start: 2021-01-13

## 2021-01-13 ENCOUNTER — HOSPITAL ENCOUNTER (OUTPATIENT)
Dept: INFUSION CENTER | Facility: CLINIC | Age: 81
Discharge: HOME | End: 2021-01-13
Payer: MEDICARE

## 2021-01-13 VITALS
DIASTOLIC BLOOD PRESSURE: 62 MMHG | RESPIRATION RATE: 20 BRPM | SYSTOLIC BLOOD PRESSURE: 133 MMHG | WEIGHT: 158.29 LBS | BODY MASS INDEX: 26.75 KG/M2 | TEMPERATURE: 96.4 F | HEART RATE: 62 BPM

## 2021-01-13 DIAGNOSIS — K51.311 ULCERATIVE RECTOSIGMOIDITIS WITH RECTAL BLEEDING (HCC): Primary | ICD-10-CM

## 2021-01-13 PROCEDURE — 96375 TX/PRO/DX INJ NEW DRUG ADDON: CPT

## 2021-01-13 PROCEDURE — 96413 CHEMO IV INFUSION 1 HR: CPT

## 2021-01-13 PROCEDURE — 96415 CHEMO IV INFUSION ADDL HR: CPT

## 2021-01-13 RX ORDER — METHYLPREDNISOLONE SODIUM SUCCINATE 40 MG/ML
40 INJECTION, POWDER, LYOPHILIZED, FOR SOLUTION INTRAMUSCULAR; INTRAVENOUS ONCE
Status: CANCELLED | OUTPATIENT
Start: 2021-03-12

## 2021-01-13 RX ORDER — METHYLPREDNISOLONE SODIUM SUCCINATE 40 MG/ML
40 INJECTION, POWDER, LYOPHILIZED, FOR SOLUTION INTRAMUSCULAR; INTRAVENOUS ONCE
Status: COMPLETED | OUTPATIENT
Start: 2021-01-13 | End: 2021-01-13

## 2021-01-13 RX ORDER — SODIUM CHLORIDE 9 MG/ML
20 INJECTION, SOLUTION INTRAVENOUS ONCE
Status: COMPLETED | OUTPATIENT
Start: 2021-01-13 | End: 2021-01-13

## 2021-01-13 RX ORDER — ACETAMINOPHEN 325 MG/1
650 TABLET ORAL ONCE
Status: CANCELLED | OUTPATIENT
Start: 2021-03-12

## 2021-01-13 RX ORDER — SODIUM CHLORIDE 9 MG/ML
20 INJECTION, SOLUTION INTRAVENOUS ONCE
Status: CANCELLED | OUTPATIENT
Start: 2021-03-12

## 2021-01-13 RX ORDER — ACETAMINOPHEN 325 MG/1
650 TABLET ORAL ONCE
Status: COMPLETED | OUTPATIENT
Start: 2021-01-13 | End: 2021-01-13

## 2021-01-13 RX ORDER — DIPHENHYDRAMINE HCL 25 MG
25 TABLET ORAL ONCE
Status: COMPLETED | OUTPATIENT
Start: 2021-01-13 | End: 2021-01-13

## 2021-01-13 RX ORDER — DIPHENHYDRAMINE HCL 25 MG
25 TABLET ORAL ONCE
Status: CANCELLED | OUTPATIENT
Start: 2021-03-12

## 2021-01-13 RX ADMIN — INFLIXIMAB 700 MG: 100 INJECTION, POWDER, LYOPHILIZED, FOR SOLUTION INTRAVENOUS at 13:48

## 2021-01-13 RX ADMIN — METHYLPREDNISOLONE SODIUM SUCCINATE 40 MG: 40 INJECTION, POWDER, FOR SOLUTION INTRAMUSCULAR; INTRAVENOUS at 12:50

## 2021-01-13 RX ADMIN — SODIUM CHLORIDE 20 ML/HR: 0.9 INJECTION, SOLUTION INTRAVENOUS at 12:47

## 2021-01-13 RX ADMIN — DIPHENHYDRAMINE HCL 25 MG: 25 TABLET, COATED ORAL at 12:49

## 2021-01-13 RX ADMIN — ACETAMINOPHEN 650 MG: 325 TABLET, FILM COATED ORAL at 12:49

## 2021-01-13 NOTE — PATIENT INSTRUCTIONS
January 2021 Sunday Monday Tuesday Wednesday Thursday Friday Saturday                            1     2       3     4     5     6     7     8     9       10     11     12     13    INF THERAPY PLAN  12:30 PM   (240 min )   [facility] [address] [address]     86     71       50     43     60     36     90     89     12       24     25     26     27     28     29     30       31                                               February 2021 Sunday Monday Tuesday Wednesday Thursday Friday Saturday        1     2     3     4     5     6       7     8    VIRTUAL VISIT PG   2:45 PM   (20 min )   Sarabjit Floyd [facility] [address]     10     11     12     13       14     15     16     17     18     19     20       21     22     23     24     25     26     27       28

## 2021-01-13 NOTE — PROGRESS NOTES
Patient here for remicade and is doing well, no c/o offered, no changes reported, denies pain and recent illness with use of antibiotic

## 2021-01-13 NOTE — PROGRESS NOTES
Patient tolerated treatment without incident and was discharged post   She offers no c/o and is due to RTO in 8 weeks for same  Patient interested in [place] vaccination  Advised her to discuss with Dr Shiva Wade regarding this and she verbalized understanding

## 2021-01-15 ENCOUNTER — IMMUNIZATIONS (OUTPATIENT)
Dept: FAMILY MEDICINE CLINIC | Facility: HOSPITAL | Age: 81
End: 2021-01-15

## 2021-01-15 DIAGNOSIS — Z23 ENCOUNTER FOR IMMUNIZATION: Primary | ICD-10-CM

## 2021-01-15 PROCEDURE — 91300 SARS-COV-2 / COVID-19 MRNA VACCINE (PFIZER-BIONTECH) 30 MCG: CPT

## 2021-01-15 PROCEDURE — 0001A SARS-COV-2 / COVID-19 MRNA VACCINE (PFIZER-BIONTECH) 30 MCG: CPT

## 2021-02-04 ENCOUNTER — IMMUNIZATIONS (OUTPATIENT)
Dept: FAMILY MEDICINE CLINIC | Facility: HOSPITAL | Age: 81
End: 2021-02-04

## 2021-02-04 DIAGNOSIS — Z23 ENCOUNTER FOR IMMUNIZATION: Primary | ICD-10-CM

## 2021-02-04 PROCEDURE — 0002A SARS-COV-2 / COVID-19 MRNA VACCINE (PFIZER-BIONTECH) 30 MCG: CPT

## 2021-02-04 PROCEDURE — 91300 SARS-COV-2 / COVID-19 MRNA VACCINE (PFIZER-BIONTECH) 30 MCG: CPT

## 2021-02-09 ENCOUNTER — TELEMEDICINE (OUTPATIENT)
Dept: GASTROENTEROLOGY | Facility: CLINIC | Age: 81
End: 2021-02-09
Payer: MEDICARE

## 2021-02-09 VITALS — BODY MASS INDEX: 26.33 KG/M2 | WEIGHT: 158 LBS | HEIGHT: 65 IN

## 2021-02-09 DIAGNOSIS — Z71.89 ADVICE GIVEN ABOUT COVID-19 VIRUS BY TELEPHONE: ICD-10-CM

## 2021-02-09 DIAGNOSIS — K51.311 ULCERATIVE RECTOSIGMOIDITIS WITH RECTAL BLEEDING (HCC): Primary | ICD-10-CM

## 2021-02-09 DIAGNOSIS — Z79.899 LONG-TERM USE OF IMMUNOSUPPRESSANT MEDICATION: ICD-10-CM

## 2021-02-09 DIAGNOSIS — K21.9 GASTROESOPHAGEAL REFLUX DISEASE WITHOUT ESOPHAGITIS: Chronic | ICD-10-CM

## 2021-02-09 DIAGNOSIS — K57.90 DIVERTICULOSIS: Chronic | ICD-10-CM

## 2021-02-09 DIAGNOSIS — Z11.59 ENCOUNTER FOR SCREENING FOR OTHER VIRAL DISEASES: ICD-10-CM

## 2021-02-09 PROBLEM — Z79.60 LONG-TERM USE OF IMMUNOSUPPRESSANT MEDICATION: Status: ACTIVE | Noted: 2021-02-09

## 2021-02-09 PROCEDURE — 99213 OFFICE O/P EST LOW 20 MIN: CPT | Performed by: INTERNAL MEDICINE

## 2021-02-09 NOTE — PROGRESS NOTES
Virtual Brief Visit    Assessment/Plan:    Problem List Items Addressed This Visit        Digestive    Diverticulosis (Chronic)    Esophageal reflux (Chronic)    Ulcerative rectosigmoiditis with rectal bleeding ([facility] [address] ) - Primary       Other    Advice given about COVID-19 virus by telephone    Long-term use of immunosuppressant medication      79year old female with      1  Ulcerative Colitis  -failed entyvio; now responded to remicade, now on remicade maintenance doses  -will update labs; check drug and Ab levels after two more doses    2  Pt already got covid vaccine  -she is Hep A immune  -got Hep B vaccine series and will recheck Hep B Surface Ab to ensure immunity        Reason for visit is   Chief Complaint   Patient presents with    Virtual Regular Visit    Virtual Brief Visit      Encounter provider Frankey Spillers, DO    Provider located at [address] [facility] [address]  [place] [address] [place] [address]  [phone]    Recent Visits  No visits were found meeting these conditions  Showing recent visits within past 7 days and meeting all other requirements     Today's Visits  Date Type Provider Dept   02/09/21 [id number] [facility], DO [facility]   Showing today's visits and meeting all other requirements     Future Appointments  No visits were found meeting these conditions  Showing future appointments within next 150 days and meeting all other requirements      After connecting through telephone, the patient was identified by name and date of birth  Kae Serge was informed that this is a telemedicine visit and that the visit is being conducted through telephone  My office door was closed  No one else was in the room  She acknowledged consent and understanding of privacy and security of the platform   The patient has agreed to participate and understands she can discontinue the visit at any time     Patient is aware this is a billable service  Jayson Bañuelos is a 79 y o  female here for follow up of UC, GERD, and diverticulosis  She is now on remicade and is now on maintenace dosing of remicade  Is doing well  Just before the last infusion she saw a pink tinged stool  She feels well without diarrhea  She just got the second dose of the Covid vaccine  She did have some arm pain and soreness with the first arm  Past Medical History:   Diagnosis Date    Atherosclerosis     Carotid artery narrowing     Coronary artery disease     Diverticulosis     GERD (gastroesophageal reflux disease)     Glaucoma     Hypercalcemia     Hyperlipidemia     Hyperparathyroidism ([facility] [address] )     Hypertension     Osteopenia     Severe cervical dysplasia, histologically confirmed     Skin cancer     squamous cell    [facility]     Thyroid nodule     Vitamin D deficiency      Past Surgical History:   Procedure Laterality Date    APPENDECTOMY      BREAST SURGERY      reduction procedure    CAROTID ENDARTARECTOMY Right     carotid thromboendarterectomy     COLONOSCOPY      EXPLORATION CAROTID ARTERY      HYSTERECTOMY      age 55    OOPHORECTOMY Bilateral     age 55    IN COLONOSCOPY FLX DX W/COLLJ SPEC WHEN PFRMD N/A 7/28/2017    Procedure: EGD AND COLONOSCOPY;  Surgeon: Colton Wisdom MD;  [facility];   Service: Colorectal    REDUCTION MAMMAPLASTY Bilateral 1987     Current Outpatient Medications   Medication Sig Dispense Refill    AMLODIPINE BESYLATE PO Take 2 5 mg by mouth daily       aspirin 81 MG tablet Take 81 mg by mouth daily      atenolol (TENORMIN) 25 mg tablet Take 25 mg by mouth 2 (two) times a day      bimatoprost (LUMIGAN) 0 01 % ophthalmic drops Administer 1 drop to both eyes daily at bedtime      brinzolamide (AZOPT) 1 % ophthalmic suspension 1 drop 2 (two) times a day      Cholecalciferol (VITAMIN D3 PO) Take 2,000 Units by mouth 2 (two) times a day      diphenoxylate-atropine (LOMOTIL) 2 5-0 025 mg per tablet TAKE 1 TO 2 TABS FOUR TIMES A DAY  4    hydrochlorothiazide (HYDRODIURIL) 25 mg tablet Take 12 5 mg by mouth daily        irbesartan (AVAPRO) 300 mg tablet Take 300 mg by mouth daily at bedtime      omega-3-acid ethyl esters (LOVAZA) 1 g capsule Take 2 g by mouth 2 (two) times a day      predniSONE 10 mg tablet       rosuvastatin (CRESTOR) 5 mg tablet Take 7 5 mg by mouth daily      timolol (TIMOPTIC) 0 5 % ophthalmic solution       triamcinolone (KENALOG) 0 1 % ointment APPLY TO AFFECTED AREA(S) TWO TIMES DAILY FOR 2 TO 3 WEEKS  3     No current facility-administered medications for this visit  No Known Allergies    REVIEW OF SYSTEMS:    CONSTITUTIONAL: Denies any fever, chills, rigors, and weight loss  HEENT: No earache or tinnitus  Denies hearing loss or visual disturbances  CARDIOVASCULAR: No chest pain or palpitations  RESPIRATORY: Denies any cough, hemoptysis, shortness of breath or dyspnea on exertion  GASTROINTESTINAL: As noted in the History of Present Illness  GENITOURINARY: No problems with urination  Denies any hematuria or dysuria  NEUROLOGIC: No dizziness or vertigo, denies headaches  MUSCULOSKELETAL: Denies any muscle or joint pain  SKIN: Denies skin rashes or itching  ENDOCRINE: Denies excessive thirst  Denies intolerance to heat or cold  PSYCHOSOCIAL: Denies depression or anxiety  Denies any recent memory loss  PHYSICAL EXAMINATION:    General Appearance:   Alert, cooperative, no distress   HEENT:  Normocephalic, atraumatic, anicteric  Lungs:   Equal chest rise and unlabored breathing, normal effort, no coughing  Cardiovascular:   No visualized JVD  Abdomen:   No abdominal distension  Skin:   No jaundice  Musculoskeletal:   Normal range of motion visualized  Psych:  Normal affect and normal insight  Neuro:  Alert and appropriate       Vitals:    02/09/21 1032   Weight: 71 7 kg (158 lb)   Height: 5' 4 5" (1 638 m)     I spent 12 minutes directly with the patient during this visit    [address] acknowledges that she has consented to an online visit or consultation  She understands that the online visit is based solely on information provided by her, and that, in the absence of a face-to-face physical evaluation by the physician, the diagnosis she receives is both limited and provisional in terms of accuracy and completeness  This is not intended to replace a full medical face-to-face evaluation by the physician  Fabian Child understands and accepts these terms

## 2021-02-11 ENCOUNTER — TELEPHONE (OUTPATIENT)
Dept: GASTROENTEROLOGY | Facility: CLINIC | Age: 81
End: 2021-02-11

## 2021-02-11 NOTE — TELEPHONE ENCOUNTER
----- Message from Muna Carpenter DO sent at 2/11/2021 10:14 AM EST -----  Please send pt tiffanie schwarz  She also needs [facility] center appt for her next remicade 8 weeks after 1-13-21 at the 700mg dose of remicade    She needs to get labs as ordered anytime after April 30th and before her next infusion after that which will probably be around may 5th  Please call pt to update her      Thanks so much,  Karl Vasquez

## 2021-02-11 NOTE — TELEPHONE ENCOUNTER
I called and spoke with Jaleel Wolf made appt 3-12-21 12:30 pm    I called and spoke with Samuel Catalan she is aware of date and time, I will mail out Guthrie jayant reminder for appt  Went over when to get labs

## 2021-03-01 ENCOUNTER — LAB (OUTPATIENT)
Dept: LAB | Age: 81
End: 2021-03-01
Payer: MEDICARE

## 2021-03-01 DIAGNOSIS — I10 HYPERTENSION, UNSPECIFIED TYPE: Chronic | ICD-10-CM

## 2021-03-01 DIAGNOSIS — N20.0 NEPHROLITHIASIS: ICD-10-CM

## 2021-03-01 DIAGNOSIS — E78.5 HYPERLIPIDEMIA, UNSPECIFIED HYPERLIPIDEMIA TYPE: Chronic | ICD-10-CM

## 2021-03-01 DIAGNOSIS — I65.23 BILATERAL CAROTID ARTERY STENOSIS: Chronic | ICD-10-CM

## 2021-03-01 DIAGNOSIS — E21.3 HYPERPARATHYROIDISM (HCC): Chronic | ICD-10-CM

## 2021-03-01 LAB
25(OH)D3 SERPL-MCNC: 45.2 NG/ML (ref 30–100)
ALBUMIN SERPL BCP-MCNC: 3.3 G/DL (ref 3.5–5)
ALP SERPL-CCNC: 88 U/L (ref 46–116)
ALT SERPL W P-5'-P-CCNC: 14 U/L (ref 12–78)
ANION GAP SERPL CALCULATED.3IONS-SCNC: 5 MMOL/L (ref 4–13)
AST SERPL W P-5'-P-CCNC: 9 U/L (ref 5–45)
BASOPHILS # BLD AUTO: 0.06 THOUSANDS/ΜL (ref 0–0.1)
BASOPHILS NFR BLD AUTO: 1 % (ref 0–1)
BILIRUB SERPL-MCNC: 0.54 MG/DL (ref 0.2–1)
BUN SERPL-MCNC: 14 MG/DL (ref 5–25)
CALCIUM ALBUM COR SERPL-MCNC: 10.9 MG/DL (ref 8.3–10.1)
CALCIUM SERPL-MCNC: 10.3 MG/DL (ref 8.3–10.1)
CHLORIDE SERPL-SCNC: 107 MMOL/L (ref 100–108)
CHOLEST SERPL-MCNC: 105 MG/DL (ref 50–200)
CO2 SERPL-SCNC: 27 MMOL/L (ref 21–32)
CREAT SERPL-MCNC: 0.82 MG/DL (ref 0.6–1.3)
CRP SERPL QL: 10.2 MG/L
EOSINOPHIL # BLD AUTO: 0.22 THOUSAND/ΜL (ref 0–0.61)
EOSINOPHIL NFR BLD AUTO: 4 % (ref 0–6)
ERYTHROCYTE [DISTWIDTH] IN BLOOD BY AUTOMATED COUNT: 13.9 % (ref 11.6–15.1)
ERYTHROCYTE [SEDIMENTATION RATE] IN BLOOD: 43 MM/HOUR (ref 0–29)
GFR SERPL CREATININE-BSD FRML MDRD: 68 ML/MIN/1.73SQ M
GLUCOSE P FAST SERPL-MCNC: 93 MG/DL (ref 65–99)
HCT VFR BLD AUTO: 40 % (ref 34.8–46.1)
HDLC SERPL-MCNC: 43 MG/DL
HGB BLD-MCNC: 13 G/DL (ref 11.5–15.4)
IMM GRANULOCYTES # BLD AUTO: 0.02 THOUSAND/UL (ref 0–0.2)
IMM GRANULOCYTES NFR BLD AUTO: 0 % (ref 0–2)
LDLC SERPL DIRECT ASSAY-MCNC: 49 MG/DL (ref 0–100)
LYMPHOCYTES # BLD AUTO: 1.13 THOUSANDS/ΜL (ref 0.6–4.47)
LYMPHOCYTES NFR BLD AUTO: 19 % (ref 14–44)
MCH RBC QN AUTO: 29.7 PG (ref 26.8–34.3)
MCHC RBC AUTO-ENTMCNC: 32.5 G/DL (ref 31.4–37.4)
MCV RBC AUTO: 92 FL (ref 82–98)
MONOCYTES # BLD AUTO: 0.84 THOUSAND/ΜL (ref 0.17–1.22)
MONOCYTES NFR BLD AUTO: 14 % (ref 4–12)
NEUTROPHILS # BLD AUTO: 3.75 THOUSANDS/ΜL (ref 1.85–7.62)
NEUTS SEG NFR BLD AUTO: 62 % (ref 43–75)
NRBC BLD AUTO-RTO: 0 /100 WBCS
PLATELET # BLD AUTO: 252 THOUSANDS/UL (ref 149–390)
PMV BLD AUTO: 9.4 FL (ref 8.9–12.7)
POTASSIUM SERPL-SCNC: 3.6 MMOL/L (ref 3.5–5.3)
PROT SERPL-MCNC: 7.2 G/DL (ref 6.4–8.2)
PTH-INTACT SERPL-MCNC: 184.5 PG/ML (ref 18.4–80.1)
RBC # BLD AUTO: 4.37 MILLION/UL (ref 3.81–5.12)
SODIUM SERPL-SCNC: 139 MMOL/L (ref 136–145)
TRIGL SERPL-MCNC: 95 MG/DL
WBC # BLD AUTO: 6.02 THOUSAND/UL (ref 4.31–10.16)

## 2021-03-01 PROCEDURE — 80061 LIPID PANEL: CPT

## 2021-03-01 PROCEDURE — 85025 COMPLETE CBC W/AUTO DIFF WBC: CPT

## 2021-03-01 PROCEDURE — 36415 COLL VENOUS BLD VENIPUNCTURE: CPT

## 2021-03-01 PROCEDURE — 82306 VITAMIN D 25 HYDROXY: CPT

## 2021-03-01 PROCEDURE — 83721 ASSAY OF BLOOD LIPOPROTEIN: CPT

## 2021-03-01 PROCEDURE — 80053 COMPREHEN METABOLIC PANEL: CPT

## 2021-03-01 PROCEDURE — 85652 RBC SED RATE AUTOMATED: CPT

## 2021-03-01 PROCEDURE — 86140 C-REACTIVE PROTEIN: CPT

## 2021-03-01 PROCEDURE — 83970 ASSAY OF PARATHORMONE: CPT

## 2021-03-08 ENCOUNTER — TELEPHONE (OUTPATIENT)
Dept: GASTROENTEROLOGY | Facility: AMBULARY SURGERY CENTER | Age: 81
End: 2021-03-08

## 2021-03-08 DIAGNOSIS — K51.311 ULCERATIVE RECTOSIGMOIDITIS WITH RECTAL BLEEDING (HCC): Primary | ICD-10-CM

## 2021-03-08 NOTE — TELEPHONE ENCOUNTER
Called patient and made aware to have c diff checked, I let her know depending on the results we may need to treat with steroids, she verbalized understanding

## 2021-03-08 NOTE — TELEPHONE ENCOUNTER
Patient of Dr Denise Rosenbaum, last seen 2/9/21    History of UC, esophageal reflux, diverticulosis    Called and spoke with patient  Over the last two weeks, she has had worsening symptoms of diarrhea sometimes with blood  She states that this has gradually been worsening, and of the past few days, she is having 10+ bowel movements per day that are bloody, sometimes dark maroon in color and other times bright red  She notes she is under a very large amount of stress right now because her  is hospitalized  She has been taking lomitil to help her symptoms and admits to taking 2 pills 2-3 times per day, and with that she is still having around 10 bowel movements per day  She denies any abdominal pain, feelings of weakness, dizziness, lightheadedness  Patient wanted to know if we recommend continuing with her remicade infusion on Friday  Please advise  Stool studies?

## 2021-03-08 NOTE — TELEPHONE ENCOUNTER
Patients GI provider:  Dr Betancourt Signs    Number to return call: [phone]    Reason for call: Pt calling because she is suppose to have an infusion on Friday but has been having dark/bloody stools and is a bit concerned     Scheduled procedure/appointment date if applicable: 6/12

## 2021-03-09 ENCOUNTER — TRANSCRIBE ORDERS (OUTPATIENT)
Dept: ADMINISTRATIVE | Age: 81
End: 2021-03-09

## 2021-03-10 ENCOUNTER — APPOINTMENT (OUTPATIENT)
Dept: LAB | Age: 81
End: 2021-03-10
Payer: MEDICARE

## 2021-03-10 ENCOUNTER — OFFICE VISIT (OUTPATIENT)
Dept: INTERNAL MEDICINE CLINIC | Facility: CLINIC | Age: 81
End: 2021-03-10
Payer: MEDICARE

## 2021-03-10 VITALS
RESPIRATION RATE: 14 BRPM | WEIGHT: 156.2 LBS | HEIGHT: 65 IN | SYSTOLIC BLOOD PRESSURE: 130 MMHG | TEMPERATURE: 96.9 F | DIASTOLIC BLOOD PRESSURE: 80 MMHG | HEART RATE: 72 BPM | BODY MASS INDEX: 26.02 KG/M2

## 2021-03-10 DIAGNOSIS — N95.9 UNSPECIFIED MENOPAUSAL AND PERIMENOPAUSAL DISORDER: Primary | ICD-10-CM

## 2021-03-10 DIAGNOSIS — I65.23 BILATERAL CAROTID ARTERY STENOSIS: Chronic | ICD-10-CM

## 2021-03-10 DIAGNOSIS — E55.9 VITAMIN D DEFICIENCY: ICD-10-CM

## 2021-03-10 DIAGNOSIS — K51.311 ULCERATIVE RECTOSIGMOIDITIS WITH RECTAL BLEEDING (HCC): ICD-10-CM

## 2021-03-10 DIAGNOSIS — E21.3 HYPERPARATHYROIDISM (HCC): Chronic | ICD-10-CM

## 2021-03-10 DIAGNOSIS — I10 HYPERTENSION, UNSPECIFIED TYPE: Chronic | ICD-10-CM

## 2021-03-10 DIAGNOSIS — E78.5 HYPERLIPIDEMIA, UNSPECIFIED HYPERLIPIDEMIA TYPE: Chronic | ICD-10-CM

## 2021-03-10 PROCEDURE — 99213 OFFICE O/P EST LOW 20 MIN: CPT | Performed by: INTERNAL MEDICINE

## 2021-03-10 PROCEDURE — 87493 C DIFF AMPLIFIED PROBE: CPT

## 2021-03-10 PROCEDURE — G0439 PPPS, SUBSEQ VISIT: HCPCS | Performed by: INTERNAL MEDICINE

## 2021-03-10 PROCEDURE — 1123F ACP DISCUSS/DSCN MKR DOCD: CPT | Performed by: INTERNAL MEDICINE

## 2021-03-10 NOTE — PATIENT INSTRUCTIONS
Medicare Preventive Visit Patient Instructions  Thank you for completing your Welcome to Medicare Visit or Medicare Annual Wellness Visit today  Your next wellness visit will be due in one year (3/11/2022)  The screening/preventive services that you may require over the next 5-10 years are detailed below  Some tests may not apply to you based off risk factors and/or age  Screening tests ordered at today's visit but not completed yet may show as past due  Also, please note that scanned in results may not display below  Preventive Screenings:  Service Recommendations Previous Testing/Comments   Colorectal Cancer Screening  * Colonoscopy    * Fecal Occult Blood Test (FOBT)/Fecal Immunochemical Test (FIT)  * Fecal DNA/Cologuard Test  * Flexible Sigmoidoscopy Age: 54-65 years old   Colonoscopy: every 10 years (may be performed more frequently if at higher risk)  OR  FOBT/FIT: every 1 year  OR  Cologuard: every 3 years  OR  Sigmoidoscopy: every 5 years  Screening may be recommended earlier than age 48 if at higher risk for colorectal cancer  Also, an individualized decision between you and your healthcare provider will decide whether screening between the ages of 74-80 would be appropriate  Colonoscopy: 08/16/2019  FOBT/FIT: Not on file  Cologuard: Not on file  Sigmoidoscopy: Not on file    Risks and Benefits Discussed  Screening Current     Breast Cancer Screening Age: 36 years old  Frequency: every 1-2 years  Not required if history of left and right mastectomy Mammogram: 10/12/2020    Screening Current   Cervical Cancer Screening Between the ages of 21-29, pap smear recommended once every 3 years  Between the ages of 33-67, can perform pap smear with HPV co-testing every 5 years     Recommendations may differ for women with a history of total hysterectomy, cervical cancer, or abnormal pap smears in past  Pap Smear: 04/06/2016    Screening Not Indicated   Hepatitis C Screening Once for adults born between 1945 and 1965  More frequently in patients at high risk for Hepatitis C Hep C Antibody: Not on file    Screening Current   Diabetes Screening 1-2 times per year if you're at risk for diabetes or have pre-diabetes Fasting glucose: 93 mg/dL   A1C: No results in last 5 years    Screening Current   Cholesterol Screening Once every 5 years if you don't have a lipid disorder  May order more often based on risk factors  Lipid panel: 01/31/2018    Screening Not Indicated  History Lipid Disorder     Other Preventive Screenings Covered by Medicare:  1  Abdominal Aortic Aneurysm (AAA) Screening: covered once if your at risk  You're considered to be at risk if you have a family history of AAA  2  Lung Cancer Screening: covers low dose CT scan once per year if you meet all of the following conditions: (1) Age 50-69; (2) No signs or symptoms of lung cancer; (3) Current smoker or have quit smoking within the last 15 years; (4) You have a tobacco smoking history of at least 30 pack years (packs per day multiplied by number of years you smoked); (5) You get a written order from a healthcare provider  3  Glaucoma Screening: covered annually if you're considered high risk: (1) You have diabetes OR (2) Family history of glaucoma OR (3)  aged 48 and older OR (3)  American aged 72 and older  3  Osteoporosis Screening: covered every 2 years if you meet one of the following conditions: (1) You're estrogen deficient and at risk for osteoporosis based off medical history and other findings; (2) Have a vertebral abnormality; (3) On glucocorticoid therapy for more than 3 months; (4) Have primary hyperparathyroidism; (5) On osteoporosis medications and need to assess response to drug therapy  · Last bone density test (DXA Scan): 02/13/2019   5  HIV Screening: covered annually if you're between the age of 15-65  Also covered annually if you are younger than 13 and older than 72 with risk factors for HIV infection   For pregnant patients, it is covered up to 3 times per pregnancy  Immunizations:  Immunization Recommendations   Influenza Vaccine Annual influenza vaccination during flu season is recommended for all persons aged >= 6 months who do not have contraindications   Pneumococcal Vaccine (Prevnar and Pneumovax)  * Prevnar = PCV13  * Pneumovax = PPSV23   Adults 25-60 years old: 1-3 doses may be recommended based on certain risk factors  Adults 72 years old: Prevnar (PCV13) vaccine recommended followed by Pneumovax (PPSV23) vaccine  If already received PPSV23 since turning 65, then PCV13 recommended at least one year after PPSV23 dose  Hepatitis B Vaccine 3 dose series if at intermediate or high risk (ex: diabetes, end stage renal disease, liver disease)   Tetanus (Td) Vaccine - COST NOT COVERED BY MEDICARE PART B Following completion of primary series, a booster dose should be given every 10 years to maintain immunity against tetanus  Td may also be given as tetanus wound prophylaxis  Tdap Vaccine - COST NOT COVERED BY MEDICARE PART B Recommended at least once for all adults  For pregnant patients, recommended with each pregnancy  Shingles Vaccine (Shingrix) - COST NOT COVERED BY MEDICARE PART B  2 shot series recommended in those aged 48 and above     Health Maintenance Due:  There are no preventive care reminders to display for this patient  Immunizations Due:      Topic Date Due    DTaP,Tdap,and Td Vaccines (1 - Tdap) 12/09/1961     Advance Directives   What are advance directives? Advance directives are legal documents that state your wishes and plans for medical care  These plans are made ahead of time in case you lose your ability to make decisions for yourself  Advance directives can apply to any medical decision, such as the treatments you want, and if you want to donate organs  What are the types of advance directives? There are many types of advance directives, and each state has rules about how to use them  You may choose a combination of any of the following:  · Living will: This is a written record of the treatment you want  You can also choose which treatments you do not want, which to limit, and which to stop at a certain time  This includes surgery, medicine, IV fluid, and tube feedings  · Durable power of  for healthcare [place] [facility]): This is a written record that states who you want to make healthcare choices for you when you are unable to make them for yourself  This person, called a proxy, is usually a family member or a friend  You may choose more than 1 proxy  · Do not resuscitate (DNR) order:  A DNR order is used in case your heart stops beating or you stop breathing  It is a request not to have certain forms of treatment, such as CPR  A DNR order may be included in other types of advance directives  · Medical directive: This covers the care that you want if you are in a coma, near death, or unable to make decisions for yourself  You can list the treatments you want for each condition  Treatment may include pain medicine, surgery, blood transfusions, dialysis, IV or tube feedings, and a ventilator (breathing machine)  · Values history: This document has questions about your views, beliefs, and how you feel and think about life  This information can help others choose the care that you would choose  Why are advance directives important? An advance directive helps you control your care  Although spoken wishes may be used, it is better to have your wishes written down  Spoken wishes can be misunderstood, or not followed  Treatments may be given even if you do not want them  An advance directive may make it easier for your family to make difficult choices about your care  Weight Management   Why it is important to manage your weight:  Being overweight increases your risk of health conditions such as heart disease, high blood pressure, type 2 diabetes, and certain types of cancer   It can also increase your risk for osteoarthritis, sleep apnea, and other respiratory problems  Aim for a slow, steady weight loss  Even a small amount of weight loss can lower your risk of health problems  How to lose weight safely:  A safe and healthy way to lose weight is to eat fewer calories and get regular exercise  You can lose up about 1 pound a week by decreasing the number of calories you eat by 500 calories each day  Healthy meal plan for weight management:  A healthy meal plan includes a variety of foods, contains fewer calories, and helps you stay healthy  A healthy meal plan includes the following:  · Eat whole-grain foods more often  A healthy meal plan should contain fiber  Fiber is the part of grains, fruits, and vegetables that is not broken down by your body  Whole-grain foods are healthy and provide extra fiber in your diet  Some examples of whole-grain foods are whole-wheat breads and pastas, oatmeal, brown rice, and bulgur  · Eat a variety of vegetables every day  Include dark, leafy greens such as spinach, kale, kendal greens, and mustard greens  Eat yellow and orange vegetables such as carrots, sweet potatoes, and winter squash  · Eat a variety of fruits every day  Choose fresh or canned fruit (canned in its own juice or light syrup) instead of juice  Fruit juice has very little or no fiber  · Eat low-fat dairy foods  Drink fat-free (skim) milk or 1% milk  Eat fat-free yogurt and low-fat cottage cheese  Try low-fat cheeses such as mozzarella and other reduced-fat cheeses  · Choose meat and other protein foods that are low in fat  Choose beans or other legumes such as split peas or lentils  Choose fish, skinless poultry (chicken or turkey), or lean cuts of red meat (beef or pork)  Before you cook meat or poultry, cut off any visible fat  · Use less fat and oil  Try baking foods instead of frying them   Add less fat, such as margarine, sour cream, regular salad dressing and mayonnaise to foods  Eat fewer high-fat foods  Some examples of high-fat foods include french fries, doughnuts, ice cream, and cakes  · Eat fewer sweets  Limit foods and drinks that are high in sugar  This includes candy, cookies, regular soda, and sweetened drinks  Exercise:  Exercise at least 30 minutes per day on most days of the week  Some examples of exercise include walking, biking, dancing, and swimming  You can also fit in more physical activity by taking the stairs instead of the elevator or parking farther away from stores  Ask your healthcare provider about the best exercise plan for you  © Copyright [employer] 2018 Information is for End User's use only and may not be sold, redistributed or otherwise used for commercial purposes   All illustrations and images included in CareNotes® are the copyrighted property of A D A M , Inc  or [address] [place], [place] [address]pa[facility]

## 2021-03-10 NOTE — PROGRESS NOTES
Assessment/Plan:   1  Health maintenance - patient completed COVID vaccine  2  Vitamin-D deficiency-continue supplement   3  Hyperparathyroidism-primary- has borderline hypercalcemia  Prior scan showed inferior parathyroid adenoma  She declined surgery  Endocrine felt she could be monitored  No evidence of nephrolithiasis and ultrasound of the kidneys done in November 2020 shows no evidence of nephrolithiasis  Most recent DEXA scan done in February 2019 shows false-positive normal lumbar spine and hip of -2 1-similar to prior a DEXA  PTH level remains elevated but about the same over the last 3 years with most recent value of 184 5  She was scheduled for repeat bone density study  She will have a repeat vitamin-D level prior to her next visit with a repeat PTH level  4  Inflammatory bowel disease-ulcerative colitis  Failed on Entyvio  A now on maintenance therapy with Remicade although level was low and dose being increased to 10 milligrams/kilogram   She had some recent worsening symptoms in GI physician ordered stool for Clostridium difficile  And told she may need prednisone-await results of additional evaluation  5  Edema-felt related to her meds  Amlodipine plus peripheral venous disease plus borderline albumin  Improved on lower dose of amlodipine  6  Hypertension -stable on current regimen of Avapro 300 milligrams daily atenolol 25 milligrams b i d , amlodipine 2 5 milligrams daily and hydrochlorothiazide 12 5 milligrams daily  7  General care -vaccinations to prepare for Remicade  Hepatitis a testing showed she was  Immune  Hepatitis-B vaccine given at its dose of 1 milliliter of Engerix at 0 1 in 6 months  This was her 2nd round and patients who do not respond to 2 rounds are unlikely to respond to additional vaccination  She did have low titers before proceeding with 2nd round of vaccination    TB test he remains normal   Serology for mumps rubella and rubeola all normal   GI physician did order follow-up hepatitis B antibody level as well as TB by [facility] gold in addition to the Remicade level  8  Previously noted lumbar radiculopathy -still has occasional symptoms  9  Carotid stenosis -status post right carotid endarterectomy for 99% stenosis with right hemispheric CVA-she has recovered and has been stable over the last few years   Now seen at [address] [place] [address] Doppler done in October of 2020 shows surgery side white open left side less than 50%-needs repeat in the year which would be October of 2021   10  Hyperlipidemia -continue current dose of statin  11  Dyspepsia -endoscopy shows hiatal hernia with some erythema in the antrum   Biopsy read as gastric mucosa the esophagus but no metaplasia   -Therefore this does not meet the criteria for Harkins's   Had been on an H2 blocker but is now off that  12  Hoarseness-ENT physician felt she had LPR with some erythema the glottis on exam   Previously was on a PPI as well as an H2 blocker -at this point off all meds and stable   13  Osteopenia- DEXA scan done in February 2019 as described above with normal lumbar spine and hip -2 1- due for repeat in this was supposedly scheduled today        MEDICAL REGIMEN:                                               Remicade dosing per GI-currently on maintenance therapy every 2 months a although dose recently increased to 10 milligram/kilogram because of low level a a   atenolol 25 milligrams b i d , Avapro 300 milligrams daily, baby aspirin daily, hydrochlorothiazide 25 milligrams- half tab daily, Crestor 7 5 milligrams daily using 5 milligram tablets, amlodipine 2 5 milligrams daily using 1/2 of a 5 milligram tablet, vitamin D3/ 4000 units a day, fish oil 1 daily     Scheduled for a DEXA scan    Appointment in several months with prior chemistry profile cholesterol profile vitamin-D level and PTH level    Addendum-records reviewed and I can find no evidence of prior hepatitis C testing -records also reviewed by staff- will add to labs prior to next visit  No problem-specific Assessment & Plan notes found for this encounter  Diagnoses and all orders for this visit:    Unspecified menopausal and perimenopausal disorder  -     DXA bone density spine hip and pelvis; Future    Hypertension, unspecified type  -     Comprehensive metabolic panel; Future  -     Cholesterol, total; Future  -     HDL cholesterol; Future  -     LDL cholesterol, direct; Future  -     Triglycerides; Future  -     PTH, intact; Future  -     Vitamin D 25 hydroxy; Future    Hyperlipidemia, unspecified hyperlipidemia type  -     Comprehensive metabolic panel; Future  -     Cholesterol, total; Future  -     HDL cholesterol; Future  -     LDL cholesterol, direct; Future  -     Triglycerides; Future  -     PTH, intact; Future  -     Vitamin D 25 hydroxy; Future    Vitamin D deficiency  -     Comprehensive metabolic panel; Future  -     Cholesterol, total; Future  -     HDL cholesterol; Future  -     LDL cholesterol, direct; Future  -     Triglycerides; Future  -     PTH, intact; Future  -     Vitamin D 25 hydroxy; Future    Hyperparathyroidism ([address] )  -     Comprehensive metabolic panel; Future  -     Cholesterol, total; Future  -     HDL cholesterol; Future  -     LDL cholesterol, direct; Future  -     Triglycerides; Future  -     PTH, intact; Future  -     Vitamin D 25 hydroxy; Future    Ulcerative rectosigmoiditis with rectal bleeding (HCC)    Bilateral carotid artery stenosis          Subjective:      Patient ID: Edmond El is a 79 y o  female  She was here for her Medicare wellness wanted to go over some other issues  She is under large amount of stress  Her  is currently hospitalized and being started on dialysis  She talked about this in great detail  She saw Dermatology in November for treatment of hand dermatitis  She has had recent increase in GI symptoms    Remicade level was decreased and her GI physician is increased to Remicade 10 milligrams/kilogram with follow-up level ordered in the future  She is recently having some increasing bloody stools and has been told she may need to restart oral prednisone that is by her GI physician  She has known hyperparathyroidism as before  Results for orders placed or performed in visit on 03/01/21  -CBC and differential       Result                      Value             Ref Range           WBC                         6 02              4 31 - 10 16*       RBC                         4 37              3 81 - 5 12 *       Hemoglobin                  13 0              11 5 - 15 4 *       Hematocrit                  40 0              34 8 - 46 1 %       MCV                         92                82 - 98 fL          MCH                         29 7              26 8 - 34 3 *       MCHC                        32 5              31 4 - 37 4 *       RDW                         13 9              11 6 - 15 1 %       MPV                         9 4               8 9 - 12 7 fL       Platelets                   252               149 - 390 Th*       nRBC                        0                 /100 WBCs           Neutrophils Relative        62                43 - 75 %           Immat GRANS %               0                 0 - 2 %             Lymphocytes Relative        19                14 - 44 %           Monocytes Relative          14 (H)            4 - 12 %            Eosinophils Relative        4                 0 - 6 %             Basophils Relative          1                 0 - 1 %             Neutrophils Absolute        3 75              1 85 - 7 62 *       Immature Grans Absolute     0 02              0 00 - 0 20 *       Lymphocytes Absolute        1 13              0 60 - 4 47 *       Monocytes Absolute          0 84              0 17 - 1 22 *       Eosinophils Absolute        0 22              0 00 - 0 61 *       Basophils Absolute          0 06              0 00 - 0 10 *  -Comprehensive metabolic panel       Result                      Value             Ref Range           Sodium                      139               136 - 145 mm*       Potassium                   3 6               3 5 - 5 3 mm*       Chloride                    107               100 - 108 mm*       CO2                         27                21 - 32 mmol*       ANION GAP                   5                 4 - 13 mmol/L       BUN                         14                5 - 25 mg/dL        Creatinine                  0 82              0 60 - 1 30 *       Glucose, Fasting            93                65 - 99 mg/dL       Calcium                     10 3 (H)          8 3 - 10 1 m*       Corrected Calcium           10 9 (H)          8 3 - 10 1 m*       AST                         9                 5 - 45 U/L          ALT                         14                12 - 78 U/L         Alkaline Phosphatase        88                46 - 116 U/L        Total Protein               7 2               6 4 - 8 2 g/*       Albumin                     3 3 (L)           3 5 - 5 0 g/*       Total Bilirubin             0 54              0 20 - 1 00 *       eGFR                        68                ml/min/1 73s*  -Cholesterol, total       Result                      Value             Ref Range           Cholesterol                 105               50 - 200 mg/*  -HDL cholesterol       Result                      Value             Ref Range           HDL, Direct                 43                >=40 mg/dL     -LDL cholesterol, direct       Result                      Value             Ref Range           LDL Direct                  49                0 - 100 mg/dl  -Triglycerides       Result                      Value             Ref Range           Triglycerides               95                <=150 mg/dL    -Sedimentation rate, automated       Result                      Value             Ref Range           Sed Rate 43 (H)            0 - 29 mm/ho*  -C-reactive protein       Result                      Value             Ref Range           CRP                         10 2 (H)          <3 0 mg/L      -Vitamin D 25 hydroxy       Result                      Value             Ref Range           Vit D, 25-Hydroxy           45 2              30 0 - 100 0*  -PTH, intact       Result                      Value             Ref Range           PTH                         184 5 (H)         18 4 - 80 1 *  She is currently on 4000 units a day of vitamin-D  Serial PTH levels were reviewed in the year 2019 was 209, in the year 2022 103, most recently 184 5  Her corrected calcium levels elevated at 10 9  She is very  Reluctant to proceed with treatment of only absolute medical problems that is essential medical problems because of her 's issues   -Notes she had ultrasound of the kidneys done in November that was normal       She has known hypertension  BP adequately controlled on current regimen  Avoiding salt and decongestants  Does not use nonsteroidals with her other issues  Because of her recent exacerbation of her inflammatory bowel disease her GI physician has ordered a stool for Clostridium difficile colitis      The following portions of the patient's history were reviewed and updated as appropriate: allergies, current medications, past family history, past medical history, past social history, past surgical history and problem list     Review of Systems   Constitutional: Positive for fatigue  HENT: Negative  Respiratory: Negative  Cardiovascular: Negative  Gastrointestinal: Positive for blood in stool and diarrhea  Endocrine: Negative  Genitourinary: Negative  Musculoskeletal: Negative  Skin: Negative  Neurological: Negative  Hematological: Negative  Psychiatric/Behavioral: Negative            Objective:      /80   Pulse 72   Temp (!) 96 9 °F (36 1 °C) (Oral)   Resp 14    5' 4 5" (1 638 m)   Wt 70 9 kg (156 lb 3 2 oz)   BMI 26 40 kg/m²          Physical Exam  Vitals signs reviewed  Constitutional:       General: She is not in acute distress  Appearance: Normal appearance  She is not ill-appearing, toxic-appearing or diaphoretic  HENT:      Head: Normocephalic and atraumatic  Right Ear: Tympanic membrane, ear canal and external ear normal       Left Ear: Tympanic membrane, ear canal and external ear normal       Nose: Nose normal  No congestion or rhinorrhea  Mouth/Throat:      Mouth: Mucous membranes are moist       Pharynx: Oropharynx is clear  No oropharyngeal exudate or posterior oropharyngeal erythema  Eyes:      General: No scleral icterus  Right eye: No discharge  Left eye: No discharge  Extraocular Movements: Extraocular movements intact  Pupils: Pupils are equal, round, and reactive to light  Neck:      Musculoskeletal: Normal range of motion and neck supple  No neck rigidity or muscular tenderness  Vascular: No carotid bruit  Cardiovascular:      Rate and Rhythm: Normal rate and regular rhythm  Pulses: Normal pulses  Heart sounds: Normal heart sounds  No murmur  No friction rub  No gallop  Pulmonary:      Effort: Pulmonary effort is normal  No respiratory distress  Breath sounds: Normal breath sounds  No stridor  No wheezing, rhonchi or rales  Chest:      Chest wall: No tenderness  Abdominal:      General: Abdomen is flat  Bowel sounds are normal  There is no distension  Palpations: Abdomen is soft  There is no mass  Tenderness: There is no abdominal tenderness  There is no right CVA tenderness, left CVA tenderness, guarding or rebound  Hernia: No hernia is present  Musculoskeletal: Normal range of motion  General: No swelling, tenderness, deformity or signs of injury  Right lower leg: No edema  Left lower leg: No edema     Lymphadenopathy:      Cervical: No cervical adenopathy  Skin:     General: Skin is warm and dry  Coloration: Skin is not jaundiced or pale  Findings: No bruising, erythema, lesion or rash  Comments: Benign nevia   Neurological:      General: No focal deficit present  Mental Status: She is alert and oriented to person, place, and time  Mental status is at baseline  Cranial Nerves: No cranial nerve deficit  Sensory: No sensory deficit  Motor: No weakness  Coordination: Coordination normal       Gait: Gait normal       Deep Tendon Reflexes: Reflexes normal    Psychiatric:         Mood and Affect: Mood normal          Behavior: Behavior normal          Thought Content:  Thought content normal          Judgment: Judgment normal

## 2021-03-10 NOTE — PROGRESS NOTES
Assessment and Plan:     Problem List Items Addressed This Visit     None           Preventive health issues were discussed with patient, and age appropriate screening tests were ordered as noted in patient's After Visit Summary  Personalized health advice and appropriate referrals for health education or preventive services given if needed, as noted in patient's After Visit Summary       History of Present Illness:     Patient presents for Medicare Annual Wellness visit    Patient Care Team:  Arnoldo Aly MD as PCP - MD Anuj Gregorio MD Julianna Carrie, MD Bonni Haber, MD (Colon and Rectal Surgery)  Surekha Nelson MD as Endoscopist  Natty Pendleton MD (Vascular Surgery)     Problem List:     Patient Active Problem List   Diagnosis    Abnormal female pelvic exam    Atherosclerosis    Diverticulosis    Esophageal reflux    Glaucoma    Hoarseness    Hypercalcemia    Hyperlipidemia    Hyperparathyroidism ([facility] [address] [facility]    Hypertension    Carotid stenosis    Osteopenia    Severe cervical dysplasia, histologically confirmed    Vitamin B 12 deficiency    Vitamin D deficiency    Ulcerative rectosigmoiditis with rectal bleeding (HCC)    Edema    Advice given about COVID-19 virus by telephone    Long-term use of immunosuppressant medication      Past Medical and Surgical History:     Past Medical History:   Diagnosis Date    Atherosclerosis     Carotid artery narrowing     Coronary artery disease     Diverticulosis     GERD (gastroesophageal reflux disease)     Glaucoma     Hypercalcemia     Hyperlipidemia     Hyperparathyroidism ([address]     Hypertension     Osteopenia     Severe cervical dysplasia, histologically confirmed     Skin cancer     squamous cell    Stroke ([facility] [address] [facility]     Thyroid nodule     Vitamin D deficiency      Past Surgical History:   Procedure Laterality Date    APPENDECTOMY      BREAST SURGERY      reduction procedure    CAROTID ENDARTARECTOMY Right     carotid thromboendarterectomy     COLONOSCOPY      EXPLORATION CAROTID ARTERY      HYSTERECTOMY      age 55    OOPHORECTOMY Bilateral     age 55    OK COLONOSCOPY FLX DX W/COLLJ SPEC WHEN PFRMD N/A 7/28/2017    Procedure: EGD AND COLONOSCOPY;  Surgeon: Wei Jimenez MD;  [facility];   Service: Colorectal    REDUCTION MAMMAPLASTY Bilateral 1987      Family History:     Family History   Problem Relation Age of Onset    Osteoporosis Mother     Osteoarthritis Mother     Other Father         heart problem    Coronary artery disease Family     Hyperlipidemia Family     No Known Problems Sister     No Known Problems Daughter     No Known Problems Maternal Grandmother     No Known Problems Maternal Grandfather     No Known Problems Paternal Grandmother     No Known Problems Paternal Grandfather     No Known Problems Maternal Aunt     No Known Problems Maternal Aunt     No Known Problems Maternal Aunt     No Known Problems Maternal Aunt     No Known Problems Maternal Aunt     No Known Problems Maternal Aunt     No Known Problems Maternal Aunt     Stroke Paternal Aunt     No Known Problems Paternal Aunt     No Known Problems Paternal Aunt       Social History:     E-Cigarette/Vaping    E-Cigarette Use Never User      E-Cigarette/Vaping Substances    Nicotine No     THC No     CBD No     Flavoring No     Other No     Unknown No      Social History     Socioeconomic History    Marital status: /Civil Union     Spouse name: Not on file    Number of children: Not on file    Years of education: Not on file    Highest education level: Not on file   Occupational History    Occupation: Retired   Social Needs    Financial resource strain: Not on file    Food insecurity     Worry: Not on file     Inability: Not on file   [employer] needs     Medical: Not on file     Non-medical: Not on file   Tobacco Use    Smoking status: Never Smoker    Smokeless tobacco: Never Used   Substance and Sexual Activity    Alcohol use:  Yes     Alcohol/week: 7 0 standard drinks     Types: 7 Glasses of wine per week     Comment: being a social drinker; drinks wine    Drug use: No    Sexual activity: Yes     Birth control/protection: Surgical   Lifestyle    Physical activity     Days per week: Not on file     Minutes per session: Not on file    Stress: Not on file   Relationships    Social connections     Talks on phone: Not on file     Gets together: Not on file     Attends Jew service: Not on file     Active member of club or organization: Not on file     Attends meetings of clubs or organizations: Not on file     Relationship status: Not on file    Intimate partner violence     Fear of current or ex partner: Not on file     Emotionally abused: Not on file     Physically abused: Not on file     Forced sexual activity: Not on file   Other Topics Concern    Not on file   Social History Narrative    Drinks 2 cups coffee/day    Lack of exercise      Medications and Allergies:     Current Outpatient Medications   Medication Sig Dispense Refill    AMLODIPINE BESYLATE PO Take 2 5 mg by mouth daily       aspirin 81 MG tablet Take 81 mg by mouth daily      atenolol (TENORMIN) 25 mg tablet Take 25 mg by mouth 2 (two) times a day      bimatoprost (LUMIGAN) 0 01 % ophthalmic drops Administer 1 drop to both eyes daily at bedtime      brinzolamide (AZOPT) 1 % ophthalmic suspension 1 drop 2 (two) times a day      Cholecalciferol (VITAMIN D3 PO) Take 2,000 Units by mouth 2 (two) times a day      diphenoxylate-atropine (LOMOTIL) 2 5-0 025 mg per tablet TAKE 1 TO 2 TABS FOUR TIMES A DAY  4    hydrochlorothiazide (HYDRODIURIL) 25 mg tablet Take 12 5 mg by mouth daily        irbesartan (AVAPRO) 300 mg tablet Take 300 mg by mouth daily at bedtime      omega-3-acid ethyl esters (LOVAZA) 1 g capsule Take 2 g by mouth 2 (two) times a day      predniSONE 10 mg tablet  rosuvastatin (CRESTOR) 5 mg tablet Take 7 5 mg by mouth daily      timolol (TIMOPTIC) 0 5 % ophthalmic solution       triamcinolone (KENALOG) 0 1 % ointment APPLY TO AFFECTED AREA(S) TWO TIMES DAILY FOR 2 TO 3 WEEKS  3     No current facility-administered medications for this visit  No Known Allergies   Immunizations:     Immunization History   Administered Date(s) Administered    Hep B, adult 08/27/2019, 10/01/2019, 02/25/2020, 08/11/2020, 09/22/2020    INFLUENZA 10/15/2014, 11/11/2015, 11/12/2015, 10/17/2016, 10/03/2017, 10/30/2018    Influenza Quadrivalent Preservative Free 3 years and older IM 10/15/2014    Influenza Split High Dose Preservative Free IM 11/11/2015, 10/17/2016, 10/03/2017    Influenza, high dose seasonal 0 7 mL 10/30/2018, 09/22/2020    Influenza, seasonal, injectable 01/01/2001, 10/01/2011, 11/08/2012, 10/22/2013    Pneumococcal Conjugate 13-Valent 01/01/2014    Pneumococcal Polysaccharide PPV23 01/01/2010, 06/10/2020    SARS-CoV-2 / COVID-19 mRNA IM (Pfizer-BioNTech) 01/15/2021, 02/04/2021    Zoster 04/13/2010    Zoster Vaccine Recombinant 04/12/2010, 06/05/2019, 08/21/2019      Health Maintenance: There are no preventive care reminders to display for this patient  Topic Date Due    DTaP,Tdap,and Td Vaccines (1 - Tdap) 12/09/1961      Medicare Health Risk Assessment:     Temp (!) 96 9 °F (36 1 °C) (Oral)   Ht 5' 4 5" (1 638 m)   Wt 70 9 kg (156 lb 3 2 oz)   BMI 26 40 kg/m²      Lokesh Wolf is here for her Subsequent Wellness visit  Last Medicare Wellness visit information reviewed, patient interviewed and updates made to the record today  Health Risk Assessment:   Patient rates overall health as good  Patient feels that their physical health rating is same  Patient is satisfied with their life  Eyesight was rated as same  Hearing was rated as same  Patient feels that their emotional and mental health rating is same  Patients states they are never, rarely angry  Patient states they are sometimes unusually tired/fatigued  Pain experienced in the last 7 days has been some  Patient's pain rating has been 3/10  Depression Screening:   PHQ-2 Score: 0      Fall Risk Screening: In the past year, patient has experienced: no history of falling in past year      Urinary Incontinence Screening:   Patient has not leaked urine accidently in the last six months  Home Safety:  Patient does not have trouble with stairs inside or outside of their home  Patient has working smoke alarms and has working carbon monoxide detector  Home safety hazards include: none  Nutrition:   Current diet is Regular  Medications:   Patient is currently taking over-the-counter supplements  OTC medications include: see medication list  Patient is able to manage medications  Activities of Daily Living (ADLs)/Instrumental Activities of Daily Living (IADLs):   Walk and transfer into and out of bed and chair?: Yes  Dress and groom yourself?: Yes    Bathe or shower yourself?: Yes    Feed yourself? Yes  Do your laundry/housekeeping?: Yes  Manage your money, pay your bills and track your expenses?: Yes  Make your own meals?: Yes    Do your own shopping?: Yes    Previous Hospitalizations:   Any hospitalizations or ED visits within the last 12 months?: No      Advance Care Planning:   Living will: Yes    Durable POA for healthcare:  Yes    Advanced directive: Yes    Five wishes given: No      Cognitive Screening:   Provider or family/friend/caregiver concerned regarding cognition?: No    PREVENTIVE SCREENINGS      Cardiovascular Screening:    General: History Lipid Disorder, Risks and Benefits Discussed and Screening Current      Diabetes Screening:     General: Screening Current      Colorectal Cancer Screening:     General: Risks and Benefits Discussed and Screening Current      Breast Cancer Screening:     General: Screening Current      Cervical Cancer Screening:    General: Screening Not Indicated      Osteoporosis Screening:    General: Risks and Benefits Discussed and Screening Current      Abdominal Aortic Aneurysm (AAA) Screening:        General: Risks and Benefits Discussed and Screening Current      Lung Cancer Screening:     General: Screening Not Indicated      Hepatitis C Screening:    General: Screening Current    Screening, Brief Intervention, and Referral to Treatment (SBIRT)    Screening      Single Item Drug Screening:  How often have you used an illegal drug (including marijuana) or a prescription medication for non-medical reasons in the past year? never    Single Item Drug Screen Score: 0  Interpretation: Negative screen for possible drug use disorder      Ruchi Oneill MD

## 2021-03-11 ENCOUNTER — TELEPHONE (OUTPATIENT)
Dept: GASTROENTEROLOGY | Facility: CLINIC | Age: 81
End: 2021-03-11

## 2021-03-11 DIAGNOSIS — K51.311 ULCERATIVE RECTOSIGMOIDITIS WITH RECTAL BLEEDING (HCC): Primary | ICD-10-CM

## 2021-03-11 LAB — C DIFF TOX B TCDB STL QL NAA+PROBE: NEGATIVE

## 2021-03-11 RX ORDER — PREDNISONE 10 MG/1
TABLET ORAL
Qty: 100 TABLET | Refills: 1 | Status: SHIPPED | OUTPATIENT
Start: 2021-03-11 | End: 2021-07-19

## 2021-03-11 NOTE — TELEPHONE ENCOUNTER
Patient called the office to get results from her stool sample  Also wanted to know if she needed to start antibiotics  If someone can please follow up with her   Thank you

## 2021-03-11 NOTE — TELEPHONE ENCOUNTER
KENDELL - The call center directly connected me to Ankita Gutierrez - I spoke to her and informed her of results and plan

## 2021-03-12 ENCOUNTER — HOSPITAL ENCOUNTER (OUTPATIENT)
Dept: INFUSION CENTER | Facility: CLINIC | Age: 81
Discharge: HOME | End: 2021-03-12
Payer: MEDICARE

## 2021-03-12 VITALS
HEART RATE: 60 BPM | TEMPERATURE: 95.9 F | BODY MASS INDEX: 26.62 KG/M2 | OXYGEN SATURATION: 94 % | DIASTOLIC BLOOD PRESSURE: 60 MMHG | RESPIRATION RATE: 18 BRPM | WEIGHT: 157.5 LBS | SYSTOLIC BLOOD PRESSURE: 124 MMHG

## 2021-03-12 DIAGNOSIS — K51.311 ULCERATIVE RECTOSIGMOIDITIS WITH RECTAL BLEEDING (HCC): Primary | ICD-10-CM

## 2021-03-12 PROCEDURE — 96409 CHEMO IV PUSH SNGL DRUG: CPT

## 2021-03-12 PROCEDURE — 96375 TX/PRO/DX INJ NEW DRUG ADDON: CPT

## 2021-03-12 RX ORDER — DIPHENHYDRAMINE HCL 25 MG
25 TABLET ORAL ONCE
Status: CANCELLED | OUTPATIENT
Start: 2021-05-05

## 2021-03-12 RX ORDER — METHYLPREDNISOLONE SODIUM SUCCINATE 40 MG/ML
40 INJECTION, POWDER, LYOPHILIZED, FOR SOLUTION INTRAMUSCULAR; INTRAVENOUS ONCE
Status: COMPLETED | OUTPATIENT
Start: 2021-03-12 | End: 2021-03-12

## 2021-03-12 RX ORDER — METHYLPREDNISOLONE SODIUM SUCCINATE 40 MG/ML
40 INJECTION, POWDER, LYOPHILIZED, FOR SOLUTION INTRAMUSCULAR; INTRAVENOUS ONCE
Status: CANCELLED | OUTPATIENT
Start: 2021-05-05

## 2021-03-12 RX ORDER — SODIUM CHLORIDE 9 MG/ML
20 INJECTION, SOLUTION INTRAVENOUS ONCE
Status: CANCELLED | OUTPATIENT
Start: 2021-05-05

## 2021-03-12 RX ORDER — ACETAMINOPHEN 325 MG/1
650 TABLET ORAL ONCE
Status: CANCELLED | OUTPATIENT
Start: 2021-05-05

## 2021-03-12 RX ORDER — ACETAMINOPHEN 325 MG/1
650 TABLET ORAL ONCE
Status: COMPLETED | OUTPATIENT
Start: 2021-03-12 | End: 2021-03-12

## 2021-03-12 RX ORDER — DIPHENHYDRAMINE HCL 25 MG
25 TABLET ORAL ONCE
Status: COMPLETED | OUTPATIENT
Start: 2021-03-12 | End: 2021-03-12

## 2021-03-12 RX ORDER — SODIUM CHLORIDE 9 MG/ML
20 INJECTION, SOLUTION INTRAVENOUS ONCE
Status: COMPLETED | OUTPATIENT
Start: 2021-03-12 | End: 2021-03-12

## 2021-03-12 RX ADMIN — INFLIXIMAB 700 MG: 100 INJECTION, POWDER, LYOPHILIZED, FOR SOLUTION INTRAVENOUS at 13:49

## 2021-03-12 RX ADMIN — SODIUM CHLORIDE 20 ML/HR: 0.9 INJECTION, SOLUTION INTRAVENOUS at 13:03

## 2021-03-12 RX ADMIN — ACETAMINOPHEN 650 MG: 325 TABLET, FILM COATED ORAL at 13:04

## 2021-03-12 RX ADMIN — DIPHENHYDRAMINE HCL 25 MG: 25 TABLET, COATED ORAL at 13:05

## 2021-03-12 RX ADMIN — METHYLPREDNISOLONE SODIUM SUCCINATE 40 MG: 40 INJECTION, POWDER, FOR SOLUTION INTRAMUSCULAR; INTRAVENOUS at 13:06

## 2021-03-12 NOTE — PROGRESS NOTES
Patient tolerated treatment today without issues  Patient stated she was able to rest while here and is feeling better than when she arrived  BP has come up to baseline  Patient scheduled next appointment before d/c  AVS declined

## 2021-03-12 NOTE — PLAN OF CARE
Problem: Potential for Falls  Goal: Patient will remain free of falls  Description: INTERVENTIONS:  - Assess patient frequently for physical needs  -  Identify cognitive and physical deficits and behaviors that affect risk of falls  -  Nathalie fall precautions as indicated by assessment   - Educate patient/family on patient safety including physical limitations  - Instruct patient to call for assistance with activity based on assessment  - Modify environment to reduce risk of injury  - Consider OT/PT consult to assist with strengthening/mobility  Outcome: Progressing     Problem: SAFETY ADULT  Goal: Patient will remain free of falls  Description: INTERVENTIONS:  - Assess patient frequently for physical needs  -  Identify cognitive and physical deficits and behaviors that affect risk of falls  -  Nathalie fall precautions as indicated by assessment   - Educate patient/family on patient safety including physical limitations  - Instruct patient to call for assistance with activity based on assessment  - Modify environment to reduce risk of injury  - Consider OT/PT consult to assist with strengthening/mobility  Outcome: Progressing     Problem: Knowledge Deficit  Goal: Patient/family/caregiver demonstrates understanding of disease process, treatment plan, medications, and discharge instructions  Description: Complete learning assessment and assess knowledge base    Interventions:  - Provide teaching at level of understanding  - Provide teaching via preferred learning methods  Outcome: Progressing

## 2021-03-12 NOTE — PROGRESS NOTES
Patient arrived for Remicade injection  Patient states she has been under quite a bit of stress lately  Her  has been hospitalized for a while  Patient is hypotensive at 88/44  Patient states she felt slightly unsteady walking back to infusion suite  Patient does take blood pressure medication at home and states she did take her medications this morning  Patient states she has a blood pressure cuff for home monitoring but admits to rarely checking her BP  Encouraged patient to check her BP tonight and tomorrow morning before taking any more BP medications  IV started and patient given snack and soda

## 2021-03-15 ENCOUNTER — TELEPHONE (OUTPATIENT)
Dept: GASTROENTEROLOGY | Facility: CLINIC | Age: 81
End: 2021-03-15

## 2021-03-15 ENCOUNTER — TELEPHONE (OUTPATIENT)
Dept: OTHER | Facility: OTHER | Age: 81
End: 2021-03-15

## 2021-03-15 DIAGNOSIS — K51.311 ULCERATIVE RECTOSIGMOIDITIS WITH RECTAL BLEEDING (HCC): Primary | Chronic | ICD-10-CM

## 2021-03-15 NOTE — TELEPHONE ENCOUNTER
Triage Telephone Intake  Dr Joe Mortensen   Chief complaint: ongoing rectal bleeding over several months~ not improving   Last infusion 3/12/21     Diarrhea: 3 x in middle of night/3 x today   Blood in stool- yes (pale red to bright red)   Denies pain  Christy Colton  Is patient increasing po fluids: yes  Patient stated she is under a lot of stress due to  in hospital     Patient has follow up appointment scheduled: 3/26/21 with Dr Joe Mortensen   Current medications for chief complaint: Lomotil po as needed     Requested:   Lomotil Rx to [facility]         Next infusion- 5/5/21

## 2021-03-15 NOTE — TELEPHONE ENCOUNTER
Patients GI provider:  Dr Denise Rosenbaum    Number to return call: [phone]    Reason for call: Pt calling stating since her infusion she has been experiencing rectal bleeding and diarrhea      Scheduled procedure/appointment date if applicable: N/A

## 2021-03-15 NOTE — TELEPHONE ENCOUNTER
GI Physician: Dr Enedina Chen for Medication: diphenoxylate-atropine     Dose: 2 5-0 025 mg    Quantity: ?     Pharmacy and Location: [facility] Left message for pt to return my call.

## 2021-03-15 NOTE — TELEPHONE ENCOUNTER
I tried to call pt and the mailbox was full so I couldn't leave a message  Did she start the prednisone as was prescribed by [facility] last week? It sounds like she is flaring so the prednisone should help  She can also take imodium as needed over the counter  Please reach out to the pt again tomorrow        Thanks so much,  Aramis Phan

## 2021-03-16 RX ORDER — DIPHENOXYLATE HYDROCHLORIDE AND ATROPINE SULFATE 2.5; .025 MG/1; MG/1
1 TABLET ORAL 4 TIMES DAILY PRN
Qty: 30 TABLET | Refills: 4 | Status: SHIPPED | OUTPATIENT
Start: 2021-03-16 | End: 2021-04-09 | Stop reason: SDUPTHER

## 2021-03-16 NOTE — TELEPHONE ENCOUNTER
[phone] Pt  Elmira Rubinstein  42 patient call saying she is about to be out of medication Lomotil 2 5-0 025 mg and she needs it tomorrow but she can get through tonight

## 2021-03-16 NOTE — TELEPHONE ENCOUNTER
Patient is on third day of prednisone 40 mg daily with no improvement of symptoms yet  Reports increased stress due to her  is inpatient and transitioning to dialysis  Recommended otc imodium for diarrhea but she is requesting a refill of lomotil  States she uses sparingly

## 2021-03-17 ENCOUNTER — TELEPHONE (OUTPATIENT)
Dept: INTERNAL MEDICINE CLINIC | Facility: CLINIC | Age: 81
End: 2021-03-17

## 2021-03-17 DIAGNOSIS — K52.9 INFLAMMATORY BOWEL DISEASE: Primary | ICD-10-CM

## 2021-03-17 NOTE — TELEPHONE ENCOUNTER
----- Message from Vivian Vivar MD sent at 3/16/2021  5:57 AM EDT -----   She has slip for labs prior to next visit    Please that hepatitis C with diagnosis of inflammatory bowel disease

## 2021-03-20 ENCOUNTER — TELEPHONE (OUTPATIENT)
Dept: OTHER | Facility: OTHER | Age: 81
End: 2021-03-20

## 2021-03-22 NOTE — TELEPHONE ENCOUNTER
I returned pts call, mailbox full, pt has upcoming appt this week with Dr Charles Cline  To discuss symptoms

## 2021-04-07 ENCOUNTER — TELEPHONE (OUTPATIENT)
Dept: GASTROENTEROLOGY | Facility: CLINIC | Age: 81
End: 2021-04-07

## 2021-04-07 DIAGNOSIS — K51.311 ULCERATIVE RECTOSIGMOIDITIS WITH RECTAL BLEEDING (HCC): Primary | ICD-10-CM

## 2021-04-07 DIAGNOSIS — R19.7 BLOODY DIARRHEA: ICD-10-CM

## 2021-04-07 DIAGNOSIS — K51.311 ULCERATIVE RECTOSIGMOIDITIS WITH RECTAL BLEEDING (HCC): Primary | Chronic | ICD-10-CM

## 2021-04-07 NOTE — TELEPHONE ENCOUNTER
I called patient and informed her of the plan below  I spoke to Dr Reece Milan about this case:    Patient says she feels the prednisone taper did not "Really help" her symptoms since her most recent flare in March  She says she is about approx 10 bloody stools/day, but denies any associated: nausea,vomiting, abdominal pain, feevrs or chills  I informed patient of the plan, which is: infectious stool studies were ordered as it seems only C diff was checked during her last flare  CRP, CMP, and CBC were also ordered  And a Remicade level was also ordered to see the course of the medication  Once the results from these tests are received, we can proceed with further treatment  Patient verbalized understanding of this and says she will go get the tests done ASAP

## 2021-04-07 NOTE — TELEPHONE ENCOUNTER
Patient of Dr Anabel Buerger, last seen 2/9/21    History of UC, on remicade    Received call from patient  She recently had a flare of her UC and was started on prednisone taper  Patient states today she took her last dose and is not any better  She is continuing to have ~10 blood bowel movements per day that are loose  She states the prednisone did not really help her much anyway, she was still having frequent loose bloody bowel movements even when she was on higher doses of prednisone  She feels the prednisone only slightly improved her symptoms  She gets remicade infusions q 8 weeks and had her last infusion 3/12   Please advise

## 2021-04-08 ENCOUNTER — APPOINTMENT (OUTPATIENT)
Dept: LAB | Age: 81
End: 2021-04-08
Payer: MEDICARE

## 2021-04-08 DIAGNOSIS — Z11.59 ENCOUNTER FOR SCREENING FOR OTHER VIRAL DISEASES: ICD-10-CM

## 2021-04-08 DIAGNOSIS — K51.311 ULCERATIVE RECTOSIGMOIDITIS WITH RECTAL BLEEDING (HCC): Chronic | ICD-10-CM

## 2021-04-08 DIAGNOSIS — R19.7 BLOODY DIARRHEA: ICD-10-CM

## 2021-04-08 LAB
ALBUMIN SERPL BCP-MCNC: 2.9 G/DL (ref 3.5–5)
ALP SERPL-CCNC: 66 U/L (ref 46–116)
ALT SERPL W P-5'-P-CCNC: 19 U/L (ref 12–78)
ANION GAP SERPL CALCULATED.3IONS-SCNC: 4 MMOL/L (ref 4–13)
AST SERPL W P-5'-P-CCNC: 8 U/L (ref 5–45)
BASOPHILS # BLD AUTO: 0.05 THOUSANDS/ΜL (ref 0–0.1)
BASOPHILS NFR BLD AUTO: 1 % (ref 0–1)
BILIRUB SERPL-MCNC: 0.41 MG/DL (ref 0.2–1)
BUN SERPL-MCNC: 11 MG/DL (ref 5–25)
CALCIUM ALBUM COR SERPL-MCNC: 11.2 MG/DL (ref 8.3–10.1)
CALCIUM SERPL-MCNC: 10.3 MG/DL (ref 8.3–10.1)
CHLORIDE SERPL-SCNC: 111 MMOL/L (ref 100–108)
CO2 SERPL-SCNC: 26 MMOL/L (ref 21–32)
CREAT SERPL-MCNC: 0.83 MG/DL (ref 0.6–1.3)
CRP SERPL QL: 13 MG/L
EOSINOPHIL # BLD AUTO: 0.5 THOUSAND/ΜL (ref 0–0.61)
EOSINOPHIL NFR BLD AUTO: 6 % (ref 0–6)
ERYTHROCYTE [DISTWIDTH] IN BLOOD BY AUTOMATED COUNT: 15 % (ref 11.6–15.1)
GFR SERPL CREATININE-BSD FRML MDRD: 67 ML/MIN/1.73SQ M
GLUCOSE P FAST SERPL-MCNC: 106 MG/DL (ref 65–99)
HBV SURFACE AB SER-ACNC: 21.78 MIU/ML
HCT VFR BLD AUTO: 38.9 % (ref 34.8–46.1)
HGB BLD-MCNC: 12.4 G/DL (ref 11.5–15.4)
IMM GRANULOCYTES # BLD AUTO: 0.04 THOUSAND/UL (ref 0–0.2)
IMM GRANULOCYTES NFR BLD AUTO: 1 % (ref 0–2)
LYMPHOCYTES # BLD AUTO: 1.2 THOUSANDS/ΜL (ref 0.6–4.47)
LYMPHOCYTES NFR BLD AUTO: 14 % (ref 14–44)
MCH RBC QN AUTO: 29.7 PG (ref 26.8–34.3)
MCHC RBC AUTO-ENTMCNC: 31.9 G/DL (ref 31.4–37.4)
MCV RBC AUTO: 93 FL (ref 82–98)
MONOCYTES # BLD AUTO: 0.95 THOUSAND/ΜL (ref 0.17–1.22)
MONOCYTES NFR BLD AUTO: 11 % (ref 4–12)
NEUTROPHILS # BLD AUTO: 5.65 THOUSANDS/ΜL (ref 1.85–7.62)
NEUTS SEG NFR BLD AUTO: 67 % (ref 43–75)
NRBC BLD AUTO-RTO: 0 /100 WBCS
PLATELET # BLD AUTO: 292 THOUSANDS/UL (ref 149–390)
PMV BLD AUTO: 9 FL (ref 8.9–12.7)
POTASSIUM SERPL-SCNC: 3.8 MMOL/L (ref 3.5–5.3)
PROT SERPL-MCNC: 6.8 G/DL (ref 6.4–8.2)
RBC # BLD AUTO: 4.18 MILLION/UL (ref 3.81–5.12)
SODIUM SERPL-SCNC: 141 MMOL/L (ref 136–145)
WBC # BLD AUTO: 8.39 THOUSAND/UL (ref 4.31–10.16)

## 2021-04-08 PROCEDURE — 85025 COMPLETE CBC W/AUTO DIFF WBC: CPT

## 2021-04-08 PROCEDURE — 82397 CHEMILUMINESCENT ASSAY: CPT

## 2021-04-08 PROCEDURE — 80053 COMPREHEN METABOLIC PANEL: CPT

## 2021-04-08 PROCEDURE — 86480 TB TEST CELL IMMUN MEASURE: CPT

## 2021-04-08 PROCEDURE — 36415 COLL VENOUS BLD VENIPUNCTURE: CPT

## 2021-04-08 PROCEDURE — 80230 DRUG ASSAY INFLIXIMAB: CPT

## 2021-04-08 PROCEDURE — 86706 HEP B SURFACE ANTIBODY: CPT

## 2021-04-08 PROCEDURE — 87505 NFCT AGENT DETECTION GI: CPT

## 2021-04-08 PROCEDURE — 87177 OVA AND PARASITES SMEARS: CPT

## 2021-04-08 PROCEDURE — 86140 C-REACTIVE PROTEIN: CPT

## 2021-04-08 PROCEDURE — 87209 SMEAR COMPLEX STAIN: CPT

## 2021-04-08 NOTE — TELEPHONE ENCOUNTER
Patients GI provider:  Dr Quique Roman    Number to return call: (  [phone]    Reason for call: Pt calling to get refill on lomotil, dose was changed so the pharmacy will not fill it--please assist only has 2 pills left    Scheduled procedure/appointment date if applicable: Apt/procedure NA

## 2021-04-09 LAB
C DIFF TOX B TCDB STL QL NAA+PROBE: NEGATIVE
CAMPYLOBACTER DNA SPEC NAA+PROBE: NORMAL
GAMMA INTERFERON BACKGROUND BLD IA-ACNC: 0.05 IU/ML
M TB IFN-G BLD-IMP: NEGATIVE
M TB IFN-G CD4+ BCKGRND COR BLD-ACNC: 0 IU/ML
M TB IFN-G CD4+ BCKGRND COR BLD-ACNC: 0.01 IU/ML
MITOGEN IGNF BCKGRD COR BLD-ACNC: >10 IU/ML
O+P STL CONC: NORMAL
SALMONELLA DNA SPEC QL NAA+PROBE: NORMAL
SHIGA TOXIN STX GENE SPEC NAA+PROBE: NORMAL
SHIGELLA DNA SPEC QL NAA+PROBE: NORMAL

## 2021-04-09 RX ORDER — DIPHENOXYLATE HYDROCHLORIDE AND ATROPINE SULFATE 2.5; .025 MG/1; MG/1
1 TABLET ORAL 4 TIMES DAILY PRN
Qty: 120 TABLET | Refills: 4 | Status: SHIPPED | OUTPATIENT
Start: 2021-04-09 | End: 2021-09-24

## 2021-04-09 NOTE — TELEPHONE ENCOUNTER
Called and spoke with pharmacy  Lomotil was ordered with only 30 tablet quantity but patient is using 4 times per day so she is going through it too fast  Attached is script with updated quantity

## 2021-04-09 NOTE — TELEPHONE ENCOUNTER
Called to make patient aware, her daughter picked up and stated pt was unavailable but asked if we fixed the prescription   I let her know that it had been updated and to have the patient call back if there are further questions

## 2021-04-13 ENCOUNTER — TELEPHONE (OUTPATIENT)
Dept: INTERNAL MEDICINE CLINIC | Facility: CLINIC | Age: 81
End: 2021-04-13

## 2021-04-13 NOTE — TELEPHONE ENCOUNTER
PT CALLED, SAID THAT SHE'S BEEN HAVING LOTS OF ANXIETY SINCE HER  HAS BEEN HOME FROM THE HOSPITAL AND WANTS TO KNOW IF SHE CAN COME IN AND TALK TO YOU ABOUT THINGS   PT SAID THAT HER CHILDREN ARE ALSO WORRIED     PLEASE ADVISE

## 2021-04-14 ENCOUNTER — PREP FOR PROCEDURE (OUTPATIENT)
Dept: GASTROENTEROLOGY | Facility: CLINIC | Age: 81
End: 2021-04-14

## 2021-04-14 DIAGNOSIS — K51.311 ULCERATIVE RECTOSIGMOIDITIS WITH RECTAL BLEEDING (HCC): Primary | ICD-10-CM

## 2021-04-14 RX ORDER — HYDROCORTISONE ACETATE 25 MG/1
25 SUPPOSITORY RECTAL 2 TIMES DAILY
Qty: 30 SUPPOSITORY | Refills: 2 | Status: SHIPPED | OUTPATIENT
Start: 2021-04-14 | End: 2021-09-24

## 2021-04-14 NOTE — TELEPHONE ENCOUNTER
Also, does she want to try a steroid enema? This can sometimes help I will call this in to her pharmacy  She can do this at bedtime

## 2021-04-14 NOTE — TELEPHONE ENCOUNTER
Thank you,  Nurses, please call pt and let her know I am waiting on her remicade drug and ab levels  She can take imodium for now for sure  Based on those values, I also may get her in for a colonoscopy so we can see what's going on  I will go ahead and order that so she can get on the schedule        Schedulers-please schedule colonoscopy

## 2021-04-14 NOTE — TELEPHONE ENCOUNTER
PT CALLED, SAID THAT SHE CAN'T GET OUT OF THE HOUSE BEFORE 10AM BECAUSE OF HER     WANTS TO KNOW IF SHE CAN DO LATER IN THE DAY    PLEASE ADVISE

## 2021-04-15 NOTE — TELEPHONE ENCOUNTER
Patient daughter Evelina Ochoa called requesting a sooner appt or a tele visit       She stated her mother has been weak , fatigue and just in bed  They family is worried     I saw the message [facility] sent you , can we do a tele visit or can we bring her in      Please advise   Evelina Ochoa - daughter   [phone]

## 2021-04-15 NOTE — TELEPHONE ENCOUNTER
Remind them that she was offered an appointment today and declined - put her tomorrow in the slot of Milena Harvey  And move farooq jaynohemy to Saturday at 8 or 830 am

## 2021-04-15 NOTE — TELEPHONE ENCOUNTER
Spoke with patient and  is currently in the hospital and cannot schedule at this time  Will call back once she can schedule

## 2021-04-16 ENCOUNTER — OFFICE VISIT (OUTPATIENT)
Dept: INTERNAL MEDICINE CLINIC | Facility: CLINIC | Age: 81
End: 2021-04-16
Payer: MEDICARE

## 2021-04-16 DIAGNOSIS — E21.3 HYPERPARATHYROIDISM (HCC): Chronic | ICD-10-CM

## 2021-04-16 DIAGNOSIS — Z79.899 LONG-TERM USE OF IMMUNOSUPPRESSANT MEDICATION: ICD-10-CM

## 2021-04-16 DIAGNOSIS — E83.52 HYPERCALCEMIA: ICD-10-CM

## 2021-04-16 DIAGNOSIS — F41.9 ANXIETY: ICD-10-CM

## 2021-04-16 DIAGNOSIS — K51.311 ULCERATIVE RECTOSIGMOIDITIS WITH RECTAL BLEEDING (HCC): Chronic | ICD-10-CM

## 2021-04-16 DIAGNOSIS — I10 HYPERTENSION, UNSPECIFIED TYPE: Primary | Chronic | ICD-10-CM

## 2021-04-16 PROCEDURE — 99215 OFFICE O/P EST HI 40 MIN: CPT | Performed by: INTERNAL MEDICINE

## 2021-04-17 VITALS
SYSTOLIC BLOOD PRESSURE: 122 MMHG | DIASTOLIC BLOOD PRESSURE: 76 MMHG | HEART RATE: 78 BPM | TEMPERATURE: 96.9 F | RESPIRATION RATE: 14 BRPM | WEIGHT: 148 LBS | BODY MASS INDEX: 24.66 KG/M2 | HEIGHT: 65 IN

## 2021-04-17 PROBLEM — Z71.89 ADVICE GIVEN ABOUT COVID-19 VIRUS BY TELEPHONE: Status: RESOLVED | Noted: 2021-02-09 | Resolved: 2021-04-17

## 2021-04-17 PROBLEM — F41.9 ANXIETY: Status: ACTIVE | Noted: 2021-04-17

## 2021-04-17 RX ORDER — ESCITALOPRAM OXALATE 10 MG/1
TABLET ORAL
Qty: 90 TABLET | Refills: 3 | Status: SHIPPED | OUTPATIENT
Start: 2021-04-17 | End: 2021-05-24
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
## 2021-04-19 ENCOUNTER — TELEPHONE (OUTPATIENT)
Dept: GASTROENTEROLOGY | Facility: AMBULARY SURGERY CENTER | Age: 81
End: 2021-04-19

## 2021-04-19 NOTE — TELEPHONE ENCOUNTER
Spoke to Kesha lombardo remicade drug and antibody levels  It is still pending and is expected to be available in the next couple days

## 2021-04-22 ENCOUNTER — TELEPHONE (OUTPATIENT)
Dept: GASTROENTEROLOGY | Facility: CLINIC | Age: 81
End: 2021-04-22

## 2021-04-22 LAB
INFLIXIMAB AB SERPL-MCNC: 520 NG/ML
INFLIXIMAB SERPL-MCNC: 0.5 UG/ML

## 2021-04-22 NOTE — TELEPHONE ENCOUNTER
----- Message from Bibiana Jara RN sent at 4/19/2021  3:58 PM EDT -----  Harper Rao to Kesha regading remicade drug and antibody levels  It is still pending and is expected to be available in the next couple days  ----- Message -----  From: Margarita Gibbs DO  Sent: 4/19/2021  10:23 AM EDT  To: Gastroenterology Nurse    Can someone please look into why her remicade drug and antibody levels have not been resulted? Are they still in the lab? Thanks so much,  Abelino Mireles  ----- Message -----  From: Antonieta Terrazas PA-C  Sent: 4/9/2021   8:09 AM EDT  To: Margarita Gibbs,     Good morning, Dr Best Manriquez! This is one of your patients with UC, and as I'm sure you saw, she called in about 2 days ago complaining of "~10 blood bowel movements per day that are loose " When I talked to her on the phone, she denied any associated abdominal pain, anorexia, n/v  She was started on a steroid taper for a flare in early march and had her last Remicade dose 3/12/2021  Dr Stanislaw Wade was sitting next to me at the [facility] at the time so I asked for his input, so we decided to order the normal stool studies, CMP, CBC, remicade level, and CRP  So far, the stool studies that came back are normal (waiting on O & P), CMP shows albumin of 2 9 (but this seems to be a chronic issue for her), CBC was normal and without a white count (patient also denies any fever/chills), but the CRP came back more elevated than it was at the start of her flare on 3/1/2021: Yesterday her CRP was 13 (on 3/1 it was 10 2)  Still waiting on the remicade level, and the Hep B/Quant gold you put in  I just wanted to fill you  in on the results we've gotten back so far!

## 2021-04-22 NOTE — TELEPHONE ENCOUNTER
I spoke to pt  C diff was negative  Infliximab level was neglgible despite 10mg/kg dosing  Will switch to stelara and cancel her upcoming remicade appt  Vivian- please start prior auth for stelara  I just updated her tb and her hep b so we should be good

## 2021-04-27 ENCOUNTER — TELEPHONE (OUTPATIENT)
Dept: OTHER | Facility: OTHER | Age: 81
End: 2021-04-27

## 2021-04-27 NOTE — TELEPHONE ENCOUNTER
Pt  Canceled the appointment with Dr Ena Omalley on 4/28/2021 at 1:30pm  Pt would not like to reschedule, decided to see a different office/provider

## 2021-05-04 ENCOUNTER — TELEPHONE (OUTPATIENT)
Dept: INFUSION CENTER | Facility: CLINIC | Age: 81
End: 2021-05-04

## 2021-05-04 NOTE — TELEPHONE ENCOUNTER
Received a call from Dulce Richmond today to Cx her infusion appnt for 5/5 to receive her first Tx of Stelara  She states that she has another problem that she needs to take care of first  Does not wish to reschedule at this time  She would prefer to call back to reschedule when she is ready to start this treatment

## 2021-05-05 ENCOUNTER — HOSPITAL ENCOUNTER (OUTPATIENT)
Dept: INFUSION CENTER | Facility: CLINIC | Age: 81
End: 2021-05-05

## 2021-05-10 ENCOUNTER — APPOINTMENT (OUTPATIENT)
Dept: LAB | Facility: HOSPITAL | Age: 81
End: 2021-05-10
Payer: MEDICARE

## 2021-05-10 ENCOUNTER — TELEPHONE (OUTPATIENT)
Dept: INTERNAL MEDICINE CLINIC | Facility: CLINIC | Age: 81
End: 2021-05-10

## 2021-05-10 ENCOUNTER — TRANSCRIBE ORDERS (OUTPATIENT)
Dept: LAB | Facility: HOSPITAL | Age: 81
End: 2021-05-10

## 2021-05-10 ENCOUNTER — OFFICE VISIT (OUTPATIENT)
Dept: INTERNAL MEDICINE CLINIC | Facility: CLINIC | Age: 81
End: 2021-05-10
Payer: MEDICARE

## 2021-05-10 VITALS
RESPIRATION RATE: 14 BRPM | HEART RATE: 68 BPM | DIASTOLIC BLOOD PRESSURE: 72 MMHG | TEMPERATURE: 98.2 F | SYSTOLIC BLOOD PRESSURE: 128 MMHG | HEIGHT: 65 IN | WEIGHT: 146.2 LBS | BODY MASS INDEX: 24.36 KG/M2

## 2021-05-10 DIAGNOSIS — K51.919 MODERATE CHRONIC ULCERATIVE COLITIS WITH COMPLICATION (HCC): ICD-10-CM

## 2021-05-10 DIAGNOSIS — K51.919 ULCERATIVE COLITIS WITH COMPLICATION, UNSPECIFIED LOCATION (HCC): ICD-10-CM

## 2021-05-10 DIAGNOSIS — E78.5 HYPERLIPIDEMIA, UNSPECIFIED HYPERLIPIDEMIA TYPE: Primary | Chronic | ICD-10-CM

## 2021-05-10 DIAGNOSIS — I10 ESSENTIAL HYPERTENSION, MALIGNANT: Primary | ICD-10-CM

## 2021-05-10 DIAGNOSIS — I10 HYPERTENSION, UNSPECIFIED TYPE: Primary | ICD-10-CM

## 2021-05-10 DIAGNOSIS — K51.311 ULCERATIVE RECTOSIGMOIDITIS WITH RECTAL BLEEDING (HCC): Chronic | ICD-10-CM

## 2021-05-10 DIAGNOSIS — E87.6 HYPOKALEMIA: Primary | ICD-10-CM

## 2021-05-10 DIAGNOSIS — I10 HYPERTENSION, UNSPECIFIED TYPE: Chronic | ICD-10-CM

## 2021-05-10 DIAGNOSIS — E21.3 HYPERPARATHYROIDISM (HCC): Chronic | ICD-10-CM

## 2021-05-10 DIAGNOSIS — R53.83 OTHER FATIGUE: ICD-10-CM

## 2021-05-10 DIAGNOSIS — R41.0 CONFUSION: ICD-10-CM

## 2021-05-10 DIAGNOSIS — A09 DIARRHEA OF INFECTIOUS ORIGIN: ICD-10-CM

## 2021-05-10 LAB
ALBUMIN SERPL BCP-MCNC: 2.8 G/DL (ref 3.5–5)
ALP SERPL-CCNC: 57 U/L (ref 46–116)
ALT SERPL W P-5'-P-CCNC: 24 U/L (ref 12–78)
ANION GAP SERPL CALCULATED.3IONS-SCNC: 5 MMOL/L (ref 4–13)
AST SERPL W P-5'-P-CCNC: 14 U/L (ref 5–45)
BASOPHILS # BLD AUTO: 0.01 THOUSANDS/ΜL (ref 0–0.1)
BASOPHILS NFR BLD AUTO: 0 % (ref 0–1)
BILIRUB SERPL-MCNC: 0.61 MG/DL (ref 0.2–1)
BILIRUB UR QL STRIP: NEGATIVE
BUN SERPL-MCNC: 14 MG/DL (ref 5–25)
CALCIUM ALBUM COR SERPL-MCNC: 11.2 MG/DL (ref 8.3–10.1)
CALCIUM SERPL-MCNC: 10.2 MG/DL (ref 8.3–10.1)
CHLORIDE SERPL-SCNC: 110 MMOL/L (ref 100–108)
CLARITY UR: CLEAR
CO2 SERPL-SCNC: 26 MMOL/L (ref 21–32)
COLOR UR: YELLOW
CREAT SERPL-MCNC: 0.72 MG/DL (ref 0.6–1.3)
EOSINOPHIL # BLD AUTO: 0.04 THOUSAND/ΜL (ref 0–0.61)
EOSINOPHIL NFR BLD AUTO: 1 % (ref 0–6)
ERYTHROCYTE [DISTWIDTH] IN BLOOD BY AUTOMATED COUNT: 14.9 % (ref 11.6–15.1)
GFR SERPL CREATININE-BSD FRML MDRD: 80 ML/MIN/1.73SQ M
GLUCOSE P FAST SERPL-MCNC: 75 MG/DL (ref 65–99)
GLUCOSE UR STRIP-MCNC: NEGATIVE MG/DL
HCT VFR BLD AUTO: 36 % (ref 34.8–46.1)
HGB BLD-MCNC: 11.8 G/DL (ref 11.5–15.4)
HGB UR QL STRIP.AUTO: NEGATIVE
IMM GRANULOCYTES # BLD AUTO: 0.03 THOUSAND/UL (ref 0–0.2)
IMM GRANULOCYTES NFR BLD AUTO: 0 % (ref 0–2)
KETONES UR STRIP-MCNC: NEGATIVE MG/DL
LEUKOCYTE ESTERASE UR QL STRIP: NEGATIVE
LYMPHOCYTES # BLD AUTO: 1.6 THOUSANDS/ΜL (ref 0.6–4.47)
LYMPHOCYTES NFR BLD AUTO: 20 % (ref 14–44)
MCH RBC QN AUTO: 29.9 PG (ref 26.8–34.3)
MCHC RBC AUTO-ENTMCNC: 32.8 G/DL (ref 31.4–37.4)
MCV RBC AUTO: 91 FL (ref 82–98)
MONOCYTES # BLD AUTO: 0.97 THOUSAND/ΜL (ref 0.17–1.22)
MONOCYTES NFR BLD AUTO: 12 % (ref 4–12)
NEUTROPHILS # BLD AUTO: 5.43 THOUSANDS/ΜL (ref 1.85–7.62)
NEUTS SEG NFR BLD AUTO: 67 % (ref 43–75)
NITRITE UR QL STRIP: NEGATIVE
NRBC BLD AUTO-RTO: 0 /100 WBCS
PH UR STRIP.AUTO: 7 [PH]
PLATELET # BLD AUTO: 290 THOUSANDS/UL (ref 149–390)
PMV BLD AUTO: 9.5 FL (ref 8.9–12.7)
POTASSIUM SERPL-SCNC: 3.2 MMOL/L (ref 3.5–5.3)
PROT SERPL-MCNC: 6.2 G/DL (ref 6.4–8.2)
PROT UR STRIP-MCNC: NEGATIVE MG/DL
RBC # BLD AUTO: 3.94 MILLION/UL (ref 3.81–5.12)
SODIUM SERPL-SCNC: 141 MMOL/L (ref 136–145)
SP GR UR STRIP.AUTO: 1.01 (ref 1–1.03)
UROBILINOGEN UR QL STRIP.AUTO: 1 E.U./DL
WBC # BLD AUTO: 8.08 THOUSAND/UL (ref 4.31–10.16)

## 2021-05-10 PROCEDURE — 99215 OFFICE O/P EST HI 40 MIN: CPT | Performed by: INTERNAL MEDICINE

## 2021-05-10 PROCEDURE — 80053 COMPREHEN METABOLIC PANEL: CPT

## 2021-05-10 PROCEDURE — 36415 COLL VENOUS BLD VENIPUNCTURE: CPT

## 2021-05-10 PROCEDURE — 81003 URINALYSIS AUTO W/O SCOPE: CPT

## 2021-05-10 PROCEDURE — 87086 URINE CULTURE/COLONY COUNT: CPT

## 2021-05-10 PROCEDURE — 85025 COMPLETE CBC W/AUTO DIFF WBC: CPT

## 2021-05-10 RX ORDER — SODIUM, POTASSIUM,MAG SULFATES 17.5-3.13G
SOLUTION, RECONSTITUTED, ORAL ORAL
COMMUNITY
Start: 2021-05-04 | End: 2021-09-24

## 2021-05-10 RX ORDER — HYDROCORTISONE 100 MG/60ML
SUSPENSION RECTAL
COMMUNITY
Start: 2021-05-04 | End: 2021-09-24

## 2021-05-10 RX ORDER — AMLODIPINE BESYLATE 5 MG/1
TABLET ORAL
COMMUNITY
Start: 2021-02-15 | End: 2021-09-24

## 2021-05-10 RX ORDER — PREDNISONE 1 MG/1
TABLET ORAL
COMMUNITY
Start: 2021-05-01 | End: 2021-07-19

## 2021-05-10 NOTE — TELEPHONE ENCOUNTER
Patients daughter called Gema Cabrera)  Requesting a UTI lab order     She does not have UTI symp , but is very confused       She is going for labs today for appt next week with you      Please advise   Kelvin Oseguera # [phone]

## 2021-05-10 NOTE — PATIENT INSTRUCTIONS
Push fluids -6 glasses of water per day or more  1 potassium pill per day can take in morning or evening   Do not be alarmed by changes in motion as prednisone dose is tapered  Call if things are deteriorating   Schedule appointment with GI physician  Whole prescription fish oil   Blood work in 2 weeks- no fast needed   Please allow others to help you with caring for your    Appointment in 4 weeks

## 2021-05-10 NOTE — PROGRESS NOTES
Assessment/Plan:  1  Confusion-suspect multifactorial-predisposed by her significant recent medical illness with pathogenic E coli superimposed on inflammatory bowel disease  Suspect aggravated also by her ongoing hypercalcemia  Significantly improved at this point  CBC and chemistry profile stable  Urinalysis benign  I do not think we need to do anything else at this point other than monitor  If however she has significant recurrence she may need additional investigation including CNS imaging as well as lumbar puncture etcetera-always watching for superimposed issues with her immunosuppressed state  2  Pathogenic E coli-was hospitalized at [facility] and grew enteropathogenic E coli-was treated with Cipro  3  Inflammatory bowel disease-severe-previously seen by the [address] [place] [address] group but now switching to [facility]  She failed on Entyvio a and was then on maintenance Remicade  Had sigmoid in hospital with significant abnormalities  Recent other still study showed culture O and P and Clostridium difficile negative  Being started on therapy with Stellara-encouraged her to schedule a follow-up appointment with that GI group  She is also on prednisone taper starting with 40 milligrams weekly and dropping down by 5 milligrams per week  4  Hypercalcemia-has a known history hyperparathyroidism  Potentially aggravated by thiazide diuretic which was discontinued  She deferred on prior surgery  Aggravated now by decreased fluids etcetera  She will increase her fluid intake and monitoring carefully  She may need repeat parathyroid scan etcetera  5  Ocsklilayshjpiuajdl-zjsknoe-otgox scan showed inferior parathyroid adenoma  She declined surgery  She saw Endocrine who felt she could be monitored  Had no evidence of nephrolithiasis an ultrasound of the kidneys done in November 2020  No past history of nephrolithiasis    DEXA scan in February 2019 shows false-positive normal lumbar spine and hip of-2 1  Repeat DEXA scan ordered but not yet done  As noted now for repeat labs over the next couple weeks including PTH level  6  Severe anxiety-aggravated by social situation when dealing with her  with multiple medical problems and also aggravated by oral corticosteroid use  Started on generic Lexapro in April 2016 and now on 10 milligrams daily  Again recommended she delegate some of the care of her  to the group that is being paid help her at her home  7  Hypertension-adequate control on Avapro atenolol amlodipine  Previously was on hydrochlorothiazide but this was discontinued  8  Edema-felt related to her meds  That is amlodipine plus peripheral venous disease post low albumin  Improved on lower dose of amlodipine  Watching carefully oral corticosteroids  9  General care -vaccinations to prepare for Remicade  Hepatitis a testing showed that she was immune-  Hepatitis-B vaccine given at its dose of 1 milliliter of Engerix at 0 1 in 6 months   This was her 2nd round and patients who do not respond to 2 rounds are unlikely to respond to additional vaccination   She did have low titers before proceeding with 2nd round of vaccination   TB test he remains normal   Serology for mumps rubella and rubeola all normal    had recent hepatitis-B level showing that she was immune with positive antibody and serology for TB negative  10  Carotid stenosis -status post right carotid endarterectomy for 99% stenosis with right hemispheric CVA-she has recovered and has been stable over the last few years   Now seen at [address] [place] [address] Doppler done in October of 2020 shows surgery side white open left side less than 50%-needs repeat in the year which would be October of 2021   10  Hyperlipidemia -continue current dose of statin  12   Dyspepsia -endoscopy shows hiatal hernia with some erythema in the antrum   Biopsy read as gastric mucosa the esophagus but no metaplasia   -Therefore this does not meet the criteria for Harkins's   Had been on an H2 blocker but is now off that  13  Hoarseness-ENT physician felt she had LPR with some erythema the glottis on exam   Previously was on a PPI as well as an H2 blocker -at this point off all meds and stable   14  Osteopenia- DEXA scan done in February 2019 as described above with normal lumbar spine and hip -2 1- due for repeat in this was scheduled  Previously but not yet done 14  Lumbar radiculopathy -still with occasional symptoms        MEDICAL REGIMEN:                                           Escitalopram 10 milligrams daily Stellara dose per GI and colicky croup, prednisone currently on taper of 5 milligrams per week starting at 40 milligrams daily  atenolol 25 milligrams b i d , Avapro 300 milligrams daily, baby aspirin daily,  holdinghydrochlorothiazide 25 milligrams- half tab daily, Crestor 7 5 milligrams daily using 5 milligram tablets, amlodipine 2 5 milligrams daily using 1/2 of a 5 milligram tablet, vitamin D3/ 4000 units a day, fish oil 1 daily being held      In 2 weeks CBC random SMA PTH level  Repeat appointment in 4 weeks    Addendum-urine culture normal    Addendum -family called the patient doing well on generic Lexapro but has been taking 20 milligrams daily for one week  rather than 10-script called in for the 20 milligrams once daily and she will continue that dose  As noted going for follow-up labs  No problem-specific Assessment & Plan notes found for this encounter  Diagnoses and all orders for this visit:    Hyperlipidemia, unspecified hyperlipidemia type  -     CBC and differential; Future  -     Comprehensive metabolic panel; Future  -     PTH, intact; Future    Other fatigue  -     CBC and differential; Future  -     Comprehensive metabolic panel; Future  -     PTH, intact;  Future    Confusion    Ulcerative rectosigmoiditis with rectal bleeding (HCC)    Diarrhea of infectious origin    Hyperparathyroidism ([facility] )    Hypertension, unspecified type    Other orders  -     amLODIPine (NORVASC) 5 mg tablet  -     hydrocortisone (CORTENEMA) 100 mg/60 mL enema; INSERT 1 ENEMA INTO THE RECTUM NIGHTLY FOR 14 DAYS  -     Suprep Bowel Prep Kit 17 5-3 13-1 6 GM/177ML SOLN; DO NOT FOLLOW INSTRUCTIONS ON BOX  PLEASE FOLLOW INSTRUCTIONS PROVIDED BY PHYSICIANS OFFICE  -     predniSONE 5 mg tablet; TAKE 8 TABLETS (40 MG) BY MOUTH EVERY DAY FOR 7 DAYS, THEN TAPER DOWN 5 MG (1 TABLET) EVERY 7 DAYS UNTIL FINISHED          Subjective:      Patient ID: Rita Hernandez is a 79 y o  female  We received an emergency phone call today  They feel she had had some confusion wanted to know she needed a urinalysis to rule out UTI  He was accompanied to the visit by another family member  The patient was recently hospitalized at another institution with pathogenic E coli  Notes from hospitalization were reviewed in great detail  Stool culture was positive for a a a pathogenic E coli  Because of hernia suppressed state was elected to treat her and she was treated with a course of azithromycin  She underwent a flexible sigmoid while in the hospital which showed severe edema, edema, loss of vascular which indicated severe disease  She has known underlying ulcerative colitis as well  She I placed her on hydrocortisone enemas nightly for 10-14 days and a prednisone regimen of 40 milligrams daily dropping by 5 milligrams a per week  After her hospitalization the family noted some tendency towards confusion  She remains under large amount of stress caring for her  with chronic renal failure and diabetes  She did not have any observe weakness of 1 arm and leg compared to the other  She did not have any observe numbness of 1 arm and leg compared to the other  She did not have significant headaches  Family feels this is much improved and today she was extremely clear  She had emergency labs done  Results for orders placed or performed in visit on 05/10/21  -Comprehensive metabolic panel       Result                      Value             Ref Range           Sodium                      141               136 - 145 mm*       Potassium                   3 2 (L)           3 5 - 5 3 mm*       Chloride                    110 (H)           100 - 108 mm*       CO2                         26                21 - 32 mmol*       ANION GAP                   5                 4 - 13 mmol/L       BUN                         14                5 - 25 mg/dL        Creatinine                  0 72              0 60 - 1 30 *       Glucose, Fasting            75                65 - 99 mg/dL       Calcium                     10 2 (H)          8 3 - 10 1 m*       Corrected Calcium           11 2 (H)          8 3 - 10 1 m*       AST                         14                5 - 45 U/L          ALT                         24                12 - 78 U/L         Alkaline Phosphatase        57                46 - 116 U/L        Total Protein               6 2 (L)           6 4 - 8 2 g/*       Albumin                     2 8 (L)           3 5 - 5 0 g/*       Total Bilirubin             0 61              0 20 - 1 00 *       eGFR                        80                ml/min/1 73s* she also had a CBC a a today as well as urinalysis  Urinalysis was normal and CBC was normal   She is having ongoing diarrhea associated the above which is the etiology for the hypokalemia  Hypokalemia is also predisposed by her prednisone use  We elected to start potassium supplement she was given a prescription for that  We reviewed that hypercalcemia can contribute to CNS issues  She has not been drinking adequate fluids and this is extremely important  She has known hyperparathyroidism better previously deferred on surgery  On last visit we discontinued her hydrochlorothiazide in case this was contributing  I recommended follow-up labs in 2 weeks    She may require intervention but trying deferred present cuts she is immunosuppressed with her other issues  She remains under a tremendous amount of stress at home  At last visit I started her on an SSRI  She still very anxious and yet to notice any definite improvement in that regard  Her family is concerned that she is overdoing things and not accepting the help of the 24/7 assistance at been hired to help her  They reminded the patient today that while she was in the hospital they took care of her  more than adequately  This patient wanted to know their preferred analgesic agent  Because of their various comorbidities I recommended that this be acetaminophen  This patient has no history of chronic liver disease that would put them at greater risk for use of acetaminophen  This patient may use up to 500-650 mg of acetaminophen at a time and no more than 3 g a day total  Nonsteroidal anti-inflammatory agents have the potential to exacerbate hypertension, hypercoagulability, chronic renal failure, congestive heart failure, and various allergic tendencies  Of her comorbidities we wanted use acetaminophen rather than nonsteroidals  She is aware this  Very long discussion held today regarding all the above  We reviewed the issues which can potentially contributed  We also reviewed about the potential for more significant issues associated with immunosuppressive therapy that could lead to confusion but at this point she is improving and I feel these are much less likely  She has opted proceed with GI physician at [address] [place] [address] states they are initiating therapy with Abby    This was a 60 minutes visit with more than 50% of the time spent counseling the patient formulating a treatment plan            The following portions of the patient's history were reviewed and updated as appropriate: allergies, current medications, past family history, past medical history, past social history, past surgical history and problem list     Review of Systems   Constitutional: Negative  HENT: Negative  Respiratory: Negative  Cardiovascular: Negative  Gastrointestinal: Positive for blood in stool and diarrhea  Endocrine: Negative  Genitourinary: Negative  Musculoskeletal: Negative  Skin: Negative  Neurological: Negative  Hematological: Negative  Psychiatric/Behavioral: Positive for confusion  The patient is nervous/anxious  Objective:      Temp 98 2 °F (36 8 °C) (Tympanic)   Ht 5' 4 5" (1 638 m)   Wt 66 3 kg (146 lb 3 2 oz)   BMI 24 71 kg/m²          Physical Exam  Vitals signs reviewed  Constitutional:       General: She is not in acute distress  Appearance: Normal appearance  She is not ill-appearing, toxic-appearing or diaphoretic  HENT:      Head: Normocephalic and atraumatic  Right Ear: Tympanic membrane, ear canal and external ear normal       Left Ear: Tympanic membrane, ear canal and external ear normal       Nose: Nose normal  No congestion or rhinorrhea  Mouth/Throat:      Mouth: Mucous membranes are moist       Pharynx: Oropharynx is clear  No oropharyngeal exudate or posterior oropharyngeal erythema  Eyes:      General: No scleral icterus  Right eye: No discharge  Left eye: No discharge  Extraocular Movements: Extraocular movements intact  Pupils: Pupils are equal, round, and reactive to light  Neck:      Musculoskeletal: Normal range of motion and neck supple  No neck rigidity or muscular tenderness  Vascular: No carotid bruit  Cardiovascular:      Rate and Rhythm: Normal rate and regular rhythm  Pulses: Normal pulses  Heart sounds: Normal heart sounds  No murmur  No friction rub  No gallop  Pulmonary:      Effort: Pulmonary effort is normal  No respiratory distress  Breath sounds: Normal breath sounds  No stridor  No wheezing, rhonchi or rales     Chest:      Chest wall: No tenderness  Abdominal:      General: Abdomen is flat  Bowel sounds are normal  There is no distension  Palpations: Abdomen is soft  There is no mass  Tenderness: There is no abdominal tenderness  There is no right CVA tenderness, left CVA tenderness, guarding or rebound  Hernia: No hernia is present  Comments: No abdominal tenderness   Musculoskeletal: Normal range of motion  General: No swelling, tenderness, deformity or signs of injury  Right lower leg: No edema  Left lower leg: No edema  Lymphadenopathy:      Cervical: No cervical adenopathy  Skin:     General: Skin is warm and dry  Coloration: Skin is not jaundiced or pale  Findings: No bruising, erythema, lesion or rash  Neurological:      General: No focal deficit present  Mental Status: She is alert and oriented to person, place, and time  Mental status is at baseline  Cranial Nerves: No cranial nerve deficit  Sensory: No sensory deficit  Motor: No weakness  Coordination: Coordination normal       Gait: Gait normal       Deep Tendon Reflexes: Reflexes normal    Psychiatric:         Mood and Affect: Mood normal          Behavior: Behavior normal          Thought Content:  Thought content normal          Judgment: Judgment normal       Comments: Anxious

## 2021-05-10 NOTE — TELEPHONE ENCOUNTER
Called Julissa Green per doc  She has to go to [place] for stat labs , she is aware and agreed to go to [place]

## 2021-05-10 NOTE — TELEPHONE ENCOUNTER
Called daughter and left message  Also called Mick Khalil , she is aware and is going today at 10 AM for stat bloodwork with appointment at 200 today    Faxed bloodwork to Elida Bejarano # [phone]

## 2021-05-11 DIAGNOSIS — I10 HYPERTENSION, UNSPECIFIED TYPE: Primary | ICD-10-CM

## 2021-05-11 LAB — BACTERIA UR CULT: NORMAL

## 2021-05-11 RX ORDER — POTASSIUM CHLORIDE 20 MEQ/1
20 TABLET, EXTENDED RELEASE ORAL DAILY
Qty: 60 TABLET | Refills: 4 | Status: SHIPPED | OUTPATIENT
Start: 2021-05-11 | End: 2021-09-24

## 2021-05-11 RX ORDER — ATENOLOL 25 MG/1
25 TABLET ORAL 2 TIMES DAILY
Qty: 180 TABLET | Refills: 3 | Status: SHIPPED | OUTPATIENT
Start: 2021-05-11 | End: 2021-08-03 | Stop reason: SDUPTHER

## 2021-05-11 NOTE — TELEPHONE ENCOUNTER
Santiago Pringle from [employer] called and said the Potassium you prescribed interferes with Lomotio  Please advise if there is any changes    #[phone]

## 2021-05-11 NOTE — TELEPHONE ENCOUNTER
Daughter called requesting refill for atentolol 25mg bid to [facility]    Please sign this encounter

## 2021-05-13 NOTE — TELEPHONE ENCOUNTER
I called pt to discuss as she told my office staff she was not going to schedule her first dose of stelara  She let me know she has decided to be seen at [facility] in the future for her continued GI care  She reports that she is going to be startinig stelara soon under the care of Dr Lorenzo Listen  She is also a family friend of Dr Toni Newman and should surgery be needed in the future she prefers [facility]   I offered support to patient

## 2021-05-24 ENCOUNTER — TELEPHONE (OUTPATIENT)
Dept: INTERNAL MEDICINE CLINIC | Facility: CLINIC | Age: 81
End: 2021-05-24

## 2021-05-24 DIAGNOSIS — F41.9 ANXIETY: Primary | ICD-10-CM

## 2021-05-24 RX ORDER — ESCITALOPRAM OXALATE 10 MG/1
10 TABLET ORAL DAILY
Qty: 90 TABLET | Refills: 3 | Status: SHIPPED | OUTPATIENT
Start: 2021-05-24 | End: 2021-08-03 | Stop reason: SDUPTHER

## 2021-05-24 NOTE — TELEPHONE ENCOUNTER
Okay to stay on escitalopram 20 mg daily - script to be for escitalopram 20 mg one po daily disp 90 -refill 3

## 2021-05-24 NOTE — TELEPHONE ENCOUNTER
PT'S DAUGHTER CLARE CALLED, SAID THAT HER MOM HAS BEEN TAKING 20MG DAILY OF THE LEXAPRO SINCE SHE THINKS April, SHE'S NOT REALLY SURE HOW LONG MOM HAS BEEN DOUBLING UP BUT SHE JUST REALIZED IT NOW AND WANTED TO LET YOU KNOW BECAUSE MOM HAS BEEN DOING REALLY GOOD AND SHE WANTS TO KNOW IF THEY CAN KEEP HER AT 20MG DAILY AND HAVE A NEW PRESCRIPTION TO REFLEX THAT      PLEASE ADVISE

## 2021-05-25 NOTE — TELEPHONE ENCOUNTER
CLARE CALLED BACK, SAID THAT SHE WAS WRONG ABOUT HOW LONG MOM WAS DOUBLING UP ON THE [address]   SHE SAID THAT THE DOUBLING UP ONLY STARTED AT THE BEGINNING OF LAST WEEK    JUST AN FYI BECAUSE SHE STILL WANTS MOM ON THE 20MGS DAILY

## 2021-06-07 ENCOUNTER — OFFICE VISIT (OUTPATIENT)
Dept: INTERNAL MEDICINE CLINIC | Facility: CLINIC | Age: 81
End: 2021-06-07
Payer: MEDICARE

## 2021-06-07 ENCOUNTER — TELEPHONE (OUTPATIENT)
Dept: INTERNAL MEDICINE CLINIC | Facility: CLINIC | Age: 81
End: 2021-06-07

## 2021-06-07 ENCOUNTER — APPOINTMENT (OUTPATIENT)
Dept: LAB | Facility: HOSPITAL | Age: 81
End: 2021-06-07
Payer: MEDICARE

## 2021-06-07 VITALS
HEIGHT: 65 IN | TEMPERATURE: 97 F | RESPIRATION RATE: 14 BRPM | WEIGHT: 145.2 LBS | SYSTOLIC BLOOD PRESSURE: 110 MMHG | DIASTOLIC BLOOD PRESSURE: 72 MMHG | BODY MASS INDEX: 24.19 KG/M2 | HEART RATE: 68 BPM

## 2021-06-07 DIAGNOSIS — R53.83 OTHER FATIGUE: ICD-10-CM

## 2021-06-07 DIAGNOSIS — I10 ESSENTIAL HYPERTENSION: Chronic | ICD-10-CM

## 2021-06-07 DIAGNOSIS — E83.52 HYPERCALCEMIA: Primary | ICD-10-CM

## 2021-06-07 DIAGNOSIS — M85.80 OSTEOPENIA, UNSPECIFIED LOCATION: Chronic | ICD-10-CM

## 2021-06-07 DIAGNOSIS — Z79.899 LONG-TERM USE OF IMMUNOSUPPRESSANT MEDICATION: ICD-10-CM

## 2021-06-07 DIAGNOSIS — E21.3 HYPERPARATHYROIDISM (HCC): Chronic | ICD-10-CM

## 2021-06-07 DIAGNOSIS — E78.5 HYPERLIPIDEMIA, UNSPECIFIED HYPERLIPIDEMIA TYPE: Chronic | ICD-10-CM

## 2021-06-07 LAB
ALBUMIN SERPL BCP-MCNC: 3.2 G/DL (ref 3.5–5)
ALP SERPL-CCNC: 57 U/L (ref 46–116)
ALT SERPL W P-5'-P-CCNC: 19 U/L (ref 12–78)
ANION GAP SERPL CALCULATED.3IONS-SCNC: 6 MMOL/L (ref 4–13)
AST SERPL W P-5'-P-CCNC: 8 U/L (ref 5–45)
BASOPHILS # BLD AUTO: 0.05 THOUSANDS/ΜL (ref 0–0.1)
BASOPHILS NFR BLD AUTO: 0 % (ref 0–1)
BILIRUB SERPL-MCNC: 0.53 MG/DL (ref 0.2–1)
BUN SERPL-MCNC: 17 MG/DL (ref 5–25)
CALCIUM ALBUM COR SERPL-MCNC: 11 MG/DL (ref 8.3–10.1)
CALCIUM SERPL-MCNC: 10.4 MG/DL (ref 8.3–10.1)
CHLORIDE SERPL-SCNC: 112 MMOL/L (ref 100–108)
CO2 SERPL-SCNC: 23 MMOL/L (ref 21–32)
CREAT SERPL-MCNC: 0.66 MG/DL (ref 0.6–1.3)
EOSINOPHIL # BLD AUTO: 0.11 THOUSAND/ΜL (ref 0–0.61)
EOSINOPHIL NFR BLD AUTO: 1 % (ref 0–6)
ERYTHROCYTE [DISTWIDTH] IN BLOOD BY AUTOMATED COUNT: 15.4 % (ref 11.6–15.1)
GFR SERPL CREATININE-BSD FRML MDRD: 84 ML/MIN/1.73SQ M
GLUCOSE P FAST SERPL-MCNC: 83 MG/DL (ref 65–99)
HCT VFR BLD AUTO: 37 % (ref 34.8–46.1)
HGB BLD-MCNC: 11.8 G/DL (ref 11.5–15.4)
IMM GRANULOCYTES # BLD AUTO: 0.09 THOUSAND/UL (ref 0–0.2)
IMM GRANULOCYTES NFR BLD AUTO: 1 % (ref 0–2)
LYMPHOCYTES # BLD AUTO: 1.05 THOUSANDS/ΜL (ref 0.6–4.47)
LYMPHOCYTES NFR BLD AUTO: 9 % (ref 14–44)
MCH RBC QN AUTO: 29.6 PG (ref 26.8–34.3)
MCHC RBC AUTO-ENTMCNC: 31.9 G/DL (ref 31.4–37.4)
MCV RBC AUTO: 93 FL (ref 82–98)
MONOCYTES # BLD AUTO: 0.92 THOUSAND/ΜL (ref 0.17–1.22)
MONOCYTES NFR BLD AUTO: 7 % (ref 4–12)
NEUTROPHILS # BLD AUTO: 10.13 THOUSANDS/ΜL (ref 1.85–7.62)
NEUTS SEG NFR BLD AUTO: 82 % (ref 43–75)
NRBC BLD AUTO-RTO: 0 /100 WBCS
PLATELET # BLD AUTO: 315 THOUSANDS/UL (ref 149–390)
PMV BLD AUTO: 9.3 FL (ref 8.9–12.7)
POTASSIUM SERPL-SCNC: 4.1 MMOL/L (ref 3.5–5.3)
PROT SERPL-MCNC: 6.5 G/DL (ref 6.4–8.2)
PTH-INTACT SERPL-MCNC: 161.3 PG/ML (ref 18.4–80.1)
RBC # BLD AUTO: 3.98 MILLION/UL (ref 3.81–5.12)
SODIUM SERPL-SCNC: 141 MMOL/L (ref 136–145)
WBC # BLD AUTO: 12.35 THOUSAND/UL (ref 4.31–10.16)

## 2021-06-07 PROCEDURE — 85025 COMPLETE CBC W/AUTO DIFF WBC: CPT

## 2021-06-07 PROCEDURE — 80053 COMPREHEN METABOLIC PANEL: CPT

## 2021-06-07 PROCEDURE — 36415 COLL VENOUS BLD VENIPUNCTURE: CPT

## 2021-06-07 PROCEDURE — 83970 ASSAY OF PARATHORMONE: CPT

## 2021-06-07 PROCEDURE — 99215 OFFICE O/P EST HI 40 MIN: CPT | Performed by: INTERNAL MEDICINE

## 2021-06-07 NOTE — TELEPHONE ENCOUNTER
Cassandra johns called to ask about doing her blood work this morning and keep her appt this afternoon at 2pm   She forgot to do it and mentioned this happened once before and she was able to do it all the same day  Do you want to change the orders to stat or have her reschedule her appt with you? Please advise        [phone] janice

## 2021-06-07 NOTE — PROGRESS NOTES
Assessment/Plan:  1  Confusion-suspect multifactorial-predisposed by her significant recent medical illness with pathogenic E coli superimposed on inflammatory bowel disease  Suspect aggravated also by her ongoing hypercalcemia  improved at this point  But still an issue  CBC and chemistry profile stable  Urinalysis benign  If however she has significant recurrence she may need additional investigation including CNS imaging as well as lumbar puncture etcetera-always watching for superimposed issues with her immunosuppressed state-also she was advised not to use Lomotil  Also I feel because of its persistence we need to readdress the issue of her hypercalcemia and referral was made endocrinology-we reviewed that hypercalcemia can have CNS side effects  2  Pathogenic E coli-was hospitalized at [facility] and grew enteropathogenic E coli-was treated with Cipro  3  Inflammatory bowel disease-severe-pre[facility] seen by [facility] but now switching to [facility]  She failed on Entyvio a and was then on maintenance Remicade  Had sigmoid in hospital with significant abnormalities  Recent other still study showed culture O and P and Clostridium difficile negative  Being started on therapy with Mario-encouraged her to schedule a follow-up appointment with that GI group  She is also on prednisone taper starting with 40 milligrams weekly and dropping down by 5 milligrams per week-however at this point there is an issue with insurance coverage with Stelara  Family is asking about prophylactic Imodium and they will speak with the GI group  I also as noted above recommended she not using Lomotil  4  Hypercalcemia-has a known history hyperparathyroidism  Potentially aggravated by thiazide diuretic which was discontinued  She deferred on prior surgery  Aggravated now by decreased fluids etcetera  She will increase her fluid intake and monitoring carefully    She may need repeat parathyroid scan etcetera-referral made back endocrinology today with letter dictated  5  Fazddsupsoiieacvmne-xvfvych-sdtwr scan showed inferior parathyroid adenoma  She declined surgery  She saw Endocrine who felt she could be monitored  Had no evidence of nephrolithiasis an ultrasound of the kidneys done in November 2020  No past history of nephrolithiasis  DEXA scan in February 2019 shows false-positive normal lumbar spine and hip of-2 1  Repeat DEXA scan ordered but not yet done but was scheduled today  Calcium level prior to this visit corrected at 11 0 and PTH level elevated at 161 with normal up to 80-await opinion of endocrinology a  6  Severe anxiety-aggravated by social situation when dealing with her  with multiple medical problems and also aggravated by oral corticosteroid use  Started on generic Lexapro in April 20231--family by mistake had been giving her 20 milligrams and a a a since she has responded so well a a will continue thata consider decreasing dose at next visit  7  Hypertension-adequate control on Avapro atenolol amlodipine  Previously was on hydrochlorothiazide but this was discontinued -systolic today 178 and amlodipine discontinued  8  Edema-felt related to her meds  That is amlodipine plus peripheral venous disease post low albumin  Improved on lower dose of amlodipine  Watching carefully oral corticosteroids  9   General care -vaccinations to prepare for Remicade   Hepatitis a testing showed that she was immune-  Hepatitis-B vaccine given at its dose of 1 milliliter of Engerix at 0 1 in 6 months   This was her 2nd round and patients who do not respond to 2 rounds are unlikely to respond to additional vaccination   She did have low titers before proceeding with 2nd round of vaccination   TB test he remains normal   Serology for mumps rubella and rubeola all normal    had recent hepatitis-B level showing that she was immune with positive antibody and serology for TB negative  10  Carotid stenosis -status post right carotid endarterectomy for 99% stenosis with right hemispheric CVA-she has recovered and has been stable over the last few years   Now seen at [facility] Doppler done in October of 2020 shows surgery side white open left side less than 50%-needs repeat in the year which would be October of 2021   10  Hyperlipidemia -continue current dose of statin  12  Dyspepsia -endoscopy shows hiatal hernia with some erythema in the antrum   Biopsy read as gastric mucosa the esophagus but no metaplasia   -Therefore this does not meet the criteria for Harkins's   Had been on an H2 blocker but is now off that  13  Hoarseness-ENT physician felt she had LPR with some erythema the glottis on exam   Previously was on a PPI as well as an H2 blocker -at this point off all meds and stable   14  Osteopenia- DEXA scan done in February 2019 as described above with normal lumbar spine and hip -2 1- due for repeat in this was scheduled  Previously but not yet done 14  Lumbar radiculopathy -still with occasional symptoms        MEDICAL REGIMEN:                                           Escitalopram 20 milligrams daily using 10 milligram does  Stellara dose per GI group, prednisone currently on 15 milligrams daily in tapering by 5 milligrams per week  atenolol 25 milligrams b i d , Avapro 300 milligrams daily, baby aspirin daily,  holdinghydrochlorothiazide 25 milligrams- half tab daily, Crestor 7 5 milligrams daily using 5 milligram tablets, discontinuing amlodipine 2 5 milligrams daily using 1/2 of a 5 milligram tablet, vitamin D3/ 4000 units a day, fish oil 1 daily being held    Await opinion endocrinology  Appointment in 6 weeks with prior CBC random SMA   addendum- spoke with the patient on [address] 15 to review that DEXA scan consistent with significant osteoporosis  Unable to do lumbar spine because of extensive spondylosis  Hip -3  10 year risk of hip fracture 32%    10 year risk of major osteoporotic fracture 42%  She is scheduled to meet with Endocrine on June 30th  I explained the patient this is additional data which would push towards treating her hyperparathyroidism -may need additional treatment as well such as Prolia pending opinion of endocrine ZACHARY CHART NEXT VISIT PATIENT HAD A DEXA SCAN IN June 2021     No problem-specific Assessment & Plan notes found for this encounter  Diagnoses and all orders for this visit:    Hypercalcemia  -     CBC and differential; Future  -     Comprehensive metabolic panel; Future    Essential hypertension    Osteopenia, unspecified location    Hyperparathyroidism ([address] )    Long-term use of immunosuppressant medication          Subjective:      Patient ID: Farheen Peñaloza is a 79 y o  female  We reviewed multiple issues today  She remains on her prednisone taper  She is currently 15 milligrams daily in tapering by 5 milligrams per week  She is tentatively ending prednisone on June 27th  There has been an issue with insurance coverage for Stelara--this is being provided now by the [address] group  She has a history of hypertension  Antihypertensive requirements have been decreasing with her multiple medical problems  She is currently on amlodipine 2 5 milligrams daily here for Irbesartan 300 daily and atenolol 25 milligrams b i d  -systolic today was 914 sitting and standing with diastolic below 70  We elected to withdraw her amlodipine  There is still some issues with her memory  Family feels that short-term memory is not as strong  This may be aggravated by her multiple medical problems including hypercalcemia  She had seen endocrinology in the remote past and did not want to proceed with surgery for her hyperparathyroidism with her documented parathyroid adenoma  She had not had significant nephrolithiasis and DEXA scan had been stable-however this is becoming more of an issue with persistent hypercalcemia    She just had labs done again showing evidence of elevated parathyroid level    Corrected calcium is 11  Results for orders placed or performed in visit on 06/07/21  -CBC and differential       Result                      Value             Ref Range           WBC                         12 35 (H)         4 31 - 10 16*       RBC                         3 98              3 81 - 5 12 *       Hemoglobin                  11 8              11 5 - 15 4 *       Hematocrit                  37 0              34 8 - 46 1 %       MCV                         93                82 - 98 fL          MCH                         29 6              26 8 - 34 3 *       MCHC                        31 9              31 4 - 37 4 *       RDW                         15 4 (H)          11 6 - 15 1 %       MPV                         9 3               8 9 - 12 7 fL       Platelets                   315               149 - 390 Th*       nRBC                        0                 /100 WBCs           Neutrophils Relative        82 (H)            43 - 75 %           Immat GRANS %               1                 0 - 2 %             Lymphocytes Relative        9 (L)             14 - 44 %           Monocytes Relative          7                 4 - 12 %            Eosinophils Relative        1                 0 - 6 %             Basophils Relative          0                 0 - 1 %             Neutrophils Absolute        10 13 (H)         1 85 - 7 62 *       Immature Grans Absolute     0 09              0 00 - 0 20 *       Lymphocytes Absolute        1 05              0 60 - 4 47 *       Monocytes Absolute          0 92              0 17 - 1 22 *       Eosinophils Absolute        0 11              0 00 - 0 61 *       Basophils Absolute          0 05              0 00 - 0 10 *  -Comprehensive metabolic panel       Result                      Value             Ref Range           Sodium                      141               136 - 145 mm*       Potassium 4 1               3 5 - 5 3 mm*       Chloride                    112 (H)           100 - 108 mm*       CO2                         23                21 - 32 mmol*       ANION GAP                   6                 4 - 13 mmol/L       BUN                         17                5 - 25 mg/dL        Creatinine                  0 66              0 60 - 1 30 *       Glucose, Fasting            83                65 - 99 mg/dL       Calcium                     10 4 (H)          8 3 - 10 1 m*       Corrected Calcium           11 0 (H)          8 3 - 10 1 m*       AST                         8                 5 - 45 U/L          ALT                         19                12 - 78 U/L         Alkaline Phosphatase        57                46 - 116 U/L        Total Protein               6 5               6 4 - 8 2 g/*       Albumin                     3 2 (L)           3 5 - 5 0 g/*       Total Bilirubin             0 53              0 20 - 1 00 *       eGFR                        84                ml/min/1 73s*  -PTH, intact       Result                      Value             Ref Range           PTH                         161 3 (H)         18 4 - 80 1 * she was accompanied to the visit today by her daughter  We reviewed all the above  I feel we again need to get endocrinology involved  Letter will be dictated to them  When we reviewed old records she had seen Dr Sow in the past but at this point we will be using the [address] [place] [address] endocrinology system  She had been on a thiazide diuretic and this was discontinued the case was contributing to her hypercalcemia  She was on Lexapro to help with anxiety  She is doing better in that regard  Unfortunately her  is now on hospice care  I had prescribed 10 milligrams and mistaking me her family had been giving her 20 milligrams  They wanted to maintain current dose because she is doing so well on this regimen    For now we will continue 20 milligrams daily but in the near future we may be able to decrease the doses this could potentially have CNS side effect as well  A we were discussing therapy for her inflammatory bowel disease they were asked me about use of prophylactic Lomotil  She apparently infrequently uses this  I recommended she not use this med because of its potential CNS side effects  She has occasionally used Imodium as well  They wanted to know about a daily dose of Imodium and I recommended that they speak with the GI group in reference to that  This patient denies any systemic symptoms  Specifically there has been no evidence of fever, night sweats, significant weight loss or significant decrease in appetite  A a she feels as though her appetite is stable  Her weight prior visit was 146 and today is 145  Fortunately she has not had any hyponatremia associated with her SSRI use  This patient has a known history of vascular disease  We discussed again optimal control of risk factors including hyperlipidemia, hypertension, and diabetes mellitus  This patient denies any episodes of weakness of one arm or leg compared to the other, numbness of one arm or leg compared to the other, blurred or double vision, or difficulty with speech  This patient denies any claudication symptoms of arms or legs  This patient denies any chest pain or pressure with activity  There has been no recent evidence of abrupt onset of back pain to suggest potential abdominal aortic aneurysm  We also again reviewed contributing factors of atherosclerosis-including tobacco abuse, hypertension, diabetes mellitus, hyperlipidemia, family history and age  This patient understands the whole concept of risk factors and the need for more aggressive treatment of them as compared to patients who do not have significant underlying atherosclerosis        She had documented carotid disease in the past   Her major risk factors are hypertension and hyperlipidemia  She is not a smoker and does not have diabetes  She is overdue for bone density study  Again recommended we proceed with this as this will be helpful information when trying to make a decision about potential intervention with her hyperparathyroidism  This had been ordered previously but patient was able to do it because of her multiple other issues and her 's health  They are now agreeable to proceeding  She is not having a place to her abdominal pain but still having 5-10 stools per day  She knows to trying to avoid nonsteroidals  This patient wanted to know their preferred analgesic agent  Because of their various comorbidities I recommended that this be acetaminophen  This patient has no history of chronic liver disease that would put them at greater risk for use of acetaminophen  This patient may use up to 500-650 mg of acetaminophen at a time and no more than 3 g a day total  Nonsteroidal anti-inflammatory agents have the potential to exacerbate hypertension, hypercoagulability, chronic renal failure, congestive heart failure, and various allergic tendencies  The following portions of the patient's history were reviewed and updated as appropriate: allergies, current medications, past family history, past medical history, past social history, past surgical history and problem list     Review of Systems   Constitutional: Negative  HENT: Negative  Respiratory: Negative  Cardiovascular: Negative  Gastrointestinal: Positive for diarrhea  Endocrine: Negative  Genitourinary: Negative  Musculoskeletal: Negative  Skin: Negative  Neurological: Negative  Hematological: Negative  Psychiatric/Behavioral: Negative  Objective:      Temp (!) 97 °F (36 1 °C) (Tympanic)   Ht 5' 4 5" (1 638 m)   Wt 65 9 kg (145 lb 3 2 oz)   BMI 24 54 kg/m²          Physical Exam  Vitals signs reviewed     Constitutional: General: She is not in acute distress  Appearance: Normal appearance  She is not ill-appearing, toxic-appearing or diaphoretic  HENT:      Head: Normocephalic and atraumatic  Right Ear: Ear canal and external ear normal       Left Ear: Ear canal and external ear normal       Nose: Nose normal  No congestion or rhinorrhea  Mouth/Throat:      Mouth: Mucous membranes are moist       Pharynx: Oropharynx is clear  No oropharyngeal exudate or posterior oropharyngeal erythema  Eyes:      General: No scleral icterus  Right eye: No discharge  Left eye: No discharge  Extraocular Movements: Extraocular movements intact  Pupils: Pupils are equal, round, and reactive to light  Neck:      Musculoskeletal: Normal range of motion and neck supple  No neck rigidity or muscular tenderness  Vascular: No carotid bruit  Cardiovascular:      Rate and Rhythm: Normal rate and regular rhythm  Pulses: Normal pulses  Heart sounds: Normal heart sounds  No murmur  No friction rub  No gallop  Pulmonary:      Effort: Pulmonary effort is normal  No respiratory distress  Breath sounds: Normal breath sounds  No stridor  No wheezing, rhonchi or rales  Chest:      Chest wall: No tenderness  Abdominal:      General: Abdomen is flat  Bowel sounds are normal  There is no distension  Palpations: Abdomen is soft  There is no mass  Tenderness: There is no abdominal tenderness  There is no right CVA tenderness, left CVA tenderness, guarding or rebound  Hernia: No hernia is present  Comments: Negative abdominal tenderness to palpation   Musculoskeletal: Normal range of motion  General: No swelling, tenderness, deformity or signs of injury  Right lower leg: No edema  Left lower leg: No edema  Lymphadenopathy:      Cervical: No cervical adenopathy  Skin:     General: Skin is warm and dry  Coloration: Skin is not jaundiced or pale  Findings: No bruising, erythema, lesion or rash  Neurological:      General: No focal deficit present  Mental Status: She is alert and oriented to person, place, and time  Mental status is at baseline  Cranial Nerves: No cranial nerve deficit  Sensory: No sensory deficit  Motor: No weakness  Coordination: Coordination normal       Gait: Gait normal       Deep Tendon Reflexes: Reflexes normal    Psychiatric:         Mood and Affect: Mood normal          Behavior: Behavior normal          Thought Content:  Thought content normal          Judgment: Judgment normal

## 2021-06-14 ENCOUNTER — HOSPITAL ENCOUNTER (OUTPATIENT)
Dept: RADIOLOGY | Facility: IMAGING CENTER | Age: 81
Discharge: HOME | End: 2021-06-14
Payer: MEDICARE

## 2021-06-14 DIAGNOSIS — E83.52 HYPERCALCEMIA: ICD-10-CM

## 2021-06-14 DIAGNOSIS — N95.9 UNSPECIFIED MENOPAUSAL AND PERIMENOPAUSAL DISORDER: ICD-10-CM

## 2021-06-14 PROCEDURE — 77080 DXA BONE DENSITY AXIAL: CPT

## 2021-06-30 ENCOUNTER — OFFICE VISIT (OUTPATIENT)
Dept: ENDOCRINOLOGY | Facility: CLINIC | Age: 81
End: 2021-06-30
Payer: MEDICARE

## 2021-06-30 VITALS
DIASTOLIC BLOOD PRESSURE: 90 MMHG | HEIGHT: 65 IN | HEART RATE: 60 BPM | SYSTOLIC BLOOD PRESSURE: 150 MMHG | BODY MASS INDEX: 24.99 KG/M2 | WEIGHT: 150 LBS

## 2021-06-30 DIAGNOSIS — E78.5 HYPERLIPIDEMIA, UNSPECIFIED HYPERLIPIDEMIA TYPE: Chronic | ICD-10-CM

## 2021-06-30 DIAGNOSIS — E55.9 VITAMIN D DEFICIENCY: ICD-10-CM

## 2021-06-30 DIAGNOSIS — R63.4 WEIGHT LOSS: ICD-10-CM

## 2021-06-30 DIAGNOSIS — M81.0 AGE-RELATED OSTEOPOROSIS WITHOUT CURRENT PATHOLOGICAL FRACTURE: ICD-10-CM

## 2021-06-30 DIAGNOSIS — E21.3 HYPERPARATHYROIDISM (HCC): Primary | Chronic | ICD-10-CM

## 2021-06-30 PROCEDURE — 99204 OFFICE O/P NEW MOD 45 MIN: CPT | Performed by: INTERNAL MEDICINE

## 2021-06-30 NOTE — PROGRESS NOTES
New Patient Note      CC: hypercalcemia  History of Present Illness:   79 yr female with HTN, HLD, Osteoporosis, ulcerative colitis, chronic immunosuppression,  Carotid stenosis s/p CEA  She was referred by PCP for primary hyperparathyroidism  She reports mental cloudiness and cognitive dysfunction that seems to be worsening, no constipation, polyuria, polydipsia, weight gain or blurred vision  She has lost 30 lbs over last 1 year  She has hyperparathyroidism associated with hypercalcemia for about 8 years  Previous US neck 8/2014 showed a rt lower pole parathyroid adenoma  24 hr urine calcium was 224mg  DXA 6/14/21 showed osteoporosis c Tscore -3 0 lt fem neck  US kidney - no nephrolithiasis  Diet: usually limited dairy  Takes vitamin D3 4000IU daily      Patient Active Problem List   Diagnosis    Abnormal female pelvic exam    Atherosclerosis    Diverticulosis    Esophageal reflux    Glaucoma    Hoarseness    Hypercalcemia    Hyperlipidemia    Hyperparathyroidism ([address] )    Hypertension    Carotid stenosis    Osteopenia    Severe cervical dysplasia, histologically confirmed    Vitamin B 12 deficiency    Vitamin D deficiency    Ulcerative rectosigmoiditis with rectal bleeding (HCC)    Edema    Long-term use of immunosuppressant medication    Anxiety    Confusion    Diarrhea of infectious origin     Past Medical History:   Diagnosis Date    Atherosclerosis     Carotid artery narrowing     Coronary artery disease     Diverticulosis     GERD (gastroesophageal reflux disease)     Glaucoma     Hypercalcemia     Hyperlipidemia     Hyperparathyroidism ([address]     Hypertension     Osteopenia     Severe cervical dysplasia, histologically confirmed     Skin cancer     squamous cell    Stroke ([address] )     Thyroid nodule     Vitamin D deficiency       Past Surgical History:   Procedure Laterality Date    APPENDECTOMY      BREAST SURGERY      reduction procedure    CAROTID ENDARTARECTOMY Right     carotid thromboendarterectomy     COLONOSCOPY      EXPLORATION CAROTID ARTERY      HYSTERECTOMY      age 55    OOPHORECTOMY Bilateral     age 55    CT COLONOSCOPY FLX DX W/COLLJ SPEC WHEN PFRMD N/A 7/28/2017    Procedure: EGD AND COLONOSCOPY;  Surgeon: Charis Bedoya MD;  [facility]: [facility]; Service: Colorectal    REDUCTION MAMMAPLASTY Bilateral 1987      Family History   Problem Relation Age of Onset    Osteoporosis Mother     Osteoarthritis Mother     Other Father         heart problem    Coronary artery disease Family     Hyperlipidemia Family     No Known Problems Sister     No Known Problems Daughter     No Known Problems Maternal Grandmother     No Known Problems Maternal Grandfather     No Known Problems Paternal Grandmother     No Known Problems Paternal Grandfather     No Known Problems Maternal Aunt     No Known Problems Maternal Aunt     No Known Problems Maternal Aunt     No Known Problems Maternal Aunt     No Known Problems Maternal Aunt     No Known Problems Maternal Aunt     No Known Problems Maternal Aunt     Stroke Paternal Aunt     No Known Problems Paternal Aunt     No Known Problems Paternal Aunt      Social History     Tobacco Use    Smoking status: Never Smoker    Smokeless tobacco: Never Used   Substance Use Topics    Alcohol use: Yes     Alcohol/week: 7 0 standard drinks     Types: 7 Glasses of wine per week     Comment: being a social drinker; drinks wine     No Known Allergies    Review of Systems   Constitutional: Positive for fatigue  HENT: Negative  Eyes: Negative  Respiratory: Negative  Cardiovascular: Negative  Gastrointestinal: Negative  Endocrine: Negative  Musculoskeletal: Negative  Skin: Negative  Allergic/Immunologic: Negative  Neurological: Negative  Hematological: Negative  Psychiatric/Behavioral: Negative            Current Outpatient Medications:     AMLODIPINE BESYLATE PO, Take 2 5 mg by mouth daily , Disp: , Rfl:     aspirin 81 MG tablet, Take 81 mg by mouth daily, Disp: , Rfl:     atenolol (TENORMIN) 25 mg tablet, Take 1 tablet (25 mg total) by mouth 2 (two) times a day, Disp: 180 tablet, Rfl: 3    bimatoprost (LUMIGAN) 0 01 % ophthalmic drops, Administer 1 drop to both eyes daily at bedtime, Disp: , Rfl:     brinzolamide (AZOPT) 1 % ophthalmic suspension, 1 drop 2 (two) times a day, Disp: , Rfl:     Cholecalciferol (VITAMIN D3 PO), Take 2,000 Units by mouth 2 (two) times a day, Disp: , Rfl:     diphenoxylate-atropine (LOMOTIL) 2 5-0 025 mg per tablet, Take 1 tablet by mouth 4 (four) times a day as needed for diarrhea, Disp: 120 tablet, Rfl: 4    escitalopram (LEXAPRO) 10 mg tablet, Take 1 tablet (10 mg total) by mouth daily, Disp: 90 tablet, Rfl: 3    irbesartan (AVAPRO) 300 mg tablet, Take 300 mg by mouth daily at bedtime, Disp: , Rfl:     omega-3-acid ethyl esters (LOVAZA) 1 g capsule, Take 2 g by mouth 2 (two) times a day, Disp: , Rfl:     potassium chloride (K-DUR,KLOR-CON) 20 mEq tablet, Take 1 tablet (20 mEq total) by mouth daily, Disp: 60 tablet, Rfl: 4    rosuvastatin (CRESTOR) 5 mg tablet, Take 7 5 mg by mouth daily, Disp: , Rfl:     timolol (TIMOPTIC) 0 5 % ophthalmic solution, , Disp: , Rfl:     amLODIPine (NORVASC) 5 mg tablet, , Disp: , Rfl:     hydrochlorothiazide (HYDRODIURIL) 25 mg tablet, Take 12 5 mg by mouth daily   (Patient not taking: Reported on 6/30/2021), Disp: , Rfl:     hydrocortisone (ANUSOL-HC) 25 mg suppository, Insert 1 suppository (25 mg total) into the rectum 2 (two) times a day (Patient not taking: Reported on 6/30/2021), Disp: 30 suppository, Rfl: 2    hydrocortisone (CORTENEMA) 100 mg/60 mL enema, INSERT 1 ENEMA INTO THE RECTUM NIGHTLY FOR 14 DAYS (Patient not taking: Reported on 6/30/2021), Disp: , Rfl:     predniSONE 10 mg tablet, , Disp: , Rfl:     predniSONE 10 mg tablet, Take 40 mg daily x 5 days, then decrease by 5 mg every 3 days until complete  (Patient not taking: Reported on 6/30/2021), Disp: 100 tablet, Rfl: 1    predniSONE 5 mg tablet, TAKE 8 TABLETS (40 MG) BY MOUTH EVERY DAY FOR 7 DAYS, THEN TAPER DOWN 5 MG (1 TABLET) EVERY 7 DAYS UNTIL FINISHED (Patient not taking: Reported on 6/30/2021), Disp: , Rfl:     Suprep Bowel Prep Kit 17 5-3 13-1 6 GM/177ML SOLN, DO NOT FOLLOW INSTRUCTIONS ON BOX  PLEASE FOLLOW INSTRUCTIONS PROVIDED BY PHYSICIANS OFFICE (Patient not taking: Reported on 6/30/2021), Disp: , Rfl:     triamcinolone (KENALOG) 0 1 % ointment, APPLY TO AFFECTED AREA(S) TWO TIMES DAILY FOR 2 TO 3 WEEKS (Patient not taking: Reported on 6/30/2021), Disp: , Rfl: 3    Physical Exam:  Body mass index is 25 35 kg/m²  /90   Pulse 60   Ht 5' 4 5" (1 638 m)   Wt 68 kg (150 lb)   BMI 25 35 kg/m²      Physical Exam  Constitutional:       Appearance: She is well-developed  HENT:      Head: Normocephalic  Eyes:      Pupils: Pupils are equal, round, and reactive to light  Neck:      Thyroid: No thyromegaly  Cardiovascular:      Rate and Rhythm: Normal rate  Heart sounds: Normal heart sounds  Pulmonary:      Effort: Pulmonary effort is normal       Breath sounds: Normal breath sounds  Abdominal:      General: Bowel sounds are normal       Palpations: Abdomen is soft  Musculoskeletal:         General: No deformity  Cervical back: Normal range of motion  Skin:     Capillary Refill: Capillary refill takes less than 2 seconds  Coloration: Skin is not pale  Findings: No rash  Neurological:      Mental Status: She is alert and oriented to person, place, and time           Labs:     Lab Results   Component Value Date    NYI5NOOMJSWS 2 889 08/07/2014       Lab Results   Component Value Date    CREATININE 0 66 06/07/2021    CREATININE 0 72 05/10/2021    CREATININE 0 83 04/08/2021    BUN 17 06/07/2021     (L) 07/09/2015    K 4 1 06/07/2021     (H) 06/07/2021    CO2 23 06/07/2021     eGFR Date Value Ref Range Status   06/07/2021 84 ml/min/1 73sq m Final       Lab Results   Component Value Date    ALT 19 06/07/2021    AST 8 06/07/2021    ALKPHOS 57 06/07/2021    BILITOT 0 57 07/09/2015       Lab Results   Component Value Date    CHOLESTEROL 105 03/01/2021    CHOLESTEROL 131 09/28/2020    CHOLESTEROL 157 06/02/2020     Lab Results   Component Value Date    HDL 43 03/01/2021    HDL 57 09/28/2020    HDL 83 06/02/2020     Lab Results   Component Value Date    TRIG 95 03/01/2021    TRIG 178 (H) 09/28/2020    TRIG 99 06/02/2020     No results found for: NONHDLC      Impression:  1  Hyperparathyroidism ([facility] [address] )    2  Vitamin D deficiency    3  Hyperlipidemia, unspecified hyperlipidemia type    4  Age-related osteoporosis without current pathological fracture    5  Weight loss         Plan:    Diagnoses and all orders for this visit:    Hyperparathyroidism ([facility] )  She has symptomatic primary hyperparathyroidism associated with progressive osteoporosis and worsening hypercalcemia  She has workup done in 2014 with US neck showing a Rt lower pole parathyroid nodule  Today, we discussed options and we recommended surgery after reviewing risk and benefit  Based on long hx of persistent hyperparathyroid state medical therapy has lower efficacy  She may develop hypocalcemia transiently post surgery  Last imaging was 2014  Will repeat sestamibi to re-localize  Follow up in 3 months  -     Ambulatory Referral to Otolaryngology; Future  -     NM parathyroid scan w spect; Future    Vitamin D deficiency  On replacement  Age-related osteoporosis without current pathological fracture  This is an indication for parathyroidectomy  Will aim to start treatment postop  Weight loss  -     T4, free; Future  -     TSH, 3rd generation; Future        I have spent 50 minutes with patient today in which greater than 50% of this time was spent in counseling/coordination of care  All questioned fully answered   She will call me if any problems arise  Educated/ Counseled patient on diagnostic test results, prognosis, risk vs benefit of treatment options, importance of treatment compliance, healthy life and lifestyle choices        Veverly Ant

## 2021-07-02 NOTE — TELEPHONE ENCOUNTER
Called patient to discuss symptoms- unable to leave message 
Dr Gudelia Bagley patient Hx- Ulcerative colitis, bloody diarrhea, abdominal cramping    Blood/ stool testing reviewed 2/11/20 office visit  Patient call for symptoms after her 2nd 8 week Entyvio infusion yesterday  4-5 loose bm with blood/mucous after infusion  Took lomotil and had additional 10-12 bm during the night  Cramping abdominal discomfort that is relieved after BM  Denies n/v or fever  Slight decrease appetite/ increasing fluids for hydration  Symptoms improving today  1 dose lomotil in Am- no BM today  Patient expressed concern that she did not receive tylenol/benadryl/steroid pre med with her infusion  She is aware it was verified it was not needed  She will continue to monitor symptoms and use lomotil as needed  she will call back if symptoms return and will start meslamine suppository when pharmacy receives shipment 
No pre-medications necessary for entyvio infusions   Discussed with infusion center
Patient will complete drug and antibody levels 1-2 days prior to next Entyvio infusion  She will start prescribed enemas and will call back if symptoms return  She is aware crp in normal and waiting for fecal calprotectin result  Probiotic/ fodmap recommended 
Patients GI provider:  Dr Clarissa Reed    Number to return call: ( [phone] MIGDALIA    Reason for call: Pt at infusion center and needs to have pre medication ordered, sent tiger text    Scheduled procedure/appointment date if applicable: Apt/procedure INFUSION TODAY 2-13-20
Please let her know we can check her drug level and antibody levels  She should have them drawn a day or 2 before her next infusion  If her symptoms return and no relief with enemas, we will consider steroid therapy  Waiting on fecal allie but crp now normal which is good news  She may also have IBS on top of her UC, recommend daily probiotic and trying to follow low fod map diet  Will enter entyvio order   Thank you
Pt had infusion yesterday and has bloody diarrhea since last night---please assist      Call back # [phone]
[phone]

## 2021-07-06 ENCOUNTER — TELEPHONE (OUTPATIENT)
Dept: INTERNAL MEDICINE CLINIC | Facility: CLINIC | Age: 81
End: 2021-07-06

## 2021-07-06 NOTE — TELEPHONE ENCOUNTER
Liza Howell from Dr Gerard's office and says she will forward this information to [facility] the  in charge of Dr Gerard's schedule and will give me call back

## 2021-07-06 NOTE — TELEPHONE ENCOUNTER
Daughter Walt Shepherd # [phone] called to report that her 1st appt to see Bladimir Mccray is in September and daughter wants to know if this can wait in sept or should see be seeing a different doctor until then for parathyroid sx  Please advise   She also wanted to mention that Zi Reyez past away this weekend on Saturday said he was a family friend and that you knew him

## 2021-07-06 NOTE — TELEPHONE ENCOUNTER
I will send note to mrs todd about her husbands death- call ent office and ask for assistant of dr Stas Ho- make sure dr Stas Ho knows this is wife of dr Alejandro Ayala- he will move up appt-

## 2021-07-13 ENCOUNTER — HOSPITAL ENCOUNTER (OUTPATIENT)
Dept: NUCLEAR MEDICINE | Facility: HOSPITAL | Age: 81
Discharge: HOME | End: 2021-07-13
Attending: INTERNAL MEDICINE
Payer: MEDICARE

## 2021-07-13 DIAGNOSIS — E21.3 HYPERPARATHYROIDISM (HCC): Chronic | ICD-10-CM

## 2021-07-13 PROCEDURE — G1004 CDSM NDSC: HCPCS

## 2021-07-13 PROCEDURE — A9500 TC99M SESTAMIBI: HCPCS

## 2021-07-13 PROCEDURE — 78072 PARATHYRD PLANAR W/SPECT&CT: CPT

## 2021-07-15 LAB
ALBUMIN SERPL-MCNC: 3.8 G/DL (ref 3.6–5.1)
ALBUMIN/GLOB SERPL: 1.5 (CALC) (ref 1–2.5)
ALP SERPL-CCNC: 69 U/L (ref 37–153)
ALT SERPL-CCNC: 8 U/L (ref 6–29)
AST SERPL-CCNC: 13 U/L (ref 10–35)
BASOPHILS # BLD AUTO: 84 CELLS/UL (ref 0–200)
BASOPHILS NFR BLD AUTO: 1 %
BILIRUB SERPL-MCNC: 0.5 MG/DL (ref 0.2–1.2)
BUN SERPL-MCNC: 17 MG/DL (ref 7–25)
BUN/CREAT SERPL: NORMAL (CALC) (ref 6–22)
CALCIUM SERPL-MCNC: 10.2 MG/DL (ref 8.6–10.4)
CHLORIDE SERPL-SCNC: 106 MMOL/L (ref 98–110)
CO2 SERPL-SCNC: 24 MMOL/L (ref 20–32)
CREAT SERPL-MCNC: 0.79 MG/DL (ref 0.6–0.93)
EOSINOPHIL # BLD AUTO: 504 CELLS/UL (ref 15–500)
EOSINOPHIL NFR BLD AUTO: 6 %
ERYTHROCYTE [DISTWIDTH] IN BLOOD BY AUTOMATED COUNT: 13.3 % (ref 11–15)
GLOBULIN SER CALC-MCNC: 2.5 G/DL (CALC) (ref 1.9–3.7)
GLUCOSE SERPL-MCNC: 91 MG/DL (ref 65–139)
HCT VFR BLD AUTO: 35.2 % (ref 35–45)
HGB BLD-MCNC: 11.2 G/DL (ref 11.7–15.5)
LYMPHOCYTES # BLD AUTO: 1252 CELLS/UL (ref 850–3900)
LYMPHOCYTES NFR BLD AUTO: 14.9 %
MCH RBC QN AUTO: 28.2 PG (ref 27–33)
MCHC RBC AUTO-ENTMCNC: 31.8 G/DL (ref 32–36)
MCV RBC AUTO: 88.7 FL (ref 80–100)
MONOCYTES # BLD AUTO: 823 CELLS/UL (ref 200–950)
MONOCYTES NFR BLD AUTO: 9.8 %
NEUTROPHILS # BLD AUTO: 5737 CELLS/UL (ref 1500–7800)
NEUTROPHILS NFR BLD AUTO: 68.3 %
PLATELET # BLD AUTO: 347 THOUSAND/UL (ref 140–400)
PMV BLD REES-ECKER: 10.2 FL (ref 7.5–12.5)
POTASSIUM SERPL-SCNC: 4.4 MMOL/L (ref 3.5–5.3)
PROT SERPL-MCNC: 6.3 G/DL (ref 6.1–8.1)
RBC # BLD AUTO: 3.97 MILLION/UL (ref 3.8–5.1)
SL AMB EGFR AFRICAN AMERICAN: 83 ML/MIN/1.73M2
SL AMB EGFR NON AFRICAN AMERICAN: 71 ML/MIN/1.73M2
SODIUM SERPL-SCNC: 137 MMOL/L (ref 135–146)
T4 FREE SERPL-MCNC: 1 NG/DL (ref 0.8–1.8)
TSH SERPL-ACNC: 2.07 MIU/L (ref 0.4–4.5)
WBC # BLD AUTO: 8.4 THOUSAND/UL (ref 3.8–10.8)

## 2021-07-19 ENCOUNTER — TELEPHONE (OUTPATIENT)
Dept: INTERNAL MEDICINE CLINIC | Facility: CLINIC | Age: 81
End: 2021-07-19

## 2021-07-19 ENCOUNTER — OFFICE VISIT (OUTPATIENT)
Dept: INTERNAL MEDICINE CLINIC | Facility: CLINIC | Age: 81
End: 2021-07-19
Payer: MEDICARE

## 2021-07-19 VITALS
SYSTOLIC BLOOD PRESSURE: 128 MMHG | HEIGHT: 65 IN | WEIGHT: 153.8 LBS | HEART RATE: 72 BPM | RESPIRATION RATE: 12 BRPM | BODY MASS INDEX: 25.62 KG/M2 | DIASTOLIC BLOOD PRESSURE: 78 MMHG

## 2021-07-19 DIAGNOSIS — E21.3 HYPERPARATHYROIDISM (HCC): Chronic | ICD-10-CM

## 2021-07-19 DIAGNOSIS — E83.52 HYPERCALCEMIA: ICD-10-CM

## 2021-07-19 DIAGNOSIS — E78.5 HYPERLIPIDEMIA, UNSPECIFIED HYPERLIPIDEMIA TYPE: Chronic | ICD-10-CM

## 2021-07-19 DIAGNOSIS — M81.0 AGE-RELATED OSTEOPOROSIS WITHOUT CURRENT PATHOLOGICAL FRACTURE: ICD-10-CM

## 2021-07-19 DIAGNOSIS — I10 ESSENTIAL HYPERTENSION: Primary | ICD-10-CM

## 2021-07-19 DIAGNOSIS — K51.311 ULCERATIVE RECTOSIGMOIDITIS WITH RECTAL BLEEDING (HCC): Chronic | ICD-10-CM

## 2021-07-19 PROBLEM — Z01.818 PREOPERATIVE EXAMINATION: Status: ACTIVE | Noted: 2021-07-19

## 2021-07-19 PROCEDURE — 99215 OFFICE O/P EST HI 40 MIN: CPT | Performed by: INTERNAL MEDICINE

## 2021-07-19 NOTE — PROGRESS NOTES
BMI Counseling: Body mass index is 25 99 kg/m²  The BMI is above normal  Nutrition recommendations include decreasing portion sizes and moderation in carbohydrate intake  Exercise recommendations include exercising 3-5 times per week  No pharmacotherapy was ordered  Assessment/Plan:  1  Hyperparathyroidism-prior scan suggested inferior parathyroid adenoma  Current scan suggest the same  She saw Endocrine who agreed with proceeding with surgery  She saw ENT who agreed with proceeding with surgery and this is being scheduled in the near future  She is also down for thyroid ultrasound prior to the surgery  Further therapy based on clinical course  2  General care-hold aspirin 5 days before surgery  Hold Avapro night before surgery  Take usual dose of atenolol, escitalopram morning of surgery with sips of water a  3  Osteoporosis-DXA scan just done in June 2021 shows significant disease-unable to do lumbar spine but hip shows -3 with a 10 year risk of hip fracture 32% and 10 year risk of major osteoporotic fracture 42%  At this point she will be having her parathyroid surgery  Could consider follow-up DEXA scan in 1 year after her surgery to help guide treatment but at this point apparently endocrine wants her just had the surgery and that observe effect  4  Lzuteoowd-flwmkjurvnaehp-xgyqnbpsql predisposed by significant recent medical illness with pathogenic E coli superimposed inflammatory bowel disease aggravated also by hypercalcemia  All other studies negative  Hoping to see improvement with treatment of her hypercalcemia  5  Pathogenic E coli-was hospitalized at [facility] in group this and was treated with Cipro-this has resolved  6  Inflammatory bowel disease-severe-previously seen by the [address] [place] [address] group but now switching [facility]  She failed on Entyvio-was then on maintenance Remicade  Had significant abnormalities when she had her sigmoid    Other stool studies including O and P and Clostridium difficile negative  [facility] and she will potentially be doing this in the near future  Had been on prednisone but is off that  7  Severe anxiety-aggravated by the recent social situation with her  who is now   She had responded to Lexapro and family by mistake was giving her 21 milligrams-she is stabilized I feel at this point we can decrease the dose  She will decrease to 10 milligrams  8  Hypertension-adequate control on Avapro, atenolol  Had also been on amlodipine but but this was discontinued as BP was lower at this point stable without it  Had also been on hydrochlorothiazide in this have been discontinued since BP was lower this could potentially contribute to her hypercalcemia  9  General care -vaccinations to prepare for Remicade   Hepatitis a testing showed that she was immune-  Hepatitis-B vaccine given at its dose of 1 milliliter of Engerix at 0 1 in 6 months   This was her 2nd round and patients who do not respond to 2 rounds are unlikely to respond to additional vaccination   She did have low titers before proceeding with 2nd round of vaccination   TB test he remains normal   Serology for mumps rubella and rubeola all normal    had recent hepatitis-B level showing that she was immune with positive antibody and serology for TB negative  10  Carotid stenosis -status post right carotid endarterectomy for 99% stenosis with right hemispheric CVA-she has recovered and has been stable over the last few years   Now seen at [address] [place] [address] Doppler done in 2020 shows surgery side white open left side less than 50%-needs repeat in the year which would be 2021   10  Hyperlipidemia -continue current dose of statin  12   Dyspepsia -endoscopy shows hiatal hernia with some erythema in the antrum   Biopsy read as gastric mucosa the esophagus but no metaplasia   -Therefore this does not meet the criteria for Harkins's  Tiara Mame been on an H2 blocker but is now off that  13  Hoarseness-ENT physician felt she had LPR with some erythema the glottis on exam   Previously was on a PPI as well as an H2 blocker -at this point off all meds and stable   14  Edema-felt related to her meds-amlodipine plus peripheral venous disease plus low albumin-much improved  As noted now off amlodipine a a a        MEDICAL REGIMEN:                                           Escitalopram 10 mg daily  potential Stellara dose per GI group, atenolol 25 milligrams b i d , Avapro 300 milligrams daily, baby aspirin daily Crestor 7 5 milligrams daily using 5 milligram tablets, , vitamin D3/ 4000 units a day,        Await results of surgery  Appointment over the next 3 months with prior labs  A ENT will order follow-up PTH levels  Addendum-patient had laboratory testing done on September 13th- she is having surgery in 2 weeks with Dr Ely Bradley  Free T4 TSH normal   Hepatitis-C negative  PTH remains elevated at 189 with normal up to 80  Vitamin-D therapeutic at 52  SMA shows corrected calcium high at 10 5 with serum calcium 9 9  Albumin is 3 3  HDL 58 cholesterol 131 LDL 56 triglycerides 75 and CBC normal    Addendum - patient had right inferior parathyroidectomy with hypercellular parathyroid tissue -await formal operative report -patient underwent on September the 29 right inferior parathyroidectomy -surgery in felt she had a large gland consistent with an adenoma confirmed as hypercellular parathyroid tissue  Discharge hemoglobin 10 2 PTH level prior to discharge normal as noted earlier in September it was elevated at 189 with normal up to 84   No problem-specific Assessment & Plan notes found for this encounter  Diagnoses and all orders for this visit:    Essential hypertension  -     CBC and differential; Future  -     Comprehensive metabolic panel;  Future    Hypercalcemia  -     CBC and differential; Future  - Comprehensive metabolic panel; Future    Hyperparathyroidism ([facility] [address] )    Ulcerative rectosigmoiditis with rectal bleeding (HCC)    Age-related osteoporosis without current pathological fracture    Hyperlipidemia, unspecified hyperlipidemia type          Subjective:      Patient ID: Laci King is a 79 y o  female  Unfortunately since her last visit her    She saw Endocrinology and earlier today saw ENT physician  Endocrinology agreed with diagnosis of hyperparathyroidism and recommended surgery  Surgeon hopes to proceed with surgery over the next month  He ordered a thyroid ultrasound  Parathyroid scan was repeated and abnormal   Imaging reviewed  PARATHYROID SCAN     INDICATION:  E21 3: Hyperparathyroidism, unspecified     COMPARISON:  None      TECHNIQUE:   Following the intravenous administration of 24 mCi Tc-99m Cardiolite, anterior and bilateral anterior oblique projection images of the neck and mediastinum were obtained at approximately 10 minutes post injection followed at 2 hours post   injection by static anterior and bilateral oblique projections as well as SPECT CT images in coronal, sagittal and axial projections       FINDINGS:     Early images demonstrate somewhat patchy radiotracer uptake in the thyroid gland      Delayed images demonstrate washout of thyroid gland activity  Focal radiotracer uptake noted in the right lower pole region      SPECT CT images demonstrate focal radiotracer uptake in the right lower pole region  This does extend posteriorly from the thyroid region  This is most suspicious for parathyroid adenoma      IMPRESSION:     1  Focal radiotracer uptake in the right lower pole region suspicious for parathyroid adenoma       Workstation performed: [id number]  Blank Weems also had a significantly abnormal DEXA scan which was reviewed in detail  DXA SCAN     CLINICAL HISTORY:  71-year-old female  Menopause at age 48     OTHER RISK FACTORS:  Parental history of hip fracture after minor injury  History of chronic oral prednisone therapy  Hyperparathyroidism  Ulcerative colitis      PHARMACOLOGIC THERAPY FOR OSTEOPOROSIS:  None      TECHNIQUE: Bone densitometry was performed using a Hologic [id number]  bone densitometer  Regions of interest appear properly placed        COMPARISON: There are no prior DXA studies performed on this unit for comparison       RESULTS:      LUMBAR SPINE: Not assessed because  scoliosis and generalized spondylosis result in fewer than two evaluable vertebrae        LEFT  TOTAL HIP:   BMD:  0 660  gm/cm2    T-score:  -2 3     LEFT  FEMORAL NECK:   BMD:  0 516  gm/cm2   T score: -3 0      LEFT  FOREARM:    33% RADIUS BMD:  0 544  gm/cm2  T-score:  -2 4         IMPRESSION:     1  Osteoporosis  (Based on the left femoral neck)     3  The 10 year risk of hip fracture is 32% with the 10 year risk of major osteoporotic fracture being 42% as calculated by the [facility]/WHO fracture risk assessment tool (FRAX)     3  The current NOF guidelines recommend treating patients with a T-score of -2 5 or less in the lumbar spine or hips, or in post-menopausal women and men over the age of 48 with low bone mass (osteopenia) and a FRAX 10 year risk score of >3% for hip   fracture and/or >20% for major osteoporotic fracture      4  The NOF recommends follow-up DXA in 1-2 years after initiating therapy for osteoporosis and every 2 years thereafter  More frequent evaluation is appropriate for patients with conditions associated with rapid bone loss, such as glucocorticoid   therapy  The interval between DXA screenings may be longer for individuals without major risk factors and initial T-score in the normal or upper low bone mass range         The FRAX algorithm has certain limitations:  -FRAX has not been validated in patients currently or previously treated with pharmacotherapy for osteoporosis    In such patients, clinical judgment must be exercised in interpreting FRAX scores  -Prior hip, vertebral and humeral fragility fractures appear to confer greater risk of subsequent fracture than fractures at other sites (this is especially true for individuals with severe vertebral fractures), but quantification of this incremental   risk is not possible with FRAX  -FRAX underestimates fracture risk in patients with history of multiple fragility fractures  -FRAX may underestimate fracture risk in patients with history of frequent falls   -It is not appropriate to use FRAX to monitor treatment response         WHO CLASSIFICATION:  Normal (a T-score of -1 0 or higher)  Low bone mineral density (a T-score of less than -1 0 but higher than -2 5)  Osteoporosis (a T-score of -2 5 or less)  Severe osteoporosis (a T-score of -2 5 or less with a fragility fracture)              This patient denies any systemic symptoms  Specifically there has been no evidence of fever, night sweats, significant weight loss or significant decrease in appetite  There was some concern that her hypercalcemia could contribute to her noted mild intermittent confusion  She was told by ENT physician at this may benefit from treatment and I completely agree  She has known hypertension  BP adequately controlled on current regimen  Avoiding salt and decongestants  We reviewed what to do with her meds before surgery  She will take her usual dose of Lexapro morning of surgery with sips of water  She will hold aspirin for 5 days before surgery  She will take her usual dose of atenolol morning of surgery with sips of water  She will hold her angiotensin receptor blocker the night before surgery as she usually takes at in the evening  Most recent labs reviewed in detail     Results for orders placed or performed in visit on 07/14/21  -Comprehensive metabolic panel       Result                      Value             Ref Range           Glucose, Random             91                65 - 139 mg/*       BUN                         17                7 - 25 mg/dL        Creatinine                  0 79              0 60 - 0 93 *       eGFR Non  Ameri*     71                > OR = 60 mL*       eGFR        83                > OR = 60 mL*       SL AMB BUN/CREATININE *                       6 - 22 (calc)   NOT APPLICABLE       Sodium                      137               135 - 146 mm*       Potassium                   4 4               3 5 - 5 3 mm*       Chloride                    106               98 - 110 mmo*       CO2                         24                20 - 32 mmol*       Calcium                     10 2              8 6 - 10 4 m*       Protein, Total              6 3               6 1 - 8 1 g/*       Albumin                     3 8               3 6 - 5 1 g/*       Globulin                    2 5               1 9 - 3 7 g/*       Albumin/Globulin Ratio      1 5               1 0 - 2 5 (c*       TOTAL BILIRUBIN             0 5               0 2 - 1 2 mg*       Alkaline Phosphatase        69                37 - 153 U/L        AST                         13                10 - 35 U/L         ALT                         8                 6 - 29 U/L     -CBC and differential       Result                      Value             Ref Range           White Blood Cell Count      8 4               3 8 - 10 8 T*       Red Blood Cell Count        3 97              3 80 - 5 10 *       Hemoglobin                  11 2 (L)          11 7 - 15 5 *       HCT                         35 2              35 0 - 45 0 %       MCV                         88 7              80 0 - 100 0*       MCH                         28 2              27 0 - 33 0 *       MCHC                        31 8 (L)          32 0 - 36 0 *       RDW                         13 3              11 0 - 15 0 %       Platelet Count              347               140 - 400 Th*       SL AMB MPV                  10 2 7 5 - 12 5 fL       Neutrophils (Absolute)      5,737             1,500 - 7,80*       Lymphocytes (Absolute)      1,252             850 - 3,900 *       Monocytes (Absolute)        823               200 - 950 ce*       Eosinophils (Absolute)      504 (H)           15 - 500 karl*       Basophils ABS               84                0 - 200 cell*       Neutrophils                 68 3              %                   Lymphocytes                 14 9              %                   Monocytes                   9 8               %                   Eosinophils                 6 0               %                   Basophils PCT               1 0               %              -T4, free       Result                      Value             Ref Range           Free t4                     1 0               0 8 - 1 8 ng*  -TSH, 3rd generation       Result                      Value             Ref Range           TSH                         2 07              0 40 - 4 50 *    A a we reviewed the status of her inflammatory bowel disease  She is off prednisone  She has not yet been started on therapy with Stelara and is hoping to do that in the future  They are trying to workup the pros and cons of coverage  She also has follow-up appointment scheduled with the [facility] in reference to her GI status  She was accompanied to the visit by her daughter  The patient is very appreciable all the support she is received from her family  This patient has a known history of vascular disease  We discussed again optimal control of risk factors including hyperlipidemia, hypertension, and diabetes mellitus  This patient denies any episodes of weakness of one arm or leg compared to the other, numbness of one arm or leg compared to the other, blurred or double vision, or difficulty with speech  This patient denies any claudication symptoms of arms or legs  This patient denies any chest pain or pressure with activity   There has been no recent evidence of abrupt onset of back pain to suggest potential abdominal aortic aneurysm  We also again reviewed contributing factors of atherosclerosis-including tobacco abuse, hypertension, diabetes mellitus, hyperlipidemia, family history and age  This patient understands the whole concept of risk factors and the need for more aggressive treatment of them as compared to patients who do not have significant underlying atherosclerosis  Her major risk factors are hypertension hyperlipidemia  Not a smoker does not have diabetes  Potentially her inflammatory chronic state can also aggravate her likelihood of vascular disease  A  This patient wanted to know their preferred analgesic agent  Because of their various comorbidities I recommended that this be acetaminophen  This patient has no history of chronic liver disease that would put them at greater risk for use of acetaminophen  This patient may use up to 500-650 mg of acetaminophen at a time and no more than 3 g a day total  Nonsteroidal anti-inflammatory agents have the potential to exacerbate hypertension, hypercoagulability, chronic renal failure, congestive heart failure, and various allergic tendencies  Because of her comorbidities we wanted use acetaminophen-this is been reviewed  She knows to avoid these the week before surgery      The following portions of the patient's history were reviewed and updated as appropriate: allergies, current medications, past family history, past medical history, past social history, past surgical history and problem list     Review of Systems   Constitutional: Positive for fatigue  HENT: Negative  Respiratory: Negative  Cardiovascular: Negative  Gastrointestinal: Positive for diarrhea  Endocrine: Negative  Genitourinary: Negative  Musculoskeletal: Negative  Skin: Negative  Neurological: Negative  Hematological: Negative  Psychiatric/Behavioral: Negative  Objective:      Ht 5' 4 5" (1 638 m)   Wt 69 8 kg (153 lb 12 8 oz)   BMI 25 99 kg/m²          Physical Exam  Vitals reviewed  Constitutional:       General: She is not in acute distress  Appearance: Normal appearance  She is not ill-appearing, toxic-appearing or diaphoretic  HENT:      Head: Normocephalic and atraumatic  Right Ear: Ear canal and external ear normal       Left Ear: Ear canal and external ear normal       Nose: Nose normal  No congestion or rhinorrhea  Mouth/Throat:      Mouth: Mucous membranes are moist       Pharynx: Oropharynx is clear  No oropharyngeal exudate or posterior oropharyngeal erythema  Eyes:      General: No scleral icterus  Right eye: No discharge  Left eye: No discharge  Extraocular Movements: Extraocular movements intact  Pupils: Pupils are equal, round, and reactive to light  Neck:      Vascular: No carotid bruit  Comments: Scar from prior carotid surgery  Cardiovascular:      Rate and Rhythm: Normal rate and regular rhythm  Pulses: Normal pulses  Heart sounds: Normal heart sounds  No murmur heard  No friction rub  No gallop  Pulmonary:      Effort: Pulmonary effort is normal  No respiratory distress  Breath sounds: Normal breath sounds  No stridor  No wheezing, rhonchi or rales  Chest:      Chest wall: No tenderness  Abdominal:      General: Abdomen is flat  Bowel sounds are normal  There is no distension  Palpations: Abdomen is soft  There is no mass  Tenderness: There is no abdominal tenderness  There is no right CVA tenderness, left CVA tenderness, guarding or rebound  Hernia: No hernia is present  Musculoskeletal:         General: No swelling, tenderness, deformity or signs of injury  Normal range of motion  Cervical back: Normal range of motion and neck supple  No rigidity  No muscular tenderness  Right lower leg: Edema present  Left lower leg: Edema present  Comments: Nonpitting edema both legs   Lymphadenopathy:      Cervical: No cervical adenopathy  Skin:     General: Skin is warm and dry  Coloration: Skin is not jaundiced or pale  Findings: No bruising, erythema, lesion or rash  Neurological:      General: No focal deficit present  Mental Status: She is alert and oriented to person, place, and time  Mental status is at baseline  Cranial Nerves: No cranial nerve deficit  Sensory: No sensory deficit  Motor: No weakness  Coordination: Coordination normal       Gait: Gait normal       Deep Tendon Reflexes: Reflexes normal    Psychiatric:         Mood and Affect: Mood normal          Behavior: Behavior normal          Thought Content:  Thought content normal          Judgment: Judgment normal

## 2021-07-19 NOTE — TELEPHONE ENCOUNTER
----- Message from Radha Archuleta MD sent at 7/19/2021  3:26 PM EDT -----  This patient was here for appointment on July 19th-she has some minimal memory issues-please call patient's daughter and tell them I forgot to remind them at the appointment that they have to mention to anesthesia when she goes for her parathyroid surgery that she had a course of prednisone over the past 2 months

## 2021-07-23 ENCOUNTER — HOSPITAL ENCOUNTER (OUTPATIENT)
Dept: ULTRASOUND IMAGING | Facility: HOSPITAL | Age: 81
Discharge: HOME | End: 2021-07-23
Attending: SPECIALIST
Payer: MEDICARE

## 2021-07-23 DIAGNOSIS — E21.3 HYPERPARATHYROIDISM (HCC): ICD-10-CM

## 2021-07-23 PROCEDURE — 76536 US EXAM OF HEAD AND NECK: CPT

## 2021-07-27 NOTE — TELEPHONE ENCOUNTER
I called Dr Gerard's office sp/w manager Ferdinand Perkins, and confirms patient was already taken care of and has been seen with Davina You on 07/19/2021 and is going to be scheduled for surgery but is not sure when

## 2021-08-03 DIAGNOSIS — I10 HYPERTENSION, UNSPECIFIED TYPE: ICD-10-CM

## 2021-08-03 DIAGNOSIS — F41.9 ANXIETY: ICD-10-CM

## 2021-08-04 ENCOUNTER — TELEPHONE (OUTPATIENT)
Dept: INTERNAL MEDICINE CLINIC | Facility: CLINIC | Age: 81
End: 2021-08-04

## 2021-08-04 RX ORDER — ATENOLOL 25 MG/1
25 TABLET ORAL 2 TIMES DAILY
Qty: 180 TABLET | Refills: 3 | Status: SHIPPED | OUTPATIENT
Start: 2021-08-04 | End: 2022-07-07

## 2021-08-04 RX ORDER — ESCITALOPRAM OXALATE 10 MG/1
TABLET ORAL
Qty: 90 TABLET | Refills: 3 | Status: SHIPPED | OUTPATIENT
Start: 2021-08-04 | End: 2022-06-20

## 2021-08-04 NOTE — TELEPHONE ENCOUNTER
WE GOT A REFILL REQUEST FOR POTASSIUM CHLORIDE 20 MEQ BUT IT'S NOT ON PT'S MEDICAL REGIMEN    I CALLED PT BUT HAD TO LEAVE A MESSAGE, ASKING HER TO CALL BACK TO LET US KNOW IF SHE'S TAKING THIS    SHE CALLED BACK ,TOLD KIRBY THAT SHE IS    IF YOU WANT HER ON IT, PLEASE LET ME KNOW SO I CAN PUT THE MEDICATION IN  AND IF YOU WANT HER ON IT, I NEED TO KNOW HOW MANY SHE TAKES AND DAY AND IT NEEDS TO BE ADDED TO HER MEDICAL REGIMEN PLEASE

## 2021-08-04 NOTE — TELEPHONE ENCOUNTER
SPOKE WITH PT, SHE DOESN'T REMEMBER WHEN OR WHY SHE STARTED TAKING THIS MEDICATION BUT YOU ARE THE ONE THAT PRESCRIBED IT   SHE TAKES 20 MEQ'S DAILY AND HAS TO DRINK A LOT OF WATER WHEN SHE TAKES IT AND SHE'S NOT ON ANY DIURETICS     PLEASE ADVISE IF YOU STILL WANT HER ON IT  SHE TOLD ME THAT SHE'D BE OK WITH NOT TAKING IT

## 2021-08-04 NOTE — TELEPHONE ENCOUNTER
It is not on my med list-I would need to know who originally prescribed it, how much she is taking and why she is taking it  It may be prescribed because of her chronic diarrhea    Also find out if she has been using any diuretics

## 2021-09-07 ENCOUNTER — TELEPHONE (OUTPATIENT)
Dept: INTERNAL MEDICINE CLINIC | Facility: CLINIC | Age: 81
End: 2021-09-07

## 2021-09-07 NOTE — TELEPHONE ENCOUNTER
Her last dose of prednisone was in May-June of this year  Because she is on stellara  she should get a 3rd dose of COVID now  She had Pfizer vaccine before and it should be another Pfizer dose    Please talk to Erma Medina  about how to do this and also remind Sofia YATES  that this patient was on the list I gave her this morning of people who need to be called about a 3rd dose -she now does not have to do that

## 2021-09-07 NOTE — TELEPHONE ENCOUNTER
Lobitoelsy Raegan called to ask when her last course of Prednisone was and if she's a candidate for the covid booster shot yet? She is on Stelara  Her 2nd pfizer vaccine was in February    Please advise     [phone]

## 2021-09-07 NOTE — TELEPHONE ENCOUNTER
Left a detailed message on emilie's phone and I told her I would speak with toby vallejo in the morning

## 2021-09-13 ENCOUNTER — APPOINTMENT (OUTPATIENT)
Dept: LAB | Facility: HOSPITAL | Age: 81
End: 2021-09-13
Attending: SPECIALIST
Payer: MEDICARE

## 2021-09-13 ENCOUNTER — LAB (OUTPATIENT)
Dept: LAB | Facility: HOSPITAL | Age: 81
End: 2021-09-13
Payer: MEDICARE

## 2021-09-13 DIAGNOSIS — E21.3 HYPERPARATHYROIDISM (HCC): Chronic | ICD-10-CM

## 2021-09-13 DIAGNOSIS — E21.3 HYPERPARATHYROIDISM (HCC): ICD-10-CM

## 2021-09-13 DIAGNOSIS — E83.52 HYPERCALCEMIA: ICD-10-CM

## 2021-09-13 DIAGNOSIS — I10 ESSENTIAL HYPERTENSION: ICD-10-CM

## 2021-09-13 DIAGNOSIS — R63.4 WEIGHT LOSS: ICD-10-CM

## 2021-09-13 DIAGNOSIS — E78.5 HYPERLIPIDEMIA, UNSPECIFIED HYPERLIPIDEMIA TYPE: Chronic | ICD-10-CM

## 2021-09-13 DIAGNOSIS — K52.9 INFLAMMATORY BOWEL DISEASE: ICD-10-CM

## 2021-09-13 DIAGNOSIS — I10 HYPERTENSION, UNSPECIFIED TYPE: Chronic | ICD-10-CM

## 2021-09-13 DIAGNOSIS — E55.9 VITAMIN D DEFICIENCY: ICD-10-CM

## 2021-09-13 LAB
25(OH)D3 SERPL-MCNC: 51.9 NG/ML (ref 30–100)
ALBUMIN SERPL BCP-MCNC: 3.3 G/DL (ref 3.5–5)
ALP SERPL-CCNC: 79 U/L (ref 46–116)
ALT SERPL W P-5'-P-CCNC: 17 U/L (ref 12–78)
ANION GAP SERPL CALCULATED.3IONS-SCNC: 0 MMOL/L (ref 4–13)
AST SERPL W P-5'-P-CCNC: 12 U/L (ref 5–45)
BASOPHILS # BLD AUTO: 0.07 THOUSANDS/ΜL (ref 0–0.1)
BASOPHILS NFR BLD AUTO: 1 % (ref 0–1)
BILIRUB SERPL-MCNC: 0.63 MG/DL (ref 0.2–1)
BUN SERPL-MCNC: 15 MG/DL (ref 5–25)
CALCIUM ALBUM COR SERPL-MCNC: 10.5 MG/DL (ref 8.3–10.1)
CALCIUM SERPL-MCNC: 9.9 MG/DL (ref 8.3–10.1)
CHLORIDE SERPL-SCNC: 111 MMOL/L (ref 100–108)
CHOLEST SERPL-MCNC: 131 MG/DL (ref 50–200)
CO2 SERPL-SCNC: 26 MMOL/L (ref 21–32)
CREAT SERPL-MCNC: 0.68 MG/DL (ref 0.6–1.3)
EOSINOPHIL # BLD AUTO: 0.25 THOUSAND/ΜL (ref 0–0.61)
EOSINOPHIL NFR BLD AUTO: 4 % (ref 0–6)
ERYTHROCYTE [DISTWIDTH] IN BLOOD BY AUTOMATED COUNT: 14.8 % (ref 11.6–15.1)
GFR SERPL CREATININE-BSD FRML MDRD: 83 ML/MIN/1.73SQ M
GLUCOSE P FAST SERPL-MCNC: 81 MG/DL (ref 65–99)
HCT VFR BLD AUTO: 38.4 % (ref 34.8–46.1)
HCV AB SER QL: NORMAL
HDLC SERPL-MCNC: 58 MG/DL
HGB BLD-MCNC: 12.1 G/DL (ref 11.5–15.4)
IMM GRANULOCYTES # BLD AUTO: 0.02 THOUSAND/UL (ref 0–0.2)
IMM GRANULOCYTES NFR BLD AUTO: 0 % (ref 0–2)
LDLC SERPL DIRECT ASSAY-MCNC: 56 MG/DL (ref 0–100)
LYMPHOCYTES # BLD AUTO: 1.14 THOUSANDS/ΜL (ref 0.6–4.47)
LYMPHOCYTES NFR BLD AUTO: 19 % (ref 14–44)
MCH RBC QN AUTO: 27.6 PG (ref 26.8–34.3)
MCHC RBC AUTO-ENTMCNC: 31.5 G/DL (ref 31.4–37.4)
MCV RBC AUTO: 88 FL (ref 82–98)
MONOCYTES # BLD AUTO: 0.66 THOUSAND/ΜL (ref 0.17–1.22)
MONOCYTES NFR BLD AUTO: 11 % (ref 4–12)
NEUTROPHILS # BLD AUTO: 3.8 THOUSANDS/ΜL (ref 1.85–7.62)
NEUTS SEG NFR BLD AUTO: 65 % (ref 43–75)
NRBC BLD AUTO-RTO: 0 /100 WBCS
PLATELET # BLD AUTO: 240 THOUSANDS/UL (ref 149–390)
PMV BLD AUTO: 10.4 FL (ref 8.9–12.7)
POTASSIUM SERPL-SCNC: 3.9 MMOL/L (ref 3.5–5.3)
PROT SERPL-MCNC: 7.1 G/DL (ref 6.4–8.2)
PTH-INTACT SERPL-MCNC: 189.3 PG/ML (ref 18.4–80.1)
RBC # BLD AUTO: 4.39 MILLION/UL (ref 3.81–5.12)
SODIUM SERPL-SCNC: 137 MMOL/L (ref 136–145)
T4 FREE SERPL-MCNC: 1.06 NG/DL (ref 0.76–1.46)
TRIGL SERPL-MCNC: 75 MG/DL
TSH SERPL DL<=0.05 MIU/L-ACNC: 3.36 UIU/ML (ref 0.36–3.74)
WBC # BLD AUTO: 5.94 THOUSAND/UL (ref 4.31–10.16)

## 2021-09-13 PROCEDURE — 82306 VITAMIN D 25 HYDROXY: CPT

## 2021-09-13 PROCEDURE — 84443 ASSAY THYROID STIM HORMONE: CPT

## 2021-09-13 PROCEDURE — 36415 COLL VENOUS BLD VENIPUNCTURE: CPT

## 2021-09-13 PROCEDURE — 93005 ELECTROCARDIOGRAM TRACING: CPT

## 2021-09-13 PROCEDURE — 83970 ASSAY OF PARATHORMONE: CPT

## 2021-09-13 PROCEDURE — 85025 COMPLETE CBC W/AUTO DIFF WBC: CPT

## 2021-09-13 PROCEDURE — 86803 HEPATITIS C AB TEST: CPT

## 2021-09-13 PROCEDURE — 83721 ASSAY OF BLOOD LIPOPROTEIN: CPT

## 2021-09-13 PROCEDURE — 84439 ASSAY OF FREE THYROXINE: CPT

## 2021-09-13 PROCEDURE — 80053 COMPREHEN METABOLIC PANEL: CPT

## 2021-09-13 PROCEDURE — 80061 LIPID PANEL: CPT

## 2021-09-14 ENCOUNTER — IMMUNIZATIONS (OUTPATIENT)
Dept: FAMILY MEDICINE CLINIC | Facility: HOSPITAL | Age: 81
End: 2021-09-14

## 2021-09-14 DIAGNOSIS — Z23 ENCOUNTER FOR IMMUNIZATION: Primary | ICD-10-CM

## 2021-09-14 PROCEDURE — 0001A SARS-COV-2 / COVID-19 MRNA VACCINE (PFIZER-BIONTECH) 30 MCG: CPT

## 2021-09-14 PROCEDURE — 91300 SARS-COV-2 / COVID-19 MRNA VACCINE (PFIZER-BIONTECH) 30 MCG: CPT

## 2021-09-15 LAB
ATRIAL RATE: 48 BPM
P AXIS: 56 DEGREES
PR INTERVAL: 194 MS
QRS AXIS: 43 DEGREES
QRSD INTERVAL: 86 MS
QT INTERVAL: 456 MS
QTC INTERVAL: 407 MS
T WAVE AXIS: 52 DEGREES
VENTRICULAR RATE: 48 BPM

## 2021-09-15 PROCEDURE — 93010 ELECTROCARDIOGRAM REPORT: CPT | Performed by: INTERNAL MEDICINE

## 2021-09-24 RX ORDER — USTEKINUMAB 45 MG/.5ML
INJECTION, SOLUTION SUBCUTANEOUS
COMMUNITY
Start: 2021-08-12

## 2021-09-28 ENCOUNTER — ANESTHESIA EVENT (OUTPATIENT)
Dept: PERIOP | Facility: HOSPITAL | Age: 81
End: 2021-09-28
Payer: MEDICARE

## 2021-09-29 ENCOUNTER — ANESTHESIA (OUTPATIENT)
Dept: PERIOP | Facility: HOSPITAL | Age: 81
End: 2021-09-29
Payer: MEDICARE

## 2021-09-29 ENCOUNTER — ANESTHESIA EVENT (OUTPATIENT)
Dept: PERIOP | Facility: HOSPITAL | Age: 81
End: 2021-09-29
Payer: MEDICARE

## 2021-09-29 ENCOUNTER — HOSPITAL ENCOUNTER (OUTPATIENT)
Facility: HOSPITAL | Age: 81
Setting detail: OUTPATIENT SURGERY
Discharge: HOME | End: 2021-09-30
Attending: SPECIALIST | Admitting: SPECIALIST
Payer: MEDICARE

## 2021-09-29 DIAGNOSIS — E21.3 HYPERPARATHYROIDISM (HCC): ICD-10-CM

## 2021-09-29 DIAGNOSIS — T14.8XXA HEMATOMA: Primary | ICD-10-CM

## 2021-09-29 LAB
PTH-INTACT SERPL-MCNC: 28.4 PG/ML (ref 8.2–83.5)
PTH-INTACT SERPL-MCNC: 85 PG/ML (ref 8.2–83.5)

## 2021-09-29 PROCEDURE — 10140 I&D HMTMA SEROMA/FLUID COLLJ: CPT | Performed by: SPECIALIST

## 2021-09-29 PROCEDURE — 60500 EXPLORE PARATHYROID GLANDS: CPT | Performed by: SPECIALIST

## 2021-09-29 PROCEDURE — 88305 TISSUE EXAM BY PATHOLOGIST: CPT | Performed by: PATHOLOGY

## 2021-09-29 PROCEDURE — 88331 PATH CONSLTJ SURG 1 BLK 1SPC: CPT | Performed by: PATHOLOGY

## 2021-09-29 PROCEDURE — 83970 ASSAY OF PARATHORMONE: CPT | Performed by: SPECIALIST

## 2021-09-29 RX ORDER — MAGNESIUM HYDROXIDE 1200 MG/15ML
LIQUID ORAL AS NEEDED
Status: DISCONTINUED | OUTPATIENT
Start: 2021-09-29 | End: 2021-09-29 | Stop reason: HOSPADM

## 2021-09-29 RX ORDER — GLYCOPYRROLATE 0.2 MG/ML
INJECTION INTRAMUSCULAR; INTRAVENOUS AS NEEDED
Status: DISCONTINUED | OUTPATIENT
Start: 2021-09-29 | End: 2021-09-29

## 2021-09-29 RX ORDER — ONDANSETRON 2 MG/ML
INJECTION INTRAMUSCULAR; INTRAVENOUS AS NEEDED
Status: DISCONTINUED | OUTPATIENT
Start: 2021-09-29 | End: 2021-09-29

## 2021-09-29 RX ORDER — EPHEDRINE SULFATE 50 MG/ML
INJECTION INTRAVENOUS AS NEEDED
Status: DISCONTINUED | OUTPATIENT
Start: 2021-09-29 | End: 2021-09-29

## 2021-09-29 RX ORDER — ONDANSETRON 2 MG/ML
4 INJECTION INTRAMUSCULAR; INTRAVENOUS ONCE AS NEEDED
Status: DISCONTINUED | OUTPATIENT
Start: 2021-09-29 | End: 2021-09-29 | Stop reason: HOSPADM

## 2021-09-29 RX ORDER — DEXAMETHASONE SODIUM PHOSPHATE 10 MG/ML
INJECTION, SOLUTION INTRAMUSCULAR; INTRAVENOUS AS NEEDED
Status: DISCONTINUED | OUTPATIENT
Start: 2021-09-29 | End: 2021-09-29

## 2021-09-29 RX ORDER — SODIUM CHLORIDE, SODIUM LACTATE, POTASSIUM CHLORIDE, CALCIUM CHLORIDE 600; 310; 30; 20 MG/100ML; MG/100ML; MG/100ML; MG/100ML
INJECTION, SOLUTION INTRAVENOUS CONTINUOUS PRN
Status: DISCONTINUED | OUTPATIENT
Start: 2021-09-29 | End: 2021-09-29

## 2021-09-29 RX ORDER — LIDOCAINE HYDROCHLORIDE 10 MG/ML
INJECTION, SOLUTION EPIDURAL; INFILTRATION; INTRACAUDAL; PERINEURAL AS NEEDED
Status: DISCONTINUED | OUTPATIENT
Start: 2021-09-29 | End: 2021-09-29

## 2021-09-29 RX ORDER — ACETAMINOPHEN 325 MG/1
650 TABLET ORAL EVERY 6 HOURS PRN
Status: DISCONTINUED | OUTPATIENT
Start: 2021-09-29 | End: 2021-09-30 | Stop reason: HOSPADM

## 2021-09-29 RX ORDER — ROCURONIUM BROMIDE 10 MG/ML
INJECTION, SOLUTION INTRAVENOUS AS NEEDED
Status: DISCONTINUED | OUTPATIENT
Start: 2021-09-29 | End: 2021-09-29

## 2021-09-29 RX ORDER — HYDROMORPHONE HCL/PF 1 MG/ML
0.5 SYRINGE (ML) INJECTION
Status: DISCONTINUED | OUTPATIENT
Start: 2021-09-29 | End: 2021-09-29 | Stop reason: HOSPADM

## 2021-09-29 RX ORDER — ACETAMINOPHEN 325 MG/1
TABLET ORAL
Qty: 30 TABLET | Refills: 0
Start: 2021-09-29 | End: 2022-06-03

## 2021-09-29 RX ORDER — PROPOFOL 10 MG/ML
INJECTION, EMULSION INTRAVENOUS AS NEEDED
Status: DISCONTINUED | OUTPATIENT
Start: 2021-09-29 | End: 2021-09-29

## 2021-09-29 RX ORDER — SODIUM CHLORIDE, SODIUM LACTATE, POTASSIUM CHLORIDE, CALCIUM CHLORIDE 600; 310; 30; 20 MG/100ML; MG/100ML; MG/100ML; MG/100ML
50 INJECTION, SOLUTION INTRAVENOUS CONTINUOUS
Status: DISCONTINUED | OUTPATIENT
Start: 2021-09-29 | End: 2021-09-30 | Stop reason: HOSPADM

## 2021-09-29 RX ORDER — PROPOFOL 10 MG/ML
INJECTION, EMULSION INTRAVENOUS CONTINUOUS PRN
Status: DISCONTINUED | OUTPATIENT
Start: 2021-09-29 | End: 2021-09-29

## 2021-09-29 RX ORDER — OXYCODONE HYDROCHLORIDE 5 MG/1
5 TABLET ORAL EVERY 6 HOURS PRN
Qty: 6 TABLET | Refills: 0 | Status: SHIPPED | OUTPATIENT
Start: 2021-09-29 | End: 2022-03-02 | Stop reason: HOSPADM

## 2021-09-29 RX ORDER — FENTANYL CITRATE 50 UG/ML
INJECTION, SOLUTION INTRAMUSCULAR; INTRAVENOUS AS NEEDED
Status: DISCONTINUED | OUTPATIENT
Start: 2021-09-29 | End: 2021-09-29

## 2021-09-29 RX ORDER — ALBUMIN, HUMAN INJ 5% 5 %
SOLUTION INTRAVENOUS CONTINUOUS PRN
Status: DISCONTINUED | OUTPATIENT
Start: 2021-09-29 | End: 2021-09-29

## 2021-09-29 RX ORDER — SUCCINYLCHOLINE/SOD CL,ISO/PF 100 MG/5ML
SYRINGE (ML) INTRAVENOUS AS NEEDED
Status: DISCONTINUED | OUTPATIENT
Start: 2021-09-29 | End: 2021-09-29

## 2021-09-29 RX ORDER — OXYCODONE HYDROCHLORIDE 5 MG/1
5 TABLET ORAL EVERY 4 HOURS PRN
Status: DISCONTINUED | OUTPATIENT
Start: 2021-09-29 | End: 2021-09-30 | Stop reason: HOSPADM

## 2021-09-29 RX ORDER — FENTANYL CITRATE/PF 50 MCG/ML
50 SYRINGE (ML) INJECTION
Status: DISCONTINUED | OUTPATIENT
Start: 2021-09-29 | End: 2021-09-29 | Stop reason: HOSPADM

## 2021-09-29 RX ORDER — MIDAZOLAM HYDROCHLORIDE 2 MG/2ML
INJECTION, SOLUTION INTRAMUSCULAR; INTRAVENOUS AS NEEDED
Status: DISCONTINUED | OUTPATIENT
Start: 2021-09-29 | End: 2021-09-29

## 2021-09-29 RX ADMIN — LIDOCAINE HYDROCHLORIDE 30 MG: 10 INJECTION, SOLUTION EPIDURAL; INFILTRATION; INTRACAUDAL; PERINEURAL at 11:41

## 2021-09-29 RX ADMIN — GLYCOPYRROLATE 0.2 MG: 0.2 INJECTION, SOLUTION INTRAMUSCULAR; INTRAVENOUS at 12:09

## 2021-09-29 RX ADMIN — GLYCOPYRROLATE 0.2 MG: 0.2 INJECTION, SOLUTION INTRAMUSCULAR; INTRAVENOUS at 15:22

## 2021-09-29 RX ADMIN — FENTANYL CITRATE 25 MCG: 50 INJECTION, SOLUTION INTRAMUSCULAR; INTRAVENOUS at 13:14

## 2021-09-29 RX ADMIN — PROPOFOL 200 MG: 10 INJECTION, EMULSION INTRAVENOUS at 15:13

## 2021-09-29 RX ADMIN — ONDANSETRON 4 MG: 2 INJECTION INTRAMUSCULAR; INTRAVENOUS at 15:20

## 2021-09-29 RX ADMIN — FENTANYL CITRATE 50 MCG: 50 INJECTION, SOLUTION INTRAMUSCULAR; INTRAVENOUS at 15:13

## 2021-09-29 RX ADMIN — DEXAMETHASONE SODIUM PHOSPHATE 10 MG: 10 INJECTION, SOLUTION INTRAMUSCULAR; INTRAVENOUS at 12:00

## 2021-09-29 RX ADMIN — EPHEDRINE SULFATE 10 MG: 50 INJECTION, SOLUTION INTRAVENOUS at 15:22

## 2021-09-29 RX ADMIN — SUGAMMADEX 200 MG: 100 INJECTION, SOLUTION INTRAVENOUS at 13:03

## 2021-09-29 RX ADMIN — ONDANSETRON 4 MG: 2 INJECTION INTRAMUSCULAR; INTRAVENOUS at 12:09

## 2021-09-29 RX ADMIN — LIDOCAINE HYDROCHLORIDE 50 MG: 10 INJECTION, SOLUTION EPIDURAL; INFILTRATION; INTRACAUDAL at 15:13

## 2021-09-29 RX ADMIN — SODIUM CHLORIDE, SODIUM LACTATE, POTASSIUM CHLORIDE, AND CALCIUM CHLORIDE: .6; .31; .03; .02 INJECTION, SOLUTION INTRAVENOUS at 11:37

## 2021-09-29 RX ADMIN — ROCURONIUM BROMIDE 10 MG: 10 INJECTION, SOLUTION INTRAVENOUS at 12:34

## 2021-09-29 RX ADMIN — ROCURONIUM BROMIDE 50 MG: 10 INJECTION, SOLUTION INTRAVENOUS at 11:41

## 2021-09-29 RX ADMIN — PHENYLEPHRINE HYDROCHLORIDE 30 MCG/MIN: 10 INJECTION INTRAVENOUS at 15:22

## 2021-09-29 RX ADMIN — MIDAZOLAM HYDROCHLORIDE 1 MG: 1 INJECTION, SOLUTION INTRAMUSCULAR; INTRAVENOUS at 11:37

## 2021-09-29 RX ADMIN — FENTANYL CITRATE 25 MCG: 50 INJECTION, SOLUTION INTRAMUSCULAR; INTRAVENOUS at 11:41

## 2021-09-29 RX ADMIN — Medication 100 MG: at 15:13

## 2021-09-29 RX ADMIN — PHENYLEPHRINE HYDROCHLORIDE 30 MCG/MIN: 10 INJECTION INTRAVENOUS at 12:18

## 2021-09-29 RX ADMIN — ALBUMIN (HUMAN): 12.5 INJECTION, SOLUTION INTRAVENOUS at 12:15

## 2021-09-29 RX ADMIN — PROPOFOL 40 MG: 10 INJECTION, EMULSION INTRAVENOUS at 12:03

## 2021-09-29 RX ADMIN — EPHEDRINE SULFATE 10 MG: 50 INJECTION, SOLUTION INTRAVENOUS at 12:17

## 2021-09-29 RX ADMIN — FENTANYL CITRATE 50 MCG: 50 INJECTION, SOLUTION INTRAMUSCULAR; INTRAVENOUS at 12:03

## 2021-09-29 RX ADMIN — SODIUM CHLORIDE, SODIUM LACTATE, POTASSIUM CHLORIDE, AND CALCIUM CHLORIDE: .6; .31; .03; .02 INJECTION, SOLUTION INTRAVENOUS at 11:32

## 2021-09-29 RX ADMIN — PROPOFOL 100 MG: 10 INJECTION, EMULSION INTRAVENOUS at 11:41

## 2021-09-29 RX ADMIN — FENTANYL CITRATE 25 MCG: 50 INJECTION, SOLUTION INTRAMUSCULAR; INTRAVENOUS at 12:00

## 2021-09-29 RX ADMIN — PROPOFOL 100 MCG/KG/MIN: 10 INJECTION, EMULSION INTRAVENOUS at 11:43

## 2021-09-29 NOTE — ANESTHESIA POSTPROCEDURE EVALUATION
Post-Op Assessment Note    CV Status:  Stable  Pain Score: 0    Pain management: adequate     Mental Status:  Awake and alert   Hydration Status:  Euvolemic   PONV Controlled:  Controlled   Airway Patency:  Patent and adequate      Post Op Vitals Reviewed: Yes      Staff: CRNA         No complications documented      BP   185/81   Temp  97   Pulse 76   Resp 20   SpO2 99

## 2021-09-29 NOTE — ANESTHESIA PREPROCEDURE EVALUATION
Procedure:  RIGHT INFERIOR PARATHYROIDECTOMY, POSSIBLE 4 GLAND EXPLORATION (INTRAOP PTH) (Right Neck)    Relevant Problems   CARDIO   (+) Hyperlipidemia   (+) Hypertension      ENDO   (+) Hyperparathyroidism (HCC)      GI/HEPATIC   (+) Esophageal reflux      NEURO/PSYCH   (+) Anxiety        Physical Exam    Airway    Mallampati score: I  TM Distance: >3 FB  Neck ROM: full     Dental   No notable dental hx     Cardiovascular      Pulmonary      Other Findings        Anesthesia Plan  ASA Score- 3     Anesthesia Type- general with ASA Monitors  Additional Monitors: arterial line  Airway Plan: ETT  Plan Factors-Exercise tolerance (METS): >4 METS  Chart reviewed  EKG reviewed  Existing labs reviewed  Patient summary reviewed  Patient is not a current smoker  Induction- intravenous  Postoperative Plan- Plan for postoperative opioid use  Informed Consent- Anesthetic plan and risks discussed with patient  I personally reviewed this patient with the CRNA  Discussed and agreed on the Anesthesia Plan with the CRNA  Meli Adame

## 2021-09-29 NOTE — ANESTHESIA PREPROCEDURE EVALUATION
Procedure:  EVACUATION/ DRAINAGE HEMATOMA (N/A Neck)    Relevant Problems   CARDIO   (+) Hyperlipidemia   (+) Hypertension      ENDO   (+) Hyperparathyroidism (HCC)      GI/HEPATIC   (+) Esophageal reflux      NEURO/PSYCH   (+) Anxiety        Physical Exam    Airway    Mallampati score: I  TM Distance: >3 FB  Neck ROM: full     Dental   No notable dental hx     Cardiovascular      Pulmonary      Other Findings        Anesthesia Plan  ASA Score- 3 Emergent    Anesthesia Type- general with ASA Monitors  Additional Monitors:   Airway Plan: ETT  Plan Factors-Exercise tolerance (METS): >4 METS  Chart reviewed  EKG reviewed  Existing labs reviewed  Patient summary reviewed  Induction- intravenous and rapid sequence induction  Postoperative Plan- Plan for postoperative opioid use  Informed Consent- Anesthetic plan and risks discussed with patient  I personally reviewed this patient with the CRNA  Discussed and agreed on the Anesthesia Plan with the CRNA  Sujata Gleason

## 2021-09-29 NOTE — ANESTHESIA POSTPROCEDURE EVALUATION
Post-Op Assessment Note    CV Status:  Stable  Pain Score: 0    Pain management: adequate     Mental Status:  Alert and awake   Hydration Status:  Euvolemic   PONV Controlled:  Controlled   Airway Patency:  Patent and adequate      Post Op Vitals Reviewed: Yes      Staff: CRNA         No complications documented      BP  148/65   temp  97 1   Pulse 67   Resp 20   SpO2 98

## 2021-09-29 NOTE — ADDENDUM NOTE
Addendum  created 09/29/21 1556 by Darwin Edwards CRNA    Flowsheet accepted, Intraprocedure Flowsheets edited

## 2021-09-30 VITALS
DIASTOLIC BLOOD PRESSURE: 61 MMHG | SYSTOLIC BLOOD PRESSURE: 142 MMHG | HEIGHT: 65 IN | TEMPERATURE: 98.3 F | WEIGHT: 153 LBS | RESPIRATION RATE: 16 BRPM | BODY MASS INDEX: 25.49 KG/M2 | HEART RATE: 55 BPM | OXYGEN SATURATION: 96 %

## 2021-09-30 LAB
BASOPHILS # BLD AUTO: 0.02 THOUSANDS/ΜL (ref 0–0.1)
BASOPHILS NFR BLD AUTO: 0 % (ref 0–1)
EOSINOPHIL # BLD AUTO: 0.02 THOUSAND/ΜL (ref 0–0.61)
EOSINOPHIL NFR BLD AUTO: 0 % (ref 0–6)
ERYTHROCYTE [DISTWIDTH] IN BLOOD BY AUTOMATED COUNT: 15.4 % (ref 11.6–15.1)
HCT VFR BLD AUTO: 31.9 % (ref 34.8–46.1)
HGB BLD-MCNC: 10.2 G/DL (ref 11.5–15.4)
IMM GRANULOCYTES # BLD AUTO: 0.07 THOUSAND/UL (ref 0–0.2)
IMM GRANULOCYTES NFR BLD AUTO: 1 % (ref 0–2)
LYMPHOCYTES # BLD AUTO: 1.56 THOUSANDS/ΜL (ref 0.6–4.47)
LYMPHOCYTES NFR BLD AUTO: 16 % (ref 14–44)
MCH RBC QN AUTO: 27.9 PG (ref 26.8–34.3)
MCHC RBC AUTO-ENTMCNC: 32 G/DL (ref 31.4–37.4)
MCV RBC AUTO: 87 FL (ref 82–98)
MONOCYTES # BLD AUTO: 1.24 THOUSAND/ΜL (ref 0.17–1.22)
MONOCYTES NFR BLD AUTO: 13 % (ref 4–12)
NEUTROPHILS # BLD AUTO: 7 THOUSANDS/ΜL (ref 1.85–7.62)
NEUTS SEG NFR BLD AUTO: 70 % (ref 43–75)
NRBC BLD AUTO-RTO: 0 /100 WBCS
PLATELET # BLD AUTO: 217 THOUSANDS/UL (ref 149–390)
PMV BLD AUTO: 10.2 FL (ref 8.9–12.7)
RBC # BLD AUTO: 3.66 MILLION/UL (ref 3.81–5.12)
WBC # BLD AUTO: 9.91 THOUSAND/UL (ref 4.31–10.16)

## 2021-09-30 PROCEDURE — 85025 COMPLETE CBC W/AUTO DIFF WBC: CPT | Performed by: PHYSICIAN ASSISTANT

## 2021-09-30 RX ADMIN — ACETAMINOPHEN 650 MG: 325 TABLET ORAL at 02:12

## 2021-09-30 RX ADMIN — ACETAMINOPHEN 650 MG: 325 TABLET ORAL at 14:25

## 2021-10-07 PROBLEM — D35.1 PARATHYROID ADENOMA: Status: ACTIVE | Noted: 2021-10-07

## 2021-10-13 ENCOUNTER — TELEPHONE (OUTPATIENT)
Dept: INTERNAL MEDICINE CLINIC | Facility: CLINIC | Age: 81
End: 2021-10-13

## 2021-10-13 ENCOUNTER — OFFICE VISIT (OUTPATIENT)
Dept: INTERNAL MEDICINE CLINIC | Facility: CLINIC | Age: 81
End: 2021-10-13
Payer: MEDICARE

## 2021-10-13 ENCOUNTER — HOSPITAL ENCOUNTER (OUTPATIENT)
Dept: RADIOLOGY | Age: 81
Discharge: HOME | End: 2021-10-13
Payer: MEDICARE

## 2021-10-13 VITALS
HEART RATE: 72 BPM | BODY MASS INDEX: 25.43 KG/M2 | SYSTOLIC BLOOD PRESSURE: 134 MMHG | HEIGHT: 65 IN | WEIGHT: 152.6 LBS | DIASTOLIC BLOOD PRESSURE: 82 MMHG | RESPIRATION RATE: 12 BRPM

## 2021-10-13 VITALS — WEIGHT: 152 LBS | HEIGHT: 64 IN | BODY MASS INDEX: 25.95 KG/M2

## 2021-10-13 DIAGNOSIS — Z23 ENCOUNTER FOR IMMUNIZATION: ICD-10-CM

## 2021-10-13 DIAGNOSIS — I65.23 BILATERAL CAROTID ARTERY STENOSIS: Chronic | ICD-10-CM

## 2021-10-13 DIAGNOSIS — E21.3 HYPERPARATHYROIDISM (HCC): Primary | Chronic | ICD-10-CM

## 2021-10-13 DIAGNOSIS — I10 PRIMARY HYPERTENSION: Chronic | ICD-10-CM

## 2021-10-13 DIAGNOSIS — Z79.899 LONG-TERM USE OF IMMUNOSUPPRESSANT MEDICATION: ICD-10-CM

## 2021-10-13 DIAGNOSIS — M81.0 AGE-RELATED OSTEOPOROSIS WITHOUT CURRENT PATHOLOGICAL FRACTURE: Primary | ICD-10-CM

## 2021-10-13 DIAGNOSIS — M81.0 AGE-RELATED OSTEOPOROSIS WITHOUT CURRENT PATHOLOGICAL FRACTURE: Chronic | ICD-10-CM

## 2021-10-13 DIAGNOSIS — Z12.31 ENCOUNTER FOR SCREENING MAMMOGRAM FOR MALIGNANT NEOPLASM OF BREAST: ICD-10-CM

## 2021-10-13 DIAGNOSIS — E78.5 HYPERLIPIDEMIA, UNSPECIFIED HYPERLIPIDEMIA TYPE: ICD-10-CM

## 2021-10-13 PROBLEM — Z01.818 PREOPERATIVE EXAMINATION: Status: RESOLVED | Noted: 2021-07-19 | Resolved: 2021-10-13

## 2021-10-13 PROCEDURE — 77067 SCR MAMMO BI INCL CAD: CPT

## 2021-10-13 PROCEDURE — 77063 BREAST TOMOSYNTHESIS BI: CPT

## 2021-10-13 PROCEDURE — 90662 IIV NO PRSV INCREASED AG IM: CPT

## 2021-10-13 PROCEDURE — 99214 OFFICE O/P EST MOD 30 MIN: CPT | Performed by: INTERNAL MEDICINE

## 2021-10-13 PROCEDURE — G0008 ADMIN INFLUENZA VIRUS VAC: HCPCS

## 2021-10-14 ENCOUNTER — OFFICE VISIT (OUTPATIENT)
Dept: ENDOCRINOLOGY | Facility: CLINIC | Age: 81
End: 2021-10-14
Payer: MEDICARE

## 2021-10-14 VITALS
HEIGHT: 64 IN | BODY MASS INDEX: 26.4 KG/M2 | WEIGHT: 154.6 LBS | DIASTOLIC BLOOD PRESSURE: 94 MMHG | SYSTOLIC BLOOD PRESSURE: 142 MMHG | HEART RATE: 52 BPM

## 2021-10-14 DIAGNOSIS — M81.0 AGE-RELATED OSTEOPOROSIS WITHOUT CURRENT PATHOLOGICAL FRACTURE: Chronic | ICD-10-CM

## 2021-10-14 DIAGNOSIS — E55.9 VITAMIN D DEFICIENCY: ICD-10-CM

## 2021-10-14 DIAGNOSIS — E21.3 HYPERPARATHYROIDISM (HCC): Primary | Chronic | ICD-10-CM

## 2021-10-14 PROCEDURE — 99214 OFFICE O/P EST MOD 30 MIN: CPT | Performed by: NURSE PRACTITIONER

## 2021-10-20 ENCOUNTER — APPOINTMENT (OUTPATIENT)
Dept: LAB | Age: 81
End: 2021-10-20
Payer: MEDICARE

## 2021-10-20 LAB
ALBUMIN SERPL BCP-MCNC: 3.3 G/DL (ref 3.5–5)
ALP SERPL-CCNC: 89 U/L (ref 46–116)
ALT SERPL W P-5'-P-CCNC: 16 U/L (ref 12–78)
ANION GAP SERPL CALCULATED.3IONS-SCNC: 3 MMOL/L (ref 4–13)
AST SERPL W P-5'-P-CCNC: 11 U/L (ref 5–45)
BILIRUB SERPL-MCNC: 0.35 MG/DL (ref 0.2–1)
BUN SERPL-MCNC: 15 MG/DL (ref 5–25)
CALCIUM ALBUM COR SERPL-MCNC: 9.9 MG/DL (ref 8.3–10.1)
CALCIUM SERPL-MCNC: 9.3 MG/DL (ref 8.3–10.1)
CHLORIDE SERPL-SCNC: 109 MMOL/L (ref 100–108)
CO2 SERPL-SCNC: 28 MMOL/L (ref 21–32)
CREAT SERPL-MCNC: 0.77 MG/DL (ref 0.6–1.3)
GFR SERPL CREATININE-BSD FRML MDRD: 74 ML/MIN/1.73SQ M
GLUCOSE P FAST SERPL-MCNC: 90 MG/DL (ref 65–99)
POTASSIUM SERPL-SCNC: 4.1 MMOL/L (ref 3.5–5.3)
PROT SERPL-MCNC: 7.5 G/DL (ref 6.4–8.2)
PTH-INTACT SERPL-MCNC: 106.9 PG/ML (ref 18.4–80.1)
SODIUM SERPL-SCNC: 140 MMOL/L (ref 136–145)

## 2021-10-20 PROCEDURE — 80053 COMPREHEN METABOLIC PANEL: CPT | Performed by: NURSE PRACTITIONER

## 2021-10-20 PROCEDURE — 36415 COLL VENOUS BLD VENIPUNCTURE: CPT | Performed by: NURSE PRACTITIONER

## 2021-10-20 PROCEDURE — 83970 ASSAY OF PARATHORMONE: CPT | Performed by: NURSE PRACTITIONER

## 2021-10-22 ENCOUNTER — TELEPHONE (OUTPATIENT)
Dept: ENDOCRINOLOGY | Facility: CLINIC | Age: 81
End: 2021-10-22

## 2021-10-22 DIAGNOSIS — E21.3 HYPERPARATHYROIDISM (HCC): Primary | ICD-10-CM

## 2021-10-25 ENCOUNTER — TELEPHONE (OUTPATIENT)
Dept: INTERNAL MEDICINE CLINIC | Facility: CLINIC | Age: 81
End: 2021-10-25

## 2021-10-27 DIAGNOSIS — E78.5 HYPERLIPIDEMIA, UNSPECIFIED HYPERLIPIDEMIA TYPE: Primary | ICD-10-CM

## 2021-10-28 RX ORDER — ROSUVASTATIN CALCIUM 5 MG/1
7.5 TABLET, COATED ORAL DAILY
Qty: 135 TABLET | Refills: 3 | Status: SHIPPED | OUTPATIENT
Start: 2021-10-28

## 2021-11-02 DIAGNOSIS — I10 PRIMARY HYPERTENSION: Primary | ICD-10-CM

## 2021-11-02 RX ORDER — IRBESARTAN 300 MG/1
300 TABLET ORAL
Qty: 90 TABLET | Refills: 3 | Status: SHIPPED | OUTPATIENT
Start: 2021-11-02

## 2021-11-12 DIAGNOSIS — I10 PRIMARY HYPERTENSION: Primary | ICD-10-CM

## 2021-11-12 RX ORDER — AMLODIPINE BESYLATE 5 MG/1
5 TABLET ORAL DAILY
Qty: 90 TABLET | Refills: 3 | Status: SHIPPED | OUTPATIENT
Start: 2021-11-12

## 2021-11-22 ENCOUNTER — APPOINTMENT (OUTPATIENT)
Dept: LAB | Age: 81
End: 2021-11-22
Payer: MEDICARE

## 2021-11-22 DIAGNOSIS — E21.3 HYPERPARATHYROIDISM (HCC): ICD-10-CM

## 2021-11-22 LAB
ALBUMIN SERPL BCP-MCNC: 3.3 G/DL (ref 3.5–5)
ALP SERPL-CCNC: 74 U/L (ref 46–116)
ALT SERPL W P-5'-P-CCNC: 19 U/L (ref 12–78)
ANION GAP SERPL CALCULATED.3IONS-SCNC: 5 MMOL/L (ref 4–13)
AST SERPL W P-5'-P-CCNC: 12 U/L (ref 5–45)
BILIRUB SERPL-MCNC: 0.33 MG/DL (ref 0.2–1)
BUN SERPL-MCNC: 15 MG/DL (ref 5–25)
CALCIUM ALBUM COR SERPL-MCNC: 9.5 MG/DL (ref 8.3–10.1)
CALCIUM SERPL-MCNC: 8.9 MG/DL (ref 8.3–10.1)
CHLORIDE SERPL-SCNC: 111 MMOL/L (ref 100–108)
CO2 SERPL-SCNC: 25 MMOL/L (ref 21–32)
CREAT SERPL-MCNC: 0.85 MG/DL (ref 0.6–1.3)
GFR SERPL CREATININE-BSD FRML MDRD: 65 ML/MIN/1.73SQ M
GLUCOSE P FAST SERPL-MCNC: 91 MG/DL (ref 65–99)
POTASSIUM SERPL-SCNC: 4 MMOL/L (ref 3.5–5.3)
PROT SERPL-MCNC: 7.2 G/DL (ref 6.4–8.2)
SODIUM SERPL-SCNC: 141 MMOL/L (ref 136–145)

## 2021-11-22 PROCEDURE — 36415 COLL VENOUS BLD VENIPUNCTURE: CPT

## 2021-11-22 PROCEDURE — 80053 COMPREHEN METABOLIC PANEL: CPT

## 2022-02-08 ENCOUNTER — APPOINTMENT (OUTPATIENT)
Dept: LAB | Age: 82
End: 2022-02-08
Payer: MEDICARE

## 2022-02-08 DIAGNOSIS — I10 PRIMARY HYPERTENSION: Chronic | ICD-10-CM

## 2022-02-08 DIAGNOSIS — M81.0 AGE-RELATED OSTEOPOROSIS WITHOUT CURRENT PATHOLOGICAL FRACTURE: Chronic | ICD-10-CM

## 2022-02-08 DIAGNOSIS — E21.3 HYPERPARATHYROIDISM, UNSPECIFIED (HCC): ICD-10-CM

## 2022-02-08 DIAGNOSIS — E21.3 HYPERPARATHYROIDISM (HCC): Chronic | ICD-10-CM

## 2022-02-08 DIAGNOSIS — E78.5 HYPERLIPIDEMIA, UNSPECIFIED HYPERLIPIDEMIA TYPE: ICD-10-CM

## 2022-02-08 DIAGNOSIS — D35.1 PARATHYROID ADENOMA: ICD-10-CM

## 2022-02-08 LAB
25(OH)D3 SERPL-MCNC: 43.8 NG/ML (ref 30–100)
ALBUMIN SERPL BCP-MCNC: 3.5 G/DL (ref 3.5–5)
ALP SERPL-CCNC: 74 U/L (ref 46–116)
ALT SERPL W P-5'-P-CCNC: 19 U/L (ref 12–78)
ANION GAP SERPL CALCULATED.3IONS-SCNC: 3 MMOL/L (ref 4–13)
AST SERPL W P-5'-P-CCNC: 11 U/L (ref 5–45)
BASOPHILS # BLD AUTO: 0.06 THOUSANDS/ΜL (ref 0–0.1)
BASOPHILS NFR BLD AUTO: 1 % (ref 0–1)
BILIRUB SERPL-MCNC: 0.66 MG/DL (ref 0.2–1)
BUN SERPL-MCNC: 17 MG/DL (ref 5–25)
CALCIUM SERPL-MCNC: 9.4 MG/DL (ref 8.3–10.1)
CHLORIDE SERPL-SCNC: 108 MMOL/L (ref 100–108)
CHOLEST SERPL-MCNC: 139 MG/DL
CO2 SERPL-SCNC: 27 MMOL/L (ref 21–32)
CREAT SERPL-MCNC: 0.82 MG/DL (ref 0.6–1.3)
EOSINOPHIL # BLD AUTO: 0.28 THOUSAND/ΜL (ref 0–0.61)
EOSINOPHIL NFR BLD AUTO: 4 % (ref 0–6)
ERYTHROCYTE [DISTWIDTH] IN BLOOD BY AUTOMATED COUNT: 16.2 % (ref 11.6–15.1)
GFR SERPL CREATININE-BSD FRML MDRD: 67 ML/MIN/1.73SQ M
GLUCOSE P FAST SERPL-MCNC: 87 MG/DL (ref 65–99)
HCT VFR BLD AUTO: 39.8 % (ref 34.8–46.1)
HDLC SERPL-MCNC: 59 MG/DL
HGB BLD-MCNC: 12.8 G/DL (ref 11.5–15.4)
IMM GRANULOCYTES # BLD AUTO: 0.01 THOUSAND/UL (ref 0–0.2)
IMM GRANULOCYTES NFR BLD AUTO: 0 % (ref 0–2)
LDLC SERPL DIRECT ASSAY-MCNC: 62 MG/DL (ref 0–100)
LYMPHOCYTES # BLD AUTO: 1.31 THOUSANDS/ΜL (ref 0.6–4.47)
LYMPHOCYTES NFR BLD AUTO: 19 % (ref 14–44)
MCH RBC QN AUTO: 28 PG (ref 26.8–34.3)
MCHC RBC AUTO-ENTMCNC: 32.2 G/DL (ref 31.4–37.4)
MCV RBC AUTO: 87 FL (ref 82–98)
MONOCYTES # BLD AUTO: 0.62 THOUSAND/ΜL (ref 0.17–1.22)
MONOCYTES NFR BLD AUTO: 9 % (ref 4–12)
NEUTROPHILS # BLD AUTO: 4.75 THOUSANDS/ΜL (ref 1.85–7.62)
NEUTS SEG NFR BLD AUTO: 67 % (ref 43–75)
NRBC BLD AUTO-RTO: 0 /100 WBCS
PLATELET # BLD AUTO: 248 THOUSANDS/UL (ref 149–390)
PMV BLD AUTO: 10 FL (ref 8.9–12.7)
POTASSIUM SERPL-SCNC: 4.3 MMOL/L (ref 3.5–5.3)
PROT SERPL-MCNC: 7.3 G/DL (ref 6.4–8.2)
PTH-INTACT SERPL-MCNC: 122.2 PG/ML (ref 18.4–80.1)
RBC # BLD AUTO: 4.57 MILLION/UL (ref 3.81–5.12)
SODIUM SERPL-SCNC: 138 MMOL/L (ref 136–145)
TRIGL SERPL-MCNC: 88 MG/DL
WBC # BLD AUTO: 7.03 THOUSAND/UL (ref 4.31–10.16)

## 2022-02-08 PROCEDURE — 82306 VITAMIN D 25 HYDROXY: CPT

## 2022-02-08 PROCEDURE — 83721 ASSAY OF BLOOD LIPOPROTEIN: CPT

## 2022-02-08 PROCEDURE — 80061 LIPID PANEL: CPT

## 2022-02-08 PROCEDURE — 80053 COMPREHEN METABOLIC PANEL: CPT

## 2022-02-08 PROCEDURE — 83970 ASSAY OF PARATHORMONE: CPT

## 2022-02-08 PROCEDURE — 36415 COLL VENOUS BLD VENIPUNCTURE: CPT

## 2022-02-08 PROCEDURE — 85025 COMPLETE CBC W/AUTO DIFF WBC: CPT

## 2022-02-16 ENCOUNTER — OFFICE VISIT (OUTPATIENT)
Dept: INTERNAL MEDICINE CLINIC | Facility: CLINIC | Age: 82
End: 2022-02-16
Payer: MEDICARE

## 2022-02-16 VITALS
SYSTOLIC BLOOD PRESSURE: 132 MMHG | WEIGHT: 161.2 LBS | HEART RATE: 78 BPM | RESPIRATION RATE: 12 BRPM | BODY MASS INDEX: 27.52 KG/M2 | DIASTOLIC BLOOD PRESSURE: 80 MMHG | HEIGHT: 64 IN

## 2022-02-16 DIAGNOSIS — E78.5 HYPERLIPIDEMIA, UNSPECIFIED HYPERLIPIDEMIA TYPE: ICD-10-CM

## 2022-02-16 DIAGNOSIS — E21.3 HYPERPARATHYROIDISM (HCC): Primary | ICD-10-CM

## 2022-02-16 DIAGNOSIS — I65.23 BILATERAL CAROTID ARTERY STENOSIS: ICD-10-CM

## 2022-02-16 DIAGNOSIS — M81.0 AGE-RELATED OSTEOPOROSIS WITHOUT CURRENT PATHOLOGICAL FRACTURE: Chronic | ICD-10-CM

## 2022-02-16 DIAGNOSIS — D35.1 PARATHYROID ADENOMA: ICD-10-CM

## 2022-02-16 DIAGNOSIS — K51.311 ULCERATIVE RECTOSIGMOIDITIS WITH RECTAL BLEEDING (HCC): Chronic | ICD-10-CM

## 2022-02-16 DIAGNOSIS — I10 PRIMARY HYPERTENSION: ICD-10-CM

## 2022-02-16 PROCEDURE — 99215 OFFICE O/P EST HI 40 MIN: CPT | Performed by: INTERNAL MEDICINE

## 2022-02-16 NOTE — PROGRESS NOTES
Assessment/Plan:  1  Health maintenance-reviewed the patient should receive a total of 4 doses of COVID vaccine and arrangements made today for the 4th dose  2  Hyperparathyroidism-scan consistent with inferior parathyroid adenoma  Had successful surgery with pathology and operative findings consistent with hyperparathyroidism on the basis of an adenoma-right inferior parathyroidectomy was performed  Postop PTH level was 28 but now climbing  Await opinion of endocrine  Always watching to make sure we do not have a 2nd adenoma-vitamin-D level prior to this visit therapeutic-she will be discussing with endocrine what to do with her calcium supplement she currently takes 3 doses daily  3  Osteoporosis-DXA scan just done in June 2021 shows significant disease-unable to do lumbar spine but hip shows -3 with a 10 year risk of hip fracture 32% and 10 year risk of major osteoporotic fracture 42%   As noted since then patient has had successful parathyroid surgery and will be discussing with endocrine about the timing of her next DEXA scan  4  Fcptwfiwa-xmxeujmwvmwjvo-bmvseuidsc predisposed by significant recent medical illness with pathogenic E coli superimposed inflammatory bowel disease aggravated also by hypercalcemia   All other studies negative  This is no longer an issue  5  Pathogenic E coli-was hospitalized at [facility] in group this and was treated with Cipro-this has resolved  6  Inflammatory bowel disease-severe-previously seen by the [address] [place] [address] group but now switching [facility]   She failed on Entyvio-was then on maintenance Remicade   Had significant abnormalities when she had her sigmoid  Hobert Kehr stool studies including O and P and Clostridium difficile negative    she now sees [facility] group and has been started on therapy with Stelara 90 milligrams every 8 weeks-recent attempt to use therapeutic Annam is unsuccessful as these are not tolerated    7  Severe anxiety-aggravated by the recent social situation with her  who is now   Dafne Kruse had responded to Lexapro and family by mistake was giving her 20 milligrams -prior visit she was decreased to 10 milligrams and staying stable on that-will continue 10 milligrams daily  8  Hypertension- stable on Avapro, atenolol and amlodipine   Had also been on hydrochlorothiazide in this have been discontinued since BP was lower this could potentially contribute to her hypercalcemia she will check her BP 3 to 4 times per week at home and call if more than 25 percent of her readings are over 140/90   9  General care -vaccinations done previously to prepare for Remicade   Hepatitis a testing showed that she was immune-  Hepatitis-B vaccine given at its dose of 1 milliliter of Engerix at 0 1 and 6 months   This was her 2nd round and patients who do not respond to 2 rounds are unlikely to respond to additional vaccination   She did have low titers before proceeding with 2nd round of vaccination   TB testing was negative l   Serology for mumps rubella and rubeola all normal    had relatively recent hepatitis-B level showing that she was immune with positive antibody and serology for TB negative  10  Carotid stenosis -status post right carotid endarterectomy for 99% stenosis with right hemispheric CVA-she has recovered and has been stable over the last few years   Now seen at [address] [place] [address] Doppler done in 2020 shows surgery side white open left side less than 50%-needs repeat in the year which would be 2021  scheduled for follow-up carotid Doppler today  10  Hyperlipidemia -continue current dose of statin  12  Dyspepsia -endoscopy shows hiatal hernia with some erythema in the antrum   Biopsy read as gastric mucosa the esophagus but no metaplasia   -Therefore this does not meet the criteria for Harkins's   Had been on an H2 blocker but is now off that  13   Hoarseness-ENT physician felt she had LPR with some erythema the glottis on exam   Previously was on a PPI as well as an H2 blocker -at this point off all meds and stable   14  Edema-felt related to her meds-amlodipine plus peripheral venous disease plus low albumin-much improved   -not an issue at present        MEDICAL REGIMEN:                                           Escitalopram 10 mg daily  [id number] MILLIGRAM SUBQ EVERY 8 WEEKS, atenolol 25 milligrams b i d , Avapro 300 milligrams daily, baby aspirin daily Crestor 7 5 milligrams daily using 5 milligram tablets, , vitamin D3/ 4000 units a day,  calcium supplement-3 doses daily  , amlodipine 5 milligrams p o  daily     Await opinion of endocrinology  Appointment over the next few months with prior chemistry profile cholesterol profile CBC with diff PTH level    We reviewed I am retiring  She will now be seen by Dr Rahul Chandler as her PCP    Addendum-carotid Doppler done in March 2022 shows right side wide open at her endarterectomy site without subclavian disease, left side shows less than 50% stenosis of the ICA without subclavian disease-unchanged from October 2020  No problem-specific Assessment & Plan notes found for this encounter  Diagnoses and all orders for this visit:    Hyperparathyroidism ([facility] [address] )  -     CBC and differential; Future  -     Comprehensive metabolic panel; Future  -     Triglycerides; Future  -     LDL cholesterol, direct; Future  -     HDL cholesterol; Future  -     Cholesterol, total; Future  -     PTH, intact; Future    Primary hypertension  -     CBC and differential; Future  -     Comprehensive metabolic panel; Future  -     Triglycerides; Future  -     LDL cholesterol, direct; Future  -     HDL cholesterol; Future  -     Cholesterol, total; Future  -     PTH, intact; Future    Hyperlipidemia, unspecified hyperlipidemia type  -     CBC and differential; Future  -     Comprehensive metabolic panel; Future  -     Triglycerides; Future  -     LDL cholesterol, direct;  Future  -     HDL cholesterol; Future  -     Cholesterol, total; Future  -     PTH, intact; Future    Bilateral carotid artery stenosis  -     VAS carotid complete study; Future    Age-related osteoporosis without current pathological fracture    Parathyroid adenoma    Ulcerative rectosigmoiditis with rectal bleeding (HCC)          Subjective:      Patient ID: Armando Cruz is a [de-identified] y o  female  She returns for follow-up exam   Multiple issues were addressed  She had called the office because her blood pressure was climbing amlodipine was restarted and she has been stable since  On follow-up labs she has elevated PTH-as noted initially after surgery she had significant drop and in September had evaluate 28  She is now 89  She is meeting with endocrine over the next month  We reviewed that there can occasionally be some elevation of PTH post surgery  Always wanting to make sure we do not have a 2nd adenoma here  Vitamin-D level is therapeutic  She remains on 3 calcium supplements a day since her surgery-likely the should be decreased  She is speaking with endocrine about this at her upcoming appointment      This patient denies any systemic symptoms  Specifically there has been no evidence of fever, night sweats, significant weight loss or significant decrease in appetite  We discussed her living situation  She is moving to a new area where she is having a home built of smaller size and under the auspices of an outside agency      Results for orders placed or performed in visit on 02/08/22  -CBC and differential:        Result                      Value             Ref Range           WBC                         7 03              4 31 - 10 16*       RBC                         4 57              3 81 - 5 12 *       Hemoglobin                  12 8              11 5 - 15 4 *       Hematocrit                  39 8              34 8 - 46 1 %       MCV                         87                82 - 98 fL          MCH 28 0              26 8 - 34 3 *       MCHC                        32 2              31 4 - 37 4 *       RDW                         16 2 (H)          11 6 - 15 1 %       MPV                         10 0              8 9 - 12 7 fL       Platelets                   248               149 - 390 Th*       nRBC                        0                 /100 WBCs           Neutrophils Relative        67                43 - 75 %           Immat GRANS %               0                 0 - 2 %             Lymphocytes Relative        19                14 - 44 %           Monocytes Relative          9                 4 - 12 %            Eosinophils Relative        4                 0 - 6 %             Basophils Relative          1                 0 - 1 %             Neutrophils Absolute        4 75              1 85 - 7 62 *       Immature Grans Absolute     0 01              0 00 - 0 20 *       Lymphocytes Absolute        1 31              0 60 - 4 47 *       Monocytes Absolute          0 62              0 17 - 1 22 *       Eosinophils Absolute        0 28              0 00 - 0 61 *       Basophils Absolute          0 06              0 00 - 0 10 *  -Comprehensive metabolic panel:        Result                      Value             Ref Range           Sodium                      138               136 - 145 mm*       Potassium                   4 3               3 5 - 5 3 mm*       Chloride                    108               100 - 108 mm*       CO2                         27                21 - 32 mmol*       ANION GAP                   3 (L)             4 - 13 mmol/L       BUN                         17                5 - 25 mg/dL        Creatinine                  0 82              0 60 - 1 30 *       Glucose, Fasting            87                65 - 99 mg/dL       Calcium                     9 4               8 3 - 10 1 m*       AST                         11                5 - 45 U/L          ALT 19                12 - 78 U/L         Alkaline Phosphatase        74                46 - 116 U/L        Total Protein               7 3               6 4 - 8 2 g/*       Albumin                     3 5               3 5 - 5 0 g/*       Total Bilirubin             0 66              0 20 - 1 00 *       eGFR                        67                ml/min/1 73s*  -Cholesterol, total:        Result                      Value             Ref Range           Cholesterol                 139               See Comment *  -LDL cholesterol, direct:        Result                      Value             Ref Range           LDL Direct                  62                0 - 100 mg/dl  -HDL cholesterol:        Result                      Value             Ref Range           HDL, Direct                 59                >=50 mg/dL     -Triglycerides:        Result                      Value             Ref Range           Triglycerides               88                See Comment *  -PTH, intact:        Result                      Value             Ref Range           PTH                         122 2 (H)         18 4 - 80 1 *  -Vitamin D 25 hydroxy:        Result                      Value             Ref Range           Vit D, 25-Hydroxy           43 8              30 0 - 100 0*  In December which showed inflammation from the anus to the descending colon which was mild with pseudo polyps, extensive scarring with on area of aphthous ulceration close to the anal verge-improved from prior study  Mild patchiness with relative sparing of the sigmoid compatible with overall adequate treatment effect  She was listed as mild left-sided ulcerative colitis which is significantly improved  She had a stricture in the proximal sigmoid colon which was biopsied-biopsy was read as focal active ileitis on biopsy from the terminal ileum and mildly active colitis of the descending colon  Also read as mildly active chronic proctitis    She had been started on therapeutic enemas but is unable to tolerate these and will be in touch with the GI group in reference to this  We reviewed her vaccination history  She remains he minutes suppressed  She has had 3 doses of COVID and should have her 4th dose which is due 6 months after the 3rd dose which is approximately mid 2022    She has known significant carotid stenosis with prior surgery  She is due for follow-up carotid Doppler and this was scheduled  This has been deferred previously because of her recent neck surgery for her parathyroid disease    Her spirits relatively well  As noted her  has recently   She remains on generic Lexapro 10 milligrams daily with adequate response      This patient wanted to know their preferred analgesic agent  Because of their various comorbidities I recommended that this be acetaminophen  This patient has no history of chronic liver disease that would put them at greater risk for use of acetaminophen  This patient may use up to 500-650 mg of acetaminophen at a time and no more than 3 g a day total  Nonsteroidal anti-inflammatory agents have the potential to exacerbate hypertension, hypercoagulability, chronic renal failure, congestive heart failure, and various allergic tendencies  She is aware of this and this has been reviewed previously    Most recent DEXA scan reviewed     CLINICAL HISTORY:  69-year-old female  Menopause at age 48  OTHER RISK FACTORS:  Parental history of hip fracture after minor injury  History of chronic oral prednisone therapy  Hyperparathyroidism  Ulcerative colitis      PHARMACOLOGIC THERAPY FOR OSTEOPOROSIS:  None      TECHNIQUE: Bone densitometry was performed using a Hologic QDR-4500  bone densitometer    Regions of interest appear properly placed        COMPARISON: There are no prior DXA studies performed on this unit for comparison       RESULTS:      LUMBAR SPINE: Not assessed because  scoliosis and generalized spondylosis result in fewer than two evaluable vertebrae        LEFT  TOTAL HIP:   BMD:  0 660  gm/cm2    T-score:  -2 3     LEFT  FEMORAL NECK:   BMD:  0 516  gm/cm2   T score: -3 0      LEFT  FOREARM:    33% RADIUS BMD:  0 544  gm/cm2  T-score:  -2 4         IMPRESSION:     1  Osteoporosis  (Based on the left femoral neck)     3  The 10 year risk of hip fracture is 32% with the 10 year risk of major osteoporotic fracture being 42% as calculated by the [facility]/WHO fracture risk assessment tool (FRAX)     3  The current NOF guidelines recommend treating patients with a T-score of -2 5 or less in the lumbar spine or hips, or in post-menopausal women and men over the age of 48 with low bone mass (osteopenia) and a FRAX 10 year risk score of >3% for hip   fracture and/or >20% for major osteoporotic fracture      4  The NOF recommends follow-up DXA in 1-2 years after initiating therapy for osteoporosis and every 2 years thereafter  More frequent evaluation is appropriate for patients with conditions associated with rapid bone loss, such as glucocorticoid   therapy  The interval between DXA screenings may be longer for individuals without major risk factors and initial T-score in the normal or upper low bone mass range         The FRAX algorithm has certain limitations:  -FRAX has not been validated in patients currently or previously treated with pharmacotherapy for osteoporosis  In such patients, clinical judgment must be exercised in interpreting FRAX scores  -Prior hip, vertebral and humeral fragility fractures appear to confer greater risk of subsequent fracture than fractures at other sites (this is especially true for individuals with severe vertebral fractures), but quantification of this incremental   risk is not possible with FRAX  -FRAX underestimates fracture risk in patients with history of multiple fragility fractures    -FRAX may underestimate fracture risk in patients with history of frequent falls   -It is not appropriate to use FRAX to monitor treatment response         WHO CLASSIFICATION:  Normal (a T-score of -1 0 or higher)  Low bone mineral density (a T-score of less than -1 0 but higher than -2 5)  Osteoporosis (a T-score of -2 5 or less)  Severe osteoporosis (a T-score of -2 5 or less with a fragility fracture)                  The following portions of the patient's history were reviewed and updated as appropriate: allergies, current medications, past family history, past medical history, past social history, past surgical history and problem list     Review of Systems   Constitutional: Negative  HENT: Negative  Respiratory: Negative  Cardiovascular: Negative  Gastrointestinal: Positive for anal bleeding and blood in stool  Endocrine: Negative  Genitourinary: Negative  Musculoskeletal: Negative  Skin: Negative  Neurological: Negative  Hematological: Negative  Psychiatric/Behavioral: Negative  Objective:      Ht 5' 4" (1 626 m)   Wt 73 1 kg (161 lb 3 2 oz)   BMI 27 67 kg/m²          Physical Exam  Vitals reviewed  Constitutional:       General: She is not in acute distress  Appearance: Normal appearance  She is not ill-appearing, toxic-appearing or diaphoretic  HENT:      Head: Normocephalic and atraumatic  Right Ear: External ear normal       Left Ear: External ear normal       Nose: Nose normal  No congestion or rhinorrhea  Mouth/Throat:      Mouth: Mucous membranes are moist       Pharynx: Oropharynx is clear  No oropharyngeal exudate or posterior oropharyngeal erythema  Eyes:      General: No scleral icterus  Right eye: No discharge  Left eye: No discharge  Extraocular Movements: Extraocular movements intact  Conjunctiva/sclera: Conjunctivae normal    Neck:      Vascular: No carotid bruit        Comments: Scar from prior endarterectomy  Cardiovascular:      Rate and Rhythm: Normal rate and regular rhythm  Pulses: Normal pulses  Heart sounds: Normal heart sounds  No murmur heard  No friction rub  No gallop  Pulmonary:      Effort: Pulmonary effort is normal  No respiratory distress  Breath sounds: Normal breath sounds  No stridor  No wheezing, rhonchi or rales  Chest:      Chest wall: No tenderness  Abdominal:      General: Abdomen is flat  Bowel sounds are normal  There is no distension  Palpations: Abdomen is soft  There is no mass  Tenderness: There is no abdominal tenderness  There is no right CVA tenderness, left CVA tenderness, guarding or rebound  Hernia: No hernia is present  Musculoskeletal:         General: No swelling, tenderness, deformity or signs of injury  Normal range of motion  Cervical back: Normal range of motion and neck supple  No rigidity  No muscular tenderness  Right lower leg: No edema  Left lower leg: No edema  Lymphadenopathy:      Cervical: No cervical adenopathy  Skin:     General: Skin is warm and dry  Coloration: Skin is not jaundiced or pale  Findings: No bruising, erythema, lesion or rash  Comments: Scar from prior bilateral breast surgery   Neurological:      General: No focal deficit present  Mental Status: She is alert and oriented to person, place, and time  Mental status is at baseline  Cranial Nerves: No cranial nerve deficit  Sensory: No sensory deficit  Motor: No weakness  Coordination: Coordination normal       Gait: Gait normal       Deep Tendon Reflexes: Reflexes normal    Psychiatric:         Mood and Affect: Mood normal          Behavior: Behavior normal          Thought Content:  Thought content normal          Judgment: Judgment normal

## 2022-03-02 ENCOUNTER — OFFICE VISIT (OUTPATIENT)
Dept: ENDOCRINOLOGY | Facility: CLINIC | Age: 82
End: 2022-03-02
Payer: MEDICARE

## 2022-03-02 VITALS
HEIGHT: 64 IN | HEART RATE: 56 BPM | BODY MASS INDEX: 27.87 KG/M2 | SYSTOLIC BLOOD PRESSURE: 130 MMHG | DIASTOLIC BLOOD PRESSURE: 70 MMHG | WEIGHT: 163.25 LBS

## 2022-03-02 DIAGNOSIS — E21.3 HYPERPARATHYROIDISM (HCC): Primary | Chronic | ICD-10-CM

## 2022-03-02 DIAGNOSIS — M81.0 AGE-RELATED OSTEOPOROSIS WITHOUT CURRENT PATHOLOGICAL FRACTURE: Chronic | ICD-10-CM

## 2022-03-02 DIAGNOSIS — D35.1 PARATHYROID ADENOMA: Chronic | ICD-10-CM

## 2022-03-02 PROCEDURE — 99214 OFFICE O/P EST MOD 30 MIN: CPT | Performed by: INTERNAL MEDICINE

## 2022-03-02 PROCEDURE — 1123F ACP DISCUSS/DSCN MKR DOCD: CPT | Performed by: INTERNAL MEDICINE

## 2022-03-02 NOTE — PROGRESS NOTES
Follow-up Patient Progress Note      CC: osteoporosis    History of Present Illness:   [de-identified] yr female with hx of PHPT for 8 years suspected 2" Rt lower pole parathyroid adenoma confirmed by sestamibi 7/13/2021 and s/p parathyroid adenoma resection 9/29/21(PTH drop from 189 to 28pg/mL), osteoporosis (DXA 6/21), GERD, Ulcerative colitis, carotid stenosis s/p CEA and HLD  Last visit was 10/14/21  She had elevated PTH 122pg/mL in context of 25OH vit D 43ng/mL and corrected calcium 9 8mg/dL  She remains asymptomatic        Patient Active Problem List   Diagnosis    Abnormal female pelvic exam    Atherosclerosis    Diverticulosis    Esophageal reflux    Glaucoma    Hoarseness    Hypercalcemia    Hyperlipidemia    Hyperparathyroidism ([address] )    Hypertension    Carotid stenosis    Age-related osteoporosis without current pathological fracture    Severe cervical dysplasia, histologically confirmed    Vitamin B 12 deficiency    Vitamin D deficiency    Ulcerative rectosigmoiditis with rectal bleeding (HCC)    Edema    Long-term use of immunosuppressant medication    Anxiety    Confusion    Diarrhea of infectious origin    Parathyroid adenoma     Past Medical History:   Diagnosis Date    Atherosclerosis     Carotid artery narrowing     Coronary artery disease     Diverticulosis     GERD (gastroesophageal reflux disease)     Glaucoma     Hypercalcemia     Hyperlipidemia     Hyperparathyroidism ([address] )     Hypertension     Osteopenia     Severe cervical dysplasia, histologically confirmed     Skin cancer     squamous cell    Thyroid nodule     Vitamin D deficiency       Past Surgical History:   Procedure Laterality Date    APPENDECTOMY      BREAST SURGERY      reduction procedure    CAROTID ENDARTARECTOMY Right     carotid thromboendarterectomy     COLONOSCOPY      EVACUATION OF HEMATOMA N/A 9/29/2021    Procedure: EVACUATION/ DRAINAGE HEMATOMA;  Surgeon: Mirian Magana MD;  [facility];  Service: ENT    EXPLORATION CAROTID ARTERY      HYSTERECTOMY      age 55    OOPHORECTOMY Bilateral     age 55    [place] COLONOSCOPY FLX DX W/COLLJ [address] PFRMD N/A 7/28/2017    Procedure: EGD AND COLONOSCOPY;  Surgeon: Robert Hernandez MD;  [facility]; Service: Colorectal    [place] EXPLORE PARATHYROID GLANDS Right 9/29/2021    Procedure: RIGHT INFERIOR PARATHYROIDECTOMY (INTRAOP PTH); Surgeon: Jimmy Larson MD;  [facility];  Service: ENT    REDUCTION MAMMAPLASTY Bilateral 1987      Family History   Problem Relation Age of Onset    Osteoporosis Mother     Osteoarthritis Mother     Arthritis Mother     Other Father         heart problem    Hypertension Father     Heart disease Father     Coronary artery disease Family     Hyperlipidemia Family     No Known Problems Sister     No Known Problems Daughter     No Known Problems Maternal Grandmother     No Known Problems Maternal Grandfather     No Known Problems Paternal Grandmother     No Known Problems Paternal Grandfather     No Known Problems Maternal Aunt     No Known Problems Maternal Aunt     No Known Problems Maternal Aunt     No Known Problems Maternal Aunt     No Known Problems Maternal Aunt     No Known Problems Maternal Aunt     No Known Problems Maternal Aunt     Stroke Paternal Aunt         Ganesh Varela    No Known Problems Paternal Aunt     No Known Problems Paternal Aunt      Social History     Tobacco Use    Smoking status: Never Smoker    Smokeless tobacco: Never Used   Substance Use Topics    Alcohol use:  Yes     Alcohol/week: 7 0 standard drinks     Types: 7 Glasses of wine per week     Comment: being a social drinker; drinks wine     No Known Allergies      Current Outpatient Medications:     amLODIPine (NORVASC) 5 mg tablet, Take 1 tablet (5 mg total) by mouth daily, Disp: 90 tablet, Rfl: 3    aspirin 81 MG tablet, Take 81 mg by mouth daily, Disp: , Rfl:     atenolol (TENORMIN) 25 mg tablet, Take 1 tablet (25 mg total) by mouth 2 (two) times a day, Disp: 180 tablet, Rfl: 3    bimatoprost (LUMIGAN) 0 01 % ophthalmic drops, Administer 1 drop to both eyes daily at bedtime, Disp: , Rfl:     brinzolamide (AZOPT) 1 % ophthalmic suspension, 1 drop 2 (two) times a day, Disp: , Rfl:     Calcium Carbonate Antacid (TUMS EXTRA STRENGTH 750 PO), Take by mouth 4 (four) times a day, Disp: , Rfl:     Cholecalciferol (VITAMIN D3 PO), Take 2,000 Units by mouth 2 (two) times a day, Disp: , Rfl:     escitalopram (LEXAPRO) 10 mg tablet, 1 p o  daily, Disp: 90 tablet, Rfl: 3    irbesartan (AVAPRO) 300 mg tablet, Take 1 tablet (300 mg total) by mouth daily at bedtime, Disp: 90 tablet, Rfl: 3    rosuvastatin (CRESTOR) 5 mg tablet, Take 1 5 tablets (7 5 mg total) by mouth daily, Disp: 135 tablet, Rfl: 3    Stelara 45 MG/0 5ML injection, q 2 months, Disp: , Rfl:     timolol (TIMOPTIC) 0 5 % ophthalmic solution, , Disp: , Rfl:     acetaminophen (TYLENOL) 325 mg tablet, 2, by mouth, every 6 hours as needed for mild to moderate pain  (Patient not taking: Reported on 3/2/2022 ), Disp: 30 tablet, Rfl: 0    oxyCODONE (ROXICODONE) 5 immediate release tablet, Take 1 tablet (5 mg total) by mouth every 6 (six) hours as needed for moderate pain or severe painMax Daily Amount: 20 mg (Patient not taking: Reported on 10/14/2021), Disp: 6 tablet, Rfl: 0    Review of Systems   Constitutional: Positive for fatigue  HENT: Negative  Eyes: Negative  Respiratory: Negative  Cardiovascular: Negative  Gastrointestinal: Negative  Endocrine: Negative  Musculoskeletal: Negative  Skin: Negative  Allergic/Immunologic: Negative  Neurological: Negative  Hematological: Negative  Psychiatric/Behavioral: Negative  Physical Exam:  Body mass index is 28 02 kg/m²    Ht 5' 4" (1 626 m)   Wt 74 kg (163 lb 4 oz)   BMI 28 02 kg/m²    Vitals:    03/02/22 1343   Weight: 74 kg (163 lb 4 oz)        Physical Exam  Constitutional:       Appearance: She is well-developed  HENT:      Head: Normocephalic  Eyes:      Pupils: Pupils are equal, round, and reactive to light  Neck:      Thyroid: No thyromegaly  Cardiovascular:      Rate and Rhythm: Normal rate  Heart sounds: Normal heart sounds  Pulmonary:      Effort: Pulmonary effort is normal       Breath sounds: Normal breath sounds  Abdominal:      General: Bowel sounds are normal       Palpations: Abdomen is soft  Musculoskeletal:         General: No deformity  Cervical back: Normal range of motion  Skin:     Capillary Refill: Capillary refill takes less than 2 seconds  Coloration: Skin is not pale  Findings: No rash  Neurological:      Mental Status: She is alert and oriented to person, place, and time  Labs:   No results found for: HGBA1C    Lab Results   Component Value Date    [id number] 3 360 09/13/2021    TSH 2 07 07/14/2021       Lab Results   Component Value Date    CREATININE 0 82 02/08/2022    CREATININE 0 85 11/22/2021    CREATININE 0 77 10/20/2021    BUN 17 02/08/2022     (L) 07/09/2015    K 4 3 02/08/2022     02/08/2022    CO2 27 02/08/2022     eGFR   Date Value Ref Range Status   02/08/2022 67 ml/min/1 73sq m Final       Lab Results   Component Value Date    ALT 19 02/08/2022    AST 11 02/08/2022    ALKPHOS 74 02/08/2022    BILITOT 0 57 07/09/2015       Lab Results   Component Value Date    CHOLESTEROL 139 02/08/2022    CHOLESTEROL 131 09/13/2021    CHOLESTEROL 105 03/01/2021     Lab Results   Component Value Date    HDL 59 02/08/2022    HDL 58 09/13/2021    HDL 43 03/01/2021     Lab Results   Component Value Date    TRIG 88 02/08/2022    TRIG 75 09/13/2021    TRIG 95 03/01/2021     No results found for: NONHDLC      Impression:  1  Hyperparathyroidism ([address] )    2  Parathyroid adenoma    3   Age-related osteoporosis without current pathological fracture         Plan:    Diagnoses and all orders for this visit:    Hyperparathyroidism ([facility] [address] )  She is post resection of Rt lower pole adenoma 9/29/21 with perioperative reduction in PTH levels  Since then she has had elevated PTH but normal serum calcium and normal vitamin D  Differential may include inadequate oral calcium intake leading to appropriate PTH elevation vs a pathologic process  As her calcium is normal, I would challenge with increased calcium intake and monitor response in 3 months with full panel of calcium metabolism  Advised to use calcium with vitamin C to improve absorption  Unclear wether UC has a role  Will consider repeat sestamibi and 24 hr urine next visit based on listed lab results  Follow up in 3 months  -     Comprehensive metabolic panel; Future  -     Phosphorus; Future  -     Vitamin D 25 hydroxy; Future  -     PTH, intact; Future  -     C-Telopeptide; Future  -     Vitamin D 1,25 dihydroxy; Future    Parathyroid adenoma    Age-related osteoporosis without current pathological fracture  Lowest T score was -3 0 Lt femoral neck in 6/2021  She will likely have residual BMD loss but reasonable to wait till next DXA before considering therapy  In meantime, advised to continue calcium, vitamin D intake, weight bearing exercise and fall prevention  I have spent 35 minutes with patient today in which greater than 50% of this time was spent in counseling/coordination of care  Discussed with the patient and all questioned fully answered  She will call me if any problems arise  Educated/ Counseled patient on diagnostic test results, prognosis, risk vs benefit of treatment options, importance of treatment compliance, healthy life and lifestyle choices        [address]

## 2022-03-10 ENCOUNTER — TELEPHONE (OUTPATIENT)
Dept: ENDOCRINOLOGY | Facility: CLINIC | Age: 82
End: 2022-03-10

## 2022-03-10 NOTE — TELEPHONE ENCOUNTER
PHYSICIAN NEXT STEPS:  Call the Patient    CHIEF COMPLAINT:  Chief Complaint/Protocol Used: Food Reactions  Onset: today      ASSESSMENT:  ? Onset: today  ? Main Symptom: redness around mouth and neck with scratching  ? Onset: today   ? Suspected Food: eggs  ? Time To Onset: 44:45 PM  ? Previous Reaction: yes, prior time eating eggs gotten a little red but not this bad  ? Asthma: n/a  ? Epinephrine: denied  ? Child's Appearance: alert and responsive  -------------------------------------------------------    DISPOSITION:  Disposition Recommendation: See PCP When Office is Open (within 3 days)  Questions that led to disposition:  ? [1] Widespread hives, itching or facial swelling within 2 hours of exposure to HIGH-RISK food (e.g., nuts, fish, shellfish, eggs) BUT [2] now > 2 hours since onset AND [3] NO serious symptoms  Patient Directed To: Unspecified  Patient Intended Action: Send a message to my doctor      CALL NOTES:  01/25/2021 at 6:10 PM by Bibi Sim  ? Father declined 3 day disposition as wanted to schedule 1 year follow up with acute appointment. Message sent to Dr. Curry's clinical pool to call the father for scheduling.    DISPOSITION OVERRIDE/PROVIDER CONSULT:  Disposition Override: N/A  Override Source: Unspecified  Consulted with PCP: No  Consulted with On-Call Physician: No    CALLER CONTACT INFO:  Name: sandeep vee (Father)  Phone 1: [phone] (Work Phone)  Phone 2: [phone] (Mobile) - Preferred  Phone 3: [phone] (Home Phone)      ENCOUNTER STARTED:  01/25/21 06:00:09 PM  ENCOUNTER ASSIGNED TO/CLOSED BY:  Bibi Sim @ 01/25/21 06:10:39 PM      -------------------------------------------------------    CARE ADVICE given per Food Reactions guideline.  SEE PCP WITHIN 3 DAYS:   * Your child needs to be examined within 2 or 3 days. Call your child's doctor during regular office hours and make an appointment. (Note: if office will be open tomorrow, tell caller to call then, not in  Patient called asking for clarification on Calcium and Vitamin C how much and how often  3 days.)  * IF PATIENT HAS NO PCP: Refer patient to an Urgent Care Center or Retail clinic. Also try to help caller find a PCP (medical home) for their child.; AVOID SUSPECTED FOOD:   * If you think your child's symptoms were triggered by a particular food, avoid it until you have seen your child's doctor.; COOL BATH FOR HIVES AND ITCHING:   * Give a cool bath for 10 minutes to relieve itching. (Caution: avoid causing a chill.)   * Rub very itchy areas with an ice cube for 10 minutes.; CALL BACK IF:    * Difficulty breathing occurs    * Severe hives or severe itching persist over 24 hours on continuous Benadryl    * Your child becomes worse      UNDERSTANDS CARE ADVICE: Yes    AGREES WITH CARE ADVICE: Yes    WILL FOLLOW CARE ADVICE: Yes    -------------------------------------------------------

## 2022-03-12 ENCOUNTER — IMMUNIZATIONS (OUTPATIENT)
Dept: FAMILY MEDICINE CLINIC | Facility: HOSPITAL | Age: 82
End: 2022-03-12

## 2022-03-12 PROCEDURE — 91305 COVID-19 PFIZER VACC TRIS-SUCROSE GRAY CAP 0.3 ML: CPT

## 2022-03-12 PROCEDURE — 0051A COVID-19 PFIZER VACC TRIS-SUCROSE GRAY CAP 0.3 ML: CPT

## 2022-03-16 ENCOUNTER — HOSPITAL ENCOUNTER (OUTPATIENT)
Dept: NON INVASIVE DIAGNOSTICS | Facility: CLINIC | Age: 82
Discharge: HOME | End: 2022-03-16
Payer: MEDICARE

## 2022-03-16 DIAGNOSIS — I65.23 BILATERAL CAROTID ARTERY STENOSIS: ICD-10-CM

## 2022-03-16 PROCEDURE — 93880 EXTRACRANIAL BILAT STUDY: CPT

## 2022-03-16 PROCEDURE — 93880 EXTRACRANIAL BILAT STUDY: CPT | Performed by: SURGERY

## 2022-03-17 ENCOUNTER — TELEPHONE (OUTPATIENT)
Dept: INTERNAL MEDICINE CLINIC | Facility: CLINIC | Age: 82
End: 2022-03-17

## 2022-03-17 NOTE — TELEPHONE ENCOUNTER
----- Message from Kris Gaming MD sent at 3/17/2022  6:06 AM EDT -----  Please call patient-carotid Doppler shows surgery side on the right wide open and on the left only mild hardening of the arteries-unchanged from October of 2020-GREAT NEWS

## 2022-03-18 ENCOUNTER — TELEPHONE (OUTPATIENT)
Dept: MULTI SPECIALTY CLINIC | Facility: CLINIC | Age: 82
End: 2022-03-18

## 2022-03-18 NOTE — TELEPHONE ENCOUNTER
----- Message from Elise Daily sent at 3/18/2022 11:46 AM EDT -----  Regarding: Lab order question  Contact: [phone]  Left a message saying she has an order for a Vitamin D level  She's not sure when she was/is supposed to have this done

## 2022-03-18 NOTE — TELEPHONE ENCOUNTER
Left message saying that she had the vitamin D drawn February 8, 2022 with her other blood work which was after the visit with Dr Apollo Martinez  I do not think she needs to get any further blood work at this time and follow-up as scheduled

## 2022-04-01 ENCOUNTER — TELEPHONE (OUTPATIENT)
Dept: INTERNAL MEDICINE CLINIC | Facility: CLINIC | Age: 82
End: 2022-04-01

## 2022-04-01 NOTE — TELEPHONE ENCOUNTER
----- Message from Lucho Jimenez MD sent at 4/1/2022  7:15 AM EDT -----  Please call patient-we received a request for refill of prescription fish oil-I thought this had been discontinued because of her diarrhea-please find out if she is still taking this

## 2022-04-01 NOTE — TELEPHONE ENCOUNTER
SPOKE WITH PT, GAVE MESSAGE   SHE SAID THAT SHE IS NOT TAKING THE FISH OIL AND DOES NOT NEED IT FILLED

## 2022-04-14 PROBLEM — Z86.39: Status: ACTIVE | Noted: 2022-04-14

## 2022-04-14 PROBLEM — Z86.018: Status: ACTIVE | Noted: 2022-04-14

## 2022-04-14 PROBLEM — H61.23 BILATERAL IMPACTED CERUMEN: Status: ACTIVE | Noted: 2022-04-14

## 2022-06-02 ENCOUNTER — APPOINTMENT (OUTPATIENT)
Dept: LAB | Age: 82
End: 2022-06-02
Payer: MEDICARE

## 2022-06-02 DIAGNOSIS — E21.3 HYPERPARATHYROIDISM (HCC): Chronic | ICD-10-CM

## 2022-06-02 DIAGNOSIS — Z86.39 HISTORY OF BENIGN NEOPLASM OF PARATHYROID GLAND: ICD-10-CM

## 2022-06-02 LAB
25(OH)D3 SERPL-MCNC: 57.6 NG/ML (ref 30–100)
ALBUMIN SERPL BCP-MCNC: 3.7 G/DL (ref 3.5–5)
ALP SERPL-CCNC: 65 U/L (ref 46–116)
ALT SERPL W P-5'-P-CCNC: 32 U/L (ref 12–78)
ANION GAP SERPL CALCULATED.3IONS-SCNC: 8 MMOL/L (ref 4–13)
AST SERPL W P-5'-P-CCNC: 19 U/L (ref 5–45)
BILIRUB SERPL-MCNC: 0.54 MG/DL (ref 0.2–1)
BUN SERPL-MCNC: 15 MG/DL (ref 5–25)
CALCIUM SERPL-MCNC: 9.4 MG/DL (ref 8.3–10.1)
CHLORIDE SERPL-SCNC: 110 MMOL/L (ref 100–108)
CO2 SERPL-SCNC: 22 MMOL/L (ref 21–32)
CREAT SERPL-MCNC: 0.69 MG/DL (ref 0.6–1.3)
GFR SERPL CREATININE-BSD FRML MDRD: 82 ML/MIN/1.73SQ M
GLUCOSE P FAST SERPL-MCNC: 91 MG/DL (ref 65–99)
PHOSPHATE SERPL-MCNC: 3 MG/DL (ref 2.3–4.1)
POTASSIUM SERPL-SCNC: 3.8 MMOL/L (ref 3.5–5.3)
PROT SERPL-MCNC: 7.5 G/DL (ref 6.4–8.2)
PTH-INTACT SERPL-MCNC: 97.8 PG/ML (ref 18.4–80.1)
SODIUM SERPL-SCNC: 140 MMOL/L (ref 136–145)

## 2022-06-02 PROCEDURE — 82652 VIT D 1 25-DIHYDROXY: CPT

## 2022-06-02 PROCEDURE — 83970 ASSAY OF PARATHORMONE: CPT

## 2022-06-02 PROCEDURE — 36415 COLL VENOUS BLD VENIPUNCTURE: CPT

## 2022-06-02 PROCEDURE — 82306 VITAMIN D 25 HYDROXY: CPT

## 2022-06-02 PROCEDURE — 82523 COLLAGEN CROSSLINKS: CPT

## 2022-06-02 PROCEDURE — 80053 COMPREHEN METABOLIC PANEL: CPT

## 2022-06-02 PROCEDURE — 84100 ASSAY OF PHOSPHORUS: CPT

## 2022-06-03 ENCOUNTER — HOSPITAL ENCOUNTER (OUTPATIENT)
Dept: NON INVASIVE DIAGNOSTICS | Facility: HOSPITAL | Age: 82
Discharge: HOME | End: 2022-06-03
Payer: MEDICARE

## 2022-06-03 ENCOUNTER — OFFICE VISIT (OUTPATIENT)
Dept: INTERNAL MEDICINE CLINIC | Facility: CLINIC | Age: 82
End: 2022-06-03
Payer: MEDICARE

## 2022-06-03 ENCOUNTER — TELEPHONE (OUTPATIENT)
Dept: INTERNAL MEDICINE CLINIC | Facility: CLINIC | Age: 82
End: 2022-06-03

## 2022-06-03 VITALS
BODY MASS INDEX: 26.46 KG/M2 | DIASTOLIC BLOOD PRESSURE: 78 MMHG | SYSTOLIC BLOOD PRESSURE: 130 MMHG | HEART RATE: 55 BPM | WEIGHT: 155 LBS | TEMPERATURE: 97 F | OXYGEN SATURATION: 98 % | RESPIRATION RATE: 18 BRPM | HEIGHT: 64 IN

## 2022-06-03 DIAGNOSIS — M79.89 PAIN AND SWELLING OF LEFT LOWER LEG: ICD-10-CM

## 2022-06-03 DIAGNOSIS — M79.662 PAIN AND SWELLING OF LEFT LOWER LEG: ICD-10-CM

## 2022-06-03 DIAGNOSIS — Z79.899 IMMUNOSUPPRESSION DUE TO DRUG THERAPY (HCC): ICD-10-CM

## 2022-06-03 DIAGNOSIS — D84.821 IMMUNOSUPPRESSION DUE TO DRUG THERAPY (HCC): ICD-10-CM

## 2022-06-03 DIAGNOSIS — M79.662 PAIN AND SWELLING OF LEFT LOWER LEG: Primary | ICD-10-CM

## 2022-06-03 DIAGNOSIS — F41.9 ANXIETY: ICD-10-CM

## 2022-06-03 DIAGNOSIS — E78.2 MIXED HYPERLIPIDEMIA: Chronic | ICD-10-CM

## 2022-06-03 DIAGNOSIS — M79.89 PAIN AND SWELLING OF LEFT LOWER LEG: Primary | ICD-10-CM

## 2022-06-03 DIAGNOSIS — Z00.00 MEDICARE ANNUAL WELLNESS VISIT, SUBSEQUENT: ICD-10-CM

## 2022-06-03 DIAGNOSIS — I10 PRIMARY HYPERTENSION: Chronic | ICD-10-CM

## 2022-06-03 PROBLEM — H61.23 BILATERAL IMPACTED CERUMEN: Status: RESOLVED | Noted: 2022-04-14 | Resolved: 2022-06-03

## 2022-06-03 PROBLEM — A09 DIARRHEA OF INFECTIOUS ORIGIN: Status: RESOLVED | Noted: 2021-05-10 | Resolved: 2022-06-03

## 2022-06-03 PROCEDURE — 93971 EXTREMITY STUDY: CPT

## 2022-06-03 PROCEDURE — 99214 OFFICE O/P EST MOD 30 MIN: CPT | Performed by: INTERNAL MEDICINE

## 2022-06-03 PROCEDURE — 93971 EXTREMITY STUDY: CPT | Performed by: SURGERY

## 2022-06-03 PROCEDURE — G0439 PPPS, SUBSEQ VISIT: HCPCS | Performed by: INTERNAL MEDICINE

## 2022-06-03 NOTE — PROGRESS NOTES
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 therapy ([facility] [address] )      Other Visit Diagnoses     Pain and swelling of left lower leg    -  Primary    s/p hitting leg on   check venous duplex to r/o VTE, scheduled for today  precautions advised    Relevant Orders    VAS lower limb venous duplex study, unilateral/limited    Medicare annual wellness visit, subsequent            BMI Counseling: Body mass index is 26 61 kg/m²  The BMI is above normal  Nutrition recommendations include moderation in carbohydrate intake  Rationale for BMI follow-up plan is due to patient being overweight or obese  Depression Screening and Follow-up Plan: Patient was screened for depression during today's encounter  They screened negative with a PHQ-2 score of 0  Preventive health issues were discussed with patient, and age appropriate screening tests were ordered as noted in patient's After Visit Summary  Personalized health advice and appropriate referrals for health education or preventive services given if needed, as noted in patient's After Visit Summary       History of Present Illness:     Patient presents for Medicare Annual Wellness visit    Patient Care Team:  Depethi Garner DO as PCP - General (Internal Medicine)  MD Dg Young MD Virgin Doffing, MD Norita Guest, MD (Colon and Rectal Surgery)  Bettye Britt MD as Endoscopist  Cam Whitten MD (Vascular Surgery)  Dilma Marrero MD (Endocrinology)     Problem List:     Patient Active Problem List   Diagnosis    Abnormal female pelvic exam    Atherosclerosis    Diverticulosis    Esophageal reflux    Glaucoma    Hoarseness    Hypercalcemia    Hyperlipidemia    Hyperparathyroidism ([facility] [address] )    Hypertension    Carotid stenosis    Age-related osteoporosis without current pathological fracture    Severe cervical dysplasia, histologically confirmed    Vitamin B 12 deficiency    Vitamin D deficiency    Ulcerative rectosigmoiditis with rectal bleeding (HCC)    Edema    Long-term use of immunosuppressant medication    Anxiety    Confusion    Parathyroid adenoma    History of benign neoplasm of parathyroid gland    Immunosuppression due to drug therapy [facility])      Past Medical and Surgical History:     Past Medical History:   Diagnosis Date    Atherosclerosis     Carotid artery narrowing     Coronary artery disease     Diverticulosis     GERD (gastroesophageal reflux disease)     Glaucoma     Hypercalcemia     Hyperlipidemia     Hyperparathyroidism ([address] )     Hypertension     Osteopenia     Severe cervical dysplasia, histologically confirmed     Skin cancer     squamous cell    Thyroid nodule     Vitamin D deficiency      Past Surgical History:   Procedure Laterality Date    APPENDECTOMY      BREAST SURGERY      reduction procedure    CAROTID ENDARTARECTOMY Right     carotid thromboendarterectomy     COLONOSCOPY      EVACUATION OF HEMATOMA N/A 9/29/2021    Procedure: EVACUATION/ DRAINAGE HEMATOMA;  Surgeon: He Salas MD;  [facility];  Service: ENT    EXPLORATION CAROTID ARTERY      HYSTERECTOMY      age 55    OOPHORECTOMY Bilateral     age 55    WI COLONOSCOPY FLX DX W/COLLJ Scott 1978 PFRMD N/A 7/28/2017    Procedure: EGD AND COLONOSCOPY;  Surgeon: Bryant Sage MD;  Location: [facility]; Service: Colorectal    WI EXPLORE PARATHYROID GLANDS Right 9/29/2021    Procedure: RIGHT INFERIOR PARATHYROIDECTOMY (INTRAOP PTH);   Surgeon: He Salas MD;  [facility];  Service: ENT    REDUCTION MAMMAPLASTY Bilateral 1987      Family History:     Family History   Problem Relation Age of Onset    Osteoporosis Mother     Osteoarthritis Mother     Arthritis Mother     Other Father         heart problem    Hypertension Father     Heart disease Father     Coronary artery disease Family     Hyperlipidemia Family     No Known Problems Sister     No Known Problems Daughter     No Known Problems Maternal Grandmother     No Known Problems Maternal Grandfather     No Known Problems Paternal Grandmother     No Known Problems Paternal Grandfather     No Known Problems Maternal Aunt     No Known Problems Maternal Aunt     No Known Problems Maternal Aunt     No Known Problems Maternal Aunt     No Known Problems Maternal Aunt     No Known Problems Maternal Aunt     No Known Problems Maternal Aunt     Stroke Paternal Aunt         Breanna Hanna    No Known Problems Paternal Aunt     No Known Problems Paternal Aunt       Social History:     Social History     Socioeconomic History    Marital status:      Spouse name: None    Number of children: None    Years of education: None    Highest education level: None   Occupational History    Occupation: Retired   Tobacco Use    Smoking status: Never Smoker    Smokeless tobacco: Never Used   Vaping Use    Vaping Use: Never used   Substance and Sexual Activity    Alcohol use:  Yes     Alcohol/week: 7 0 standard drinks     Types: 7 Glasses of wine per week     Comment: being a social drinker; drinks wine    Drug use: No    Sexual activity: Not Currently     Birth control/protection: Surgical   Other Topics Concern    None   Social History Narrative    Drinks 2 cups coffee/day    Lack of exercise     Social Determinants of Health     Financial Resource Strain: Not on file   Food Insecurity: Not on file   Transportation Needs: Not on file   Physical Activity: Not on file   Stress: Not on file   Social Connections: Not on file   Intimate Partner Violence: Not on file   Housing Stability: Not on file      Medications and Allergies:     Current Outpatient Medications   Medication Sig Dispense Refill    amLODIPine (NORVASC) 5 mg tablet Take 1 tablet (5 mg total) by mouth daily 90 tablet 3    aspirin 81 MG tablet Take 81 mg by mouth daily      atenolol (TENORMIN) 25 mg tablet Take 1 tablet (25 mg total) by mouth 2 (two) times a day 180 tablet 3    bimatoprost (LUMIGAN) 0 01 % ophthalmic drops Administer 1 drop to both eyes daily at bedtime      brinzolamide (AZOPT) 1 % ophthalmic suspension 1 drop 2 (two) times a day      Calcium Carbonate Antacid (TUMS EXTRA STRENGTH 750 PO) Take 1,500 mg by mouth 2 (two) times a day with meals Use before meals with a vitmain C tablet for improved absoprtion       Cholecalciferol (VITAMIN D3 PO) Take 2,000 Units by mouth 2 (two) times a day      escitalopram (LEXAPRO) 10 mg tablet 1 p o  daily 90 tablet 3    irbesartan (AVAPRO) 300 mg tablet Take 1 tablet (300 mg total) by mouth daily at bedtime 90 tablet 3    rosuvastatin (CRESTOR) 5 mg tablet Take 1 5 tablets (7 5 mg total) by mouth daily 135 tablet 3    Stelara 45 MG/0 5ML injection q 2 months      timolol (TIMOPTIC) 0 5 % ophthalmic solution        No current facility-administered medications for this visit       No Known Allergies   Immunizations:     Immunization History   Administered Date(s) Administered    COVID-19 PFIZER VACCINE 0 3 ML IM 01/15/2021, 02/04/2021, 09/14/2021    COVID-19 Pfizer vac (Aniket-sucrose, gray cap) 12 yr+ IM 03/12/2022    Hep B, adult 08/27/2019, 10/01/2019, 02/25/2020, 08/11/2020, 09/22/2020    INFLUENZA 10/15/2014, 11/11/2015, 11/12/2015, 10/17/2016, 10/03/2017, 10/30/2018    Influenza Quadrivalent Preservative Free 3 years and older IM 10/15/2014    Influenza Split High Dose Preservative Free IM 11/11/2015, 10/17/2016, 10/03/2017    Influenza, high dose seasonal 0 7 mL 10/30/2018, 09/22/2020, 10/13/2021    Influenza, seasonal, injectable 01/01/2001, 10/01/2011, 11/08/2012, 10/22/2013    Pneumococcal Conjugate 13-Valent 01/01/2014    Pneumococcal Polysaccharide PPV23 01/01/2010, 06/10/2020    Zoster 04/13/2010    Zoster Vaccine Recombinant 04/12/2010, 06/05/2019, 08/21/2019      Health Maintenance: Topic Date Due    Hepatitis C Screening  Completed         Topic Date Due    DTaP,Tdap,and Td Vaccines (1 - Tdap) Never done      Medicare Health Risk Assessment:     /78 (BP Location: Left arm, Patient Position: Sitting, Cuff Size: Adult)   Pulse 55   Temp (!) 97 °F (36 1 °C) (Tympanic)   Resp 18   Ht 5' 4" (1 626 m)   Wt 70 3 kg (155 lb)   SpO2 98%   BMI 26 61 kg/m²      Dorinda Christensen is here for her Subsequent Wellness visit  Health Risk Assessment:   Patient rates overall health as good  Patient feels that their physical health rating is same  Patient is very satisfied with their life  Eyesight was rated as same  Hearing was rated as same  Patient feels that their emotional and mental health rating is much better  Patients states they are never, rarely angry  Patient states they are never, rarely unusually tired/fatigued  Pain experienced in the last 7 days has been none  Patient states that she has experienced no weight loss or gain in last 6 months  Depression Screening:   PHQ-2 Score: 0      Fall Risk Screening: In the past year, patient has experienced: no history of falling in past year      Urinary Incontinence Screening:   Patient has not leaked urine accidently in the last six months  Home Safety:  Patient does not have trouble with stairs inside or outside of their home  Patient has working smoke alarms and has working carbon monoxide detector  Home safety hazards include: none  Nutrition:   Current diet is Regular  Medications:   Patient is not currently taking any over-the-counter supplements  Patient is able to manage medications  Activities of Daily Living (ADLs)/Instrumental Activities of Daily Living (IADLs):   Walk and transfer into and out of bed and chair?: Yes  Dress and groom yourself?: Yes    Bathe or shower yourself?: Yes    Feed yourself?  Yes  Do your laundry/housekeeping?: Yes  Manage your money, pay your bills and track your expenses?: Yes  Make your own meals?: Yes    Do your own shopping?: Yes    Previous Hospitalizations:   Any hospitalizations or ED visits within the last 12 months?: No      Advance Care Planning:   Living will: Yes    Advanced directive: Yes      PREVENTIVE SCREENINGS      Cardiovascular Screening:    General: Screening Not Indicated and History Lipid Disorder      Diabetes Screening:     General: Screening Current      Colorectal Cancer Screening:     General: Screening Not Indicated      Breast Cancer Screening:     General: Screening Current      Cervical Cancer Screening:    General: Screening Not Indicated      Osteoporosis Screening:    General: Screening Not Indicated and History Osteoporosis      Abdominal Aortic Aneurysm (AAA) Screening:        General: Screening Not Indicated      Lung Cancer Screening:     General: Screening Not Indicated      Hepatitis C Screening:    General: Screening Current    Screening, Brief Intervention, and Referral to Treatment (SBIRT)    Screening  Typical number of drinks in a day: 0  Typical number of drinks in a week: 0  Interpretation: Low risk drinking behavior      AUDIT-C Screenin) How often did you have a drink containing alcohol in the past year? never  2) How many drinks did you have on a typical day when you were drinking in the past year? 0  3) How often did you have 6 or more drinks on one occasion in the past year? never    AUDIT-C Score: 0  Interpretation: Score 0-2 (female): Negative screen for alcohol misuse    Single Item Drug Screening:  How often have you used an illegal drug (including marijuana) or a prescription medication for non-medical reasons in the past year? never    Single Item Drug Screen Score: 0  Interpretation: Negative screen for possible drug use disorder      Arthur Arias DO

## 2022-06-03 NOTE — TELEPHONE ENCOUNTER
----- Message from Ciro Ramos DO sent at 6/3/2022  3:02 PM EDT -----  Please let Allison Dayanara know her Ultrasound is back and negative, no clots in her leg    Thank you

## 2022-06-03 NOTE — PROGRESS NOTES
Assessment/Plan:     Diagnoses and all orders for this visit:    Pain and swelling of left lower leg  Comments:  s/p hitting leg on   check venous duplex to r/o VTE, scheduled for today  precautions advised  Orders:  -     VAS lower limb venous duplex study, unilateral/limited; Future    Primary hypertension  Comments:  BP doing well, c/w ARB/CCB/BB    Anxiety  Comments:  stable, c/w lexapro    Mixed hyperlipidemia  Comments:  taking statin and no SE, c/w rx    Immunosuppression due to drug therapy [facility])  Comments:  takes stelara for UC per her GI dr, c/w care per specialist    Medicare annual wellness visit, subsequent          Subjective:      Patient ID: Raisa Kidd is a [de-identified] y o  female  HPI    New to me, here to establish care and with c/o left leg swelling after hitting leg on   Used to see Dr Katerin Cruz as PCP  Reviewed meds with Roosevelt Zhu and briefly reviewed her past med hx  She hit her leg on  more than 1 week ago  It caused pain and has swollen since then  No fever, chills, leg redness  She is able to walk on the leg but swelling has persisted  She has varicose veins but no previous DVT or fhx VTE  She is otherwise doing well, no CP/SOB and no recent long car or plane ride  ROS otherwise negative, no other complaints      Past Medical History:   Diagnosis Date    Atherosclerosis     Carotid artery narrowing     Coronary artery disease     Diverticulosis     GERD (gastroesophageal reflux disease)     Glaucoma     Hypercalcemia     Hyperlipidemia     Hyperparathyroidism ([facility] [address] )     Hypertension     Osteopenia     Severe cervical dysplasia, histologically confirmed     Skin cancer     squamous cell    Thyroid nodule     Vitamin D deficiency      Vitals:    06/03/22 0957   BP: 130/78   BP Location: Left arm   Patient Position: Sitting   Cuff Size: Adult   Pulse: 55   Resp: 18   Temp: (!) 97 °F (36 1 °C)   TempSrc: Tympanic   SpO2: 98%   Weight: 70 3 kg (155 lb)   Height: 5' 4" (1 626 m)     Body mass index is 26 61 kg/m²  Current Outpatient Medications:     amLODIPine (NORVASC) 5 mg tablet, Take 1 tablet (5 mg total) by mouth daily, Disp: 90 tablet, Rfl: 3    aspirin 81 MG tablet, Take 81 mg by mouth daily, Disp: , Rfl:     atenolol (TENORMIN) 25 mg tablet, Take 1 tablet (25 mg total) by mouth 2 (two) times a day, Disp: 180 tablet, Rfl: 3    bimatoprost (LUMIGAN) 0 01 % ophthalmic drops, Administer 1 drop to both eyes daily at bedtime, Disp: , Rfl:     brinzolamide (AZOPT) 1 % ophthalmic suspension, 1 drop 2 (two) times a day, Disp: , Rfl:     Calcium Carbonate Antacid (TUMS EXTRA STRENGTH 750 PO), Take 1,500 mg by mouth 2 (two) times a day with meals Use before meals with a vitmain C tablet for improved absoprtion , Disp: , Rfl:     Cholecalciferol (VITAMIN D3 PO), Take 2,000 Units by mouth 2 (two) times a day, Disp: , Rfl:     escitalopram (LEXAPRO) 10 mg tablet, 1 p o  daily, Disp: 90 tablet, Rfl: 3    irbesartan (AVAPRO) 300 mg tablet, Take 1 tablet (300 mg total) by mouth daily at bedtime, Disp: 90 tablet, Rfl: 3    rosuvastatin (CRESTOR) 5 mg tablet, Take 1 5 tablets (7 5 mg total) by mouth daily, Disp: 135 tablet, Rfl: 3    Stelara 45 MG/0 5ML injection, q 2 months, Disp: , Rfl:     timolol (TIMOPTIC) 0 5 % ophthalmic solution, , Disp: , Rfl:   No Known Allergies      Review of Systems   Constitutional: Negative for fever  HENT: Negative for congestion  Eyes: Negative for visual disturbance  Respiratory: Negative for shortness of breath  Cardiovascular: Positive for leg swelling  Negative for chest pain  Gastrointestinal: Negative for abdominal pain  Endocrine: Negative for polyuria  Genitourinary: Negative for difficulty urinating  Musculoskeletal: Negative for gait problem  Skin: Negative for wound  Allergic/Immunologic: Negative for immunocompromised state  Neurological: Negative for dizziness  Psychiatric/Behavioral: Negative for dysphoric mood  Objective:      /78 (BP Location: Left arm, Patient Position: Sitting, Cuff Size: Adult)   Pulse 55   Temp (!) 97 °F (36 1 °C) (Tympanic)   Resp 18   Ht 5' 4" (1 626 m)   Wt 70 3 kg (155 lb)   SpO2 98%   BMI 26 61 kg/m²          Physical Exam  Vitals reviewed  Constitutional:       Appearance: Normal appearance  HENT:      Head: Normocephalic and atraumatic  Right Ear: Tympanic membrane normal       Left Ear: Tympanic membrane normal    Eyes:      Conjunctiva/sclera: Conjunctivae normal    Cardiovascular:      Rate and Rhythm: Normal rate and regular rhythm  Heart sounds:     No gallop  Comments: With multiple varicose veins of both legs  No calf tenderness of left leg & rufino's sign is negative(left)  Pulmonary:      Effort: Pulmonary effort is normal       Breath sounds: No wheezing or rales  Abdominal:      General: Bowel sounds are normal       Palpations: Abdomen is soft  Tenderness: There is no abdominal tenderness  Musculoskeletal:      Right lower leg: No edema  Left lower le+ Pitting Edema present  Neurological:      Mental Status: She is alert  Mental status is at baseline     Psychiatric:         Mood and Affect: Mood normal          Behavior: Behavior normal

## 2022-06-05 LAB — 1,25(OH)2D3 SERPL-MCNC: 46.2 PG/ML (ref 19.9–79.3)

## 2022-06-06 ENCOUNTER — TELEPHONE (OUTPATIENT)
Dept: ENDOCRINOLOGY | Facility: CLINIC | Age: 82
End: 2022-06-06

## 2022-06-06 LAB — COLLAGEN CTX SERPL-MCNC: 175 PG/ML

## 2022-06-06 NOTE — TELEPHONE ENCOUNTER
----- Message from Humberto Santoro MD sent at 6/3/2022  4:36 PM EDT -----  Cor ca 9 7, PTH 97 8pg/mL - calcium is stable  Other labs pending   Review upcoming visit

## 2022-06-07 ENCOUNTER — OFFICE VISIT (OUTPATIENT)
Dept: ENDOCRINOLOGY | Facility: CLINIC | Age: 82
End: 2022-06-07
Payer: MEDICARE

## 2022-06-07 ENCOUNTER — TELEPHONE (OUTPATIENT)
Dept: ENDOCRINOLOGY | Facility: CLINIC | Age: 82
End: 2022-06-07

## 2022-06-07 VITALS
HEART RATE: 52 BPM | SYSTOLIC BLOOD PRESSURE: 122 MMHG | WEIGHT: 164.13 LBS | BODY MASS INDEX: 28.02 KG/M2 | DIASTOLIC BLOOD PRESSURE: 60 MMHG | HEIGHT: 64 IN

## 2022-06-07 DIAGNOSIS — E21.3 HYPERPARATHYROIDISM (HCC): ICD-10-CM

## 2022-06-07 DIAGNOSIS — E55.9 VITAMIN D DEFICIENCY: ICD-10-CM

## 2022-06-07 DIAGNOSIS — M81.0 AGE-RELATED OSTEOPOROSIS WITHOUT CURRENT PATHOLOGICAL FRACTURE: Primary | ICD-10-CM

## 2022-06-07 PROCEDURE — 99214 OFFICE O/P EST MOD 30 MIN: CPT

## 2022-06-07 RX ORDER — MESALAMINE 1.2 G/1
TABLET, DELAYED RELEASE ORAL
COMMUNITY
Start: 2022-05-25

## 2022-06-07 NOTE — PATIENT INSTRUCTIONS
Follow up in 2-3 months  Have your PTH, albumin and calcium done before then  Have you DXA scan done    A Guide to Calcium-Rich Foods  We all know that milk is a great source of calcium, but you may be surprised by all the different foods you can work into your diet to reach your daily recommended amount of calcium   Use the guide below to get ideas of additional calcium-rich foods to add to your weekly shopping list   Produce Serving Size Estimated Calcium*   [place] greens, frozen 8 oz 360 mg   Broccoli angelina 8 oz 200 mg   Kale, frozen 8 oz 180 mg   Soy Beans, green, boiled 8 oz 175 mg   Bok Darryl, cooked, boiled 8 oz 160 mg   Figs, dried 2 figs 65 mg   Broccoli, fresh, cooked 8 oz 60 mg   Oranges 1 whole 55 mg   Seafood Serving Size Estimated Calcium*   Sardines, canned with bones 3 oz 325 mg   [place], canned with bones 3 oz 180 mg   Shrimp, canned 3 oz 125 mg   Dairy Serving Size Estimated Calcium*   Ricotta, part-skim 4 oz 335 mg   Yogurt, plain, low-fat 6 oz 310 mg   Milk, skim, low-fat, whole 8 oz 300 mg   Yogurt with fruit, low-fat 6 oz 260 mg   Mozzarella, part-skim 1 oz 210 mg   Cheddar 1 oz 205 mg   Yogurt, Greek 6 oz 200 mg   American Cheese 1 oz 195 mg   Feta Cheese 4 oz 140 mg   Cottage Cheese, 2% 4 oz 105 mg   Frozen yogurt, vanilla 8 oz 105 mg   Ice Cream, vanilla 8 oz 85 mg   Parmesan 1 tbsp 55 mg   Fortified Food Serving Size Estimated Calcium*   [place] milk, rice milk or soy milk, fortified 8 oz 300 mg   Orange juice and other fruit juices, fortified 8 oz 300 mg   Tofu, prepared with calcium 4 oz 205 mg   Waffle, frozen, fortified 2 pieces 200 mg   Oatmeal, fortified 1 packet 140 mg   English muffin, fortified 1 muffin 100 mg   Cereal, fortified 8 oz 100-1,000 mg   Other Serving Size Estimated Calcium*   Mac & cheese, frozen 1 package 325 mg   Pizza, cheese, frozen 1 serving 115 mg   Pudding, chocolate, prepared with 2% milk 4 oz 160 mg   Beans, baked, canned 4 oz 160 mg   *The calcium content listed for most foods is estimated and can vary due to multiple factors  Check the food label to determine how much calcium is in a particular product

## 2022-06-07 NOTE — ASSESSMENT & PLAN NOTE
Discussed starting treatment with [facility]     DXA scan ordered due 06/2023  Continue with calcium and vitamin D supplementation

## 2022-06-07 NOTE — PROGRESS NOTES
Established Patient Progress Note      CC: Follow up for osteoporosis and primary hyperparathyroidism     History of Present Illness:   Gilda Richardson is a [de-identified] y o  female with a history of primary hyperparathyroidism, osteoporosis, hyperlipidemia, GERD, and vitamin D deficiency  She had a parathyroid adenoma resection on 9/29/21  Her last DXA scan was in 06/2021 and showed T score of -3 in the left femoral neck, -2 3 in the left total hip and -2 4 in the left forearm  For the osteoporosis, she is taking 2,000 IU of vitamin D daily and 1,500 mg of calcium carbonate daily  She reports having recent exacerbation of her U C  in the past month  She has recently began taking mesalamine in the past few weeks which have improved her symptoms  Prior to this, she may have had a malabsorption issue which may be contributing to the elevated PTH on recent lab work  She does also supplement her calcium intake with skim milk, yogurt and ice cream once daily  She denies any falls, fractures or kidney stones  She has not been doing resistance or weight bearing exercises  She states she has plans to resume at the [address] soon  We discussed doing chair exercises and weights in the meantime  She is also here seeking treatment options for her osteoporosis  She has not had a history or heart attack or stroke in the past year  She does have a history of right sided carotid endarectomy from 2008  At that time, there was no evidence of TIA/stroke per patient and chart review  She continues to follow up with vascular surgery  She is interested in starting evenity  Risks and benefits discussed       Patient Active Problem List   Diagnosis    Abnormal female pelvic exam    Diverticulosis    Esophageal reflux    Glaucoma    Hoarseness    Hypercalcemia    Mixed hyperlipidemia    Hyperparathyroidism ([address] )    Primary hypertension    Age-related osteoporosis without current pathological fracture    Severe cervical dysplasia, histologically confirmed    Vitamin B 12 deficiency    Vitamin D deficiency    Ulcerative rectosigmoiditis with rectal bleeding (HCC)    Edema    Long-term use of immunosuppressant medication    Anxiety    Confusion    Parathyroid adenoma    History of benign neoplasm of parathyroid gland    Immunosuppression due to drug therapy [facility])      Past Medical History:   Diagnosis Date    Atherosclerosis     Carotid artery narrowing     Coronary artery disease     Diverticulosis     GERD (gastroesophageal reflux disease)     Glaucoma     Hypercalcemia     Hyperlipidemia     Hyperparathyroidism ([address] )     Hypertension     Osteopenia     Severe cervical dysplasia, histologically confirmed     Skin cancer     squamous cell    Thyroid nodule     Vitamin D deficiency       Past Surgical History:   Procedure Laterality Date    APPENDECTOMY      BREAST SURGERY      reduction procedure    CAROTID ENDARTARECTOMY Right     carotid thromboendarterectomy     COLONOSCOPY      EVACUATION OF HEMATOMA N/A 9/29/2021    Procedure: EVACUATION/ DRAINAGE HEMATOMA;  Surgeon: Rama Spence MD;  [facility];  Service: ENT    EXPLORATION CAROTID ARTERY      HYSTERECTOMY      age 55    OOPHORECTOMY Bilateral     age 55    CT COLONOSCOPY FLX DX W/[address] PFRMD N/A 7/28/2017    Procedure: EGD AND COLONOSCOPY;  Surgeon: Cara Jeffers MD;  Location: [facility]; Service: Colorectal    CT EXPLORE PARATHYROID GLANDS Right 9/29/2021    Procedure: RIGHT INFERIOR PARATHYROIDECTOMY (INTRAOP PTH);   Surgeon: Rama Spence MD;  [facility];  Service: ENT    REDUCTION MAMMAPLASTY Bilateral 1987      Family History   Problem Relation Age of Onset    Osteoporosis Mother     Osteoarthritis Mother     Arthritis Mother     Other Father         heart problem    Hypertension Father     Heart disease Father     Coronary artery disease Family     Hyperlipidemia Family     No Known Problems Sister     No Known Problems Daughter     No Known Problems Maternal Grandmother     No Known Problems Maternal Grandfather     No Known Problems Paternal Grandmother     No Known Problems Paternal Grandfather     No Known Problems Maternal Aunt     No Known Problems Maternal Aunt     No Known Problems Maternal Aunt     No Known Problems Maternal Aunt     No Known Problems Maternal Aunt     No Known Problems Maternal Aunt     No Known Problems Maternal Aunt     Stroke Paternal Aunt         Vinicius Baker    No Known Problems Paternal Aunt     No Known Problems Paternal Aunt      Social History     Tobacco Use    Smoking status: Never Smoker    Smokeless tobacco: Never Used   Substance Use Topics    Alcohol use:  Yes     Alcohol/week: 7 0 standard drinks     Types: 7 Glasses of wine per week     Comment: being a social drinker; drinks wine     No Known Allergies      Current Outpatient Medications:     amLODIPine (NORVASC) 5 mg tablet, Take 1 tablet (5 mg total) by mouth daily, Disp: 90 tablet, Rfl: 3    aspirin 81 MG tablet, Take 81 mg by mouth daily, Disp: , Rfl:     bimatoprost (LUMIGAN) 0 01 % ophthalmic drops, Administer 1 drop to both eyes daily at bedtime, Disp: , Rfl:     brinzolamide (AZOPT) 1 % ophthalmic suspension, 1 drop 2 (two) times a day, Disp: , Rfl:     Calcium Carbonate Antacid (TUMS EXTRA STRENGTH 750 PO), Take 1,500 mg by mouth 2 (two) times a day with meals Use before meals with a vitmain C tablet for improved absoprtion , Disp: , Rfl:     Cholecalciferol (VITAMIN D3 PO), Take 2,000 Units by mouth 2 (two) times a day, Disp: , Rfl:     irbesartan (AVAPRO) 300 mg tablet, Take 1 tablet (300 mg total) by mouth daily at bedtime, Disp: 90 tablet, Rfl: 3    mesalamine (LIALDA) 1 2 g EC tablet, TAKE 4 TABLETS BY MOUTH EVERY DAY AFTER BREAKFAST, Disp: , Rfl:     rosuvastatin (CRESTOR) 5 mg tablet, Take 1 5 tablets (7 5 mg total) by mouth daily, Disp: 135 tablet, Rfl: 3   Stelara 45 MG/0 5ML injection, q 2 months, Disp: , Rfl:     timolol (TIMOPTIC) 0 5 % ophthalmic solution, , Disp: , Rfl:     atenolol (TENORMIN) 25 mg tablet, TAKE 1 TABLET TWICE A DAY, Disp: 180 tablet, Rfl: 3    diphenoxylate-atropine (LOMOTIL) 2 5-0 025 mg per tablet, Take 1 tablet by mouth 3 (three) times a day as needed, Disp: , Rfl:     escitalopram (LEXAPRO) 10 mg tablet, TAKE 1 TABLET DAILY, Disp: 90 tablet, Rfl: 3    Review of Systems   Respiratory: Negative for shortness of breath  Cardiovascular: Positive for leg swelling  Negative for chest pain  Left leg swelling after bumping into  door  Had [facility] done, which was negative  Followed by PCP   Gastrointestinal: Negative for diarrhea  All other systems reviewed and are negative  Physical Exam:  Body mass index is 28 17 kg/m²  /60 (BP Location: Left arm, Patient Position: Sitting, Cuff Size: Standard)   Pulse (!) 52   Ht 5' 4" (1 626 m)   Wt 74 4 kg (164 lb 2 oz)   BMI 28 17 kg/m²    Wt Readings from Last 3 Encounters:   07/22/22 74 4 kg (164 lb)   06/07/22 74 4 kg (164 lb 2 oz)   06/03/22 70 3 kg (155 lb)       Physical Exam  Vitals reviewed  Constitutional:       Appearance: Normal appearance  HENT:      Head: Normocephalic  Cardiovascular:      Rate and Rhythm: Normal rate and regular rhythm  Heart sounds: Normal heart sounds  Pulmonary:      Effort: Pulmonary effort is normal       Breath sounds: Normal breath sounds  Musculoskeletal:         General: No tenderness, deformity or signs of injury  Neurological:      Mental Status: She is alert and oriented to person, place, and time     Psychiatric:         Mood and Affect: Mood normal          Behavior: Behavior normal          Labs:   No results found for: HGBA1C  Lab Results   Component Value Date    CREATININE 0 73 07/20/2022    CREATININE 0 69 06/02/2022    CREATININE 0 82 02/08/2022    BUN 16 07/20/2022     (L) 07/09/2015    K 3 9 07/20/2022     (H) 07/20/2022    CO2 25 07/20/2022     eGFR   Date Value Ref Range Status   07/20/2022 78 ml/min/1 73sq m Final     Lab Results   Component Value Date    CHOL 123 07/09/2015    HDL 51 07/20/2022    TRIG 93 07/20/2022     Lab Results   Component Value Date    ALT 27 07/20/2022    AST 19 07/20/2022    ALKPHOS 66 07/20/2022    BILITOT 0 57 07/09/2015     Lab Results   Component Value Date    XPQ5XSRYGXBH 3 360 09/13/2021    YGY5AWBKHDGI 2 889 08/07/2014     Lab Results   Component Value Date    FREET4 1 06 09/13/2021       Impression & Plan:    Problem List Items Addressed This Visit        Endocrine    Hyperparathyroidism ([facility] [address] ) (Chronic)     Ordered bone specific ALP, calcium, albumin and PTH  If abnormal, will do further testing with 24 hour urine calcium and creatinine  Continue with calcium and vitamin D supplementation         Relevant Orders    PTH, intact- Lab Collect    Alkaline phosphatase, bone specific    Calcium Lab Collect (Completed)    Albumin Lab Collect (Completed)    DXA bone density spine hip and pelvis       Musculoskeletal and Integument    Age-related osteoporosis without current pathological fracture - Primary (Chronic)     Discussed starting treatment with Evenity  DXA scan ordered due 06/2023  Continue with calcium and vitamin D supplementation         Relevant Orders    PTH, intact- Lab Collect    Alkaline phosphatase, bone specific    Calcium Lab Collect (Completed)    Albumin Lab Collect (Completed)    DXA bone density spine hip and pelvis       Other    Vitamin D deficiency     Continue with current supplementation  Last value was WNL           Relevant Orders    Alkaline phosphatase, bone specific    Calcium Lab Collect (Completed)    Albumin Lab Collect (Completed)          Orders Placed This Encounter   Procedures    DXA bone density spine hip and pelvis     Standing Status:   Future     Standing Expiration Date:   7/27/2026     Scheduling Instructions: Please wear comfortable clothing with no metal buttons, zippers, snaps or an underwire bra  Do not take any calcium supplements 24 hours prior to your test  Please bring your insurance cards, a form of photo ID and a list of your medications with you  Arrive 5-10 minutes prior to your appointment time in order to register  Your study cannot be performed if you take your calcium supplement 24 hours before the scheduled Dexa scan examination  To schedule this appointment, please contact Central Scheduling at [phone]   PTH, intact- Lab Collect     Standing Status:   Future     Number of Occurrences:   1     Standing Expiration Date:   6/7/2023    Alkaline phosphatase, bone specific     Standing Status:   Future     Number of Occurrences:   1     Standing Expiration Date:   6/7/2023    Calcium Lab Collect     Standing Status:   Future     Number of Occurrences:   1     Standing Expiration Date:   6/7/2023    Albumin Lab Collect     Standing Status:   Future     Number of Occurrences:   1     Standing Expiration Date:   6/7/2023         Patient Instructions     Follow up in 2-3 months  Have your PTH, albumin and calcium done before then  Have you DXA scan done    A Guide to Calcium-Rich Foods  We all know that milk is a great source of calcium, but you may be surprised by all the different foods you can work into your diet to reach your daily recommended amount of calcium   Use the guide below to get ideas of additional calcium-rich foods to add to your weekly shopping list   Produce Serving Size Estimated Calcium*   Arthur greens, frozen 8 oz 360 mg   Broccoli angelina 8 oz 200 mg   Kale, frozen 8 oz 180 mg   Soy Beans, green, boiled 8 oz 175 mg   Bok Darryl, cooked, boiled 8 oz 160 mg   Figs, dried 2 figs 65 mg   Broccoli, fresh, cooked 8 oz 60 mg   Oranges 1 whole 55 mg   Seafood Serving Size Estimated Calcium*   Sardines, canned with bones 3 oz 325 mg   Liscomb, canned with bones 3 oz 180 mg   Shrimp, canned 3 oz 125 mg   Dairy Serving Size Estimated Calcium*   Ricotta, part-skim 4 oz 335 mg   Yogurt, plain, low-fat 6 oz 310 mg   Milk, skim, low-fat, whole 8 oz 300 mg   Yogurt with fruit, low-fat 6 oz 260 mg   Mozzarella, part-skim 1 oz 210 mg   Cheddar 1 oz 205 mg   Yogurt, Greek 6 oz 200 mg   American Cheese 1 oz 195 mg   Feta Cheese 4 oz 140 mg   Cottage Cheese, 2% 4 oz 105 mg   Frozen yogurt, vanilla 8 oz 105 mg   Ice Cream, vanilla 8 oz 85 mg   Parmesan 1 tbsp 55 mg   Fortified Food Serving Size Estimated Calcium*   Caribou milk, rice milk or soy milk, fortified 8 oz 300 mg   Orange juice and other fruit juices, fortified 8 oz 300 mg   Tofu, prepared with calcium 4 oz 205 mg   Waffle, frozen, fortified 2 pieces 200 mg   Oatmeal, fortified 1 packet 140 mg   English muffin, fortified 1 muffin 100 mg   Cereal, fortified 8 oz 100-1,000 mg   Other Serving Size Estimated Calcium*   Mac & cheese, frozen 1 package 325 mg   Pizza, cheese, frozen 1 serving 115 mg   Pudding, chocolate, prepared with 2% milk 4 oz 160 mg   Beans, baked, canned 4 oz 160 mg   *The calcium content listed for most foods is estimated and can vary due to multiple factors  Check the food label to determine how much calcium is in a particular product  Goal vitamin D > 40  Goal calcium ~1200 mg/day    Discussed with the patient and all questioned fully answered  She will call me if any problems arise

## 2022-06-07 NOTE — ASSESSMENT & PLAN NOTE
Ordered bone specific ALP, calcium, albumin and PTH  If abnormal, will do further testing with 24 hour urine calcium and creatinine    Continue with calcium and vitamin D supplementation

## 2022-06-07 NOTE — TELEPHONE ENCOUNTER
----- Message from Yashi1 leemail [email or web address] sent at 6/7/2022 12:05 PM EDT -----  Regarding: augustine Rene,    Could you please set this patient up for Evenity  Thank you!

## 2022-06-09 NOTE — TELEPHONE ENCOUNTER
I'm not sure, I have not prescribed evenity before    She needs to d/w her endocrinologist    Thank you

## 2022-06-10 ENCOUNTER — TELEPHONE (OUTPATIENT)
Dept: ENDOCRINOLOGY | Facility: CLINIC | Age: 82
End: 2022-06-10

## 2022-06-10 NOTE — TELEPHONE ENCOUNTER
Patient received call to schedule evenity but wanted to know if she should proceed due to history carotid artery surgery  She wants reassurance and is this safe to take

## 2022-06-20 DIAGNOSIS — F41.9 ANXIETY: ICD-10-CM

## 2022-06-20 RX ORDER — ESCITALOPRAM OXALATE 10 MG/1
TABLET ORAL
Qty: 90 TABLET | Refills: 3 | Status: SHIPPED | OUTPATIENT
Start: 2022-06-20

## 2022-07-07 DIAGNOSIS — I10 HYPERTENSION, UNSPECIFIED TYPE: ICD-10-CM

## 2022-07-07 RX ORDER — ATENOLOL 25 MG/1
TABLET ORAL
Qty: 180 TABLET | Refills: 3 | Status: SHIPPED | OUTPATIENT
Start: 2022-07-07

## 2022-07-20 ENCOUNTER — APPOINTMENT (OUTPATIENT)
Dept: LAB | Age: 82
End: 2022-07-20
Payer: MEDICARE

## 2022-07-20 DIAGNOSIS — M81.0 AGE-RELATED OSTEOPOROSIS WITHOUT CURRENT PATHOLOGICAL FRACTURE: ICD-10-CM

## 2022-07-20 DIAGNOSIS — I10 PRIMARY HYPERTENSION: ICD-10-CM

## 2022-07-20 DIAGNOSIS — E21.3 HYPERPARATHYROIDISM (HCC): ICD-10-CM

## 2022-07-20 DIAGNOSIS — E55.9 VITAMIN D DEFICIENCY: ICD-10-CM

## 2022-07-20 DIAGNOSIS — E78.5 HYPERLIPIDEMIA, UNSPECIFIED HYPERLIPIDEMIA TYPE: ICD-10-CM

## 2022-07-20 LAB
ALBUMIN SERPL BCP-MCNC: 3.5 G/DL (ref 3.5–5)
ALP SERPL-CCNC: 66 U/L (ref 46–116)
ALT SERPL W P-5'-P-CCNC: 27 U/L (ref 12–78)
ANION GAP SERPL CALCULATED.3IONS-SCNC: 7 MMOL/L (ref 4–13)
AST SERPL W P-5'-P-CCNC: 19 U/L (ref 5–45)
BASOPHILS # BLD AUTO: 0.05 THOUSANDS/ΜL (ref 0–0.1)
BASOPHILS NFR BLD AUTO: 1 % (ref 0–1)
BILIRUB SERPL-MCNC: 0.61 MG/DL (ref 0.2–1)
BUN SERPL-MCNC: 16 MG/DL (ref 5–25)
CALCIUM SERPL-MCNC: 9.2 MG/DL (ref 8.3–10.1)
CHLORIDE SERPL-SCNC: 112 MMOL/L (ref 96–108)
CHOLEST SERPL-MCNC: 130 MG/DL
CO2 SERPL-SCNC: 25 MMOL/L (ref 21–32)
CREAT SERPL-MCNC: 0.73 MG/DL (ref 0.6–1.3)
EOSINOPHIL # BLD AUTO: 0.54 THOUSAND/ΜL (ref 0–0.61)
EOSINOPHIL NFR BLD AUTO: 9 % (ref 0–6)
ERYTHROCYTE [DISTWIDTH] IN BLOOD BY AUTOMATED COUNT: 15.3 % (ref 11.6–15.1)
GFR SERPL CREATININE-BSD FRML MDRD: 78 ML/MIN/1.73SQ M
GLUCOSE P FAST SERPL-MCNC: 94 MG/DL (ref 65–99)
HCT VFR BLD AUTO: 39.7 % (ref 34.8–46.1)
HDLC SERPL-MCNC: 51 MG/DL
HGB BLD-MCNC: 12.6 G/DL (ref 11.5–15.4)
IMM GRANULOCYTES # BLD AUTO: 0.01 THOUSAND/UL (ref 0–0.2)
IMM GRANULOCYTES NFR BLD AUTO: 0 % (ref 0–2)
LDLC SERPL DIRECT ASSAY-MCNC: 66 MG/DL (ref 0–100)
LYMPHOCYTES # BLD AUTO: 1.27 THOUSANDS/ΜL (ref 0.6–4.47)
LYMPHOCYTES NFR BLD AUTO: 22 % (ref 14–44)
MCH RBC QN AUTO: 29.8 PG (ref 26.8–34.3)
MCHC RBC AUTO-ENTMCNC: 31.7 G/DL (ref 31.4–37.4)
MCV RBC AUTO: 94 FL (ref 82–98)
MONOCYTES # BLD AUTO: 0.76 THOUSAND/ΜL (ref 0.17–1.22)
MONOCYTES NFR BLD AUTO: 13 % (ref 4–12)
NEUTROPHILS # BLD AUTO: 3.18 THOUSANDS/ΜL (ref 1.85–7.62)
NEUTS SEG NFR BLD AUTO: 55 % (ref 43–75)
NRBC BLD AUTO-RTO: 0 /100 WBCS
PLATELET # BLD AUTO: 195 THOUSANDS/UL (ref 149–390)
PMV BLD AUTO: 10 FL (ref 8.9–12.7)
POTASSIUM SERPL-SCNC: 3.9 MMOL/L (ref 3.5–5.3)
PROT SERPL-MCNC: 7.1 G/DL (ref 6.4–8.4)
RBC # BLD AUTO: 4.23 MILLION/UL (ref 3.81–5.12)
SODIUM SERPL-SCNC: 144 MMOL/L (ref 135–147)
TRIGL SERPL-MCNC: 93 MG/DL
WBC # BLD AUTO: 5.81 THOUSAND/UL (ref 4.31–10.16)

## 2022-07-20 PROCEDURE — 80053 COMPREHEN METABOLIC PANEL: CPT

## 2022-07-20 PROCEDURE — 83721 ASSAY OF BLOOD LIPOPROTEIN: CPT

## 2022-07-20 PROCEDURE — 85025 COMPLETE CBC W/AUTO DIFF WBC: CPT

## 2022-07-20 PROCEDURE — 80061 LIPID PANEL: CPT

## 2022-07-20 PROCEDURE — 36415 COLL VENOUS BLD VENIPUNCTURE: CPT

## 2022-07-22 ENCOUNTER — OFFICE VISIT (OUTPATIENT)
Dept: INTERNAL MEDICINE CLINIC | Facility: CLINIC | Age: 82
End: 2022-07-22
Payer: MEDICARE

## 2022-07-22 VITALS
RESPIRATION RATE: 16 BRPM | DIASTOLIC BLOOD PRESSURE: 74 MMHG | TEMPERATURE: 96.8 F | SYSTOLIC BLOOD PRESSURE: 140 MMHG | HEIGHT: 64 IN | OXYGEN SATURATION: 95 % | HEART RATE: 56 BPM | BODY MASS INDEX: 28 KG/M2 | WEIGHT: 164 LBS

## 2022-07-22 DIAGNOSIS — E78.2 MIXED HYPERLIPIDEMIA: Primary | ICD-10-CM

## 2022-07-22 DIAGNOSIS — I10 PRIMARY HYPERTENSION: ICD-10-CM

## 2022-07-22 DIAGNOSIS — M81.0 AGE-RELATED OSTEOPOROSIS WITHOUT CURRENT PATHOLOGICAL FRACTURE: Chronic | ICD-10-CM

## 2022-07-22 DIAGNOSIS — F41.9 ANXIETY: ICD-10-CM

## 2022-07-22 PROCEDURE — 99214 OFFICE O/P EST MOD 30 MIN: CPT | Performed by: INTERNAL MEDICINE

## 2022-07-22 RX ORDER — DIPHENOXYLATE HYDROCHLORIDE AND ATROPINE SULFATE 2.5; .025 MG/1; MG/1
1 TABLET ORAL 3 TIMES DAILY PRN
COMMUNITY
Start: 2022-06-17

## 2022-07-22 NOTE — PROGRESS NOTES
Assessment/Plan:     Diagnoses and all orders for this visit:    Mixed hyperlipidemia  Comments:  taking statin and no SE, c/w rx    Anxiety  Comments:  taking lexapro and stable, c/w rx    Age-related osteoporosis without current pathological fracture  Comments:  seeing endocrine for possible evenity therapy  of note, she had MRI in 2008 in [facility]'s records which showed signs of lacunar infarcts(report rev'd in PACS)    Primary hypertension  Comments:  BP stable, she is under more stress at this time  c/w BB/CCB/ARB    Other orders  -     diphenoxylate-atropine (LOMOTIL) 2 5-0 025 mg per tablet; Take 1 tablet by mouth 3 (three) times a day as needed      patient was told she had a TIA greater than 10 yrs ago before having carotid artery surgery for stenosis  As above, reviewed MRI brain results from chart from 2008 which showed multiple previous lacunar infarcts  Subjective:      Patient ID: Peter Haley is a [de-identified] y o  female  HPI    Here for follow up, Chris Wood is overall stable  She is taking her medications as prescribed and completed BW prior to today's appt  Her leg is doing better, no pain and swelling is slowly improved s/p injury last month  She hasn't rec'd 5th COVID vaccine dose(takes stelara for UC)  She is due to complete BW for endocrine appt and to discuss treatment options for osteoporosis  She had DEXA in 6/2021 and was told by radiology she cannot complete again till 2023  She is under more stress lately with selling her house and moving in the fall  She has no other questions or concerns and ROS is otherwise negative      Past Medical History:   Diagnosis Date    Atherosclerosis     Carotid artery narrowing     Coronary artery disease     Diverticulosis     GERD (gastroesophageal reflux disease)     Glaucoma     Hypercalcemia     Hyperlipidemia     Hyperparathyroidism ([facility] [address] )     Hypertension     Osteopenia     Severe cervical dysplasia, histologically confirmed     Skin cancer     squamous cell    Thyroid nodule     Vitamin D deficiency      Vitals:    07/22/22 1052 07/22/22 1130   BP: 140/80 140/74   BP Location: Left arm Left arm   Patient Position: Sitting Sitting   Cuff Size: Adult Standard   Pulse: (!) 54 56   Resp: 16    Temp: (!) 96 8 °F (36 °C)    TempSrc: Tympanic    SpO2: 95%    Weight: 74 4 kg (164 lb)    Height: 5' 4" (1 626 m)      Body mass index is 28 15 kg/m²      Current Outpatient Medications:     amLODIPine (NORVASC) 5 mg tablet, Take 1 tablet (5 mg total) by mouth daily, Disp: 90 tablet, Rfl: 3    aspirin 81 MG tablet, Take 81 mg by mouth daily, Disp: , Rfl:     atenolol (TENORMIN) 25 mg tablet, TAKE 1 TABLET TWICE A DAY, Disp: 180 tablet, Rfl: 3    bimatoprost (LUMIGAN) 0 01 % ophthalmic drops, Administer 1 drop to both eyes daily at bedtime, Disp: , Rfl:     brinzolamide (AZOPT) 1 % ophthalmic suspension, 1 drop 2 (two) times a day, Disp: , Rfl:     Calcium Carbonate Antacid (TUMS EXTRA STRENGTH 750 PO), Take 1,500 mg by mouth 2 (two) times a day with meals Use before meals with a vitmain C tablet for improved absoprtion , Disp: , Rfl:     Cholecalciferol (VITAMIN D3 PO), Take 2,000 Units by mouth 2 (two) times a day, Disp: , Rfl:     diphenoxylate-atropine (LOMOTIL) 2 5-0 025 mg per tablet, Take 1 tablet by mouth 3 (three) times a day as needed, Disp: , Rfl:     escitalopram (LEXAPRO) 10 mg tablet, TAKE 1 TABLET DAILY, Disp: 90 tablet, Rfl: 3    irbesartan (AVAPRO) 300 mg tablet, Take 1 tablet (300 mg total) by mouth daily at bedtime, Disp: 90 tablet, Rfl: 3    mesalamine (LIALDA) 1 2 g EC tablet, TAKE 4 TABLETS BY MOUTH EVERY DAY AFTER BREAKFAST, Disp: , Rfl:     rosuvastatin (CRESTOR) 5 mg tablet, Take 1 5 tablets (7 5 mg total) by mouth daily, Disp: 135 tablet, Rfl: 3    Stelara 45 MG/0 5ML injection, q 2 months, Disp: , Rfl:     timolol (TIMOPTIC) 0 5 % ophthalmic solution, , Disp: , Rfl:   No Known Allergies      Review of Systems Constitutional: Negative for fever  HENT: Negative for congestion  Eyes: Negative for visual disturbance  Respiratory: Negative for shortness of breath  Cardiovascular: Positive for leg swelling (left leg but improving per patient)  Negative for chest pain and palpitations  Gastrointestinal: Negative for abdominal pain  Endocrine: Negative for polyuria  Genitourinary: Negative for difficulty urinating  Musculoskeletal: Negative for arthralgias  Skin: Negative for rash  Allergic/Immunologic: Positive for immunocompromised state (takes stelara for UC)  Neurological: Negative for dizziness  Psychiatric/Behavioral: Negative for dysphoric mood (but +stressed)  Objective:      /74 (BP Location: Left arm, Patient Position: Sitting, Cuff Size: Standard)   Pulse 56 Comment: right radial  Temp (!) 96 8 °F (36 °C) (Tympanic)   Resp 16   Ht 5' 4" (1 626 m)   Wt 74 4 kg (164 lb)   SpO2 95%   BMI 28 15 kg/m²          Physical Exam  Vitals reviewed  Constitutional:       Appearance: Normal appearance  HENT:      Head: Normocephalic and atraumatic  Right Ear: Tympanic membrane normal       Left Ear: Tympanic membrane normal    Eyes:      Conjunctiva/sclera: Conjunctivae normal    Cardiovascular:      Rate and Rhythm: Normal rate and regular rhythm  Heart sounds:     No gallop  Pulmonary:      Effort: Pulmonary effort is normal       Breath sounds: No wheezing or rales  Abdominal:      General: Bowel sounds are normal       Palpations: Abdomen is soft  Tenderness: There is no abdominal tenderness  Musculoskeletal:         General: No tenderness  Right lower leg: No edema  Left lower leg: Edema (<1+ left lower extremity edema(improving)) present  Neurological:      Mental Status: She is alert  Mental status is at baseline     Psychiatric:         Mood and Affect: Mood normal          Behavior: Behavior normal            Results for orders placed or performed in visit on 07/20/22   CBC and differential   Result Value Ref Range    WBC 5 81 4 31 - 10 16 Thousand/uL    RBC 4 23 3 81 - 5 12 Million/uL    Hemoglobin 12 6 11 5 - 15 4 g/dL    Hematocrit 39 7 34 8 - 46 1 %    MCV 94 82 - 98 fL    MCH 29 8 26 8 - 34 3 pg    MCHC 31 7 31 4 - 37 4 g/dL    RDW 15 3 (H) 11 6 - 15 1 %    MPV 10 0 8 9 - 12 7 fL    Platelets 902 742 - 538 Thousands/uL    nRBC 0 /100 WBCs    Neutrophils Relative 55 43 - 75 %    Immat GRANS % 0 0 - 2 %    Lymphocytes Relative 22 14 - 44 %    Monocytes Relative 13 (H) 4 - 12 %    Eosinophils Relative 9 (H) 0 - 6 %    Basophils Relative 1 0 - 1 %    Neutrophils Absolute 3 18 1 85 - 7 62 Thousands/µL    Immature Grans Absolute 0 01 0 00 - 0 20 Thousand/uL    Lymphocytes Absolute 1 27 0 60 - 4 47 Thousands/µL    Monocytes Absolute 0 76 0 17 - 1 22 Thousand/µL    Eosinophils Absolute 0 54 0 00 - 0 61 Thousand/µL    Basophils Absolute 0 05 0 00 - 0 10 Thousands/µL   Comprehensive metabolic panel   Result Value Ref Range    Sodium 144 135 - 147 mmol/L    Potassium 3 9 3 5 - 5 3 mmol/L    Chloride 112 (H) 96 - 108 mmol/L    CO2 25 21 - 32 mmol/L    ANION GAP 7 4 - 13 mmol/L    BUN 16 5 - 25 mg/dL    Creatinine 0 73 0 60 - 1 30 mg/dL    Glucose, Fasting 94 65 - 99 mg/dL    Calcium 9 2 8 3 - 10 1 mg/dL    AST 19 5 - 45 U/L    ALT 27 12 - 78 U/L    Alkaline Phosphatase 66 46 - 116 U/L    Total Protein 7 1 6 4 - 8 4 g/dL    Albumin 3 5 3 5 - 5 0 g/dL    Total Bilirubin 0 61 0 20 - 1 00 mg/dL    eGFR 78 ml/min/1 73sq m   Triglycerides   Result Value Ref Range    Triglycerides 93 See Comment mg/dL   LDL cholesterol, direct   Result Value Ref Range    LDL Direct 66 0 - 100 mg/dl   HDL cholesterol   Result Value Ref Range    HDL, Direct 51 >=50 mg/dL   Cholesterol, total   Result Value Ref Range    Cholesterol 130 See Comment mg/dL

## 2022-07-22 NOTE — PATIENT INSTRUCTIONS
Ask GI doctor if you need a 5th COVID vaccine dose  Monitor your blood pressure at home, 1-2 times per week    All readings should be under 140/90

## 2022-07-26 ENCOUNTER — APPOINTMENT (OUTPATIENT)
Dept: LAB | Age: 82
End: 2022-07-26
Payer: MEDICARE

## 2022-07-26 DIAGNOSIS — E21.3 HYPERPARATHYROIDISM (HCC): ICD-10-CM

## 2022-07-26 DIAGNOSIS — M81.0 AGE-RELATED OSTEOPOROSIS WITHOUT CURRENT PATHOLOGICAL FRACTURE: ICD-10-CM

## 2022-07-26 DIAGNOSIS — E55.9 VITAMIN D DEFICIENCY: ICD-10-CM

## 2022-07-26 LAB
ALBUMIN SERPL BCP-MCNC: 3.7 G/DL (ref 3.5–5)
CALCIUM SERPL-MCNC: 10 MG/DL (ref 8.3–10.1)

## 2022-07-26 PROCEDURE — 82310 ASSAY OF CALCIUM: CPT

## 2022-07-26 PROCEDURE — 36415 COLL VENOUS BLD VENIPUNCTURE: CPT

## 2022-07-26 PROCEDURE — 84080 ASSAY ALKALINE PHOSPHATASES: CPT

## 2022-07-26 PROCEDURE — 82040 ASSAY OF SERUM ALBUMIN: CPT

## 2022-07-29 LAB — ALP BONE SERPL-MCNC: 12.4 UG/L

## 2022-08-02 ENCOUNTER — OFFICE VISIT (OUTPATIENT)
Dept: ENDOCRINOLOGY | Facility: CLINIC | Age: 82
End: 2022-08-02
Payer: MEDICARE

## 2022-08-02 VITALS
WEIGHT: 167.4 LBS | HEART RATE: 51 BPM | BODY MASS INDEX: 28.58 KG/M2 | HEIGHT: 64 IN | SYSTOLIC BLOOD PRESSURE: 140 MMHG | DIASTOLIC BLOOD PRESSURE: 80 MMHG

## 2022-08-02 DIAGNOSIS — M81.0 AGE-RELATED OSTEOPOROSIS WITHOUT CURRENT PATHOLOGICAL FRACTURE: Chronic | ICD-10-CM

## 2022-08-02 DIAGNOSIS — E21.3 HYPERPARATHYROIDISM (HCC): Primary | Chronic | ICD-10-CM

## 2022-08-02 DIAGNOSIS — E55.9 VITAMIN D DEFICIENCY: ICD-10-CM

## 2022-08-02 PROCEDURE — 99214 OFFICE O/P EST MOD 30 MIN: CPT | Performed by: INTERNAL MEDICINE

## 2022-08-02 NOTE — PROGRESS NOTES
Follow-up Patient Progress Note      CC: osteoporosis     History of Present Illness:   [de-identified] yr female with hx of PHPT for 8 years suspected 2" Rt lower pole parathyroid adenoma confirmed by sestamibi 7/13/2021 and s/p parathyroid adenoma resection 9/29/21(PTH drop from 189 to 28pg/mL), osteoporosis, GERD, Ulcerative colitis, carotid stenosis s/p CEA and HLD  Last visit was 3/2/22      Since parathyroidectomy, she has been using calcium 3gm daily, vitamin C, 4000IU daily vit D3  She is also doing weight bearing exercise  DXA 6/14/21 - Tscores -2 3 LTH, -3 0 LFN and -2 4 lt forearm  LS was not scored  10yr FRAX score 32% hip and 42% major osteoporotic fracture      Patient Active Problem List   Diagnosis    Abnormal female pelvic exam    Diverticulosis    Esophageal reflux    Glaucoma    Hoarseness    Hypercalcemia    Mixed hyperlipidemia    Hyperparathyroidism ([facility] [address] )    Primary hypertension    Age-related osteoporosis without current pathological fracture    Severe cervical dysplasia, histologically confirmed    Vitamin B 12 deficiency    Vitamin D deficiency    Ulcerative rectosigmoiditis with rectal bleeding (HCC)    Edema    Long-term use of immunosuppressant medication    Anxiety    Confusion    Parathyroid adenoma    History of benign neoplasm of parathyroid gland    Immunosuppression due to drug therapy [facility])     Past Medical History:   Diagnosis Date    Atherosclerosis     Carotid artery narrowing     Coronary artery disease     Diverticulosis     GERD (gastroesophageal reflux disease)     Glaucoma     Hypercalcemia     Hyperlipidemia     Hyperparathyroidism ([address] )     Hypertension     Osteopenia     Severe cervical dysplasia, histologically confirmed     Skin cancer     squamous cell    Thyroid nodule     Vitamin D deficiency       Past Surgical History:   Procedure Laterality Date    APPENDECTOMY      BREAST SURGERY      reduction procedure    CAROTID ENDARTARECTOMY Right     carotid thromboendarterectomy     COLONOSCOPY      EVACUATION OF HEMATOMA N/A 9/29/2021    Procedure: EVACUATION/ DRAINAGE HEMATOMA;  Surgeon: Kvng Alcala MD;  [facility];  Service: ENT    EXPLORATION CAROTID ARTERY      HYSTERECTOMY      age 55    OOPHORECTOMY Bilateral     age 55    [place] COLONOSCOPY FLX DX W/COLLJ [address] PFRMD N/A 7/28/2017    Procedure: EGD AND COLONOSCOPY;  Surgeon: Ricardo Garcia MD;  Location: [facility]; Service: Colorectal    [place] EXPLORE PARATHYROID GLANDS Right 9/29/2021    Procedure: RIGHT INFERIOR PARATHYROIDECTOMY (INTRAOP PTH); Surgeon: Kvng Alcala MD;  [facility];  Service: ENT    REDUCTION MAMMAPLASTY Bilateral 1987      Family History   Problem Relation Age of Onset    Osteoporosis Mother     Osteoarthritis Mother     Arthritis Mother     Other Father         heart problem    Hypertension Father     Heart disease Father     Coronary artery disease Family     Hyperlipidemia Family     No Known Problems Sister     No Known Problems Daughter     No Known Problems Maternal Grandmother     No Known Problems Maternal Grandfather     No Known Problems Paternal Grandmother     No Known Problems Paternal Grandfather     No Known Problems Maternal Aunt     No Known Problems Maternal Aunt     No Known Problems Maternal Aunt     No Known Problems Maternal Aunt     No Known Problems Maternal Aunt     No Known Problems Maternal Aunt     No Known Problems Maternal Aunt     Stroke Paternal Aunt         Yenni Xiong    No Known Problems Paternal Aunt     No Known Problems Paternal Aunt      Social History     Tobacco Use    Smoking status: Never Smoker    Smokeless tobacco: Never Used   Substance Use Topics    Alcohol use:  Yes     Alcohol/week: 7 0 standard drinks     Types: 7 Glasses of wine per week     Comment: being a social drinker; drinks wine     No Known Allergies      Current Outpatient Medications:     amLODIPine (NORVASC) 5 mg tablet, Take 1 tablet (5 mg total) by mouth daily, Disp: 90 tablet, Rfl: 3    aspirin 81 MG tablet, Take 81 mg by mouth daily, Disp: , Rfl:     atenolol (TENORMIN) 25 mg tablet, TAKE 1 TABLET TWICE A DAY, Disp: 180 tablet, Rfl: 3    bimatoprost (LUMIGAN) 0 01 % ophthalmic drops, Administer 1 drop to both eyes daily at bedtime, Disp: , Rfl:     brinzolamide (AZOPT) 1 % ophthalmic suspension, 1 drop 2 (two) times a day, Disp: , Rfl:     Calcium Carbonate Antacid (TUMS EXTRA STRENGTH 750 PO), Take 1,500 mg by mouth 2 (two) times a day with meals Use before meals with a vitmain C tablet for improved absoprtion , Disp: , Rfl:     Cholecalciferol (VITAMIN D3 PO), Take 2,000 Units by mouth 2 (two) times a day, Disp: , Rfl:     diphenoxylate-atropine (LOMOTIL) 2 5-0 025 mg per tablet, Take 1 tablet by mouth 3 (three) times a day as needed, Disp: , Rfl:     escitalopram (LEXAPRO) 10 mg tablet, TAKE 1 TABLET DAILY, Disp: 90 tablet, Rfl: 3    Ferrous Sulfate (IRON PO), Take by mouth, Disp: , Rfl:     irbesartan (AVAPRO) 300 mg tablet, Take 1 tablet (300 mg total) by mouth daily at bedtime, Disp: 90 tablet, Rfl: 3    mesalamine (LIALDA) 1 2 g EC tablet, TAKE 4 TABLETS BY MOUTH EVERY DAY AFTER BREAKFAST, Disp: , Rfl:     rosuvastatin (CRESTOR) 5 mg tablet, Take 1 5 tablets (7 5 mg total) by mouth daily, Disp: 135 tablet, Rfl: 3    Stelara 45 MG/0 5ML injection, q 2 months, Disp: , Rfl:     timolol (TIMOPTIC) 0 5 % ophthalmic solution, , Disp: , Rfl:     Review of Systems   Constitutional: Positive for fatigue  HENT: Negative  Eyes: Negative  Respiratory: Negative  Cardiovascular: Negative  Gastrointestinal: Negative  Endocrine: Negative  Musculoskeletal: Negative  Skin: Negative  Allergic/Immunologic: Negative  Neurological: Negative  Hematological: Negative  Psychiatric/Behavioral: Negative  Physical Exam:  Body mass index is 28 73 kg/m²    /80   Pulse (!) 51   Ht 5' 4" (1 626 m)   Wt 75 9 kg (167 lb 6 4 oz)   BMI 28 73 kg/m²    Vitals:    08/02/22 0957   Weight: 75 9 kg (167 lb 6 4 oz)        Physical Exam  Constitutional:       Appearance: She is well-developed  HENT:      Head: Normocephalic  Eyes:      Pupils: Pupils are equal, round, and reactive to light  Neck:      Thyroid: No thyromegaly  Cardiovascular:      Rate and Rhythm: Normal rate  Heart sounds: Normal heart sounds  Pulmonary:      Effort: Pulmonary effort is normal       Breath sounds: Normal breath sounds  Abdominal:      General: Bowel sounds are normal       Palpations: Abdomen is soft  Musculoskeletal:         General: No deformity  Cervical back: Normal range of motion  Skin:     Capillary Refill: Capillary refill takes less than 2 seconds  Coloration: Skin is not pale  Findings: No rash  Neurological:      Mental Status: She is alert and oriented to person, place, and time  Labs:   No results found for: HGBA1C    Lab Results   Component Value Date    [id number] 3 360 09/13/2021    TSH 2 07 07/14/2021       Lab Results   Component Value Date    CREATININE 0 73 07/20/2022    CREATININE 0 69 06/02/2022    CREATININE 0 82 02/08/2022    BUN 16 07/20/2022     (L) 07/09/2015    K 3 9 07/20/2022     (H) 07/20/2022    CO2 25 07/20/2022     eGFR   Date Value Ref Range Status   07/20/2022 78 ml/min/1 73sq m Final       Lab Results   Component Value Date    ALT 27 07/20/2022    AST 19 07/20/2022    ALKPHOS 66 07/20/2022    BILITOT 0 57 07/09/2015       Lab Results   Component Value Date    CHOLESTEROL 130 07/20/2022    CHOLESTEROL 139 02/08/2022    CHOLESTEROL 131 09/13/2021     Lab Results   Component Value Date    HDL 51 07/20/2022    HDL 59 02/08/2022    HDL 58 09/13/2021     Lab Results   Component Value Date    TRIG 93 07/20/2022    TRIG 88 02/08/2022    TRIG 75 09/13/2021     No results found for: NONHDLC      Impression:  1  Hyperparathyroidism ([facility] [address] )    2  Age-related osteoporosis without current pathological fracture    3  Vitamin D deficiency         Plan:    Diagnoses and all orders for this visit:    Hyperparathyroidism ([facility] [address] )  Past hx parathyroidectomy  Presently, she has a normal calcium, vitamin D and elevated PTH  Today we discussed option of further increasing calcium intake due to possible malabsorption and monitor PTH  She is not keen for further testing or redo parathyroidectomy if offered  Advised to increase calcium intake to 3gm daily  Follow up in 6 months  -     DXA bone density spine hip and pelvis; Future    Age-related osteoporosis without current pathological fracture  She has high risk of fracture based on high frax scores  Today we discussed options and I suggested Zolendronic acid as patient prefers non long term agents  Prolia may also be used  I expect some improvement after parathyroid adenoma resection  Review next DXA scan  Patient is leaning to wait till next scan to initiate treatment  I advised to repeat it to soon expedite therapy  Follow up in 6 months  -     PTH, intact; Future  -     Vitamin D 25 hydroxy; Future  -     Phosphorus; Future  -     Comprehensive metabolic panel; Future    Vitamin D deficiency        I have spent 35 minutes with patient today in which greater than 50% of this time was spent in counseling/coordination of care  Discussed with the patient and all questioned fully answered  She will call me if any problems arise  Educated/ Counseled patient on diagnostic test results, prognosis, risk vs benefit of treatment options, importance of treatment compliance, healthy life and lifestyle choices        [address]

## 2022-08-02 NOTE — PATIENT INSTRUCTIONS
Zoledronic Acid (By injection)   Zoledronic Acid ([id number]-lz-OCTN-ik AS-id)  Treats high blood calcium levels  Also treats bone damage caused by Paget disease, multiple myeloma, and cancers that spread to the bone  Also treats osteoporosis and reduces the risk of hip fractures in certain patients  Brand Name(s): Reclast, Zoledronic Acid Novaplus   There may be other brand names for this medicine  When This Medicine Should Not Be Used: This medicine is not right for everyone  You should not receive it if you had an allergic reaction to zoledronic acid, or if you are pregnant  How to Use This Medicine:   Injectable  A nurse or other health provider will give you this medicine  Your doctor will prescribe your dose and schedule  This medicine is given through a needle placed in a vein  Your doctor may tell you to drink extra liquids before your treatment to prevent kidney problems  Your doctor may also give you vitamin D and calcium supplements  Tell your doctor if you are not able to take these medicines  This medicine should come with a Medication Guide  Ask your pharmacist for a copy if you do not have one  Missed dose: You must use this medicine on a fixed schedule  Call your doctor or pharmacist if you miss a dose  Drugs and Foods to Avoid:   Ask your doctor or pharmacist before using any other medicine, including over-the-counter medicines, vitamins, and herbal products  Do not use other medicines that also contain zoledronic acid  Do not use zoledronic acid together with another bisphosphonate medicine  Some foods and medicines can affect how zoledronic acid works  Tell your doctor if you are using digoxin, antibiotics, diuretics (water pills), an NSAID pain or arthritis medicine (such as aspirin, celecoxib, ibuprofen, naproxen), steroid medicines, or cancer medicines  Warnings While Using This Medicine: It is not safe to take this medicine during pregnancy  It could harm an unborn baby   Tell your doctor right away if you become pregnant  Tell your doctor if you are breastfeeding, or if you have kidney disease, anemia, aspirin-sensitive asthma, bleeding problems, cancer, congestive heart failure, low blood calcium levels, stomach absorption problems, mineral imbalance, dental problems or gum disease  Also tell your doctor if you had surgery on your bowel or parathyroid or thyroid gland  This medicine may cause the following problems:  Jaw or teeth problems  Severe bone, joint, or muscle pain  Increased risk of thigh bone fracture  Low calcium levels in your blood  You must have regular dental exams while you are being treated with this medicine  Tell your dentist or oral surgeon that you are using this medicine  Your doctor will do lab tests at regular visits to check on the effects of this medicine  Keep all appointments  Possible Side Effects While Using This Medicine:   Call your doctor right away if you notice any of these side effects: Allergic reaction: Itching or hives, swelling in your face or hands, swelling or tingling in your mouth or throat, chest tightness, trouble breathing  Chest pain, trouble breathing, fast or uneven heartbeat  Decrease in how much or how often you urinate, blood in the urine, lower back or side pain, burning or painful urination  Muscle spasm or twitching, or numbness or tingling in your fingers, feet, or around your mouth  Pain, swelling, or numbness in the mouth or jaw, loose teeth or other teeth problems  Severe muscle, bone, or joint pain  Unusual pain in your thigh, groin, or hip  If you notice these less serious side effects, talk with your doctor:   Fever, chills, cough, sore throat, and body aches  Headache  Mild nausea, constipation, diarrhea, stomach pain or upset  Redness, pain, or swelling of your skin where the needle is placed  If you notice other side effects that you think are caused by this medicine, tell your doctor     Call your doctor for medical advice about side effects  You may report side effects to FDA at 1-570-FDA-4023    © Copyright [facility] 2022 Information is for End User's use only and may not be sold, redistributed or otherwise used for commercial purposes  The above information is an  only  It is not intended as medical advice for individual conditions or treatments  Talk to your doctor, nurse or pharmacist before following any medical regimen to see if it is safe and effective for you

## 2022-08-19 ENCOUNTER — HOSPITAL ENCOUNTER (OUTPATIENT)
Dept: RADIOLOGY | Facility: IMAGING CENTER | Age: 82
Discharge: HOME | End: 2022-08-19
Payer: MEDICARE

## 2022-08-19 DIAGNOSIS — E21.3 HYPERPARATHYROIDISM (HCC): Chronic | ICD-10-CM

## 2022-08-19 PROCEDURE — 77080 DXA BONE DENSITY AXIAL: CPT

## 2022-09-20 PROBLEM — Z98.890 STATUS POST PARATHYROIDECTOMY: Status: ACTIVE | Noted: 2022-09-20

## 2022-09-20 PROBLEM — Z90.89 STATUS POST PARATHYROIDECTOMY: Status: ACTIVE | Noted: 2022-09-20

## 2022-09-20 PROBLEM — E89.2 STATUS POST PARATHYROIDECTOMY (HCC): Status: ACTIVE | Noted: 2022-09-20

## 2022-10-04 DIAGNOSIS — I10 PRIMARY HYPERTENSION: ICD-10-CM

## 2022-10-05 ENCOUNTER — CLINICAL SUPPORT (OUTPATIENT)
Dept: INTERNAL MEDICINE CLINIC | Facility: CLINIC | Age: 82
End: 2022-10-05
Payer: MEDICARE

## 2022-10-05 DIAGNOSIS — I10 PRIMARY HYPERTENSION: ICD-10-CM

## 2022-10-05 DIAGNOSIS — Z23 FLU VACCINE NEED: Primary | ICD-10-CM

## 2022-10-05 PROCEDURE — G0008 ADMIN INFLUENZA VIRUS VAC: HCPCS | Performed by: INTERNAL MEDICINE

## 2022-10-05 PROCEDURE — 90662 IIV NO PRSV INCREASED AG IM: CPT | Performed by: INTERNAL MEDICINE

## 2022-10-05 RX ORDER — AMLODIPINE BESYLATE 5 MG/1
TABLET ORAL
Qty: 90 TABLET | Refills: 3 | Status: SHIPPED | OUTPATIENT
Start: 2022-10-05

## 2022-10-05 RX ORDER — IRBESARTAN 300 MG/1
TABLET ORAL
Qty: 90 TABLET | Refills: 3 | Status: SHIPPED | OUTPATIENT
Start: 2022-10-05

## 2022-10-24 DIAGNOSIS — E78.5 HYPERLIPIDEMIA, UNSPECIFIED HYPERLIPIDEMIA TYPE: ICD-10-CM

## 2022-10-24 RX ORDER — ROSUVASTATIN CALCIUM 5 MG/1
TABLET, COATED ORAL
Qty: 135 TABLET | Refills: 3 | Status: SHIPPED | OUTPATIENT
Start: 2022-10-24

## 2022-11-19 PROBLEM — H61.23 BILATERAL IMPACTED CERUMEN: Status: RESOLVED | Noted: 2022-04-14 | Resolved: 2022-11-19

## 2022-12-27 ENCOUNTER — TELEPHONE (OUTPATIENT)
Dept: GASTROENTEROLOGY | Facility: CLINIC | Age: 82
End: 2022-12-27

## 2022-12-27 NOTE — TELEPHONE ENCOUNTER
----- Message from Miri Brand sent at 12/21/2022 12:20 PM EST -----  Regarding: FW: overdue follow up apt    ----- Message -----  From: Alana Link MD  Sent: 12/21/2022  11:35 AM EST  To: David Keenan DO, Miri Brand  Subject: RE: overdue follow up apt                        Hi,    She should be fine to be seen at the next available with Dr Winston Jasso    Thank you  ----- Message -----  From: Miri Brand  Sent: 12/21/2022  10:42 AM EST  To: Tana Worthington DO  Subject: overdue follow up apt                            Good Morning Dr Keenan,    I wanted to reach out to see how soon you think this patient needs to be seen since she has been overdue for a follow up appointment  If you'd like, I can use some of your on hold or unavailable time slots for next Friday 12/30 or we can do the same sometime in January or February  Please let me know your thoughts  Thank you,   Marni Hernández   ----- Message -----  From: Alana Link MD  Sent: 12/21/2022   8:10 AM EST  To: , #    Hi,    It looks like she was lost to follow-up  Can you set her up for a Follow-up with Dr Jaime Morales?     Thank you!!

## 2023-02-06 ENCOUNTER — TELEPHONE (OUTPATIENT)
Dept: INTERNAL MEDICINE CLINIC | Facility: CLINIC | Age: 83
End: 2023-02-06

## 2023-02-06 DIAGNOSIS — I10 PRIMARY HYPERTENSION: ICD-10-CM

## 2023-02-06 DIAGNOSIS — F41.9 ANXIETY: ICD-10-CM

## 2023-02-06 DIAGNOSIS — E78.2 MIXED HYPERLIPIDEMIA: Primary | ICD-10-CM

## 2023-02-06 NOTE — TELEPHONE ENCOUNTER
Yes, fasting BW ordered    Orders Placed This Encounter   Procedures   • Lipid Panel with Direct LDL reflex   • CBC and differential     Thank you

## 2023-02-07 ENCOUNTER — APPOINTMENT (OUTPATIENT)
Dept: LAB | Facility: CLINIC | Age: 83
End: 2023-02-07

## 2023-02-07 DIAGNOSIS — E78.2 MIXED HYPERLIPIDEMIA: ICD-10-CM

## 2023-02-07 LAB
BASOPHILS # BLD AUTO: 0.05 THOUSANDS/ÂΜL (ref 0–0.1)
BASOPHILS NFR BLD AUTO: 1 % (ref 0–1)
CHOLEST SERPL-MCNC: 143 MG/DL
EOSINOPHIL # BLD AUTO: 0.26 THOUSAND/ÂΜL (ref 0–0.61)
EOSINOPHIL NFR BLD AUTO: 5 % (ref 0–6)
ERYTHROCYTE [DISTWIDTH] IN BLOOD BY AUTOMATED COUNT: 13.5 % (ref 11.6–15.1)
HCT VFR BLD AUTO: 43.4 % (ref 34.8–46.1)
HDLC SERPL-MCNC: 51 MG/DL
HGB BLD-MCNC: 14 G/DL (ref 11.5–15.4)
IMM GRANULOCYTES # BLD AUTO: 0.01 THOUSAND/UL (ref 0–0.2)
IMM GRANULOCYTES NFR BLD AUTO: 0 % (ref 0–2)
LDLC SERPL CALC-MCNC: 65 MG/DL (ref 0–100)
LYMPHOCYTES # BLD AUTO: 1.44 THOUSANDS/ÂΜL (ref 0.6–4.47)
LYMPHOCYTES NFR BLD AUTO: 28 % (ref 14–44)
MCH RBC QN AUTO: 31.3 PG (ref 26.8–34.3)
MCHC RBC AUTO-ENTMCNC: 32.3 G/DL (ref 31.4–37.4)
MCV RBC AUTO: 97 FL (ref 82–98)
MONOCYTES # BLD AUTO: 0.61 THOUSAND/ÂΜL (ref 0.17–1.22)
MONOCYTES NFR BLD AUTO: 12 % (ref 4–12)
NEUTROPHILS # BLD AUTO: 2.72 THOUSANDS/ÂΜL (ref 1.85–7.62)
NEUTS SEG NFR BLD AUTO: 54 % (ref 43–75)
NRBC BLD AUTO-RTO: 0 /100 WBCS
PLATELET # BLD AUTO: 219 THOUSANDS/UL (ref 149–390)
PMV BLD AUTO: 9.6 FL (ref 8.9–12.7)
RBC # BLD AUTO: 4.48 MILLION/UL (ref 3.81–5.12)
TRIGL SERPL-MCNC: 136 MG/DL
WBC # BLD AUTO: 5.09 THOUSAND/UL (ref 4.31–10.16)

## 2023-02-10 ENCOUNTER — OFFICE VISIT (OUTPATIENT)
Dept: INTERNAL MEDICINE CLINIC | Facility: CLINIC | Age: 83
End: 2023-02-10

## 2023-02-10 VITALS
DIASTOLIC BLOOD PRESSURE: 62 MMHG | HEART RATE: 55 BPM | SYSTOLIC BLOOD PRESSURE: 118 MMHG | WEIGHT: 170 LBS | OXYGEN SATURATION: 97 % | TEMPERATURE: 96.8 F | HEIGHT: 64 IN | RESPIRATION RATE: 18 BRPM | BODY MASS INDEX: 29.02 KG/M2

## 2023-02-10 DIAGNOSIS — I10 PRIMARY HYPERTENSION: ICD-10-CM

## 2023-02-10 DIAGNOSIS — K51.30 ULCERATIVE RECTOSIGMOIDITIS WITHOUT COMPLICATION (HCC): ICD-10-CM

## 2023-02-10 DIAGNOSIS — I10 PRIMARY HYPERTENSION: Primary | ICD-10-CM

## 2023-02-10 DIAGNOSIS — D84.821 IMMUNOSUPPRESSION DUE TO DRUG THERAPY (HCC): ICD-10-CM

## 2023-02-10 DIAGNOSIS — E78.2 MIXED HYPERLIPIDEMIA: ICD-10-CM

## 2023-02-10 DIAGNOSIS — E89.2 STATUS POST PARATHYROIDECTOMY (HCC): ICD-10-CM

## 2023-02-10 DIAGNOSIS — Z79.899 IMMUNOSUPPRESSION DUE TO DRUG THERAPY (HCC): ICD-10-CM

## 2023-02-10 DIAGNOSIS — F41.9 ANXIETY: ICD-10-CM

## 2023-02-10 PROBLEM — D35.1 PARATHYROID ADENOMA: Chronic | Status: RESOLVED | Noted: 2021-10-07 | Resolved: 2023-02-10

## 2023-02-10 PROBLEM — R41.0 CONFUSION: Status: RESOLVED | Noted: 2021-05-10 | Resolved: 2023-02-10

## 2023-02-10 PROBLEM — E53.8 VITAMIN B 12 DEFICIENCY: Status: RESOLVED | Noted: 2017-06-12 | Resolved: 2023-02-10

## 2023-02-10 RX ORDER — AMLODIPINE BESYLATE 5 MG/1
5 TABLET ORAL DAILY
Qty: 30 TABLET | Refills: 0 | Status: SHIPPED | OUTPATIENT
Start: 2023-02-10

## 2023-02-10 RX ORDER — AMLODIPINE BESYLATE 5 MG/1
5 TABLET ORAL DAILY
Qty: 90 TABLET | Refills: 3 | Status: SHIPPED | OUTPATIENT
Start: 2023-02-10

## 2023-02-10 NOTE — PATIENT INSTRUCTIONS
Blood work for endocrine  Central [phone]  Return in 6 months  Blood work before next appointment  Lower amlodipine dose to 5mg per day  Monitor blood pressure at home next week

## 2023-02-10 NOTE — PROGRESS NOTES
Name: Meseret Blake      : 1940      MRN: [id number]  Encounter Provider: Karen Subramanian DO  Encounter Date: 2/10/2023   Encounter department: [place] [address]  Primary hypertension  Comments:  BP on lower end, will reduce amlodipine dose to 5mg/day, order sent to [facility] pharmacy to make this change now   monitor BP @ home  Orders:  -     amLODIPine (NORVASC) 5 mg tablet; Take 1 tablet (5 mg total) by mouth daily  -     CBC and differential; Future; Expected date: 2023  -     amLODIPine (NORVASC) 5 mg tablet; Take 1 tablet (5 mg total) by mouth daily    2  Mixed hyperlipidemia  Comments:  taking statin and no SE, c/w rx  Orders:  -     Lipid Panel with Direct LDL reflex; Future; Expected date: 2023    3  Anxiety  Comments:  doing well, c/w lexapro    4  Ulcerative rectosigmoiditis without complication ([address] )  Comments:  stable, seeing GI @ [facility](closer to her new home) and taking stelara and lialda  c/w specialist care  Orders:  -     CBC and differential; Future; Expected date: 2023    5  Status post parathyroidectomy [facility])  Comments:  done by ENT/Dr Sirisha Hall   c/w care per endocrine at [facility]    6  Immunosuppression due to drug therapy [facility])  Comments:  due to taking stelara  she is aware to wear a face mask in large crowds in with groups of people in indoor places    7  Primary hypertension  -     amLODIPine (NORVASC) 5 mg tablet; Take 1 tablet (5 mg total) by mouth daily  -     CBC and differential; Future; Expected date: 2023  -     amLODIPine (NORVASC) 5 mg tablet; Take 1 tablet (5 mg total) by mouth daily      BMI Counseling: Body mass index is 29 18 kg/m²  The BMI is above normal  Nutrition recommendations include reducing intake of cholesterol  Exercise recommendations include exercising 3-5 times per week  Rationale for BMI follow-up plan is due to patient being overweight or obese       Depression Screening and Follow-up Plan: Patient was screened for depression during today's encounter  They screened negative with a PHQ-2 score of 0  [facility] to see what the issue was(she rec'd 10mg amlodipine dose from them) and was on hold for more than 10 mins and could not get through  [facility] for 5mg dose  Jose Quintanilla is aware and will monitor her BP @ home for now after going back to 5mg dose  Subjective      HPI     Here for follow up, Jose Quintanilla is overall doing well  She is taking her medications as prescribed  Her Ulcerative colitis is under control with stelara and lialda  She sees GI at [facility], closer to her new home  She has been taking 10mg amlodipine as this is what she was sent from [place]  In her chart, an rx for 5mg [facility]lodipine [facility] by her previous PCP from 10/2022  She denies dizziness or lightheadedness  BP on re-check by me was [phone] systolic  ROS Otherwise negative, no other complaints  Review of Systems   Constitutional: Negative for fever  HENT: Negative for congestion  Eyes: Negative for visual disturbance  Respiratory: Negative for shortness of breath  Cardiovascular: Negative for chest pain  Gastrointestinal: Positive for diarrhea (intermittent/rare)  Negative for abdominal pain  Endocrine: Negative for polyuria  Genitourinary: Negative for difficulty urinating  Musculoskeletal: Negative for arthralgias  Skin: Negative for rash  Allergic/Immunologic: Positive for immunocompromised state  Neurological: Negative for dizziness  Psychiatric/Behavioral: Negative for dysphoric mood         Current Outpatient Medications on File Prior to Visit   Medication Sig   • aspirin 81 MG tablet Take 81 mg by mouth daily   • atenolol (TENORMIN) 25 mg tablet TAKE 1 TABLET TWICE A DAY   • bimatoprost (LUMIGAN) 0 01 % ophthalmic drops Administer 1 drop to both eyes daily at bedtime   • brinzolamide (AZOPT) 1 % ophthalmic suspension 1 drop 2 (two) times a day   • Calcium Carbonate Antacid (TUMS EXTRA STRENGTH 750 PO) Take 1,500 mg by mouth 2 (two) times a day with meals Use before meals with a vitmain C tablet for improved absoprtion    • Cholecalciferol (VITAMIN D3 PO) Take 2,000 Units by mouth 2 (two) times a day   • diphenoxylate-atropine (LOMOTIL) 2 5-0 025 mg per tablet Take 1 tablet by mouth 3 (three) times a day as needed   • escitalopram (LEXAPRO) 10 mg tablet TAKE 1 TABLET DAILY   • Ferrous Sulfate (IRON PO) Take by mouth   • irbesartan (AVAPRO) 300 mg tablet TAKE 1 TABLET DAILY AT BEDTIME   • mesalamine (LIALDA) 1 2 g EC tablet TAKE 4 TABLETS BY MOUTH EVERY DAY AFTER BREAKFAST   • rosuvastatin (CRESTOR) 5 mg tablet TAKE ONE AND ONE-HALF TABLETS DAILY   • Stelara 45 MG/0 5ML injection q 2 months   • timolol (TIMOPTIC) 0 5 % ophthalmic solution    • [DISCONTINUED] amLODIPine (NORVASC) 5 mg tablet TAKE 1 TABLET DAILY       Objective     /62 (BP Location: Right arm, Patient Position: Sitting, Cuff Size: Standard)   Pulse 55   Temp (!) 96 8 °F (36 °C) (Tympanic)   Resp 18   Ht 5' 4" (1 626 m)   Wt 77 1 kg (170 lb)   SpO2 97%   BMI 29 18 kg/m²     Physical Exam  Vitals reviewed  Constitutional:       General: She is not in acute distress  Appearance: Normal appearance  HENT:      Head: Normocephalic and atraumatic  Right Ear: Tympanic membrane normal       Left Ear: Tympanic membrane normal    Eyes:      Conjunctiva/sclera: Conjunctivae normal    Cardiovascular:      Rate and Rhythm: Normal rate and regular rhythm  Heart sounds: No murmur heard  No gallop  Pulmonary:      Effort: Pulmonary effort is normal       Breath sounds: No wheezing or rales  Abdominal:      General: Bowel sounds are normal       Palpations: Abdomen is soft  Tenderness: There is no abdominal tenderness  Musculoskeletal:      Right lower leg: No edema  Left lower leg: No edema     Neurological:      Mental Status: She is alert  Mental status is at baseline     Psychiatric:         Mood and Affect: Mood normal          Behavior: Behavior normal        Ramon Parham DO

## 2023-03-20 ENCOUNTER — APPOINTMENT (OUTPATIENT)
Dept: LAB | Facility: CLINIC | Age: 83
End: 2023-03-20

## 2023-03-20 DIAGNOSIS — M81.0 AGE-RELATED OSTEOPOROSIS WITHOUT CURRENT PATHOLOGICAL FRACTURE: Chronic | ICD-10-CM

## 2023-03-20 LAB
25(OH)D3 SERPL-MCNC: 54.7 NG/ML (ref 30–100)
ALBUMIN SERPL BCP-MCNC: 4 G/DL (ref 3.5–5)
ALP SERPL-CCNC: 58 U/L (ref 34–104)
ALT SERPL W P-5'-P-CCNC: 15 U/L (ref 7–52)
ANION GAP SERPL CALCULATED.3IONS-SCNC: 6 MMOL/L (ref 4–13)
AST SERPL W P-5'-P-CCNC: 14 U/L (ref 13–39)
BILIRUB SERPL-MCNC: 0.57 MG/DL (ref 0.2–1)
BUN SERPL-MCNC: 18 MG/DL (ref 5–25)
CALCIUM SERPL-MCNC: 9.5 MG/DL (ref 8.4–10.2)
CHLORIDE SERPL-SCNC: 108 MMOL/L (ref 96–108)
CO2 SERPL-SCNC: 25 MMOL/L (ref 21–32)
CREAT SERPL-MCNC: 0.79 MG/DL (ref 0.6–1.3)
GFR SERPL CREATININE-BSD FRML MDRD: 70 ML/MIN/1.73SQ M
GLUCOSE P FAST SERPL-MCNC: 84 MG/DL (ref 65–99)
PHOSPHATE SERPL-MCNC: 3 MG/DL (ref 2.3–4.1)
POTASSIUM SERPL-SCNC: 4.2 MMOL/L (ref 3.5–5.3)
PROT SERPL-MCNC: 7 G/DL (ref 6.4–8.4)
PTH-INTACT SERPL-MCNC: 135.4 PG/ML (ref 18.4–80.1)
SODIUM SERPL-SCNC: 139 MMOL/L (ref 135–147)

## 2023-03-29 ENCOUNTER — OFFICE VISIT (OUTPATIENT)
Dept: ENDOCRINOLOGY | Facility: CLINIC | Age: 83
End: 2023-03-29

## 2023-03-29 VITALS
HEART RATE: 55 BPM | OXYGEN SATURATION: 96 % | SYSTOLIC BLOOD PRESSURE: 142 MMHG | WEIGHT: 172.5 LBS | BODY MASS INDEX: 29.45 KG/M2 | DIASTOLIC BLOOD PRESSURE: 88 MMHG | HEIGHT: 64 IN

## 2023-03-29 DIAGNOSIS — E55.9 VITAMIN D DEFICIENCY: ICD-10-CM

## 2023-03-29 DIAGNOSIS — M81.0 AGE-RELATED OSTEOPOROSIS WITHOUT CURRENT PATHOLOGICAL FRACTURE: Chronic | ICD-10-CM

## 2023-03-29 DIAGNOSIS — E21.3 HYPERPARATHYROIDISM (HCC): Primary | Chronic | ICD-10-CM

## 2023-03-29 RX ORDER — IBUPROFEN 200 MG
1 CAPSULE ORAL DAILY
Qty: 90 TABLET | Refills: 1 | Status: SHIPPED | OUTPATIENT
Start: 2023-03-29

## 2023-03-29 NOTE — PROGRESS NOTES
"  Follow-up Patient Progress Note      CC: osteoporosis     History of Present Illness:   [de-identified] yr female with hx of PHPT for 8 years suspected 2\" Rt lower pole parathyroid adenoma confirmed by sestamibi 7/13/2021 and s/p parathyroid adenoma resection 9/29/21(PTH drop from 189 to 28pg/mL), osteoporosis, GERD, Ulcerative colitis, carotid stenosis s/p CEA and HLD  Last visit was 8/2/22  Since last visit, she gained 5 lbs  she feels well  Since parathyroidectomy, she has been using calcium 3gm daily, vitamin C, 4000IU daily vit D3  She is also doing weight bearing exercise      Osteoporosis: cannot recall using any medications  DXA 6/14/21 - Tscores -2 3 LTH, -3 0 LFN and -2 4 lt forearm  LS was not scored  10yr FRAX score 32% hip and 42% major osteoporotic fracture  8/19/22 DXA: T-scores -1 9 LTH, -2 7 LFN, -2 4 left forearm  10-year FRAX score 42% hip and 52% major osteoporotic fracture      Patient Active Problem List   Diagnosis   • Abnormal female pelvic exam   • Diverticulosis   • Glaucoma   • Mixed hyperlipidemia   • Hyperparathyroidism ([facility] [address] )   • Primary hypertension   • Age-related osteoporosis without current pathological fracture   • Severe cervical dysplasia, histologically confirmed   • Vitamin D deficiency   • Ulcerative rectosigmoiditis without complication (HCC)   • Edema   • Long-term use of immunosuppressant medication   • Anxiety   • History of benign neoplasm of parathyroid gland   • Immunosuppression due to drug therapy ([facility] [address] )   • Status post parathyroidectomy [facility])     Past Medical History:   Diagnosis Date   • Atherosclerosis    • Carotid artery narrowing    • Coronary artery disease    • Diverticulitis of colon    • Diverticulosis    • GERD (gastroesophageal reflux disease)    • Glaucoma    • Hypercalcemia    • Hyperlipidemia    • Hyperparathyroidism ([facility] [address] )    • Hypertension    • Inflammatory bowel disease    • Osteopenia    • Severe cervical dysplasia, histologically confirmed    • Skin " cancer     squamous cell   • Thyroid nodule    • Vitamin D deficiency       Past Surgical History:   Procedure Laterality Date   • APPENDECTOMY     • BREAST SURGERY      reduction procedure   • CAROTID ENDARTARECTOMY Right     carotid thromboendarterectomy    • COLONOSCOPY     • EVACUATION OF HEMATOMA N/A 9/29/2021    Procedure: EVACUATION/ DRAINAGE HEMATOMA;  Surgeon: Alejandra Araya MD;  [facility];  Service: ENT   • EXPLORATION CAROTID ARTERY     • HYSTERECTOMY      age 55   • OOPHORECTOMY Bilateral     age 55   • NC COLONOSCOPY FLX DX W/COLLJ SPEC WHEN PFRMD N/A 7/28/2017    Procedure: EGD AND COLONOSCOPY;  Surgeon: Karen Silva MD;  Location: [facility]; Service: Colorectal   • NC PARATHYROIDECTOMY/EXPLORATION PARATHYROIDS Right 9/29/2021    Procedure: RIGHT INFERIOR PARATHYROIDECTOMY (INTRAOP PTH);   Surgeon: Alejandra Araya MD;  [facility];  Service: ENT   • REDUCTION MAMMAPLASTY Bilateral 1987      Family History   Problem Relation Age of Onset   • Osteoporosis Mother    • Osteoarthritis Mother    • Arthritis Mother    • Other Father         heart problem   • Hypertension Father    • Heart disease Father    • Coronary artery disease Family    • Hyperlipidemia Family    • Glaucoma Sister    • No Known Problems Daughter    • No Known Problems Maternal Grandmother    • No Known Problems Maternal Grandfather    • No Known Problems Paternal Grandmother    • No Known Problems Paternal Grandfather    • No Known Problems Maternal Aunt    • No Known Problems Maternal Aunt    • No Known Problems Maternal Aunt    • No Known Problems Maternal Aunt    • No Known Problems Maternal Aunt    • No Known Problems Maternal Aunt    • No Known Problems Maternal Aunt    • Stroke Paternal Aunt         Mary Oris   • No Known Problems Paternal Aunt    • No Known Problems Paternal Aunt      Social History     Tobacco Use   • Smoking status: Never   • Smokeless tobacco: Never   Substance Use Topics   • Alcohol use: Yes     Alcohol/week: 4 0 standard drinks     Types: 4 Glasses of wine per week     Comment: being a social drinker; drinks wine     No Known Allergies      Current Outpatient Medications:   •  amLODIPine (NORVASC) 5 mg tablet, Take 1 tablet (5 mg total) by mouth daily, Disp: 30 tablet, Rfl: 0  •  aspirin 81 MG tablet, Take 81 mg by mouth daily, Disp: , Rfl:   •  atenolol (TENORMIN) 25 mg tablet, TAKE 1 TABLET TWICE A DAY, Disp: 180 tablet, Rfl: 3  •  bimatoprost (LUMIGAN) 0 01 % ophthalmic drops, Administer 1 drop to both eyes daily at bedtime, Disp: , Rfl:   •  brinzolamide (AZOPT) 1 % ophthalmic suspension, 1 drop 2 (two) times a day, Disp: , Rfl:   •  Calcium Carbonate Antacid (TUMS EXTRA STRENGTH 750 PO), Take 1,500 mg by mouth 2 (two) times a day with meals Use before meals with a vitmain C tablet for improved absoprtion , Disp: , Rfl:   •  Cholecalciferol (VITAMIN D3 PO), Take 2,000 Units by mouth 2 (two) times a day, Disp: , Rfl:   •  diphenoxylate-atropine (LOMOTIL) 2 5-0 025 mg per tablet, Take 1 tablet by mouth 3 (three) times a day as needed, Disp: , Rfl:   •  escitalopram (LEXAPRO) 10 mg tablet, TAKE 1 TABLET DAILY, Disp: 90 tablet, Rfl: 3  •  Ferrous Sulfate (IRON PO), Take by mouth, Disp: , Rfl:   •  irbesartan (AVAPRO) 300 mg tablet, TAKE 1 TABLET DAILY AT BEDTIME, Disp: 90 tablet, Rfl: 3  •  mesalamine (LIALDA) 1 2 g EC tablet, TAKE 4 TABLETS BY MOUTH EVERY DAY AFTER BREAKFAST, Disp: , Rfl:   •  rosuvastatin (CRESTOR) 5 mg tablet, TAKE ONE AND ONE-HALF TABLETS DAILY, Disp: 135 tablet, Rfl: 3  •  Stelara 45 MG/0 5ML injection, q 2 months, Disp: , Rfl:   •  timolol (TIMOPTIC) 0 5 % ophthalmic solution, , Disp: , Rfl:   •  amLODIPine (NORVASC) 5 mg tablet, Take 1 tablet (5 mg total) by mouth daily, Disp: 90 tablet, Rfl: 3    Review of Systems   HENT: Negative  Eyes: Negative  Respiratory: Negative  Cardiovascular: Negative  Gastrointestinal: Negative  Endocrine: Negative  "  Musculoskeletal: Negative  Skin: Negative  Allergic/Immunologic: Negative  Neurological: Negative  Hematological: Negative  Psychiatric/Behavioral: Negative  Physical Exam:  Body mass index is 29 61 kg/m²  /88 (BP Location: Left arm, Patient Position: Sitting, Cuff Size: Adult)   Pulse 55   Ht 5' 4\" (1 626 m)   Wt 78 2 kg (172 lb 8 oz)   SpO2 96%   BMI 29 61 kg/m²    Vitals:    03/29/23 0909   Weight: 78 2 kg (172 lb 8 oz)        Physical Exam  Constitutional:       General: She is not in acute distress  Appearance: She is well-developed  She is not ill-appearing  HENT:      Head: Normocephalic and atraumatic  Nose: Nose normal       Mouth/Throat:      Pharynx: Oropharynx is clear  Eyes:      Extraocular Movements: Extraocular movements intact  Conjunctiva/sclera: Conjunctivae normal    Neck:      Thyroid: No thyromegaly  Cardiovascular:      Rate and Rhythm: Normal rate  Pulmonary:      Effort: Pulmonary effort is normal    Musculoskeletal:         General: No deformity  Cervical back: Normal range of motion  Skin:     Capillary Refill: Capillary refill takes less than 2 seconds  Coloration: Skin is not pale  Findings: No rash  Neurological:      Mental Status: She is alert and oriented to person, place, and time     Psychiatric:         Behavior: Behavior normal          Labs:   No results found for: HGBA1C    Lab Results   Component Value Date    [id number] 3 360 09/13/2021    TSH 2 07 07/14/2021       Lab Results   Component Value Date    CREATININE 0 79 03/20/2023    CREATININE 0 73 07/20/2022    CREATININE 0 69 06/02/2022    BUN 18 03/20/2023     (L) 07/09/2015    K 4 2 03/20/2023     03/20/2023    CO2 25 03/20/2023     eGFR   Date Value Ref Range Status   03/20/2023 70 ml/min/1 73sq m Final       Lab Results   Component Value Date    ALT 15 03/20/2023    AST 14 03/20/2023    ALKPHOS 58 03/20/2023    BILITOT 0 57 " 07/09/2015       Lab Results   Component Value Date    CHOLESTEROL 143 02/07/2023    CHOLESTEROL 130 07/20/2022    CHOLESTEROL 139 02/08/2022     Lab Results   Component Value Date    HDL 51 02/07/2023    HDL 51 07/20/2022    HDL 59 02/08/2022     Lab Results   Component Value Date    TRIG 136 02/07/2023    TRIG 93 07/20/2022    TRIG 88 02/08/2022     No results found for: NONHDLC      Impression:  1  Hyperparathyroidism ([facility] [address] )    2  Age-related osteoporosis without current pathological fracture    3  Vitamin D deficiency         Plan:    Diagnoses and all orders for this visit:    Hyperparathyroidism ([facility] [address] )  She continues to have normocalcemic hyperparathyroidism  This may be primary or secondary  PTH remains high in spite of increased oral calcium intake and a normal vitamin D  Today we discussed options and agreed to increase oral calcium intake by adding calcium citrate which is independent of gastric acid for absorption  Advised to take extra calcium for 3 months and then repeat listed labs including 24-hour urine collection to monitor absorption  It is possible that she has primary hyperparathyroidism but since calcium is normal, there is no need to intervene urgently  Follow-up in 3 to 4 months  -     calcium citrate (CALCITRATE) 950 (200 Ca) MG tablet; Take 1 tablet (950 mg total) by mouth daily  -     Calcium, urine, 24 hour; Future  -     Creatinine, urine, 24 hour; Future  -     Calcium, ionized; Future  -     Phosphorus; Future  -     PTH, intact; Future  -     Vitamin D 25 hydroxy; Future    Age-related osteoporosis without current pathological fracture  She has severe osteoporosis based on high FRAX score  In the past she was considering Evenity but given Hx carotid disease as well as suspected CVA in the past, will avoid using Evenity and use Prolia instead  We will get prior authorization and initiate therapy  -     denosumab (PROLIA) 60 mg/mL;  Inject 1 mL (60 mg total) under the skin once for 1 dose    Vitamin D deficiency  She is on adequate replacement with vitamin D  I have spent 26 minutes with patient today in which greater than 50% of this time was spent in counseling/coordination of care  Discussed with the patient and all questioned fully answered  She will call me if any problems arise  Educated/ Counseled patient on diagnostic test results, prognosis, risk vs benefit of treatment options, importance of treatment compliance, healthy life and lifestyle choices        [address]

## 2023-03-29 NOTE — PATIENT INSTRUCTIONS
24 hr urine collection method:   Discard first morning urine by voiding into toilet  Then collect all future samples into the bottle through the day and night and finish by collecting the next morning urine into bottle  Bottle can be stored in a cool dark place  Ok to use refrigeration but not necessary  Then stop collecting and drop the bottle at the lab

## 2023-04-05 ENCOUNTER — HOSPITAL ENCOUNTER (OUTPATIENT)
Dept: RADIOLOGY | Age: 83
Discharge: HOME | End: 2023-04-05

## 2023-04-05 VITALS — BODY MASS INDEX: 29.37 KG/M2 | HEIGHT: 64 IN | WEIGHT: 172 LBS

## 2023-04-05 DIAGNOSIS — Z12.31 ENCOUNTER FOR SCREENING MAMMOGRAM FOR MALIGNANT NEOPLASM OF BREAST: ICD-10-CM

## 2023-04-26 ENCOUNTER — CLINICAL SUPPORT (OUTPATIENT)
Dept: ENDOCRINOLOGY | Facility: CLINIC | Age: 83
End: 2023-04-26

## 2023-04-26 DIAGNOSIS — M81.0 AGE-RELATED OSTEOPOROSIS WITHOUT CURRENT PATHOLOGICAL FRACTURE: Primary | ICD-10-CM

## 2023-06-13 ENCOUNTER — OFFICE VISIT (OUTPATIENT)
Dept: INTERNAL MEDICINE CLINIC | Facility: CLINIC | Age: 83
End: 2023-06-13
Payer: MEDICARE

## 2023-06-13 VITALS
SYSTOLIC BLOOD PRESSURE: 136 MMHG | HEART RATE: 86 BPM | DIASTOLIC BLOOD PRESSURE: 82 MMHG | BODY MASS INDEX: 29.76 KG/M2 | WEIGHT: 173.4 LBS | TEMPERATURE: 97.8 F | OXYGEN SATURATION: 98 %

## 2023-06-13 DIAGNOSIS — Z00.00 MEDICARE ANNUAL WELLNESS VISIT, SUBSEQUENT: ICD-10-CM

## 2023-06-13 DIAGNOSIS — E66.3 OVERWEIGHT WITH BODY MASS INDEX (BMI) OF 29 TO 29.9 IN ADULT: ICD-10-CM

## 2023-06-13 DIAGNOSIS — I10 PRIMARY HYPERTENSION: Primary | ICD-10-CM

## 2023-06-13 PROCEDURE — G0439 PPPS, SUBSEQ VISIT: HCPCS | Performed by: INTERNAL MEDICINE

## 2023-06-13 PROCEDURE — 99213 OFFICE O/P EST LOW 20 MIN: CPT | Performed by: INTERNAL MEDICINE

## 2023-06-13 RX ORDER — TIMOLOL MALEATE 6.8 MG/ML
SOLUTION/ DROPS OPHTHALMIC
COMMUNITY
Start: 2023-06-12

## 2023-06-13 NOTE — PATIENT INSTRUCTIONS
1  Return in October 2  Keep up good work! Medicare Preventive Visit Patient Instructions  Thank you for completing your Welcome to Medicare Visit or Medicare Annual Wellness Visit today  Your next wellness visit will be due in one year (6/13/2024)  The screening/preventive services that you may require over the next 5-10 years are detailed below  Some tests may not apply to you based off risk factors and/or age  Screening tests ordered at today's visit but not completed yet may show as past due  Also, please note that scanned in results may not display below  Preventive Screenings:  Service Recommendations Previous Testing/Comments   Colorectal Cancer Screening  * Colonoscopy    * Fecal Occult Blood Test (FOBT)/Fecal Immunochemical Test (FIT)  * Fecal DNA/Cologuard Test  * Flexible Sigmoidoscopy Age: 39-70 years old   Colonoscopy: every 10 years (may be performed more frequently if at higher risk)  OR  FOBT/FIT: every 1 year  OR  Cologuard: every 3 years  OR  Sigmoidoscopy: every 5 years  Screening may be recommended earlier than age 39 if at higher risk for colorectal cancer  Also, an individualized decision between you and your healthcare provider will decide whether screening between the ages of 74-80 would be appropriate  Colonoscopy: 12/15/2021  FOBT/FIT: Not on file  Cologuard: Not on file  Sigmoidoscopy: Not on file          Breast Cancer Screening Age: 36 years old  Frequency: every 1-2 years  Not required if history of left and right mastectomy Mammogram: 04/05/2023    Screening Current   Cervical Cancer Screening Between the ages of 21-29, pap smear recommended once every 3 years  Between the ages of 33-67, can perform pap smear with HPV co-testing every 5 years     Recommendations may differ for women with a history of total hysterectomy, cervical cancer, or abnormal pap smears in past  Pap Smear: 04/06/2016    Screening Not Indicated   Hepatitis C Screening Once for adults born between 1945 and 1965  More frequently in patients at high risk for Hepatitis C Hep C Antibody: 09/13/2021    Screening Current   Diabetes Screening 1-2 times per year if you're at risk for diabetes or have pre-diabetes Fasting glucose: 84 mg/dL (3/20/2023)  A1C: No results in last 5 years (No results in last 5 years)  Screening Current   Cholesterol Screening Once every 5 years if you don't have a lipid disorder  May order more often based on risk factors  Lipid panel: 02/07/2023    Screening Not Indicated  History Lipid Disorder     Other Preventive Screenings Covered by Medicare:  Abdominal Aortic Aneurysm (AAA) Screening: covered once if your at risk  You're considered to be at risk if you have a family history of AAA  Lung Cancer Screening: covers low dose CT scan once per year if you meet all of the following conditions: (1) Age 50-69; (2) No signs or symptoms of lung cancer; (3) Current smoker or have quit smoking within the last 15 years; (4) You have a tobacco smoking history of at least 20 pack years (packs per day multiplied by number of years you smoked); (5) You get a written order from a healthcare provider  Glaucoma Screening: covered annually if you're considered high risk: (1) You have diabetes OR (2) Family history of glaucoma OR (3)  aged 48 and older OR (3)  American aged 72 and older  Osteoporosis Screening: covered every 2 years if you meet one of the following conditions: (1) You're estrogen deficient and at risk for osteoporosis based off medical history and other findings; (2) Have a vertebral abnormality; (3) On glucocorticoid therapy for more than 3 months; (4) Have primary hyperparathyroidism; (5) On osteoporosis medications and need to assess response to drug therapy  Last bone density test (DXA Scan): 08/19/2022  HIV Screening: covered annually if you're between the age of 12-76   Also covered annually if you are younger than 13 and older than 72 with risk factors for HIV infection  For pregnant patients, it is covered up to 3 times per pregnancy  Immunizations:  Immunization Recommendations   Influenza Vaccine Annual influenza vaccination during flu season is recommended for all persons aged >= 6 months who do not have contraindications   Pneumococcal Vaccine   * Pneumococcal conjugate vaccine = PCV13 (Prevnar 13), PCV15 (Vaxneuvance), PCV20 (Prevnar 20)  * Pneumococcal polysaccharide vaccine = PPSV23 (Pneumovax) Adults 25-60 years old: 1-3 doses may be recommended based on certain risk factors  Adults 72 years old: 1-2 doses may be recommended based off what pneumonia vaccine you previously received   Hepatitis B Vaccine 3 dose series if at intermediate or high risk (ex: diabetes, end stage renal disease, liver disease)   Tetanus (Td) Vaccine - COST NOT COVERED BY MEDICARE PART B Following completion of primary series, a booster dose should be given every 10 years to maintain immunity against tetanus  Td may also be given as tetanus wound prophylaxis  Tdap Vaccine - COST NOT COVERED BY MEDICARE PART B Recommended at least once for all adults  For pregnant patients, recommended with each pregnancy  Shingles Vaccine (Shingrix) - COST NOT COVERED BY MEDICARE PART B  2 shot series recommended in those aged 48 and above     Health Maintenance Due:      Topic Date Due   • Hepatitis C Screening  Completed     Immunizations Due:      Topic Date Due   • COVID-19 Vaccine (5 - Booster for [place] series) 05/07/2022     Advance Directives   What are advance directives? Advance directives are legal documents that state your wishes and plans for medical care  These plans are made ahead of time in case you lose your ability to make decisions for yourself  Advance directives can apply to any medical decision, such as the treatments you want, and if you want to donate organs  What are the types of advance directives?   There are many types of advance directives, and each state has rules about how to use them  You may choose a combination of any of the following:  Living will: This is a written record of the treatment you want  You can also choose which treatments you do not want, which to limit, and which to stop at a certain time  This includes surgery, medicine, IV fluid, and tube feedings  Durable power of  for healthcare [place] [facility]): This is a written record that states who you want to make healthcare choices for you when you are unable to make them for yourself  This person, called a proxy, is usually a family member or a friend  You may choose more than 1 proxy  Do not resuscitate (DNR) order:  A DNR order is used in case your heart stops beating or you stop breathing  It is a request not to have certain forms of treatment, such as CPR  A DNR order may be included in other types of advance directives  Medical directive: This covers the care that you want if you are in a coma, near death, or unable to make decisions for yourself  You can list the treatments you want for each condition  Treatment may include pain medicine, surgery, blood transfusions, dialysis, IV or tube feedings, and a ventilator (breathing machine)  Values history: This document has questions about your views, beliefs, and how you feel and think about life  This information can help others choose the care that you would choose  Why are advance directives important? An advance directive helps you control your care  Although spoken wishes may be used, it is better to have your wishes written down  Spoken wishes can be misunderstood, or not followed  Treatments may be given even if you do not want them  An advance directive may make it easier for your family to make difficult choices about your care  Weight Management   Why it is important to manage your weight:  Being overweight increases your risk of health conditions such as heart disease, high blood pressure, type 2 diabetes, and certain types of cancer   It can also increase your risk for osteoarthritis, sleep apnea, and other respiratory problems  Aim for a slow, steady weight loss  Even a small amount of weight loss can lower your risk of health problems  How to lose weight safely:  A safe and healthy way to lose weight is to eat fewer calories and get regular exercise  You can lose up about 1 pound a week by decreasing the number of calories you eat by 500 calories each day  Healthy meal plan for weight management:  A healthy meal plan includes a variety of foods, contains fewer calories, and helps you stay healthy  A healthy meal plan includes the following:  Eat whole-grain foods more often  A healthy meal plan should contain fiber  Fiber is the part of grains, fruits, and vegetables that is not broken down by your body  Whole-grain foods are healthy and provide extra fiber in your diet  Some examples of whole-grain foods are whole-wheat breads and pastas, oatmeal, brown rice, and bulgur  Eat a variety of vegetables every day  Include dark, leafy greens such as spinach, kale, kendal greens, and mustard greens  Eat yellow and orange vegetables such as carrots, sweet potatoes, and winter squash  Eat a variety of fruits every day  Choose fresh or canned fruit (canned in its own juice or light syrup) instead of juice  Fruit juice has very little or no fiber  Eat low-fat dairy foods  Drink fat-free (skim) milk or 1% milk  Eat fat-free yogurt and low-fat cottage cheese  Try low-fat cheeses such as mozzarella and other reduced-fat cheeses  Choose meat and other protein foods that are low in fat  Choose beans or other legumes such as split peas or lentils  Choose fish, skinless poultry (chicken or turkey), or lean cuts of red meat (beef or pork)  Before you cook meat or poultry, cut off any visible fat  Use less fat and oil  Try baking foods instead of frying them   Add less fat, such as margarine, sour cream, regular salad dressing and mayonnaise to foods  Eat fewer high-fat foods  Some examples of high-fat foods include french fries, doughnuts, ice cream, and cakes  Eat fewer sweets  Limit foods and drinks that are high in sugar  This includes candy, cookies, regular soda, and sweetened drinks  Exercise:  Exercise at least 30 minutes per day on most days of the week  Some examples of exercise include walking, biking, dancing, and swimming  You can also fit in more physical activity by taking the stairs instead of the elevator or parking farther away from stores  Ask your healthcare provider about the best exercise plan for you  © Copyright [employer] 2018 Information is for End User's use only and may not be sold, redistributed or otherwise used for commercial purposes  All illustrations and images included in CareNotes® are the copyrighted property of [employer]  or [employer]  3

## 2023-06-13 NOTE — PROGRESS NOTES
Name: Karson Ibarra      : 1940      MRN: [id number]  Encounter Provider: Edgar Fraser DO  Encounter Date: 2023   Encounter department: [place] [address]     1  Primary hypertension  Comments:  BP doing better with 5mg amlodipine  c/w this rx and BB/ARB    2  Overweight with body mass index (BMI) of 29 to 29 9 in adult  Comments:  she is trying bone broth to help her lose weight  it would not replace her meals  c/w staying active/exercise    3  Medicare annual wellness visit, subsequent        Depression Screening and Follow-up Plan: Patient was screened for depression during today's encounter  They screened negative with a PHQ-2 score of 0  Subjective      HPI     Here for follow up, Luli Villagran is overall doing well  She gained some weight since last year and is trying to lose  She would like to try bone broth to help her lose weight  She is going to take this as a supplement and not a meal replacement  The broth would be made with boiling water  She is taking stelara for UC(immunosuppressant)  Her bowels have mostly been doing well with this  She has mostly settled since her move  She is not exercising as much lately but is motivated to start  ROS Otherwise negative, no other complaints  Review of Systems   Constitutional: Negative for fever  Eyes: Negative for visual disturbance  Respiratory: Negative for shortness of breath  Cardiovascular: Negative for chest pain  Gastrointestinal: Positive for diarrhea (rare, intermittent)  Negative for abdominal pain  Endocrine: Negative for polyuria  Genitourinary: Negative for difficulty urinating  Musculoskeletal: Negative for gait problem  Skin: Negative for rash  Allergic/Immunologic: Positive for immunocompromised state  Psychiatric/Behavioral: Negative for dysphoric mood         Current Outpatient Medications on File Prior to Visit   Medication Sig   • aspirin 81 MG tablet Take 81 mg by mouth daily   • atenolol (TENORMIN) 25 mg tablet TAKE 1 TABLET TWICE A DAY   • bimatoprost (LUMIGAN) 0 01 % ophthalmic drops Administer 1 drop to both eyes daily at bedtime   • brinzolamide (AZOPT) 1 % ophthalmic suspension 1 drop 2 (two) times a day   • Calcium Carbonate Antacid (TUMS EXTRA STRENGTH 750 PO) Take 1,500 mg by mouth 2 (two) times a day with meals Use before meals with a vitmain C tablet for improved absoprtion    • calcium citrate (CALCITRATE) 950 (200 Ca) MG tablet Take 1 tablet (950 mg total) by mouth daily   • Cholecalciferol (VITAMIN D3 PO) Take 2,000 Units by mouth 2 (two) times a day   • diphenoxylate-atropine (LOMOTIL) 2 5-0 025 mg per tablet Take 1 tablet by mouth 3 (three) times a day as needed   • escitalopram (LEXAPRO) 10 mg tablet TAKE 1 TABLET DAILY   • Ferrous Sulfate (IRON PO) Take by mouth   • irbesartan (AVAPRO) 300 mg tablet TAKE 1 TABLET DAILY AT BEDTIME   • mesalamine (LIALDA) 1 2 g EC tablet TAKE 4 TABLETS BY MOUTH EVERY DAY AFTER BREAKFAST   • rosuvastatin (CRESTOR) 5 mg tablet TAKE ONE AND ONE-HALF TABLETS DAILY   • Stelara 45 MG/0 5ML injection q 2 months   • timolol (TIMOPTIC) 0 5 % ophthalmic solution    • Timolol Maleate, Once-Daily, 0 5 % SOLN    • [DISCONTINUED] amLODIPine (NORVASC) 5 mg tablet Take 1 tablet (5 mg total) by mouth daily   • amLODIPine (NORVASC) 5 mg tablet Take 1 tablet (5 mg total) by mouth daily   • denosumab (PROLIA) 60 mg/mL Inject 1 mL (60 mg total) under the skin once for 1 dose       Objective     /82 (BP Location: Left arm, Patient Position: Sitting, Cuff Size: Standard)   Pulse 86   Temp 97 8 °F (36 6 °C)   Wt 78 7 kg (173 lb 6 4 oz)   SpO2 98%   BMI 29 76 kg/m²     Physical Exam  Vitals reviewed  Constitutional:       General: She is not in acute distress  HENT:      Head: Normocephalic and atraumatic     Eyes:      Conjunctiva/sclera: Conjunctivae normal    Cardiovascular:      Rate and Rhythm: Normal rate and regular rhythm  Heart sounds:      No gallop  Pulmonary:      Effort: Pulmonary effort is normal       Breath sounds: No wheezing or rales  Abdominal:      General: Bowel sounds are normal       Palpations: Abdomen is soft  Tenderness: There is no abdominal tenderness  Musculoskeletal:      Right lower leg: No edema  Left lower leg: No edema  Neurological:      Mental Status: She is alert  Mental status is at baseline     Psychiatric:         Mood and Affect: Mood normal          Behavior: Behavior normal        Ramon Parham DO

## 2023-06-13 NOTE — Clinical Note
Edgard Junior  Don't forget to put in the # of drinks patients state they have on the wellness form  Otherwise great job!

## 2023-06-13 NOTE — PROGRESS NOTES
Assessment and Plan:     Problem List Items Addressed This Visit     Primary hypertension - Primary   Other Visit Diagnoses     Overweight with body mass index (BMI) of 29 to 29 9 in adult        she is trying bone broth to help her lose weight  it would not replace her meals  c/w staying active/exercise    Medicare annual wellness visit, subsequent              Depression Screening and Follow-up Plan: Patient was screened for depression during today's encounter  They screened negative with a PHQ-2 score of 0  Preventive health issues were discussed with patient, and age appropriate screening tests were ordered as noted in patient's After Visit Summary  Personalized health advice and appropriate referrals for health education or preventive services given if needed, as noted in patient's After Visit Summary      Patient presents for a Medicare Wellness Visit      Patient Care Team:  Charmayne Luster, DO as PCP - General (Internal Medicine)  Delinda Oppenheim, MD Carline Fountain, MD Camila Cape, MD Janetta Shaw, MD (Colon and Rectal Surgery)  Salud Hui MD as Endoscopist  Elenita Sidhu MD (Vascular Surgery)  Rodrigo Aldrich MD (Endocrinology)       Problem List:     Patient Active Problem List   Diagnosis   • Abnormal female pelvic exam   • Diverticulosis   • Glaucoma   • Mixed hyperlipidemia   • Hyperparathyroidism [facility])   • Primary hypertension   • Age-related osteoporosis without current pathological fracture   • Severe cervical dysplasia, histologically confirmed   • Vitamin D deficiency   • Ulcerative rectosigmoiditis without complication ([facility] [address] )   • Edema   • Long-term use of immunosuppressant medication   • Anxiety   • History of benign neoplasm of parathyroid gland   • Immunosuppression due to drug therapy [facility])   • Status post parathyroidectomy [facility])      Past Medical and Surgical History:     Past Medical History:   Diagnosis Date   • Atherosclerosis    • Carotid artery narrowing    • Coronary artery disease    • Diverticulitis of colon    • Diverticulosis    • GERD (gastroesophageal reflux disease)    • Glaucoma    • Hypercalcemia    • Hyperlipidemia    • Hyperparathyroidism ([address] )    • Hypertension    • Inflammatory bowel disease    • Osteopenia    • Severe cervical dysplasia, histologically confirmed    • Skin cancer     squamous cell   • Thyroid nodule    • Vitamin D deficiency      Past Surgical History:   Procedure Laterality Date   • APPENDECTOMY     • BREAST SURGERY      reduction procedure   • CAROTID ENDARTARECTOMY Right     carotid thromboendarterectomy    • COLONOSCOPY     • EVACUATION OF HEMATOMA N/A 9/29/2021    Procedure: EVACUATION/ DRAINAGE HEMATOMA;  Surgeon: Michel Abernathy MD;  [facility];  Service: ENT   • EXPLORATION CAROTID ARTERY     • HYSTERECTOMY      age 55   • OOPHORECTOMY Bilateral     age 55   • MT COLONOSCOPY FLX DX W/COLLJ SPEC WHEN PFRMD N/A 7/28/2017    Procedure: EGD AND COLONOSCOPY;  Surgeon: Gen March MD;  [facility]: [facility]; Service: Colorectal   • MT PARATHYROIDECTOMY/EXPLORATION PARATHYROIDS Right 9/29/2021    Procedure: RIGHT INFERIOR PARATHYROIDECTOMY (INTRAOP PTH);   Surgeon: Michel Abernathy MD;  [facility];  Service: ENT   • REDUCTION MAMMAPLASTY Bilateral 1987      Family History:     Family History   Problem Relation Age of Onset   • Osteoporosis Mother    • Osteoarthritis Mother    • Arthritis Mother    • Other Father         heart problem   • Hypertension Father    • Heart disease Father    • Coronary artery disease Family    • Hyperlipidemia Family    • Glaucoma Sister    • No Known Problems Daughter    • No Known Problems Maternal Grandmother    • No Known Problems Maternal Grandfather    • No Known Problems Paternal Grandmother    • No Known Problems Paternal Grandfather    • No Known Problems Maternal Aunt    • No Known Problems Maternal Aunt    • No Known Problems Maternal Aunt    • No Known Problems Maternal Aunt    • No Known Problems Maternal Aunt    • No Known Problems Maternal Aunt    • No Known Problems Maternal Aunt    • Stroke Paternal Aunt         Mini Jesus   • No Known Problems Paternal Aunt    • No Known Problems Paternal Aunt       Social History:     Social History     Socioeconomic History   • Marital status:      Spouse name: None   • Number of children: None   • Years of education: None   • Highest education level: None   Occupational History   • Occupation: Retired   Tobacco Use   • Smoking status: Never   • Smokeless tobacco: Never   Vaping Use   • Vaping Use: Never used   Substance and Sexual Activity   • Alcohol use: Yes     Alcohol/week: 4 0 standard drinks of alcohol     Types: 4 Glasses of wine per week     Comment: being a social drinker; drinks wine   • Drug use: No   • Sexual activity: Not Currently     Partners: Male     Birth control/protection: Surgical   Other Topics Concern   • None   Social History Narrative    Drinks 2 cups coffee/day    Lack of exercise     Social Determinants of Health     Financial Resource Strain: Low Risk  (6/13/2023)    Overall Financial Resource Strain (CARDIA)    • Difficulty of Paying Living Expenses: Not hard at all   Food Insecurity: Not on file   Transportation Needs: No Transportation Needs (6/13/2023)    PRAPARE - Transportation    • Lack of Transportation (Medical): No    • Lack of Transportation (Non-Medical):  No   Physical Activity: Not on file   Stress: Not on file   Social Connections: Not on file   Intimate Partner Violence: Not on file   Housing Stability: Not on file      Medications and Allergies:     Current Outpatient Medications   Medication Sig Dispense Refill   • aspirin 81 MG tablet Take 81 mg by mouth daily     • atenolol (TENORMIN) 25 mg tablet TAKE 1 TABLET TWICE A  tablet 3   • bimatoprost (LUMIGAN) 0 01 % ophthalmic drops Administer 1 drop to both eyes daily at bedtime     • brinzolamide (AZOPT) 1 % ophthalmic suspension 1 drop 2 (two) times a day     • Calcium Carbonate Antacid (TUMS EXTRA STRENGTH 750 PO) Take 1,500 mg by mouth 2 (two) times a day with meals Use before meals with a vitmain C tablet for improved absoprtion      • calcium citrate (CALCITRATE) 950 (200 Ca) MG tablet Take 1 tablet (950 mg total) by mouth daily 90 tablet 1   • Cholecalciferol (VITAMIN D3 PO) Take 2,000 Units by mouth 2 (two) times a day     • diphenoxylate-atropine (LOMOTIL) 2 5-0 025 mg per tablet Take 1 tablet by mouth 3 (three) times a day as needed     • escitalopram (LEXAPRO) 10 mg tablet TAKE 1 TABLET DAILY 90 tablet 3   • Ferrous Sulfate (IRON PO) Take by mouth     • irbesartan (AVAPRO) 300 mg tablet TAKE 1 TABLET DAILY AT BEDTIME 90 tablet 3   • mesalamine (LIALDA) 1 2 g EC tablet TAKE 4 TABLETS BY MOUTH EVERY DAY AFTER BREAKFAST     • rosuvastatin (CRESTOR) 5 mg tablet TAKE ONE AND ONE-HALF TABLETS DAILY 135 tablet 3   • Stelara 45 MG/0 5ML injection q 2 months     • timolol (TIMOPTIC) 0 5 % ophthalmic solution      • Timolol Maleate, Once-Daily, 0 5 % SOLN      • amLODIPine (NORVASC) 5 mg tablet Take 1 tablet (5 mg total) by mouth daily 90 tablet 3   • denosumab (PROLIA) 60 mg/mL Inject 1 mL (60 mg total) under the skin once for 1 dose 1 mL 0     Current Facility-Administered Medications   Medication Dose Route Frequency Provider Last Rate Last Admin   • denosumab (PROLIA) subcutaneous injection 60 mg  60 mg Subcutaneous Q6 Months Rodrigo Aldrich MD   60 mg at 04/26/23 1330     No Known Allergies   Immunizations:     Immunization History   Administered Date(s) Administered   • COVID-19 PFIZER VACCINE 0 3 ML IM 01/15/2021, 02/04/2021, 09/14/2021   • COVID-19 Pfizer vac (Aniket-sucrose, gray cap) 12 yr+ IM 03/12/2022   • Hep B, adult 08/27/2019, 10/01/2019, 02/25/2020, 08/11/2020, 09/22/2020   • INFLUENZA 10/15/2014, 11/11/2015, 11/12/2015, 10/17/2016, 10/03/2017, 10/30/2018   • Influenza Quadrivalent Preservative Free 3 years and older IM 10/15/2014   • Influenza Split High Dose Preservative Free IM 11/11/2015, 10/17/2016, 10/03/2017   • Influenza, high dose seasonal 0 7 mL 10/30/2018, 09/22/2020, 10/13/2021, 10/05/2022   • Influenza, seasonal, injectable 01/01/2001, 10/01/2011, 11/08/2012, 10/22/2013   • Pneumococcal Conjugate 13-Valent 01/01/2014   • Pneumococcal Polysaccharide PPV23 01/01/2010, 06/10/2020   • Zoster 04/13/2010   • Zoster Vaccine Recombinant 04/12/2010, 06/05/2019, 08/21/2019      Health Maintenance:         Topic Date Due   • Hepatitis C Screening  Completed         Topic Date Due   • COVID-19 Vaccine (5 - Booster for Chuck Barnett series) 05/07/2022      Medicare Screening Tests and Risk Assessments:     Francesco De Leon is here for her Subsequent Wellness visit  Health Risk Assessment:   Patient rates overall health as good  Patient feels that their physical health rating is slightly better  Patient is very satisfied with their life  Eyesight was rated as slightly worse  Hearing was rated as same  Patient feels that their emotional and mental health rating is much better  Patients states they are never, rarely angry  Patient states they are sometimes unusually tired/fatigued  Pain experienced in the last 7 days has been some  Patient's pain rating has been 2/10  Patient states that she has experienced no weight loss or gain in last 6 months  Depression Screening:   PHQ-2 Score: 0      Fall Risk Screening: In the past year, patient has experienced: no history of falling in past year      Urinary Incontinence Screening:   Patient has not leaked urine accidently in the last six months  Home Safety:  Patient does not have trouble with stairs inside or outside of their home  Patient has working smoke alarms and has working carbon monoxide detector  Home safety hazards include: none  Nutrition:   Current diet is Regular  Medications:   Patient is currently taking over-the-counter supplements   OTC medications include: see medication list  Patient is able to manage medications  Activities of Daily Living (ADLs)/Instrumental Activities of Daily Living (IADLs):   Walk and transfer into and out of bed and chair?: Yes  Dress and groom yourself?: Yes    Bathe or shower yourself?: Yes    Feed yourself? Yes  Do your laundry/housekeeping?: Yes  Manage your money, pay your bills and track your expenses?: Yes  Make your own meals?: Yes    Do your own shopping?: Yes    Previous Hospitalizations:   Any hospitalizations or ED visits within the last 12 months?: No      Advance Care Planning:   Living will: Yes    Durable POA for healthcare: Yes    Advanced directive: Yes      PREVENTIVE SCREENINGS      Cardiovascular Screening:    General: Screening Not Indicated and History Lipid Disorder      Diabetes Screening:     General: Screening Current      Colorectal Cancer Screening:     General: Screening Not Indicated      Breast Cancer Screening:     General: Screening Current      Cervical Cancer Screening:    General: Screening Not Indicated      Osteoporosis Screening:    General: Screening Not Indicated and History Osteoporosis      Abdominal Aortic Aneurysm (AAA) Screening:        General: Screening Not Indicated      Lung Cancer Screening:     General: Screening Not Indicated      Hepatitis C Screening:    General: Screening Current    Screening, Brief Intervention, and Referral to Treatment (SBIRT)    Screening      Single Item Drug Screening:  How often have you used an illegal drug (including marijuana) or a prescription medication for non-medical reasons in the past year? never    Single Item Drug Screen Score: 0  Interpretation: Negative screen for possible drug use disorder    Other Counseling Topics:   Regular weightbearing exercise  No results found      /82 (BP Location: Left arm, Patient Position: Sitting, Cuff Size: Standard)   Pulse 86   Temp 97 8 °F (36 6 °C)   Wt 78 7 kg (173 lb 6 4 oz)   SpO2 98%   BMI 29 76 kg/m²         Ramon Parham, DO

## 2023-06-15 DIAGNOSIS — F41.9 ANXIETY: ICD-10-CM

## 2023-06-15 RX ORDER — ESCITALOPRAM OXALATE 10 MG/1
TABLET ORAL
Qty: 90 TABLET | Refills: 3 | Status: SHIPPED | OUTPATIENT
Start: 2023-06-15

## 2023-07-03 DIAGNOSIS — I10 HYPERTENSION, UNSPECIFIED TYPE: ICD-10-CM

## 2023-07-03 RX ORDER — ATENOLOL 25 MG/1
TABLET ORAL
Qty: 180 TABLET | Refills: 3 | Status: SHIPPED | OUTPATIENT
Start: 2023-07-03

## 2023-07-31 ENCOUNTER — OFFICE VISIT (OUTPATIENT)
Dept: ENDOCRINOLOGY | Facility: CLINIC | Age: 83
End: 2023-07-31
Payer: MEDICARE

## 2023-07-31 VITALS
BODY MASS INDEX: 29.71 KG/M2 | WEIGHT: 174 LBS | OXYGEN SATURATION: 95 % | SYSTOLIC BLOOD PRESSURE: 158 MMHG | TEMPERATURE: 97 F | HEIGHT: 64 IN | HEART RATE: 56 BPM | DIASTOLIC BLOOD PRESSURE: 90 MMHG

## 2023-07-31 DIAGNOSIS — E55.9 VITAMIN D DEFICIENCY: ICD-10-CM

## 2023-07-31 DIAGNOSIS — D35.1 PARATHYROID ADENOMA: ICD-10-CM

## 2023-07-31 DIAGNOSIS — E21.3 HYPERPARATHYROIDISM (HCC): Primary | ICD-10-CM

## 2023-07-31 DIAGNOSIS — M81.0 AGE-RELATED OSTEOPOROSIS WITHOUT CURRENT PATHOLOGICAL FRACTURE: ICD-10-CM

## 2023-07-31 PROCEDURE — 99214 OFFICE O/P EST MOD 30 MIN: CPT | Performed by: INTERNAL MEDICINE

## 2023-07-31 NOTE — PROGRESS NOTES
Follow-up Patient Progress Note         CC: osteoporosis     History of Present Illness:   81 yr female with hx of PHPT for 8 years suspected 2" Rt lower pole parathyroid adenoma confirmed by sestamibi 7/13/2021 and s/p parathyroid adenoma resection 9/29/21(PTH drop from 189 to 28pg/mL), new elevated PTH, osteoporosis, GERD, Ulcerative colitis, carotid stenosis s/p CEA and HLD. Last visit was 9/29/23.     Since last visit, she feels well. No symptoms.     She continues to be on calcium 3gm daily, vitamin C, 4000IU daily vit D3. She is also doing weight bearing exercise. She continues to have normocalcemic hyperparathyroidism. Last level was 96 pg/mL. Recent 24-hour urine for calcium was 226 mg.     Osteoporosis: cannot recall using any medications. DXA 6/14/21 - Tscores -2.3 LTH, -3.0 LFN and -2.4 lt forearm. LS was not scored.  10yr FRAX score 32% hip and 42% major osteoporotic fracture. 8/19/22 DXA: T-scores -1.9 LTH, -2.7 LFN, -2.4 left forearm. 10-year FRAX score 42% hip and 52% major osteoporotic fracture.     Patient Active Problem List   Diagnosis   • Abnormal female pelvic exam   • Diverticulosis   • Glaucoma   • Mixed hyperlipidemia   • Hyperparathyroidism ([address])   • Primary hypertension   • Age-related osteoporosis without current pathological fracture   • Severe cervical dysplasia, histologically confirmed   • Vitamin D deficiency   • Ulcerative rectosigmoiditis without complication (HCC)   • Edema   • Long-term use of immunosuppressant medication   • Anxiety   • History of benign neoplasm of parathyroid gland   • Immunosuppression due to drug therapy [facility])   • Status post parathyroidectomy [facility])     Past Medical History:   Diagnosis Date   • Atherosclerosis    • Carotid artery narrowing    • Coronary artery disease    • Diverticulitis of colon    • Diverticulosis    • GERD (gastroesophageal reflux disease)    • Glaucoma    • Hypercalcemia    • Hyperlipidemia    • Hyperparathyroidism ([address])    • Hypertension    • Inflammatory bowel disease    • Osteopenia    • Severe cervical dysplasia, histologically confirmed    • Skin cancer     squamous cell   • Thyroid nodule    • Vitamin D deficiency       Past Surgical History:   Procedure Laterality Date   • APPENDECTOMY     • BREAST SURGERY      reduction procedure   • CAROTID ENDARTARECTOMY Right     carotid thromboendarterectomy    • COLONOSCOPY     • EVACUATION OF HEMATOMA N/A 9/29/2021    Procedure: EVACUATION/ DRAINAGE HEMATOMA;  Surgeon: Jackelyn Yang MD;  [facility];  Service: ENT   • EXPLORATION CAROTID ARTERY     • HYSTERECTOMY      age 55   • OOPHORECTOMY Bilateral     age 55   • NV COLONOSCOPY FLX DX W/COLLJ SPEC WHEN PFRMD N/A 7/28/2017    Procedure: EGD AND COLONOSCOPY;  Surgeon: Liza Cesar MD;  [facility]: [facility]; Service: Colorectal   • NV PARATHYROIDECTOMY/EXPLORATION PARATHYROIDS Right 9/29/2021    Procedure: RIGHT INFERIOR PARATHYROIDECTOMY (INTRAOP PTH);   Surgeon: Jackelyn Yang MD;  [facility];  Service: ENT   • REDUCTION MAMMAPLASTY Bilateral 1987      Family History   Problem Relation Age of Onset   • Osteoporosis Mother    • Osteoarthritis Mother    • Arthritis Mother    • Other Father         heart problem   • Hypertension Father    • Heart disease Father    • Coronary artery disease Family    • Hyperlipidemia Family    • Glaucoma Sister    • No Known Problems Daughter    • No Known Problems Maternal Grandmother    • No Known Problems Maternal Grandfather    • No Known Problems Paternal Grandmother    • No Known Problems Paternal Grandfather    • No Known Problems Maternal Aunt    • No Known Problems Maternal Aunt    • No Known Problems Maternal Aunt    • No Known Problems Maternal Aunt    • No Known Problems Maternal Aunt    • No Known Problems Maternal Aunt    • No Known Problems Maternal Aunt    • Stroke Paternal Aunt         Almeta Polo   • No Known Problems Paternal Aunt    • No Known Problems Paternal Aunt Social History     Tobacco Use   • Smoking status: Never   • Smokeless tobacco: Never   Substance Use Topics   • Alcohol use:  Yes     Alcohol/week: 4.0 standard drinks of alcohol     Types: 4 Glasses of wine per week     Comment: being a social drinker; drinks wine     No Known Allergies      Current Outpatient Medications:   •  amLODIPine (NORVASC) 5 mg tablet, Take 1 tablet (5 mg total) by mouth daily, Disp: 90 tablet, Rfl: 3  •  aspirin 81 MG tablet, Take 81 mg by mouth daily, Disp: , Rfl:   •  atenolol (TENORMIN) 25 mg tablet, TAKE 1 TABLET TWICE A DAY, Disp: 180 tablet, Rfl: 3  •  bimatoprost (LUMIGAN) 0.01 % ophthalmic drops, Administer 1 drop to both eyes daily at bedtime, Disp: , Rfl:   •  brinzolamide (AZOPT) 1 % ophthalmic suspension, 1 drop 2 (two) times a day, Disp: , Rfl:   •  Calcium Carbonate Antacid (TUMS EXTRA STRENGTH 750 PO), Take 1,500 mg by mouth 2 (two) times a day with meals Use before meals with a vitmain C tablet for improved absoprtion , Disp: , Rfl:   •  calcium citrate (CALCITRATE) 950 (200 Ca) MG tablet, Take 1 tablet (950 mg total) by mouth daily, Disp: 90 tablet, Rfl: 1  •  Cholecalciferol (VITAMIN D3 PO), Take 2,000 Units by mouth 2 (two) times a day, Disp: , Rfl:   •  diphenoxylate-atropine (LOMOTIL) 2.5-0.025 mg per tablet, Take 1 tablet by mouth 3 (three) times a day as needed, Disp: , Rfl:   •  escitalopram (LEXAPRO) 10 mg tablet, TAKE 1 TABLET DAILY, Disp: 90 tablet, Rfl: 3  •  Ferrous Sulfate (IRON PO), Take by mouth, Disp: , Rfl:   •  irbesartan (AVAPRO) 300 mg tablet, TAKE 1 TABLET DAILY AT BEDTIME, Disp: 90 tablet, Rfl: 3  •  mesalamine (LIALDA) 1.2 g EC tablet, TAKE 4 TABLETS BY MOUTH EVERY DAY AFTER BREAKFAST, Disp: , Rfl:   •  rosuvastatin (CRESTOR) 5 mg tablet, TAKE ONE AND ONE-HALF TABLETS DAILY, Disp: 135 tablet, Rfl: 3  •  Stelara 45 MG/0.5ML injection, q 2 months, Disp: , Rfl:   •  timolol (TIMOPTIC) 0.5 % ophthalmic solution, , Disp: , Rfl:   •  denosumab (PROLIA) 60 mg/mL, Inject 1 mL (60 mg total) under the skin once for 1 dose, Disp: 1 mL, Rfl: 0  •  Timolol Maleate, Once-Daily, 0.5 % SOLN, , Disp: , Rfl:     Current Facility-Administered Medications:   •  denosumab (PROLIA) subcutaneous injection 60 mg, 60 mg, Subcutaneous, Q6 Months, Mehdi Beckford MD, 60 mg at 04/26/23 1330    Review of Systems   HENT: Negative. Eyes: Negative. Respiratory: Negative. Cardiovascular: Negative. Gastrointestinal: Negative. Endocrine: Negative. Musculoskeletal: Negative. Skin: Negative. Allergic/Immunologic: Negative. Neurological: Negative. Hematological: Negative. Psychiatric/Behavioral: Negative. Physical Exam:  Body mass index is 29.87 kg/m². /90 (BP Location: Left arm, Patient Position: Sitting, Cuff Size: Standard)   Pulse 56   Temp (!) 97 °F (36.1 °C) (Tympanic)   Ht 5' 4" (1.626 m)   Wt 78.9 kg (174 lb)   SpO2 95%   BMI 29.87 kg/m²    Vitals:    07/31/23 0953   Weight: 78.9 kg (174 lb)        Physical Exam  Constitutional:       General: She is not in acute distress. Appearance: She is well-developed. She is not ill-appearing. HENT:      Head: Normocephalic and atraumatic. Nose: Nose normal.      Mouth/Throat:      Pharynx: Oropharynx is clear. Eyes:      Extraocular Movements: Extraocular movements intact. Conjunctiva/sclera: Conjunctivae normal.   Neck:      Thyroid: No thyromegaly. Cardiovascular:      Rate and Rhythm: Normal rate. Pulmonary:      Effort: Pulmonary effort is normal.   Musculoskeletal:         General: No deformity. Cervical back: Normal range of motion. Skin:     Capillary Refill: Capillary refill takes less than 2 seconds. Coloration: Skin is not pale. Findings: No rash. Neurological:      Mental Status: She is alert and oriented to person, place, and time.    Psychiatric:         Behavior: Behavior normal.         Labs:   No results found for: "HGBA1C"    Lab Results   Component Value Date    TQU7QLZXLHQM 3.360 09/13/2021    TSH 2.07 07/14/2021       Lab Results   Component Value Date    CREATININE 0.79 03/20/2023    CREATININE 0.73 07/20/2022    CREATININE 0.69 06/02/2022    BUN 18 03/20/2023     (L) 07/09/2015    K 4.2 03/20/2023     03/20/2023    CO2 25 03/20/2023     eGFR   Date Value Ref Range Status   03/20/2023 70 ml/min/1.73sq m Final       Lab Results   Component Value Date    ALT 15 03/20/2023    AST 14 03/20/2023    ALKPHOS 58 03/20/2023    BILITOT 0.57 07/09/2015       Lab Results   Component Value Date    CHOLESTEROL 143 02/07/2023    CHOLESTEROL 130 07/20/2022    CHOLESTEROL 139 02/08/2022     Lab Results   Component Value Date    HDL 51 02/07/2023    HDL 51 07/20/2022    HDL 59 02/08/2022     Lab Results   Component Value Date    TRIG 136 02/07/2023    TRIG 93 07/20/2022    TRIG 88 02/08/2022     No results found for: "NONHDLC"      Impression:  1. Hyperparathyroidism ([address])    2. Age-related osteoporosis without current pathological fracture    3. Vitamin D deficiency    4. Parathyroid adenoma         Plan:    Diagnoses and all orders for this visit:    Hyperparathyroidism ([address] She continues to have normocalcemic hyperparathyroidism. Note prior history of right lower pole parathyroid adenoma resection in 2021. Today we discussed options and agreed to continue with current calcium and vitamin D replacement therapy. We agreed to repeat a sestamibi scan for a second target lesion contributing to hyperparathyroidism. She also follows with Dr. Eric De La Cruz. For now, as she does not have significant hypercalcemia, we may wait and watch. She is not keen on surgery at this time. Given history of osteoporosis, persistently elevated PTH may contribute to worsening however she is on Prolia. In future we may consider Sensipar particularly if there is hypercalcemia. Follow-up in 6 months. -     PTH, intact;  Future  -     Vitamin D 25 hydroxy; Future  -     Phosphorus; Future  -     Calcium, ionized; Future  -     NM parathyroid scan w spect; Future    Age-related osteoporosis without current pathological fracture. She is presently stable on Prolia. Vitamin D deficiency. Parathyroid adenoma        I have spent 31 minutes with patient today in which greater than 50% of this time was spent in counseling/coordination of care. Discussed with the patient and all questioned fully answered. She will call me if any problems arise. Educated/ Counseled patient on diagnostic test results, prognosis, risk vs benefit of treatment options, importance of treatment compliance, healthy life and lifestyle choices.       Mushtaq

## 2023-08-21 ENCOUNTER — HOSPITAL ENCOUNTER (OUTPATIENT)
Dept: NUCLEAR MEDICINE | Facility: HOSPITAL | Age: 83
Discharge: HOME | End: 2023-08-21
Attending: INTERNAL MEDICINE
Payer: MEDICARE

## 2023-08-21 ENCOUNTER — HOSPITAL ENCOUNTER (OUTPATIENT)
Dept: NUCLEAR MEDICINE | Facility: HOSPITAL | Age: 83
Discharge: HOME | End: 2023-08-21
Attending: INTERNAL MEDICINE

## 2023-08-21 DIAGNOSIS — E21.3 HYPERPARATHYROIDISM (HCC): ICD-10-CM

## 2023-08-21 PROCEDURE — A9500 TC99M SESTAMIBI: HCPCS

## 2023-08-21 PROCEDURE — G1004 CDSM NDSC: HCPCS

## 2023-08-21 PROCEDURE — 78071 PARATHYRD PLANAR W/WO SUBTRJ: CPT

## 2023-09-18 DIAGNOSIS — E21.3 HYPERPARATHYROIDISM (HCC): Chronic | ICD-10-CM

## 2023-09-18 RX ORDER — IBUPROFEN 200 MG
1 CAPSULE ORAL DAILY
Qty: 90 TABLET | Refills: 1 | Status: SHIPPED | OUTPATIENT
Start: 2023-09-18

## 2023-10-02 DIAGNOSIS — I10 PRIMARY HYPERTENSION: ICD-10-CM

## 2023-10-02 RX ORDER — IRBESARTAN 300 MG/1
TABLET ORAL
Qty: 90 TABLET | Refills: 3 | Status: SHIPPED | OUTPATIENT
Start: 2023-10-02

## 2023-10-17 ENCOUNTER — OFFICE VISIT (OUTPATIENT)
Dept: INTERNAL MEDICINE CLINIC | Facility: CLINIC | Age: 83
End: 2023-10-17
Payer: MEDICARE

## 2023-10-17 ENCOUNTER — TELEPHONE (OUTPATIENT)
Dept: INTERNAL MEDICINE CLINIC | Facility: CLINIC | Age: 83
End: 2023-10-17

## 2023-10-17 VITALS
OXYGEN SATURATION: 97 % | SYSTOLIC BLOOD PRESSURE: 118 MMHG | TEMPERATURE: 98.3 F | HEART RATE: 95 BPM | DIASTOLIC BLOOD PRESSURE: 78 MMHG | WEIGHT: 173 LBS | BODY MASS INDEX: 29.7 KG/M2

## 2023-10-17 DIAGNOSIS — E78.2 MIXED HYPERLIPIDEMIA: ICD-10-CM

## 2023-10-17 DIAGNOSIS — I10 PRIMARY HYPERTENSION: Primary | ICD-10-CM

## 2023-10-17 DIAGNOSIS — D84.821 IMMUNOSUPPRESSION DUE TO DRUG THERAPY: ICD-10-CM

## 2023-10-17 DIAGNOSIS — Z79.899 IMMUNOSUPPRESSION DUE TO DRUG THERAPY: ICD-10-CM

## 2023-10-17 DIAGNOSIS — F41.9 ANXIETY: ICD-10-CM

## 2023-10-17 DIAGNOSIS — M81.0 AGE-RELATED OSTEOPOROSIS WITHOUT CURRENT PATHOLOGICAL FRACTURE: Chronic | ICD-10-CM

## 2023-10-17 PROBLEM — Z79.60 LONG-TERM USE OF IMMUNOSUPPRESSANT MEDICATION: Status: RESOLVED | Noted: 2021-02-09 | Resolved: 2023-10-17

## 2023-10-17 PROCEDURE — 99214 OFFICE O/P EST MOD 30 MIN: CPT | Performed by: INTERNAL MEDICINE

## 2023-10-17 NOTE — TELEPHONE ENCOUNTER
Hi, this is Jaskaranrina  calling. I was there this morning for an appointment with Doctor Rosmery Cade and I told him that I had my COVID shot and he was wondering which one I had and I just called Leonid in [facility], which is where I got the shot and they said it It's a Fizer COVID vaccine. Again, it's a Fizer COVID vaccine and that's for Dvae Maritza. My birth date is [birth year] and my phone number, if you need it is [phone]. Again, Dave Maritza [birth year] and the COVID vaccine that I got at [facility] last week was a Pfizer 1. If you need any other information, you can just give me a call back. But thank you very much.  Bye, bye.

## 2023-10-17 NOTE — PATIENT INSTRUCTIONS
Flu vaccine end of next week at the earliest  Return in 6 months otherwise  Our fax number: [phone]

## 2023-10-17 NOTE — PROGRESS NOTES
Name: Kodi Reno      : 1940      MRN: [id number]  Encounter Provider: Celsa Matson DO  Encounter Date: 10/17/2023   Encounter department: [address] [place] [address]     1. Primary hypertension  Comments:  BP doing well, no dizziness. c/w CCB/ARB/BB    2. Mixed hyperlipidemia  Comments:  taking statin and no SE, c/w rx  Orders:  -     Lipid Panel with Direct LDL reflex; Future    3. Anxiety  Comments:  taking lexapro and stable, c/w rx    4. Age-related osteoporosis without current pathological fracture  Comments:  recieves prolia every 6 mos with her endocrine for treatment, c/w care per specialist    5. Immunosuppression due to drug therapy          Advised she has to wait 2 weeks in-between COVID and flu vaccines. She is aware and will return for a nurse visit to receive flu vaccine. Subjective      HPI    Here for follow up, Tati Goetz is overall doing well. She is taking her medications. Her last stelara injection was 1-2 weeks ago. She rec'd her COVID vaccine booster 5 days ago. She would like to receive flu vaccine today. She has no other questions or concerns today and ROS is otherwise negative. Review of Systems   Constitutional:  Negative for fever. HENT:  Negative for congestion. Eyes:  Negative for visual disturbance. Respiratory:  Negative for shortness of breath. Cardiovascular:  Negative for chest pain. Gastrointestinal:  Negative for abdominal pain. Endocrine: Negative for polyuria. Genitourinary:  Negative for difficulty urinating. Musculoskeletal:  Negative for gait problem. Skin:  Negative for rash. Allergic/Immunologic: Positive for immunocompromised state (receives stelara for crohn's). Neurological:  Negative for dizziness. Psychiatric/Behavioral:  Negative for dysphoric mood.         Current Outpatient Medications on File Prior to Visit   Medication Sig    amLODIPine (NORVASC) 5 mg tablet Take 1 tablet (5 mg total) by mouth daily    aspirin 81 MG tablet Take 81 mg by mouth daily    atenolol (TENORMIN) 25 mg tablet TAKE 1 TABLET TWICE A DAY    bimatoprost (LUMIGAN) 0.01 % ophthalmic drops Administer 1 drop to both eyes daily at bedtime    brinzolamide (AZOPT) 1 % ophthalmic suspension 1 drop 2 (two) times a day    Calcium Carbonate Antacid (TUMS EXTRA STRENGTH 750 PO) Take 1,500 mg by mouth 2 (two) times a day with meals Use before meals with a vitmain C tablet for improved absoprtion     calcium citrate (CALCITRATE) 950 (200 Ca) MG tablet Take 1 tablet (950 mg total) by mouth daily    Cholecalciferol (VITAMIN D3 PO) Take 2,000 Units by mouth 2 (two) times a day    diphenoxylate-atropine (LOMOTIL) 2.5-0.025 mg per tablet Take 1 tablet by mouth 3 (three) times a day as needed    escitalopram (LEXAPRO) 10 mg tablet TAKE 1 TABLET DAILY    Ferrous Sulfate (IRON PO) Take by mouth    irbesartan (AVAPRO) 300 mg tablet TAKE 1 TABLET DAILY AT BEDTIME    mesalamine (LIALDA) 1.2 g EC tablet TAKE 4 TABLETS BY MOUTH EVERY DAY AFTER BREAKFAST    rosuvastatin (CRESTOR) 5 mg tablet TAKE ONE AND ONE-HALF TABLETS DAILY    Stelara 45 MG/0.5ML injection q 2 months    timolol (TIMOPTIC) 0.5 % ophthalmic solution     denosumab (PROLIA) 60 mg/mL Inject 1 mL (60 mg total) under the skin once for 1 dose    diclofenac (VOLTAREN) 75 mg EC tablet Take 75 mg by mouth    [DISCONTINUED] Timolol Maleate, Once-Daily, 0.5 % SOLN        Objective     /78 (BP Location: Left arm, Patient Position: Sitting, Cuff Size: Standard)   Pulse 95   Temp 98.3 °F (36.8 °C)   Wt 78.5 kg (173 lb)   SpO2 97%   BMI 29.70 kg/m²     Physical Exam  Vitals reviewed. Constitutional:       General: She is not in acute distress. HENT:      Head: Normocephalic and atraumatic.       Right Ear: Tympanic membrane normal.      Left Ear: Tympanic membrane normal.   Eyes:      Conjunctiva/sclera: Conjunctivae normal.   Cardiovascular:      Rate and Rhythm: Normal rate and regular rhythm. Heart sounds: No murmur heard. Pulmonary:      Effort: Pulmonary effort is normal.      Breath sounds: No wheezing or rales. Abdominal:      General: Abdomen is flat. Bowel sounds are normal.      Palpations: Abdomen is soft. Tenderness: There is no abdominal tenderness. Musculoskeletal:      Cervical back: Neck supple. Right lower leg: No edema. Left lower leg: No edema. Neurological:      Mental Status: She is alert. Mental status is at baseline.    Psychiatric:         Mood and Affect: Mood normal.         Behavior: Behavior normal.           Ramon Parham, DO

## 2023-10-18 DIAGNOSIS — E78.5 HYPERLIPIDEMIA, UNSPECIFIED HYPERLIPIDEMIA TYPE: ICD-10-CM

## 2023-10-18 RX ORDER — ROSUVASTATIN CALCIUM 5 MG/1
TABLET, COATED ORAL
Qty: 135 TABLET | Refills: 3 | Status: SHIPPED | OUTPATIENT
Start: 2023-10-18

## 2023-10-24 ENCOUNTER — CLINICAL SUPPORT (OUTPATIENT)
Dept: ENDOCRINOLOGY | Facility: CLINIC | Age: 83
End: 2023-10-24
Payer: MEDICARE

## 2023-10-24 DIAGNOSIS — M81.0 AGE-RELATED OSTEOPOROSIS WITHOUT CURRENT PATHOLOGICAL FRACTURE: Primary | Chronic | ICD-10-CM

## 2023-10-24 PROCEDURE — 96372 THER/PROPH/DIAG INJ SC/IM: CPT | Performed by: INTERNAL MEDICINE

## 2023-10-27 ENCOUNTER — CLINICAL SUPPORT (OUTPATIENT)
Dept: INTERNAL MEDICINE CLINIC | Facility: CLINIC | Age: 83
End: 2023-10-27
Payer: MEDICARE

## 2023-10-27 DIAGNOSIS — Z23 ENCOUNTER FOR IMMUNIZATION: Primary | ICD-10-CM

## 2023-10-27 PROCEDURE — G0008 ADMIN INFLUENZA VIRUS VAC: HCPCS | Performed by: INTERNAL MEDICINE

## 2023-10-27 PROCEDURE — 90662 IIV NO PRSV INCREASED AG IM: CPT | Performed by: INTERNAL MEDICINE

## 2023-11-29 ENCOUNTER — TELEPHONE (OUTPATIENT)
Dept: INTERNAL MEDICINE CLINIC | Facility: CLINIC | Age: 83
End: 2023-11-29

## 2023-11-29 DIAGNOSIS — M54.42 CHRONIC LEFT-SIDED LOW BACK PAIN WITH LEFT-SIDED SCIATICA: Primary | ICD-10-CM

## 2023-11-29 DIAGNOSIS — G89.29 CHRONIC LEFT-SIDED LOW BACK PAIN WITH LEFT-SIDED SCIATICA: Primary | ICD-10-CM

## 2023-11-29 NOTE — TELEPHONE ENCOUNTER
PT referral for [facility] ordered    Recommend Nana Rubinstein complete a low back x-ray too at [facility] radiology dept    Pls give her the PT phone # to call/schedule

## 2023-11-29 NOTE — TELEPHONE ENCOUNTER
Yes, I can refer her for physical therapy. Just a few questions: How long has back pain been going on(weeks, months?)   About 2 weeks mostly left side  Any sciatic pains down the legs?   only when she is laying down and stretches   Any fall/injury or trauma to the back   No  Is pain localized to her low back?    mostly left side of back sometimes it goes into the right side   Let me know, thanks

## 2023-11-29 NOTE — TELEPHONE ENCOUNTER
Yes, I can refer her for physical therapy. Just a few questions: How long has back pain been going on(weeks, months?)    Any sciatic pains down the legs? Any fall/injury or trauma to the back    Is pain localized to her low back?     Let me know, thanks

## 2023-11-29 NOTE — TELEPHONE ENCOUNTER
Jaqueline, this is Aliza Carson calling. My birth date is [birth year] and my contact number is [phone]. I'm a patient of Doctor Val Connor and I've been having some back pain and sometimes it bothers me really when I walk and stand up sometimes at night. I was wondering, I have a friend who was having some back problems and her physician sent her to physical therapy and she was doing so much better after that. I was wondering if that was something that I could get an order for, perhaps to have some physical therapy that might help with the pain in my back. So if you could give me a call back and receive information about whether or not I could have physical therapy, I would really appreciate it. Dean Robles, birth date [birth year] and my contact number is [phone]. And I'm a patient of Doctor Markel. Thank you so much and have a good day.  emma Vizcaino.

## 2023-11-30 ENCOUNTER — HOSPITAL ENCOUNTER (OUTPATIENT)
Dept: RADIOLOGY | Facility: HOSPITAL | Age: 83
Discharge: HOME | End: 2023-11-30
Payer: MEDICARE

## 2023-11-30 DIAGNOSIS — G89.29 CHRONIC LEFT-SIDED LOW BACK PAIN WITH LEFT-SIDED SCIATICA: ICD-10-CM

## 2023-11-30 DIAGNOSIS — M54.42 CHRONIC LEFT-SIDED LOW BACK PAIN WITH LEFT-SIDED SCIATICA: ICD-10-CM

## 2023-11-30 PROCEDURE — 72110 X-RAY EXAM L-2 SPINE 4/>VWS: CPT

## 2023-12-04 ENCOUNTER — APPOINTMENT (OUTPATIENT)
Dept: LAB | Age: 83
End: 2023-12-04
Payer: MEDICARE

## 2023-12-04 DIAGNOSIS — E78.2 MIXED HYPERLIPIDEMIA: ICD-10-CM

## 2023-12-04 DIAGNOSIS — I10 PRIMARY HYPERTENSION: ICD-10-CM

## 2023-12-04 DIAGNOSIS — K51.30 ULCERATIVE RECTOSIGMOIDITIS WITHOUT COMPLICATION (HCC): ICD-10-CM

## 2023-12-04 DIAGNOSIS — E21.3 HYPERPARATHYROIDISM (HCC): ICD-10-CM

## 2023-12-04 LAB
25(OH)D3 SERPL-MCNC: 51.6 NG/ML (ref 30–100)
BASOPHILS # BLD AUTO: 0.05 THOUSANDS/ÂΜL (ref 0–0.1)
BASOPHILS NFR BLD AUTO: 1 % (ref 0–1)
CA-I BLD-SCNC: 1.2 MMOL/L (ref 1.12–1.32)
CHOLEST SERPL-MCNC: 154 MG/DL
EOSINOPHIL # BLD AUTO: 0.28 THOUSAND/ÂΜL (ref 0–0.61)
EOSINOPHIL NFR BLD AUTO: 5 % (ref 0–6)
ERYTHROCYTE [DISTWIDTH] IN BLOOD BY AUTOMATED COUNT: 14.1 % (ref 11.6–15.1)
HCT VFR BLD AUTO: 45.2 % (ref 34.8–46.1)
HDLC SERPL-MCNC: 53 MG/DL
HGB BLD-MCNC: 14.6 G/DL (ref 11.5–15.4)
IMM GRANULOCYTES # BLD AUTO: 0.03 THOUSAND/UL (ref 0–0.2)
IMM GRANULOCYTES NFR BLD AUTO: 1 % (ref 0–2)
LDLC SERPL CALC-MCNC: 77 MG/DL (ref 0–100)
LYMPHOCYTES # BLD AUTO: 1.14 THOUSANDS/ÂΜL (ref 0.6–4.47)
LYMPHOCYTES NFR BLD AUTO: 20 % (ref 14–44)
MCH RBC QN AUTO: 31.5 PG (ref 26.8–34.3)
MCHC RBC AUTO-ENTMCNC: 32.3 G/DL (ref 31.4–37.4)
MCV RBC AUTO: 97 FL (ref 82–98)
MONOCYTES # BLD AUTO: 0.6 THOUSAND/ÂΜL (ref 0.17–1.22)
MONOCYTES NFR BLD AUTO: 10 % (ref 4–12)
NEUTROPHILS # BLD AUTO: 3.72 THOUSANDS/ÂΜL (ref 1.85–7.62)
NEUTS SEG NFR BLD AUTO: 63 % (ref 43–75)
NRBC BLD AUTO-RTO: 0 /100 WBCS
PHOSPHATE SERPL-MCNC: 2.8 MG/DL (ref 2.3–4.1)
PLATELET # BLD AUTO: 232 THOUSANDS/UL (ref 149–390)
PMV BLD AUTO: 10.6 FL (ref 8.9–12.7)
PTH-INTACT SERPL-MCNC: 175 PG/ML (ref 12–88)
RBC # BLD AUTO: 4.64 MILLION/UL (ref 3.81–5.12)
TRIGL SERPL-MCNC: 122 MG/DL
WBC # BLD AUTO: 5.82 THOUSAND/UL (ref 4.31–10.16)

## 2023-12-04 PROCEDURE — 84100 ASSAY OF PHOSPHORUS: CPT

## 2023-12-04 PROCEDURE — 85025 COMPLETE CBC W/AUTO DIFF WBC: CPT

## 2023-12-04 PROCEDURE — 80061 LIPID PANEL: CPT

## 2023-12-04 PROCEDURE — 82330 ASSAY OF CALCIUM: CPT

## 2023-12-04 PROCEDURE — 82306 VITAMIN D 25 HYDROXY: CPT

## 2023-12-04 PROCEDURE — 83970 ASSAY OF PARATHORMONE: CPT

## 2023-12-04 PROCEDURE — 36415 COLL VENOUS BLD VENIPUNCTURE: CPT

## 2023-12-05 ENCOUNTER — TELEPHONE (OUTPATIENT)
Dept: INTERNAL MEDICINE CLINIC | Facility: CLINIC | Age: 83
End: 2023-12-05

## 2023-12-05 NOTE — TELEPHONE ENCOUNTER
Patient left message      Hi, this is Harman Fuller calling. My birth date is [birth year] and my phone number is [phone]. I was calling to check up about my recommendation for me to get for physical therapy and doctor I had wanted me also to get a back X-ray, which I did get on Friday. And I was wondering if you've gotten the results and if you could let me know what those would be. So if you could give me a call back, I would very much appreciate it. I'd kind of like to get started on physical therapy if I could. My back is still bothering me. So again my number is [phone]. Harman Fuller, [phone]. Thank you so much.  emma Vizcaino

## 2023-12-08 DIAGNOSIS — G89.29 CHRONIC LEFT-SIDED LOW BACK PAIN WITH LEFT-SIDED SCIATICA: Primary | ICD-10-CM

## 2023-12-08 DIAGNOSIS — M43.9 COMPRESSION DEFORMITY OF VERTEBRA: ICD-10-CM

## 2023-12-08 DIAGNOSIS — M54.42 CHRONIC LEFT-SIDED LOW BACK PAIN WITH LEFT-SIDED SCIATICA: Primary | ICD-10-CM

## 2023-12-13 ENCOUNTER — EVALUATION (OUTPATIENT)
Dept: PHYSICAL THERAPY | Facility: CLINIC | Age: 83
End: 2023-12-13
Payer: MEDICARE

## 2023-12-13 DIAGNOSIS — M54.42 CHRONIC LEFT-SIDED LOW BACK PAIN WITH LEFT-SIDED SCIATICA: Primary | ICD-10-CM

## 2023-12-13 DIAGNOSIS — G89.29 CHRONIC LEFT-SIDED LOW BACK PAIN WITH LEFT-SIDED SCIATICA: Primary | ICD-10-CM

## 2023-12-13 PROCEDURE — 97161 PT EVAL LOW COMPLEX 20 MIN: CPT

## 2023-12-13 PROCEDURE — 97110 THERAPEUTIC EXERCISES: CPT

## 2023-12-13 PROCEDURE — 97112 NEUROMUSCULAR REEDUCATION: CPT

## 2023-12-13 NOTE — PROGRESS NOTES
PT Evaluation     Today's date: 2023  Patient name: Yariel Longoria  : 1940  MRN: [id number]  Referring provider: Daniel Hilton DO  Dx:   Encounter Diagnosis     ICD-10-CM    1. Chronic left-sided low back pain with left-sided sciatica  M54.42 Ambulatory Referral to Physical Therapy    G89.29                      Assessment  Assessment details: John is a 80 yo female presenting with complaints of L sided LBP. Pt demonstrates decreased lumbar spine AROM, decreased core stabilization and adverse neural dynamics leading to limitations with standing, walking, stair climbing and ADLs. Pt's clinical presentation aligns with stabilization classification with opening preference. Pt would benefit from skilled physical therapy interventions in order to address the stated impairments, decrease pain with function and increase activity tolerance in order to improve quality of life. No further referral appears necessary at this time based on examination results. Prognosis is good given HEP compliance and PT 1-2/wk. Positive prognostic indicators include positive attitude toward recovery. Please contact me if you have any questions or recommendations. Thank you for the opportunity to share in Wendy's care.     Impairments: abnormal gait, abnormal muscle firing, abnormal muscle tone, abnormal or restricted ROM, activity intolerance, impaired balance, impaired physical strength, lacks appropriate home exercise program, pain with function, safety issue and weight-bearing intolerance    Symptom irritability: moderateUnderstanding of Dx/Px/POC: good   Prognosis: good    Goals  Short Term Goals:  Pt will report decreased levels of pain by at least 2 subjective ratings in 4 weeks  Pt will demonstrate improved ROM by at least 10 degrees in 4 weeks  Pt will demonstrate improved strength by ½ grade in 4 weeks  Pt will be able to stand for 15 minutes for ADLs in 4 weeks    Long Term Goals:  Pt will be independent with HEP in 8 weeks  Pt will be pain free with ADL's in 8 weeks  Pt will demonstrate FOTO score of 64 in 8 weeks  Pt will be able to walk for 20 minutes for exercise in 8 weeks      Plan  Patient would benefit from: skilled physical therapy  Referral necessary: No  Planned modality interventions: low level laser therapy  Planned therapy interventions: abdominal trunk stabilization, IASTM, joint mobilization, manual therapy, nerve gliding, neuromuscular re-education, balance, balance/weight bearing training, body mechanics training, breathing training, patient education, postural training, functional ROM exercises, gait training, graded activity, graded exercise, home exercise program, strengthening, stretching, therapeutic activities and therapeutic exercise  Frequency: 2x week  Duration in weeks: 12  Plan of Care beginning date: 2023  Plan of Care expiration date: 3/6/2024  Treatment plan discussed with: patient        Subjective Evaluation    History of Present Illness  Mechanism of injury: Pt reports she has had mild back pain for years but over the past 4 weeks, it has significantly worsened insidiously. The pain is overall pretty random. Sometimes it hurts to walk and sometimes walking makes it feel better. Pt reports Tylenol sometimes help minimize pain symptoms. The pain is mostly L sided and occasionally radiates into upper L thigh. Denies 5d's, 3n's. Pt denies night pain, unexplained weight changes, bowel/bladder changes.             Recurrent probem    Quality of life: good    Patient Goals  Patient goals for therapy: decreased pain, improved balance, increased motion, increased strength, independence with ADLs/IADLs and return to sport/leisure activities    Pain  Current pain ratin  At best pain ratin  At worst pain ratin  Location: L sided LBP  Quality: dull ache  Relieving factors: change in position  Aggravating factors: sitting, lifting and standing  Progression: no change    Social Support  Steps to enter house: yes  Stairs in house: yes   Lives in: multiple-level home    Treatments  No previous or current treatments        Objective     Postural Observations  Seated posture: fair  Standing posture: fair      Neurological Testing     Sensation     Lumbar   Left   Intact: light touch    Right   Intact: light touch    Reflexes   Left   Patellar (L4): trace (1+)  Achilles (S1): trace (1+)  Babinski sign: positive    Right   Patellar (L4): trace (1+)  Achilles (S1): trace (1+)  Babinski sign: positive    Active Range of Motion     Lumbar   Flexion:  Restriction level: minimal  Extension:  Restriction level: moderate  Left lateral flexion:  WFL Restriction level: minimal  Right lateral flexion:  Restriction level: minimal  Left rotation:  Restriction level: minimal  Right rotation:  Restriction level: minimal  Mechanical Assessment    Cervical      Thoracic      Lumbar    Sitting flexion: repeated movements  Pain location: centralized  Pain intensity: better    Strength/Myotome Testing     Left Hip   Planes of Motion   Flexion: 4-  Extension: 4-  Abduction: 4  Adduction: 4    Right Hip   Planes of Motion   Flexion: 4-  Extension: 4-  Abduction: 4  Adduction: 4    Left Knee   Flexion: 4  Extension: 4    Right Knee   Flexion: 4  Extension: 4    Left Ankle/Foot   Dorsiflexion: 4  Plantar flexion: 4  Great toe extension: 4    Right Ankle/Foot   Dorsiflexion: 4  Plantar flexion: 4  Great toe extension: 4    Muscle Activation   Patient able to activate left transverse abdominals and right transverse abdominals.      Tests     Lumbar     Left   Positive passive SLR and slump test.     Left Pelvic Girdle/Sacrum   Positive: active SLR test.     General Comments:      Lumbar Comments  Positive sciatic nerve tension             Precautions: Osteoporosis, HTN      Manuals 12/13                                                                Neuro Re-Ed             TrA bracing 1x10            PPT 1x10            TrA SLR Glute sets             Seated sciatic nerve glide 1x10                                                   Ther Ex             Seated lumbar flexion 1x10            LTR             Back ext machine                                                                              Ther Activity                                       Gait Training                                       Modalities

## 2023-12-19 ENCOUNTER — OFFICE VISIT (OUTPATIENT)
Dept: PHYSICAL THERAPY | Facility: CLINIC | Age: 83
End: 2023-12-19
Payer: MEDICARE

## 2023-12-19 DIAGNOSIS — G89.29 CHRONIC LEFT-SIDED LOW BACK PAIN WITH LEFT-SIDED SCIATICA: Primary | ICD-10-CM

## 2023-12-19 DIAGNOSIS — M54.42 CHRONIC LEFT-SIDED LOW BACK PAIN WITH LEFT-SIDED SCIATICA: Primary | ICD-10-CM

## 2023-12-19 PROCEDURE — 97112 NEUROMUSCULAR REEDUCATION: CPT

## 2023-12-19 PROCEDURE — 97110 THERAPEUTIC EXERCISES: CPT

## 2023-12-19 NOTE — PROGRESS NOTES
Daily Note     Today's date: 2023  Patient name: Wendy Raygoza  : 1940  MRN: [id number]  Referring provider: Ramon Parham DO  Dx:   Encounter Diagnosis     ICD-10-CM    1. Chronic left-sided low back pain with left-sided sciatica  M54.42     G89.29                      Subjective: Pt reports her back feels a bit better. Seated lumbar flexion provides the most relief. The core exercises are stilll very challenging.       Objective: See treatment diary below      Assessment: Pt tolerated session well. Seated lumbar flexion relieves symtpoms. Did require verbal and tactile cueing to perform core stabilization exercises. Continue to progress as able.       Plan: Continue per plan of care.  Progress treatment as tolerated.       Precautions: Osteoporosis, HTN      Manuals                                                                Neuro Re-Ed             TrA bracing 1x10 1x10           PPT 1x10 2x10           TrA SLR  1x10 ea           Glute sets  3x10           Seated sciatic nerve glide LLE 1x10 1x10                                                  Ther Ex             Seated lumbar flexion 1x10 2x10           LTR  1x10 ea           Back ext machine  30# 2x10                                                                            Ther Activity                                       Gait Training                                       Modalities

## 2023-12-20 ENCOUNTER — TELEPHONE (OUTPATIENT)
Dept: INTERNAL MEDICINE CLINIC | Facility: CLINIC | Age: 83
End: 2023-12-20

## 2023-12-20 NOTE — TELEPHONE ENCOUNTER
Jaqueline, this is Wendy Glezgado calling. My birth date is 12/9 of 1940 and I was calling to ask a question to check with Doctor Markel. My gastroenterologist Yesterday when I went for a checkup was questioning what shots I had Celtic. Since I am immunocompromised that perhaps I should have an RSD shot and a shingrix shot. I had a shingle shot a number of years ago, but I don't know if it's Shingrix, if it was shingrix or before that. And I was just wondering what Doctor Markel felt if I should have these shots. So if you could give me a call back, I would appreciate it. At [phone]. Again, that's Wendy Leungdo birth date of [birth year], excuse me, 1940, excuse me. Thank you so much and I will talk with you soon. emma Vizcaino.

## 2023-12-20 NOTE — TELEPHONE ENCOUNTER
Wendy already had shingles shot in 2019 per her medical records    If her GI dr advised her to gt RSV shot, I agree    All vaccines have some risk of side effects but RSV illness can get people over the age of 60 quite sick

## 2023-12-22 ENCOUNTER — APPOINTMENT (OUTPATIENT)
Dept: PHYSICAL THERAPY | Facility: CLINIC | Age: 83
End: 2023-12-22
Payer: MEDICARE

## 2024-01-08 ENCOUNTER — OFFICE VISIT (OUTPATIENT)
Dept: PHYSICAL THERAPY | Facility: CLINIC | Age: 84
End: 2024-01-08
Payer: MEDICARE

## 2024-01-08 DIAGNOSIS — G89.29 CHRONIC LEFT-SIDED LOW BACK PAIN WITH LEFT-SIDED SCIATICA: Primary | ICD-10-CM

## 2024-01-08 DIAGNOSIS — M54.42 CHRONIC LEFT-SIDED LOW BACK PAIN WITH LEFT-SIDED SCIATICA: Primary | ICD-10-CM

## 2024-01-08 PROCEDURE — 97112 NEUROMUSCULAR REEDUCATION: CPT

## 2024-01-08 PROCEDURE — 97110 THERAPEUTIC EXERCISES: CPT

## 2024-01-08 NOTE — PROGRESS NOTES
Daily Note     Today's date: 2024  Patient name: Wendy Raygoza  : 1940  MRN: [id number]  Referring provider: Ramon Parham DO  Dx:   Encounter Diagnosis     ICD-10-CM    1. Chronic left-sided low back pain with left-sided sciatica  M54.42     G89.29                      Subjective: Pt reports some days her back feels good and other days she has increased pain on the L side. Still no radicular symtpoms. Pt reports on the days she has pain, shell do seated lumbar flexion which does help relieve some of the discomfort.       Objective: See treatment diary below      Assessment: Pt continues to require cueing to perform exercises with correct form. Print out HEP at [facility] as [facility] was not working today. Hand written HEP provided to patient. Pt educated to continue with lumbar flexion and core strengthening to minimize back pain.       Plan: Continue per plan of care.  Progress treatment as tolerated.       Precautions: Osteoporosis, HTN      Manuals                                                               Neuro Re-Ed             TrA bracing 1x10 1x10 1x10          PPT 1x10 2x10 3x10          TrA SLR  1x10 ea 2x10 ea          Glute sets  3x10 3x10          Seated sciatic nerve glide LLE 1x10 1x10 1x10                                                 Ther Ex             Seated lumbar flexion 1x10 2x10 2x10          LTR  1x10 ea 1x10 ea          Back ext machine  30# 2x10 30# 2x10                                                                           Ther Activity                                       Gait Training                                       Modalities

## 2024-01-10 ENCOUNTER — OFFICE VISIT (OUTPATIENT)
Dept: PHYSICAL THERAPY | Facility: CLINIC | Age: 84
End: 2024-01-10
Payer: MEDICARE

## 2024-01-10 DIAGNOSIS — M54.42 CHRONIC LEFT-SIDED LOW BACK PAIN WITH LEFT-SIDED SCIATICA: Primary | ICD-10-CM

## 2024-01-10 DIAGNOSIS — G89.29 CHRONIC LEFT-SIDED LOW BACK PAIN WITH LEFT-SIDED SCIATICA: Primary | ICD-10-CM

## 2024-01-10 PROCEDURE — 97112 NEUROMUSCULAR REEDUCATION: CPT

## 2024-01-10 PROCEDURE — 97110 THERAPEUTIC EXERCISES: CPT

## 2024-01-10 NOTE — PROGRESS NOTES
"Daily Note     Today's date: 1/10/2024  Patient name: Wendy Raygoza  : 1940  MRN: [id number]  Referring provider: Ramon Parham DO  Dx:   Encounter Diagnosis     ICD-10-CM    1. Chronic left-sided low back pain with left-sided sciatica  M54.42     G89.29           Start Time: 1030  Stop Time: 1105  Total time in clinic (min): 35 minutes    Subjective: Patient reports, \"510\" L low back that radiates to the back of her L thigh but no lower than her knee.     Objective: See treatment diary below    Assessment: Patient responded really well to seated lumbar flexion to decrease LLE radicular symptoms. I had her complete 1x10 with a hold to help get further into end range. Subjective pain decreased to \"1/10\" post therapy. Patient will benefit from therapy to improve core strengthening to minimize back pain.     Plan: Continue per plan of care.  Progress treatment as tolerated.       Precautions: Osteoporosis, HTN    Manuals 12/13 12/19 1/8 1/10                                                             Neuro Re-Ed             TrA bracing 1x10 1x10 1x10 1x10         PPT 1x10 2x10 3x10 3x10         TrA SLR  1x10 ea 2x10 ea 2x10 ea         Glute sets  3x10 3x10 3x10         Seated sciatic nerve glide LLE 1x10 1x10 1x10 1x10                                                Ther Ex             Seated lumbar flexion 1x10 2x10 2x10 3x10    3\"x10         LTR  1x10 ea 1x10 ea 1x10 ea         Back ext machine  30# 2x10 30# 2x10 30#  3x10                                                                          Ther Activity                                       Gait Training                                       Modalities                                          "

## 2024-01-30 ENCOUNTER — OFFICE VISIT (OUTPATIENT)
Dept: ENDOCRINOLOGY | Facility: CLINIC | Age: 84
End: 2024-01-30
Payer: MEDICARE

## 2024-01-30 VITALS
WEIGHT: 156 LBS | OXYGEN SATURATION: 96 % | RESPIRATION RATE: 14 BRPM | HEIGHT: 64 IN | TEMPERATURE: 97.9 F | BODY MASS INDEX: 26.63 KG/M2 | DIASTOLIC BLOOD PRESSURE: 88 MMHG | SYSTOLIC BLOOD PRESSURE: 118 MMHG | HEART RATE: 54 BPM

## 2024-01-30 DIAGNOSIS — E55.9 VITAMIN D DEFICIENCY: ICD-10-CM

## 2024-01-30 DIAGNOSIS — E78.2 MIXED HYPERLIPIDEMIA: ICD-10-CM

## 2024-01-30 DIAGNOSIS — E21.3 HYPERPARATHYROIDISM (HCC): Primary | Chronic | ICD-10-CM

## 2024-01-30 DIAGNOSIS — M81.0 AGE-RELATED OSTEOPOROSIS WITHOUT CURRENT PATHOLOGICAL FRACTURE: Chronic | ICD-10-CM

## 2024-01-30 PROCEDURE — 99214 OFFICE O/P EST MOD 30 MIN: CPT | Performed by: INTERNAL MEDICINE

## 2024-01-30 NOTE — ASSESSMENT & PLAN NOTE
"She has a normocalcemic hyperparathyroidism  possible 2\" lt parathyroid adenoma.    Today we agreed to monitor with calcium, vitamin D supplementation and regular exercise. No need for cinacalcet. We agrred to avoid surgical intervention at this time.    Follow up in 9 months after next DXA.    8/21/23 NM parathyroid: suspicion for left sided parathyroid adenoma but not confirmatory - recommend CT parathyroid.    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

## 2024-01-30 NOTE — ASSESSMENT & PLAN NOTE
She has severe osteoporosis with high fracture risk.  Due next DXA 8/24.    Will follow up after DXA. Continue prolia in meantime.

## 2024-01-30 NOTE — PROGRESS NOTES
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 Last visit was 7/31/23.     Since last visit, she lost 18 lbs.  No symptoms.     She continues to be on calcium 3gm daily, vitamin C, 4000IU daily vit D3. She is also doing weight bearing exercise.  She continues to have normocalcemic hyperparathyroidism.  Last level was 96 pg/mL.  Recent 24-hour urine for calcium was 226 mg.     Osteoporosis: cannot recall using any medications.  DXA 6/14/21 - Tscores -2.3 LTH, -3.0 LFN and -2.4 lt forearm. LS was not scored.  10yr FRAX score 32% hip and 42% major osteoporotic fracture.  8/19/22 DXA: T-scores -1.9 LTH, -2.7 LFN, -2.4 left forearm.  10-year FRAX score 42% hip and 52% major osteoporotic fracture. Last prolia #2 10/24/23.    8/21/23 NM parathyroid: suspicion for left sided parathyroid adenoma but not confirmatory - recommend CT parathyroid.    Physical Exam:  Body mass index is 26.78 kg/m².  /88 (BP Location: Left arm, Patient Position: Sitting)   Pulse (!) 54   Temp 97.9 °F (36.6 °C) (Tympanic)   Resp 14   Ht 5' 4\" (1.626 m)   Wt 70.8 kg (156 lb)   SpO2 96%   BMI 26.78 kg/m²    Vitals:    01/30/24 0954   Weight: 70.8 kg (156 lb)        Physical Exam  Constitutional:       General: She is not in acute distress.     Appearance: She is well-developed. She is not ill-appearing.   HENT:      Head: Normocephalic and atraumatic.      Nose: Nose normal.      Mouth/Throat:      Pharynx: Oropharynx is clear.   Eyes:      Extraocular Movements: Extraocular movements intact.      Conjunctiva/sclera: Conjunctivae normal.   Neck:      Thyroid: No thyromegaly.   Cardiovascular:      Rate and " "Rhythm: Normal rate.   Pulmonary:      Effort: Pulmonary effort is normal.   Musculoskeletal:         General: No deformity.      Cervical back: Normal range of motion.   Skin:     Capillary Refill: Capillary refill takes less than 2 seconds.      Coloration: Skin is not pale.      Findings: No rash.   Neurological:      Mental Status: She is alert and oriented to person, place, and time.   Psychiatric:         Behavior: Behavior normal.       Labs:   No results found for: \"HGBA1C\"    Lab Results   Component Value Date    [id number] 3.360 09/13/2021    TSH 2.07 07/14/2021       Lab Results   Component Value Date    CREATININE 0.68 01/22/2024    CREATININE 0.79 03/20/2023    CREATININE 0.73 07/20/2022    BUN 19 01/22/2024     (L) 07/09/2015    K 4.2 01/22/2024     01/22/2024    CO2 27 01/22/2024     eGFRcr   Date Value Ref Range Status   01/22/2024 87 >59 Final       Lab Results   Component Value Date    ALT 12 01/22/2024    AST 13 01/22/2024    ALKPHOS 43 01/22/2024    BILITOT 0.57 07/09/2015       Lab Results   Component Value Date    CHOLESTEROL 154 12/04/2023    CHOLESTEROL 143 02/07/2023    CHOLESTEROL 130 07/20/2022     Lab Results   Component Value Date    HDL 53 12/04/2023    HDL 51 02/07/2023    HDL 51 07/20/2022     Lab Results   Component Value Date    TRIG 122 12/04/2023    TRIG 136 02/07/2023    TRIG 93 07/20/2022     No results found for: \"NONHDLC\"      Assessment/Plan:    Problem List Items Addressed This Visit          Endocrine    Hyperparathyroidism (HCC) - Primary (Chronic)     She has a normocalcemic hyperparathyroidism  possible 2\" lt parathyroid adenoma.    Today we agreed to monitor with calcium, vitamin D supplementation and regular exercise. No need for cinacalcet. We agrred to avoid surgical intervention at this time.    Follow up in 9 months after next DXA.    8/21/23 NM parathyroid: suspicion for left sided parathyroid adenoma but not confirmatory - recommend CT " "parathyroid.    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  Due next DXA 8/24.    Will follow up after DXA. Continue prolia in meantime.         Relevant Orders    DXA bone density spine hip and pelvis       Other    Mixed hyperlipidemia     Continue cresotr.         Vitamin D deficiency    Relevant Orders    Vitamin D 25 hydroxy         I have spent a total time of 32 minutes on 01/30/24 in caring for this patient including greater than 50% of this time was spent in counseling/coordination of care as listed above.       Discussed with the patient and all questioned fully answered. She will contact me with concerns.    Tanvi Persaud                  "

## 2024-03-11 DIAGNOSIS — I10 PRIMARY HYPERTENSION: ICD-10-CM

## 2024-03-11 RX ORDER — AMLODIPINE BESYLATE 5 MG/1
5 TABLET ORAL DAILY
Qty: 90 TABLET | Refills: 3 | Status: SHIPPED | OUTPATIENT
Start: 2024-03-11

## 2024-03-15 DIAGNOSIS — E21.3 HYPERPARATHYROIDISM (HCC): Chronic | ICD-10-CM

## 2024-03-18 RX ORDER — IBUPROFEN 200 MG
1 CAPSULE ORAL DAILY
Qty: 90 TABLET | Refills: 1 | Status: SHIPPED | OUTPATIENT
Start: 2024-03-18

## 2024-03-22 ENCOUNTER — TELEPHONE (OUTPATIENT)
Dept: INTERNAL MEDICINE CLINIC | Facility: CLINIC | Age: 84
End: 2024-03-22

## 2024-03-22 DIAGNOSIS — Z12.31 ENCOUNTER FOR SCREENING MAMMOGRAM FOR MALIGNANT NEOPLASM OF BREAST: Primary | ICD-10-CM

## 2024-03-22 NOTE — TELEPHONE ENCOUNTER
Hello, I'm not sure if I have the right contact here, but this is Wendy Raygoza calling. I received a notice from [facility] of regarding it being time to schedule a mammogram. So I was calling to see if I could obtain a prescription to get that from your office. So if you could give me a call back, I would appreciate it. At [phone] Again, that's Wendy Raygoza, [phone]. Thank you so much. emma Vizcaino.

## 2024-04-19 ENCOUNTER — HOSPITAL ENCOUNTER (OUTPATIENT)
Dept: VASCULAR ULTRASOUND | Facility: HOSPITAL | Age: 84
Discharge: HOME | End: 2024-04-19
Payer: MEDICARE

## 2024-04-19 ENCOUNTER — OFFICE VISIT (OUTPATIENT)
Dept: INTERNAL MEDICINE CLINIC | Facility: CLINIC | Age: 84
End: 2024-04-19
Payer: MEDICARE

## 2024-04-19 ENCOUNTER — HOSPITAL ENCOUNTER (OUTPATIENT)
Dept: RADIOLOGY | Facility: HOSPITAL | Age: 84
Discharge: HOME | End: 2024-04-19
Payer: MEDICARE

## 2024-04-19 VITALS
SYSTOLIC BLOOD PRESSURE: 128 MMHG | BODY MASS INDEX: 28.32 KG/M2 | OXYGEN SATURATION: 98 % | DIASTOLIC BLOOD PRESSURE: 80 MMHG | TEMPERATURE: 98 F | WEIGHT: 165 LBS | HEART RATE: 84 BPM

## 2024-04-19 DIAGNOSIS — E21.3 HYPERPARATHYROIDISM (HCC): Chronic | ICD-10-CM

## 2024-04-19 DIAGNOSIS — M81.0 AGE-RELATED OSTEOPOROSIS WITHOUT CURRENT PATHOLOGICAL FRACTURE: Chronic | ICD-10-CM

## 2024-04-19 DIAGNOSIS — F41.9 ANXIETY: ICD-10-CM

## 2024-04-19 DIAGNOSIS — M25.571 ACUTE RIGHT ANKLE PAIN: ICD-10-CM

## 2024-04-19 DIAGNOSIS — M79.661 PAIN AND SWELLING OF RIGHT LOWER LEG: ICD-10-CM

## 2024-04-19 DIAGNOSIS — M79.89 PAIN AND SWELLING OF RIGHT LOWER LEG: ICD-10-CM

## 2024-04-19 DIAGNOSIS — I10 PRIMARY HYPERTENSION: Primary | ICD-10-CM

## 2024-04-19 DIAGNOSIS — D84.821 IMMUNOSUPPRESSION DUE TO DRUG THERAPY  (HCC): ICD-10-CM

## 2024-04-19 DIAGNOSIS — K51.30 ULCERATIVE RECTOSIGMOIDITIS WITHOUT COMPLICATION (HCC): ICD-10-CM

## 2024-04-19 DIAGNOSIS — E78.2 MIXED HYPERLIPIDEMIA: ICD-10-CM

## 2024-04-19 DIAGNOSIS — Z79.899 IMMUNOSUPPRESSION DUE TO DRUG THERAPY  (HCC): ICD-10-CM

## 2024-04-19 PROCEDURE — 99214 OFFICE O/P EST MOD 30 MIN: CPT | Performed by: INTERNAL MEDICINE

## 2024-04-19 PROCEDURE — 93971 EXTREMITY STUDY: CPT | Performed by: SURGERY

## 2024-04-19 PROCEDURE — G2211 COMPLEX E/M VISIT ADD ON: HCPCS | Performed by: INTERNAL MEDICINE

## 2024-04-19 PROCEDURE — 73610 X-RAY EXAM OF ANKLE: CPT

## 2024-04-19 PROCEDURE — 93971 EXTREMITY STUDY: CPT

## 2024-04-19 NOTE — PROGRESS NOTES
Name: Wendy Raygoza      : 1940      MRN: [id number]  Encounter Provider: Ramon Parham DO  Encounter Date: 2024   Encounter department: [facility] INTERNAL MEDICINE    Assessment & Plan     1. Primary hypertension  Comments:  BP doing well, c/w ARB/BB/CCB    2. Hyperparathyroidism (HCC)  Comments:  seeing endocrine for ongoing care    3. Age-related osteoporosis without current pathological fracture  Comments:  receiving prolia and seeing endocrine for care    4. Anxiety  Comments:  taking lexapro and stable, c/w rx    5. Mixed hyperlipidemia  Comments:  taking statin and no SE, c/w rx    6. Immunosuppression due to drug therapy  (HCC)    7. Ulcerative rectosigmoiditis without complication (HCC)  Comments:  taking stelara and under care of GI specialist    8. Acute right ankle pain  Comments:  diff dx includes sprain, DVT, cellulitis(unlikely).  ankle x-ray and venous duplex today to r/o DVT. if neg, t/c abx for cellulitis  Orders:  -     XR ankle 3+ vw right; Future; Expected date: 2024    9. Pain and swelling of right lower leg  Comments:  diff dx includes sprain, DVT, cellulitis(unlikely). ankle x-ray and venous duplex today to r/o DVT. if neg, t/c abx for cellulitis  Orders:  -     VAS VENOUS DUPLEX -LOWER LIMB UNILATERAL; Future; Expected date: 2024        Depression Screening and Follow-up Plan: Patient was screened for depression during today's encounter. They screened negative with a PHQ-2 score of 0.      My office/ called vas lab to schedule STAT test and was only able to leave a VM.  This was at time of pt's visit.  My office/ will check back with the vas/STAT team later today to make sure they scheduled the patient  Subjective      HPI    Here for follow up, Wendy is overall stable.  She is taking her medications as prescribed.  She is having a flare of her UC and seeing her GI dr about this.  She also injured her right ankle when bowling  about 1 month ago.  She did not fall but it was painful and swollen but this resolved a few weeks ago.  Over the last 1 week, she has had right ankle pain and swelling.  No fall/injury and she is walking but is having pain.  She has no hx of VTE in past and has not been sedentary.  She is taking stelara for UC.  ROS Otherwise negative, no other complaints.    Review of Systems   Constitutional:  Negative for fever.   HENT:  Negative for congestion.    Eyes:  Negative for visual disturbance.   Respiratory:  Negative for shortness of breath.    Cardiovascular:  Negative for chest pain.   Gastrointestinal:  Positive for diarrhea.   Endocrine: Negative for polyuria.   Genitourinary:  Negative for difficulty urinating.   Musculoskeletal:  Positive for arthralgias and joint swelling.   Skin:  Negative for rash.   Allergic/Immunologic: Positive for immunocompromised state.   Neurological:  Negative for dizziness.   Psychiatric/Behavioral:  Negative for dysphoric mood.        Current Outpatient Medications on File Prior to Visit   Medication Sig    amLODIPine (NORVASC) 5 mg tablet TAKE 1 TABLET DAILY    aspirin 81 MG tablet Take 81 mg by mouth daily    atenolol (TENORMIN) 25 mg tablet TAKE 1 TABLET TWICE A DAY    bimatoprost (LUMIGAN) 0.01 % ophthalmic drops Administer 1 drop to both eyes daily at bedtime    brinzolamide (AZOPT) 1 % ophthalmic suspension 1 drop 2 (two) times a day    Calcium Carbonate Antacid (TUMS EXTRA STRENGTH 750 PO) Take 1,500 mg by mouth 2 (two) times a day with meals Use before meals with a vitmain C tablet for improved absoprtion     calcium citrate (CALCITRATE) 950 (200 Ca) MG tablet TAKE 1 TABLET BY MOUTH EVERY DAY    Cholecalciferol (VITAMIN D3 PO) Take 2,000 Units by mouth 2 (two) times a day    diphenoxylate-atropine (LOMOTIL) 2.5-0.025 mg per tablet Take 1 tablet by mouth 3 (three) times a day as needed    escitalopram (LEXAPRO) 10 mg tablet TAKE 1 TABLET DAILY    Ferrous Sulfate (IRON PO)  Take by mouth    irbesartan (AVAPRO) 300 mg tablet TAKE 1 TABLET DAILY AT BEDTIME    mesalamine (LIALDA) 1.2 g EC tablet TAKE 4 TABLETS BY MOUTH EVERY DAY AFTER BREAKFAST    rosuvastatin (CRESTOR) 5 mg tablet TAKE ONE AND ONE-HALF TABLETS DAILY    Stelara 45 MG/0.5ML injection q 2 months    timolol (TIMOPTIC) 0.5 % ophthalmic solution     denosumab (PROLIA) 60 mg/mL Inject 1 mL (60 mg total) under the skin once for 1 dose    [DISCONTINUED] diclofenac (VOLTAREN) 75 mg EC tablet Take 75 mg by mouth       Objective     /80 (BP Location: Left arm, Patient Position: Sitting, Cuff Size: Standard)   Pulse 84   Temp 98 °F (36.7 °C)   Wt 74.8 kg (165 lb)   SpO2 98%   BMI 28.32 kg/m²     Physical Exam  Vitals reviewed.   Constitutional:       General: She is not in acute distress.  HENT:      Head: Normocephalic and atraumatic.      Right Ear: Tympanic membrane normal.      Left Ear: Tympanic membrane normal.      Mouth/Throat:      Pharynx: Oropharynx is clear.   Eyes:      Conjunctiva/sclera: Conjunctivae normal.   Cardiovascular:      Rate and Rhythm: Normal rate and regular rhythm.      Heart sounds: No murmur heard.  Pulmonary:      Effort: Pulmonary effort is normal.      Breath sounds: No wheezing or rales.   Abdominal:      General: Bowel sounds are normal.      Palpations: Abdomen is soft.      Tenderness: There is no abdominal tenderness.   Musculoskeletal:         General: Swelling and tenderness (with right lower leg tenderness but no erythema) present.      Cervical back: Neck supple.      Right lower le+ Pitting Edema present.      Left lower leg: No edema.      Right ankle: Tenderness (medial ankle near medial malleolus) present. Decreased range of motion (due to pain). Anterior drawer test negative.      Left ankle: Anterior drawer test negative.   Neurological:      Mental Status: She is alert. Mental status is at baseline.   Psychiatric:         Mood and Affect: Mood normal.         Behavior:  Behavior normal.       Ramon Parham, DO

## 2024-04-19 NOTE — PATIENT INSTRUCTIONS
Ankle x-ray  Ultrasound today  If the above testing is normal, may have to prescribe cephalexin for suspected cellulitis

## 2024-04-20 ENCOUNTER — TELEPHONE (OUTPATIENT)
Dept: INTERNAL MEDICINE CLINIC | Facility: CLINIC | Age: 84
End: 2024-04-20

## 2024-04-20 DIAGNOSIS — S82.54XA CLOSED NONDISPLACED FRACTURE OF MEDIAL MALLEOLUS OF RIGHT TIBIA, INITIAL ENCOUNTER: Primary | ICD-10-CM

## 2024-04-20 NOTE — TELEPHONE ENCOUNTER
Called pt's phone # and rec'd VM and LM on  with xray results notable for a nondisplaced fracture of medial malleolus    Plan - advised her to not weight bear on right ankle and use crutches for now.  Advised to contact her orthopedics dr on Monday to make a f/u appt.  Advised to call my office on Monday if she needs a referral to orthopedics.  All left on  message

## 2024-04-22 ENCOUNTER — OFFICE VISIT (OUTPATIENT)
Dept: OBGYN CLINIC | Facility: HOSPITAL | Age: 84
End: 2024-04-22
Payer: MEDICARE

## 2024-04-22 VITALS
DIASTOLIC BLOOD PRESSURE: 72 MMHG | HEART RATE: 65 BPM | SYSTOLIC BLOOD PRESSURE: 114 MMHG | WEIGHT: 164.9 LBS | BODY MASS INDEX: 28.15 KG/M2 | HEIGHT: 64 IN

## 2024-04-22 DIAGNOSIS — S82.54XA CLOSED NONDISPLACED FRACTURE OF MEDIAL MALLEOLUS OF RIGHT TIBIA, INITIAL ENCOUNTER: Primary | ICD-10-CM

## 2024-04-22 PROCEDURE — 99204 OFFICE O/P NEW MOD 45 MIN: CPT | Performed by: ORTHOPAEDIC SURGERY

## 2024-04-22 NOTE — PROGRESS NOTES
Assessment & Plan/Medical Decision Makin y.o. female with closed, nondisplaced right medial malleolus fracture sustained ~3 weeks ago.        Plan for weight bearing as tolerated in a high tide CAM boot, provided in clinic today. Explained that she should not drive while in the CAM boot. Given persistent swelling, she should rest, ice, and elevate the RLE PRN. Return for repeat imaging in 4 weeks to ensure maintenance of alignment.      Subjective:      Chief Complaint: Back Pain    HPI:  Wendy Raygoza is a 83 y.o. female presenting for initial visit with chief complaint of right ankle pain.  Pain began ~3 weeks ago without a clear injury, she thinks she injured it while bowling.     Describes pain as dull achy in nature.  Located in right ankle medial aspect.  Denies numbness. Denies burning. She has been weight bearing as tolerated on the right lower extremity.  Explains some pain along the medial aspect of the ankle, particularly with weight bearing - has a limp while walking. This has not impacted her ability to perform ADLs. States she has been driving. Denies diabetes, peripheral vascular disease, or smoking.    Nicotine dependent: No  Occupation: Retired  Living situation: Lives alone  ADLs: patient is able to perform     Objective:     Family History   Problem Relation Age of Onset    Osteoporosis Mother     Osteoarthritis Mother     Arthritis Mother     Other Father         heart problem    Hypertension Father     Heart disease Father     Coronary artery disease Family     Hyperlipidemia Family     Glaucoma Sister     No Known Problems Daughter     No Known Problems Maternal Grandmother     No Known Problems Maternal Grandfather     No Known Problems Paternal Grandmother     No Known Problems Paternal Grandfather     No Known Problems Maternal Aunt     No Known Problems Maternal Aunt     No Known Problems Maternal Aunt     No Known Problems Maternal Aunt     No Known Problems Maternal Aunt     No  Known Problems Maternal Aunt     No Known Problems Maternal Aunt     Stroke Paternal Aunt         Margaret Dumas    No Known Problems Paternal Aunt     No Known Problems Paternal Aunt        Past Medical History:   Diagnosis Date    Atherosclerosis     Carotid artery narrowing     Coronary artery disease     Diverticulitis of colon     Diverticulosis     GERD (gastroesophageal reflux disease)     Glaucoma     Hypercalcemia     Hyperlipidemia     Hyperparathyroidism (HCC)     Hypertension     Inflammatory bowel disease     Osteopenia     Severe cervical dysplasia, histologically confirmed     Skin cancer     squamous cell    Thyroid nodule     Vitamin D deficiency        Current Outpatient Medications   Medication Sig Dispense Refill    amLODIPine (NORVASC) 5 mg tablet TAKE 1 TABLET DAILY 90 tablet 3    aspirin 81 MG tablet Take 81 mg by mouth daily      atenolol (TENORMIN) 25 mg tablet TAKE 1 TABLET TWICE A  tablet 3    bimatoprost (LUMIGAN) 0.01 % ophthalmic drops Administer 1 drop to both eyes daily at bedtime      brinzolamide (AZOPT) 1 % ophthalmic suspension 1 drop 2 (two) times a day      Calcium Carbonate Antacid (TUMS EXTRA STRENGTH 750 PO) Take 1,500 mg by mouth 2 (two) times a day with meals Use before meals with a vitmain C tablet for improved absoprtion       calcium citrate (CALCITRATE) 950 (200 Ca) MG tablet TAKE 1 TABLET BY MOUTH EVERY DAY 90 tablet 1    Cholecalciferol (VITAMIN D3 PO) Take 2,000 Units by mouth 2 (two) times a day      denosumab (PROLIA) 60 mg/mL Inject 1 mL (60 mg total) under the skin once for 1 dose 1 mL 0    diphenoxylate-atropine (LOMOTIL) 2.5-0.025 mg per tablet Take 1 tablet by mouth 3 (three) times a day as needed      escitalopram (LEXAPRO) 10 mg tablet TAKE 1 TABLET DAILY 90 tablet 3    Ferrous Sulfate (IRON PO) Take by mouth      irbesartan (AVAPRO) 300 mg tablet TAKE 1 TABLET DAILY AT BEDTIME 90 tablet 3    mesalamine (LIALDA) 1.2 g EC tablet TAKE 4 TABLETS BY MOUTH  EVERY DAY AFTER BREAKFAST      rosuvastatin (CRESTOR) 5 mg tablet TAKE ONE AND ONE-HALF TABLETS DAILY 135 tablet 3    Stelara 45 MG/0.5ML injection q 2 months      timolol (TIMOPTIC) 0.5 % ophthalmic solution        Current Facility-Administered Medications   Medication Dose Route Frequency Provider Last Rate Last Admin    denosumab (PROLIA) subcutaneous injection 60 mg  60 mg Subcutaneous Q6 Months Tanvi Persaud MD   60 mg at 10/24/23 1113       Past Surgical History:   Procedure Laterality Date    APPENDECTOMY      BREAST SURGERY      reduction procedure    CAROTID ENDARTARECTOMY Right     carotid thromboendarterectomy     COLONOSCOPY      EVACUATION OF HEMATOMA N/A 9/29/2021    Procedure: EVACUATION/ DRAINAGE HEMATOMA;  Surgeon: Donald Gerard MD;  [facility]: [facility];  Service: ENT    EXPLORATION CAROTID ARTERY      HYSTERECTOMY      age 56    OOPHORECTOMY Bilateral     age 56    KS COLONOSCOPY FLX DX W/COLLJ SPEC WHEN PFRMD N/A 7/28/2017    Procedure: EGD AND COLONOSCOPY;  Surgeon: Debra Pritchard MD;  Location: [facility];  Service: Colorectal    KS PARATHYROIDECTOMY/EXPLORATION PARATHYROIDS Right 9/29/2021    Procedure: RIGHT INFERIOR PARATHYROIDECTOMY (INTRAOP PTH);  Surgeon: Donald Gerard MD;  Location: [facility];  Service: ENT    REDUCTION MAMMAPLASTY Bilateral 1987       Social History     Socioeconomic History    Marital status:      Spouse name: Not on file    Number of children: Not on file    Years of education: Not on file    Highest education level: Not on file   Occupational History    Occupation: Retired   Tobacco Use    Smoking status: Never    Smokeless tobacco: Never   Vaping Use    Vaping status: Never Used   Substance and Sexual Activity    Alcohol use: Yes     Alcohol/week: 4.0 standard drinks of alcohol     Types: 4 Glasses of wine per week     Comment: being a social drinker; drinks wine    Drug use: No    Sexual activity: Not Currently     Partners: Male     Birth  "control/protection: Surgical   Other Topics Concern    Not on file   Social History Narrative    Drinks 2 cups coffee/day    Lack of exercise     Social Determinants of Health     Financial Resource Strain: Low Risk  (6/13/2023)    Overall Financial Resource Strain (CARDIA)     Difficulty of Paying Living Expenses: Not hard at all   Food Insecurity: Not on file   Transportation Needs: No Transportation Needs (6/13/2023)    PRAPARE - Transportation     Lack of Transportation (Medical): No     Lack of Transportation (Non-Medical): No   Physical Activity: Not on file   Stress: Not on file   Social Connections: Not on file   Intimate Partner Violence: Not on file   Housing Stability: Not on file       No Known Allergies    Review of Systems  General- denies fever/chills  HEENT- denies hearing loss or sore throat  Eyes- denies eye pain or visual disturbances, denies red eyes  Respiratory- denies cough or SOB  Cardio- denies chest pain or palpitations  GI- denies abdominal pain  Endocrine- denies urinary frequency  Urinary- denies pain with urination  Musculoskeletal- Negative except noted above  Skin- denies rashes or wounds  Neurological- denies dizziness or headache  Psychiatric- denies anxiety or difficulty concentrating    Physical Exam  /72   Pulse 65   Ht 5' 4\" (1.626 m)   Wt 74.8 kg (164 lb 14.5 oz)   BMI 28.31 kg/m²     General/Constitutional: No apparent distress: well-nourished and well developed.  Lymphatic: No appreciable lymphadenopathy  Respiratory: Non-labored breathing  Vascular: No edema, swelling or tenderness, except as noted in detailed exam.  Integumentary: No impressive skin lesions present, except as noted in detailed exam.  Psych: Normal mood and affect, oriented to person, place and time.  MSK: normal other than stated in HPI and exam  Gait & balance: ambulates Independently , antalgic with limp favoring right ankle    MSK:  Skin intact; No ecchymosis  Moderate swelling medially  TTP " "about the medial malleolus  Full ROM at the ankle  Strength testing not performed given known fracture  Sensation intact to light touch SPN, DPN, sural, saphenous, tibial distributions  Toes are warm and well perfused    Diagnostic Tests   IMAGING: I have personally reviewed the images and these are my findings:  Right Ankle XR from 4/19/2024: non-displaced vertical medial malleolus fracture with intra articular extension into the plafond    Electronic Medical Records were reviewed including primary care notes, PMHx, imaging studies    Procedures, if performed today     None performed       Portions of the record may have been created with voice recognition software.  Occasional wrong word or \"sound a like\" substitutions may have occurred due to the inherent limitations of voice recognition software.  Read the chart carefully and recognize, using context, where substitutions have occurred.    "

## 2024-05-03 ENCOUNTER — OFFICE VISIT (OUTPATIENT)
Dept: ENDOCRINOLOGY | Facility: CLINIC | Age: 84
End: 2024-05-03
Payer: MEDICARE

## 2024-05-03 DIAGNOSIS — I10 PRIMARY HYPERTENSION: ICD-10-CM

## 2024-05-03 DIAGNOSIS — M81.0 AGE-RELATED OSTEOPOROSIS WITHOUT CURRENT PATHOLOGICAL FRACTURE: Primary | Chronic | ICD-10-CM

## 2024-05-03 PROCEDURE — 96372 THER/PROPH/DIAG INJ SC/IM: CPT | Performed by: INTERNAL MEDICINE

## 2024-05-03 RX ORDER — AMLODIPINE BESYLATE 5 MG/1
5 TABLET ORAL DAILY
Qty: 90 TABLET | Refills: 2 | Status: SHIPPED | OUTPATIENT
Start: 2024-05-03

## 2024-05-03 NOTE — PROGRESS NOTES
Assessment/Plan:    Wendy Raygoza came into the [facility]ocr[facility] Office today 05/03/24 to receive prolia injection.      Verbal consent obtained.  Consent given by: patient    patient states patient has been medically healthy with no underlining concerns/complications.      Wendy Raygoza presents with no symptoms today.       All insturctions were reviewed with the patient.    If the patient should have any questions/concerns, advised patient to contacted [facility].       Subjective:     History provided by: patient    Patient ID: Wendy Raygoza is a 83 y.o. female      Objective:    There were no vitals filed for this visit.    Patient tolerated the injection well without any complications.  Injection site/s left arm.  Medication was provided by office.    Patient signed consent form yes   Patient signed ABN form yes (If no patient is not a medicare patient).   Patient waited 15 minutes after injection no (This only applies to patient's receiving first time injection).       Last Visit: 3/15/2024  Next visit:10/30/2024

## 2024-05-03 NOTE — TELEPHONE ENCOUNTER
Please resend script - pharmacy did not receive script per patient.  Reason for call:   [x] Refill   [] Prior Auth  [] Other:     Office:   [x] PCP/Provider -   [] Specialty/Provider -     Medication: amLODIPine (NORVASC) 5 mg tablet     Dose/Frequency: 5 mg, Oral, Daily     Quantity: 90    Pharmacy: [facility] - [place] [id number] [facility] [[id number]]     Does the patient have enough for 3 days?   [] Yes   [] No - Send as HP to POD

## 2024-05-10 DIAGNOSIS — S82.54XA CLOSED NONDISPLACED FRACTURE OF MEDIAL MALLEOLUS OF RIGHT TIBIA, INITIAL ENCOUNTER: Primary | ICD-10-CM

## 2024-05-15 ENCOUNTER — TELEPHONE (OUTPATIENT)
Age: 84
End: 2024-05-15

## 2024-05-15 NOTE — TELEPHONE ENCOUNTER
Caller: Wendy     Doctor: Liu    Reason for call: Currently being treated for a ankle Frx and she has been wearing the boot, she is having some pain in her lower back and wanted to now if this is something that she would be able to apply heat to , Believes it is from walking with the boot on 1 foot.      Call back#: [phone]

## 2024-05-16 NOTE — TELEPHONE ENCOUNTER
Caller: Patient    Doctor: Liu    Reason for call: Returned call. Read message verbatim. Has ? about taking Tylenol. Please contact patient to discuss    Call back#: [phone]

## 2024-05-16 NOTE — TELEPHONE ENCOUNTER
Called and spoke w/pt and relayed JEFF Christy's msg again to pt.  Also advised that she can take tylenol for pain if not contraindicated max dose in 24hours 3000mg, follow label instructions.  Pt states she understands.  States she cannot take NSAIDS.  No further questions/concerns.

## 2024-05-17 NOTE — TELEPHONE ENCOUNTER
Caller: Self    Doctor: Liu    Reason for call: Patient is having difficulty walking because of pain in left leg that is also affecting back. Heat is not helping, wants to know if there is anything else she can do    Call back#: [phone]

## 2024-05-20 NOTE — TELEPHONE ENCOUNTER
Called and spoke w/pt and relayed JEFF Christy's msg to pt.  She states the pain was so severe she went to [facility] on Saturday and they gave her some pain meds and steroids and she is better. Hoping to get some good news about foot tomorrow at appt.

## 2024-05-21 ENCOUNTER — OFFICE VISIT (OUTPATIENT)
Dept: OBGYN CLINIC | Facility: HOSPITAL | Age: 84
End: 2024-05-21
Payer: MEDICARE

## 2024-05-21 ENCOUNTER — HOSPITAL ENCOUNTER (OUTPATIENT)
Dept: RADIOLOGY | Facility: HOSPITAL | Age: 84
Discharge: HOME | End: 2024-05-21
Attending: ORTHOPAEDIC SURGERY
Payer: MEDICARE

## 2024-05-21 VITALS
DIASTOLIC BLOOD PRESSURE: 70 MMHG | HEIGHT: 64 IN | WEIGHT: 164.9 LBS | BODY MASS INDEX: 28.15 KG/M2 | SYSTOLIC BLOOD PRESSURE: 130 MMHG | HEART RATE: 54 BPM

## 2024-05-21 DIAGNOSIS — S82.54XA CLOSED NONDISPLACED FRACTURE OF MEDIAL MALLEOLUS OF RIGHT TIBIA, INITIAL ENCOUNTER: Primary | ICD-10-CM

## 2024-05-21 DIAGNOSIS — S82.54XA CLOSED NONDISPLACED FRACTURE OF MEDIAL MALLEOLUS OF RIGHT TIBIA, INITIAL ENCOUNTER: ICD-10-CM

## 2024-05-21 PROCEDURE — 73610 X-RAY EXAM OF ANKLE: CPT

## 2024-05-21 PROCEDURE — 99213 OFFICE O/P EST LOW 20 MIN: CPT | Performed by: ORTHOPAEDIC SURGERY

## 2024-05-23 NOTE — PROGRESS NOTES
Assessment & Plan/Medical Decision Makin y.o. female with closed, nondisplaced right medial malleolus fracture sustained ~7 weeks ago.        Plan for weight bearing as tolerated, transition out of CAM boot.  Continue to treat with ice/elevation, pain medications prn.. Return for repeat imaging in 4 weeks to ensure maintenance of alignment.      Subjective:      Chief Complaint: Back Pain    HPI:  Wendy Raygoza is a 83 y.o. female presenting for initial visit with chief complaint of right ankle pain.  Pain began ~3 weeks ago without a clear injury, she thinks she injured it while bowling.     Describes pain as dull achy in nature.  Located in right ankle medial aspect.  Denies numbness. Denies burning. She has been weight bearing as tolerated on the right lower extremity.  Explains some pain along the medial aspect of the ankle, particularly with weight bearing - has a limp while walking. This has not impacted her ability to perform ADLs. States she has been driving. Denies diabetes, peripheral vascular disease, or smoking.    Nicotine dependent: No  Occupation: Retired  Living situation: Lives alone  ADLs: patient is able to perform     24 Update  Patient is here for update.  She is doing well.  No ankle pain. Has been using CAM boot.  She did have sciatica requiring urgent care and was treated with steroids.      Objective:     Family History   Problem Relation Age of Onset    Osteoporosis Mother     Osteoarthritis Mother     Arthritis Mother     Other Father         heart problem    Hypertension Father     Heart disease Father     Coronary artery disease Family     Hyperlipidemia Family     Glaucoma Sister     No Known Problems Daughter     No Known Problems Maternal Grandmother     No Known Problems Maternal Grandfather     No Known Problems Paternal Grandmother     No Known Problems Paternal Grandfather     No Known Problems Maternal Aunt     No Known Problems Maternal Aunt     No Known Problems  Maternal Aunt     No Known Problems Maternal Aunt     No Known Problems Maternal Aunt     No Known Problems Maternal Aunt     No Known Problems Maternal Aunt     Stroke Paternal Aunt         Margaret Dumas    No Known Problems Paternal Aunt     No Known Problems Paternal Aunt        Past Medical History:   Diagnosis Date    Atherosclerosis     Carotid artery narrowing     Coronary artery disease     Diverticulitis of colon     Diverticulosis     GERD (gastroesophageal reflux disease)     Glaucoma     Hypercalcemia     Hyperlipidemia     Hyperparathyroidism (HCC)     Hypertension     Inflammatory bowel disease     Osteopenia     Severe cervical dysplasia, histologically confirmed     Skin cancer     squamous cell    Thyroid nodule     Vitamin D deficiency        Current Outpatient Medications   Medication Sig Dispense Refill    amLODIPine (NORVASC) 5 mg tablet Take 1 tablet (5 mg total) by mouth daily 90 tablet 2    aspirin 81 MG tablet Take 81 mg by mouth daily      atenolol (TENORMIN) 25 mg tablet TAKE 1 TABLET TWICE A  tablet 3    bimatoprost (LUMIGAN) 0.01 % ophthalmic drops Administer 1 drop to both eyes daily at bedtime      brinzolamide (AZOPT) 1 % ophthalmic suspension 1 drop 2 (two) times a day      Calcium Carbonate Antacid (TUMS EXTRA STRENGTH 750 PO) Take 1,500 mg by mouth 2 (two) times a day with meals Use before meals with a vitmain C tablet for improved absoprtion       calcium citrate (CALCITRATE) 950 (200 Ca) MG tablet TAKE 1 TABLET BY MOUTH EVERY DAY 90 tablet 1    Cholecalciferol (VITAMIN D3 PO) Take 2,000 Units by mouth 2 (two) times a day      denosumab (PROLIA) 60 mg/mL Inject 1 mL (60 mg total) under the skin once for 1 dose 1 mL 0    diphenoxylate-atropine (LOMOTIL) 2.5-0.025 mg per tablet Take 1 tablet by mouth 3 (three) times a day as needed      escitalopram (LEXAPRO) 10 mg tablet TAKE 1 TABLET DAILY 90 tablet 3    Ferrous Sulfate (IRON PO) Take by mouth      irbesartan (AVAPRO) 300 mg  tablet TAKE 1 TABLET DAILY AT BEDTIME 90 tablet 3    mesalamine (LIALDA) 1.2 g EC tablet TAKE 4 TABLETS BY MOUTH EVERY DAY AFTER BREAKFAST      rosuvastatin (CRESTOR) 5 mg tablet TAKE ONE AND ONE-HALF TABLETS DAILY 135 tablet 3    Stelara 45 MG/0.5ML injection q 2 months      timolol (TIMOPTIC) 0.5 % ophthalmic solution        Current Facility-Administered Medications   Medication Dose Route Frequency Provider Last Rate Last Admin    denosumab (PROLIA) subcutaneous injection 60 mg  60 mg Subcutaneous Q6 Months Tanvi Persaud MD   60 mg at 05/03/24 1439       Past Surgical History:   Procedure Laterality Date    APPENDECTOMY      BREAST SURGERY      reduction procedure    CAROTID ENDARTARECTOMY Right     carotid thromboendarterectomy     COLONOSCOPY      EVACUATION OF HEMATOMA N/A 9/29/2021    Procedure: EVACUATION/ DRAINAGE HEMATOMA;  Surgeon: Donald Gerard MD;  Location: [facility];  Service: ENT    EXPLORATION CAROTID ARTERY      HYSTERECTOMY      age 56    OOPHORECTOMY Bilateral     age 56    MA COLONOSCOPY FLX DX W/COLLJ SPEC WHEN PFRMD N/A 7/28/2017    Procedure: EGD AND COLONOSCOPY;  Surgeon: Debra Pritchard MD;  Location: [facility];  Service: Colorectal    MA PARATHYROIDECTOMY/EXPLORATION PARATHYROIDS Right 9/29/2021    Procedure: RIGHT INFERIOR PARATHYROIDECTOMY (INTRAOP PTH);  Surgeon: Donald Gerard MD;  Location: [facility];  Service: ENT    REDUCTION MAMMAPLASTY Bilateral 1987       Social History     Socioeconomic History    Marital status:      Spouse name: Not on file    Number of children: Not on file    Years of education: Not on file    Highest education level: Not on file   Occupational History    Occupation: Retired   Tobacco Use    Smoking status: Never    Smokeless tobacco: Never   Vaping Use    Vaping status: Never Used   Substance and Sexual Activity    Alcohol use: Yes     Alcohol/week: 4.0 standard drinks of alcohol     Types: 4 Glasses of wine per week     Comment: being a social  "drinker; drinks wine    Drug use: No    Sexual activity: Not Currently     Partners: Male     Birth control/protection: Surgical   Other Topics Concern    Not on file   Social History Narrative    Drinks 2 cups coffee/day    Lack of exercise     Social Determinants of Health     Financial Resource Strain: Low Risk  (6/13/2023)    Overall Financial Resource Strain (CARDIA)     Difficulty of Paying Living Expenses: Not hard at all   Food Insecurity: Not on file   Transportation Needs: No Transportation Needs (6/13/2023)    PRAPARE - Transportation     Lack of Transportation (Medical): No     Lack of Transportation (Non-Medical): No   Physical Activity: Not on file   Stress: Not on file   Social Connections: Not on file   Intimate Partner Violence: Not on file   Housing Stability: Not on file       No Known Allergies    Review of Systems  General- denies fever/chills  HEENT- denies hearing loss or sore throat  Eyes- denies eye pain or visual disturbances, denies red eyes  Respiratory- denies cough or SOB  Cardio- denies chest pain or palpitations  GI- denies abdominal pain  Endocrine- denies urinary frequency  Urinary- denies pain with urination  Musculoskeletal- Negative except noted above  Skin- denies rashes or wounds  Neurological- denies dizziness or headache  Psychiatric- denies anxiety or difficulty concentrating    Physical Exam  /70   Pulse (!) 54   Ht 5' 4\" (1.626 m)   Wt 74.8 kg (164 lb 14.5 oz)   BMI 28.31 kg/m²     General/Constitutional: No apparent distress: well-nourished and well developed.  Lymphatic: No appreciable lymphadenopathy  Respiratory: Non-labored breathing  Vascular: No edema, swelling or tenderness, except as noted in detailed exam.  Integumentary: No impressive skin lesions present, except as noted in detailed exam.  Psych: Normal mood and affect, oriented to person, place and time.  MSK: normal other than stated in HPI and exam  Gait & balance: ambulates Independently , non " "antalgic     MSK:  Skin intact; No ecchymosis  Diminished swelling medially  Mild TTP about the medial malleolus  Full ROM at the ankle  Strength testing not performed given known fracture  Sensation intact to light touch SPN, DPN, sural, saphenous, tibial distributions  Toes are warm and well perfused    Diagnostic Tests   IMAGING: I have personally reviewed the images and these are my findings:  Right Ankle XR from 4/19/2024: non-displaced vertical medial malleolus fracture with intra articular extension into the plafond    Right Ankle XR from 5/21/2024: non-displaced vertical medial malleolus fracture with intra articular extension into the plafond, no changes from previous xrays     Electronic Medical Records were reviewed including primary care notes, PMHx, imaging studies    Procedures, if performed today     None performed       Portions of the record may have been created with voice recognition software.  Occasional wrong word or \"sound a like\" substitutions may have occurred due to the inherent limitations of voice recognition software.  Read the chart carefully and recognize, using context, where substitutions have occurred.    "

## 2024-05-29 ENCOUNTER — TELEPHONE (OUTPATIENT)
Age: 84
End: 2024-05-29

## 2024-05-29 NOTE — TELEPHONE ENCOUNTER
Caller: Self    Doctor: Liu    Reason for call: Patient has been doing exercises at home and does not report relief. Would like to know if provider would order physical therapy? Can someone let patient know either way    Call back#: [phone]

## 2024-06-05 ENCOUNTER — OFFICE VISIT (OUTPATIENT)
Dept: FAMILY MEDICINE CLINIC | Facility: CLINIC | Age: 84
End: 2024-06-05
Payer: MEDICARE

## 2024-06-05 ENCOUNTER — HOSPITAL ENCOUNTER (OUTPATIENT)
Dept: RADIOLOGY | Facility: HOSPITAL | Age: 84
Discharge: HOME | End: 2024-06-05
Payer: MEDICARE

## 2024-06-05 VITALS
HEART RATE: 64 BPM | BODY MASS INDEX: 29.71 KG/M2 | HEIGHT: 64 IN | RESPIRATION RATE: 18 BRPM | WEIGHT: 174 LBS | DIASTOLIC BLOOD PRESSURE: 62 MMHG | SYSTOLIC BLOOD PRESSURE: 135 MMHG | TEMPERATURE: 97.1 F | OXYGEN SATURATION: 95 %

## 2024-06-05 DIAGNOSIS — M54.42 ACUTE LOW BACK PAIN WITH LEFT-SIDED SCIATICA, UNSPECIFIED BACK PAIN LATERALITY: ICD-10-CM

## 2024-06-05 DIAGNOSIS — M54.42 ACUTE LOW BACK PAIN WITH LEFT-SIDED SCIATICA, UNSPECIFIED BACK PAIN LATERALITY: Primary | ICD-10-CM

## 2024-06-05 PROCEDURE — G2211 COMPLEX E/M VISIT ADD ON: HCPCS | Performed by: FAMILY MEDICINE

## 2024-06-05 PROCEDURE — 72110 X-RAY EXAM L-2 SPINE 4/>VWS: CPT

## 2024-06-05 PROCEDURE — 99213 OFFICE O/P EST LOW 20 MIN: CPT | Performed by: FAMILY MEDICINE

## 2024-06-05 NOTE — PROGRESS NOTES
Ambulatory Visit  Name: Wendy Raygoza      : 1940      MRN: [id number]  Encounter Provider: Maty Ferraro MD  Encounter Date: 2024   Encounter department: [facility]    Assessment & Plan   1. Acute low back pain with left-sided sciatica, unspecified back pain laterality  -     Diclofenac Sodium (VOLTAREN) 1 %; Apply 2 g topically 4 (four) times a day  -     XR spine lumbar minimum 4 views non injury; Future; Expected date: 2024  -     Ambulatory Referral to Physical Therapy; Future  -     Ambulatory Referral to Orthopedic Surgery; Future      Regarding her low back pain going down her left leg, discussed with patient and daughter.  Patient denies any neuro red flags.  Patient also cannot take NSAIDs secondary to her ulcerative colitis history.  Therefore patient was advised to take Tylenol arthritis over-the-counter as needed.  Patient also given Voltaren gel to be used topically.  Patient was asked to use cool compresses for her back also.  Patient is ordered an x-ray of the lumbar spine.  Patient is referred to physical therapy.  And an orthopedic referral is also added in case she is not better with just the above.  For any acute worsening and or changes and or symptoms she does not feel comfortable with that then patient is to go to the emergency room.  RTO for her next scheduled appointment.             History of Present Illness     83-year-old female here accompanied by her daughter complaining of low back pain that is going down her left leg.  Is not acute but it is recent.  Patient and daughter asserts that likely is when she had her right foot pain/injury/fracture is that since then is when she is favoring the left side and that could be the cause of her pain.  Patient denies any rashes there or fever or chills.  Patient denies any trauma to her back itself.  Patient also denies any loss of bowel or bladder control.  Patient has no  or GYN symptoms.   "Patient does suffer from ulcerative colitis so at times she can have blood in the stool.  Of note patient also has history of osteoporosis and her last bone density was less than 2 years but she does have an order to do another 1 soon.  Patient denies tobacco.  Also patient did receive her shingles vaccine back in 2019.        Review of Systems   Constitutional:  Negative for chills, fatigue, fever and unexpected weight change.   Respiratory:  Negative for cough and shortness of breath.    Cardiovascular:  Negative for chest pain and palpitations.   Gastrointestinal:  Negative for abdominal pain.   Genitourinary:  Negative for dysuria and hematuria.   Musculoskeletal:  Positive for back pain.   Skin:  Negative for rash.   Neurological:  Negative for dizziness and headaches.       Objective     /62 (BP Location: Left arm, Patient Position: Sitting, Cuff Size: Large)   Pulse 64   Temp (!) 97.1 °F (36.2 °C) (Temporal)   Resp 18   Ht 5' 4\" (1.626 m)   Wt 78.9 kg (174 lb)   SpO2 95%   BMI 29.87 kg/m²     Physical Exam  Vitals reviewed.   Constitutional:       Appearance: Normal appearance.   HENT:      Head: Normocephalic and atraumatic.      Mouth/Throat:      Mouth: Mucous membranes are moist.   Cardiovascular:      Rate and Rhythm: Normal rate and regular rhythm.   Pulmonary:      Effort: Pulmonary effort is normal.      Breath sounds: Normal breath sounds.   Musculoskeletal:         General: No tenderness.      Right lower leg: No edema.      Left lower leg: No edema.      Comments: No calf tenderness bilateral.   Skin:     General: Skin is warm.      Findings: No rash.   Neurological:      General: No focal deficit present.      Mental Status: She is alert and oriented to person, place, and time.   Psychiatric:         Mood and Affect: Mood normal.         Behavior: Behavior normal.         Thought Content: Thought content normal.       Administrative Statements           "

## 2024-06-07 ENCOUNTER — TELEPHONE (OUTPATIENT)
Age: 84
End: 2024-06-07

## 2024-06-07 DIAGNOSIS — M54.42 ACUTE LOW BACK PAIN WITH LEFT-SIDED SCIATICA, UNSPECIFIED BACK PAIN LATERALITY: Primary | ICD-10-CM

## 2024-06-07 DIAGNOSIS — F41.9 ANXIETY: ICD-10-CM

## 2024-06-07 RX ORDER — ESCITALOPRAM OXALATE 10 MG/1
TABLET ORAL
Qty: 90 TABLET | Refills: 1 | Status: SHIPPED | OUTPATIENT
Start: 2024-06-07

## 2024-06-07 RX ORDER — CYCLOBENZAPRINE HCL 5 MG
5 TABLET ORAL 3 TIMES DAILY PRN
Qty: 30 TABLET | Refills: 0 | Status: SHIPPED | OUTPATIENT
Start: 2024-06-07

## 2024-06-07 RX ORDER — METHYLPREDNISOLONE 4 MG/1
TABLET ORAL
Qty: 21 EACH | Refills: 0 | Status: SHIPPED | OUTPATIENT
Start: 2024-06-07

## 2024-06-07 NOTE — TELEPHONE ENCOUNTER
Patient called back as she was seen at [facility] for this complaint on 5/18. At that facility, she was prescribed:    cyclobenzaprine (FLEXERIL) 10 MG tablet   Instructions: Take 1 tablet (10 mg total) by mouth nightly as needed for muscle spasms.  15 tablet   methylPREDNISolone (MEDROL, EFRAÍN,) 4 mg tablet   Instructions: Take as directed per package instructions.  21 tablet     Patient reports she was unsure if provider could see this encounter and medications (same as ordered) - asking if provider wants her to complete another round of steroids and if Flexeril is replacing previous prescription or if she should take both. Please review and advise patient.

## 2024-06-07 NOTE — PROGRESS NOTES
Discussed with patient regarding the results of her lumbar spine x-ray.  Patient reports of pain 8 out of 10.  No neurodeficits.  Discussed with patient.  Patient advised appropriately.  Patient is prescribed Medrol Dosepak and Flexeril.  And patient will be seeing her orthopedic at the end of this month.  However patient was advised that she should see the orthopedic and the lumbar spine orthopedic sooner than that.  Patient understood and will do.  Patient was advised at the time of the visit also that to go to the ER if he gets worse or has changes and or she does not feel comfortable with her symptoms.   SCM

## 2024-06-07 NOTE — TELEPHONE ENCOUNTER
Received call from patient regarding results of xrays. She would like to know recommendations as she is still having the pain in her leg and back. Please advise.

## 2024-06-07 NOTE — TELEPHONE ENCOUNTER
Patient called back and will be taking medrol pack and Flexeril. She will go to ED if pain worsens.

## 2024-06-14 ENCOUNTER — OFFICE VISIT (OUTPATIENT)
Dept: OBGYN CLINIC | Facility: HOSPITAL | Age: 84
End: 2024-06-14
Payer: MEDICARE

## 2024-06-14 ENCOUNTER — HOSPITAL ENCOUNTER (OUTPATIENT)
Dept: RADIOLOGY | Facility: HOSPITAL | Age: 84
Discharge: HOME | End: 2024-06-14
Attending: ORTHOPAEDIC SURGERY
Payer: MEDICARE

## 2024-06-14 VITALS
HEART RATE: 66 BPM | SYSTOLIC BLOOD PRESSURE: 114 MMHG | DIASTOLIC BLOOD PRESSURE: 72 MMHG | HEIGHT: 64 IN | WEIGHT: 173.94 LBS | BODY MASS INDEX: 29.7 KG/M2

## 2024-06-14 DIAGNOSIS — M54.16 RADICULOPATHY, LUMBAR REGION: ICD-10-CM

## 2024-06-14 DIAGNOSIS — R52 PAIN: ICD-10-CM

## 2024-06-14 DIAGNOSIS — R52 PAIN: Primary | ICD-10-CM

## 2024-06-14 PROCEDURE — 99213 OFFICE O/P EST LOW 20 MIN: CPT | Performed by: ORTHOPAEDIC SURGERY

## 2024-06-14 PROCEDURE — 72100 X-RAY EXAM L-S SPINE 2/3 VWS: CPT

## 2024-06-14 NOTE — PROGRESS NOTES
Assessment & Plan/Medical Decision Makin y.o. female with Back Pain and Left Radicular Leg Pain and imaging findings most notable for lumbar spondylosis         The clinical, physical and imaging findings were reviewed with the patient.  Wendy  has a constellation of findings consistent with Lumbar Radiculopathy in the setting of lumbar degenerative disease.      Fortunately patient remains neurologically intact and functional. Physical exam showing no abnormalities.  We discussed the treatment options including physical therapy, at home exercises, activity modifications, chiropractic medicine, oral medications, interventional spine procedures.  At this time recommend continued conservative treatments. Given her persistent pain despite two rounds of Medrol dose packs and activity modification, recommend obtaining an MRI of her lumbar spine.     Referral placed for COMPREHENSIVE SPINE PHYSICAL THERAPY to work on core strengthening, lumbar ROM, strengthening, and stretching exercises. and MRI lumbar spine to further evaluate patient's symptoms. Will review MRI in detail with patient at follow-up visit and discuss further treatment options at that time.    Patient instructed to return to office/ER sooner if symptoms are not improving, getting worse, or new worrisome/neurologic symptoms arise.  Patient will follow up after completion of MRI.     Subjective:      Chief Complaint: Back Pain    HPI:  Wendy Raygoza is a 83 y.o. female presenting for initial visit with chief complaint of left-sided low back pain that radiates down the back of her left leg.  Pain began within the past several weeks. She sustained a right ankle fracture in 2024 and was in a CAM boot for several weeks, which she believes may be contributing to her back pain. The pain goes down he back of her leg and stops at the level of her ankle. Denies numbness or paresthesias. Pain is present with walking, pain will start immediately. Pain  is relieved with sitting and laying down. She has started using a cane intermittently due to the pain. Denies previous back pain.    Describes pain as sharp in nature.  Located in left-side low back and radiates down the back of her left leg.  Denies numbness . Denies burning. Back pain 25%, Leg pain 75%.    Denies any danilo trauma. Denies fever or chills, no night sweats. Denies any bladder or bowel changes.      Conservative therapy includes the following:   Medications: Medrol dose pack x2 (improved pain), flexeril (helps sleeping)    Injections: None     Physical Therapy: None recently  Chiropractic Medicine: has not attempted  Accupunture/Massage Therapy: has not attempted   These therapeutic modalities were ineffective at providing sustained pain relief/functional improvement.     Nicotine dependent: No  Occupation: Retired   Living situation: Lives alone  ADLs: patient is able to perform     Objective:     Family History   Problem Relation Age of Onset    Osteoporosis Mother     Osteoarthritis Mother     Arthritis Mother     Other Father         heart problem    Hypertension Father     Heart disease Father     Coronary artery disease Family     Hyperlipidemia Family     Glaucoma Sister     No Known Problems Daughter     No Known Problems Maternal Grandmother     No Known Problems Maternal Grandfather     No Known Problems Paternal Grandmother     No Known Problems Paternal Grandfather     No Known Problems Maternal Aunt     No Known Problems Maternal Aunt     No Known Problems Maternal Aunt     No Known Problems Maternal Aunt     No Known Problems Maternal Aunt     No Known Problems Maternal Aunt     No Known Problems Maternal Aunt     Stroke Paternal Aunt         Margaret Dumas    No Known Problems Paternal Aunt     No Known Problems Paternal Aunt        Past Medical History:   Diagnosis Date    Atherosclerosis     Carotid artery narrowing     Coronary artery disease     Diverticulitis of colon      Diverticulosis     GERD (gastroesophageal reflux disease)     Glaucoma     Hypercalcemia     Hyperlipidemia     Hyperparathyroidism (HCC)     Hypertension     Inflammatory bowel disease     Osteopenia     Severe cervical dysplasia, histologically confirmed     Skin cancer     squamous cell    Thyroid nodule     Vitamin D deficiency        Current Outpatient Medications   Medication Sig Dispense Refill    amLODIPine (NORVASC) 5 mg tablet Take 1 tablet (5 mg total) by mouth daily 90 tablet 2    aspirin 81 MG tablet Take 81 mg by mouth daily      atenolol (TENORMIN) 25 mg tablet TAKE 1 TABLET TWICE A  tablet 3    bimatoprost (LUMIGAN) 0.01 % ophthalmic drops Administer 1 drop to both eyes daily at bedtime      brinzolamide (AZOPT) 1 % ophthalmic suspension 1 drop 2 (two) times a day      Calcium Carbonate Antacid (TUMS EXTRA STRENGTH 750 PO) Take 1,500 mg by mouth 2 (two) times a day with meals Use before meals with a vitmain C tablet for improved absoprtion       calcium citrate (CALCITRATE) 950 (200 Ca) MG tablet TAKE 1 TABLET BY MOUTH EVERY DAY 90 tablet 1    Cholecalciferol (VITAMIN D3 PO) Take 2,000 Units by mouth 2 (two) times a day      denosumab (PROLIA) 60 mg/mL Inject under the skin      Diclofenac Sodium (VOLTAREN) 1 % Apply 2 g topically 4 (four) times a day 100 g 1    escitalopram (LEXAPRO) 10 mg tablet TAKE 1 TABLET DAILY 90 tablet 1    Ferrous Sulfate (IRON PO) Take by mouth      irbesartan (AVAPRO) 300 mg tablet TAKE 1 TABLET DAILY AT BEDTIME 90 tablet 3    mesalamine (LIALDA) 1.2 g EC tablet TAKE 4 TABLETS BY MOUTH EVERY DAY AFTER BREAKFAST      rosuvastatin (CRESTOR) 5 mg tablet TAKE ONE AND ONE-HALF TABLETS DAILY 135 tablet 3    Stelara 45 MG/0.5ML injection q 2 months      timolol (TIMOPTIC) 0.5 % ophthalmic solution       cyclobenzaprine (FLEXERIL) 5 mg tablet Take 1 tablet (5 mg total) by mouth 3 (three) times a day as needed for muscle spasms 30 tablet 0    diphenoxylate-atropine (LOMOTIL)  2.5-0.025 mg per tablet Take 1 tablet by mouth 3 (three) times a day as needed (Patient not taking: Reported on 6/5/2024)      methylPREDNISolone 4 MG tablet therapy pack Use as directed on package 21 each 0     Current Facility-Administered Medications   Medication Dose Route Frequency Provider Last Rate Last Admin    denosumab (PROLIA) subcutaneous injection 60 mg  60 mg Subcutaneous Q6 Months Tanvi Persaud MD   60 mg at 05/03/24 1439       Past Surgical History:   Procedure Laterality Date    APPENDECTOMY      BREAST SURGERY      reduction procedure    CAROTID ENDARTARECTOMY Right     carotid thromboendarterectomy     COLONOSCOPY      EVACUATION OF HEMATOMA N/A 9/29/2021    Procedure: EVACUATION/ DRAINAGE HEMATOMA;  Surgeon: Donald Gerard MD;  [facility];  Service: ENT    EXPLORATION CAROTID ARTERY      HYSTERECTOMY      age 56    OOPHORECTOMY Bilateral     age 56    MS COLONOSCOPY FLX DX W/COLLJ SPEC WHEN PFRMD N/A 7/28/2017    Procedure: EGD AND COLONOSCOPY;  Surgeon: Debra Pritchard MD;  [facility];  Service: Colorectal    MS PARATHYROIDECTOMY/EXPLORATION PARATHYROIDS Right 9/29/2021    Procedure: RIGHT INFERIOR PARATHYROIDECTOMY (INTRAOP PTH);  Surgeon: Donald Gerard MD;  [facility];  Service: ENT    REDUCTION MAMMAPLASTY Bilateral 1987       Social History     Socioeconomic History    Marital status:      Spouse name: Not on file    Number of children: Not on file    Years of education: Not on file    Highest education level: Not on file   Occupational History    Occupation: Retired   Tobacco Use    Smoking status: Never    Smokeless tobacco: Never   Vaping Use    Vaping status: Never Used   Substance and Sexual Activity    Alcohol use: Yes     Alcohol/week: 4.0 standard drinks of alcohol     Types: 4 Glasses of wine per week     Comment: being a social drinker; drinks wine    Drug use: No    Sexual activity: Not Currently     Partners: Male     Birth  "control/protection: Surgical   Other Topics Concern    Not on file   Social History Narrative    Drinks 2 cups coffee/day    Lack of exercise     Social Determinants of Health     Financial Resource Strain: Low Risk  (6/13/2023)    Overall Financial Resource Strain (CARDIA)     Difficulty of Paying Living Expenses: Not hard at all   Food Insecurity: Not on file   Transportation Needs: No Transportation Needs (6/13/2023)    PRAPARE - Transportation     Lack of Transportation (Medical): No     Lack of Transportation (Non-Medical): No   Physical Activity: Not on file   Stress: Not on file   Social Connections: Not on file   Intimate Partner Violence: Not on file   Housing Stability: Not on file       No Known Allergies    Review of Systems  General- denies fever/chills  HEENT- denies hearing loss or sore throat  Eyes- denies eye pain or visual disturbances, denies red eyes  Respiratory- denies cough or SOB  Cardio- denies chest pain or palpitations  GI- denies abdominal pain  Endocrine- denies urinary frequency  Urinary- denies pain with urination  Musculoskeletal- Negative except noted above  Skin- denies rashes or wounds  Neurological- denies dizziness or headache  Psychiatric- denies anxiety or difficulty concentrating    Physical Exam  Ht 5' 4\" (1.626 m)   Wt 78.9 kg (173 lb 15.1 oz)   BMI 29.86 kg/m²     General/Constitutional: No apparent distress: well-nourished and well developed.  Lymphatic: No appreciable lymphadenopathy  Respiratory: Non-labored breathing  Vascular: No edema, swelling or tenderness, except as noted in detailed exam.  Integumentary: No impressive skin lesions present, except as noted in detailed exam.  Psych: Normal mood and affect, oriented to person, place and time.  MSK: normal other than stated in HPI and exam  Gait & balance: no evidence of myelopathic gait, ambulates with a Cane    Lumbar spine range of motion:  -Forward flexion to 90  -Extension to neutral  -Lateral bend 25 right, 25 " "left  -Rotation 25 right, 25 left  There tenderness with palpation along lumbar paraspinal musculature, no midline tenderness     Neurologic:    Lower Extremity Motor Function    Right  Left    Iliopsoas  5/5  5/5    Quadriceps 5/5 5/5   Tibialis anterior  5/5  5/5    EHL  5/5  5/5    Gastroc. muscle  5/5  5/5    Heel rise  5/5  5/5    Toe rise  5/5  5/5      Sensory: light touch is intact to bilateral upper and lower extremities     Reflexes:    Right Left   Patellar 1+ 1+   Achilles 1+ 1+   Babinski neg neg     Other tests:  Straight Leg Raise: negative  Thomas SI: negative  ELYSE SI: negative  Greater troch: no tenderness   Internal/external hip ROM: intact, no pain   Flexion/extension knee ROM: intact, no pain   Vascular: WWP extremities, 2+DP bilateral    Tenderness to deep palpation over left-side SI joint    Diagnostic Tests   IMAGING: I have personally reviewed the images and these are my findings:  Lumbar Spine X-rays from 6/14/2024: multi level lumbar spondylosis with loss of disc height (most prominent at L2-3, L1-2 and degenerative scoliosis, osteophyte/ formation and facet hypertrophy, no apparent spondylolisthesis, no appreciated lytic/blastic lesions, no obvious instability    Electronic Medical Records were reviewed including notes from primary care physician.    Procedures, if performed today     None performed       Portions of the record may have been created with voice recognition software.  Occasional wrong word or \"sound a like\" substitutions may have occurred due to the inherent limitations of voice recognition software.  Read the chart carefully and recognize, using context, where substitutions have occurred.   "

## 2024-06-26 DIAGNOSIS — I10 HYPERTENSION, UNSPECIFIED TYPE: ICD-10-CM

## 2024-06-26 RX ORDER — ATENOLOL 25 MG/1
TABLET ORAL
Qty: 180 TABLET | Refills: 1 | Status: SHIPPED | OUTPATIENT
Start: 2024-06-26

## 2024-07-10 ENCOUNTER — HOSPITAL ENCOUNTER (OUTPATIENT)
Dept: RADIOLOGY | Age: 84
Discharge: HOME | End: 2024-07-10
Payer: MEDICARE

## 2024-07-10 DIAGNOSIS — Z12.31 ENCOUNTER FOR SCREENING MAMMOGRAM FOR MALIGNANT NEOPLASM OF BREAST: ICD-10-CM

## 2024-07-10 PROCEDURE — 77063 BREAST TOMOSYNTHESIS BI: CPT

## 2024-07-10 PROCEDURE — 77067 SCR MAMMO BI INCL CAD: CPT

## 2024-07-13 ENCOUNTER — HOSPITAL ENCOUNTER (OUTPATIENT)
Dept: MRI IMAGING | Facility: HOSPITAL | Age: 84
Discharge: HOME | End: 2024-07-13
Payer: MEDICARE

## 2024-07-13 DIAGNOSIS — M54.16 RADICULOPATHY, LUMBAR REGION: ICD-10-CM

## 2024-07-13 PROCEDURE — 72148 MRI LUMBAR SPINE W/O DYE: CPT

## 2024-07-19 ENCOUNTER — OFFICE VISIT (OUTPATIENT)
Dept: OBGYN CLINIC | Facility: HOSPITAL | Age: 84
End: 2024-07-19
Payer: MEDICARE

## 2024-07-19 VITALS
HEART RATE: 58 BPM | DIASTOLIC BLOOD PRESSURE: 70 MMHG | SYSTOLIC BLOOD PRESSURE: 119 MMHG | BODY MASS INDEX: 29.7 KG/M2 | HEIGHT: 64 IN | WEIGHT: 173.94 LBS

## 2024-07-19 DIAGNOSIS — M54.16 RADICULOPATHY, LUMBAR REGION: Primary | ICD-10-CM

## 2024-07-19 PROCEDURE — 99213 OFFICE O/P EST LOW 20 MIN: CPT | Performed by: ORTHOPAEDIC SURGERY

## 2024-07-19 NOTE — PROGRESS NOTES
Assessment & Plan/Medical Decision Makin y.o. female with Back Pain and Left Radicular Leg Pain and imaging findings most notable for lumbar spondylosis , improving.          The clinical, physical and imaging findings were reviewed with the patient.  Wendy  has a constellation of findings consistent with Lumbar Radiculopathy in the setting of lumbar degenerative disease that has improved in the interim.      Fortunately patient remains neurologically intact and functional. Physical exam showing no abnormalities.  We discussed the treatment options including physical therapy, at home exercises, activity modifications, chiropractic medicine, oral medications, interventional spine procedures.  At this time recommend continued conservative treatments. No need for a formal PT referral given improvement. Will provide a referral to spine and pain so that she may get established in the event she requires an injection in the future.     Patient instructed to return to office/ER sooner if symptoms are not improving, getting worse, or new worrisome/neurologic symptoms arise.  Patient will follow up PRN.     Subjective:      Chief Complaint: Back Pain    HPI:  Wendy Raygoza is a 83 y.o. female presenting for initial visit with chief complaint of left-sided low back pain that radiates down the back of her left leg.  Pain began within the past several weeks. She sustained a right ankle fracture in 2024 and was in a CAM boot for several weeks, which she believes may be contributing to her back pain. The pain goes down he back of her leg and stops at the level of her ankle. Denies numbness or paresthesias. Pain is present with walking, pain will start immediately. Pain is relieved with sitting and laying down. She has started using a cane intermittently due to the pain. Denies previous back pain.    Describes pain as sharp in nature.  Located in left-side low back and radiates down the back of her left leg.   Denies numbness . Denies burning. Back pain 25%, Leg pain 75%.    Denies any danilo trauma. Denies fever or chills, no night sweats. Denies any bladder or bowel changes.      Conservative therapy includes the following:   Medications: Medrol dose pack x2 (improved pain), flexeril (helps sleeping)    Injections: None     Physical Therapy: None recently  Chiropractic Medicine: has not attempted  Accupunture/Massage Therapy: has not attempted   These therapeutic modalities were ineffective at providing sustained pain relief/functional improvement.     Nicotine dependent: No  Occupation: Retired   Living situation: Lives alone  ADLs: patient is able to perform     Update 7/19/24:  Wendy presents for follow up of left radicular pain. States that her pain has improved. She has not participated in formal PT.  She did obtain a lumbar MRI in the interim. She is here for MRI review.    Objective:     Family History   Problem Relation Age of Onset    Osteoporosis Mother     Osteoarthritis Mother     Arthritis Mother     Other Father         heart problem    Hypertension Father     Heart disease Father     Coronary artery disease Family     Hyperlipidemia Family     Glaucoma Sister     No Known Problems Daughter     No Known Problems Maternal Grandmother     No Known Problems Maternal Grandfather     No Known Problems Paternal Grandmother     No Known Problems Paternal Grandfather     No Known Problems Maternal Aunt     No Known Problems Maternal Aunt     No Known Problems Maternal Aunt     No Known Problems Maternal Aunt     No Known Problems Maternal Aunt     No Known Problems Maternal Aunt     No Known Problems Maternal Aunt     Stroke Paternal Aunt         Margaret Dumas    No Known Problems Paternal Aunt     No Known Problems Paternal Aunt        Past Medical History:   Diagnosis Date    Atherosclerosis     Carotid artery narrowing     Coronary artery disease     Diverticulitis of colon     Diverticulosis     GERD  (gastroesophageal reflux disease)     Glaucoma     Hypercalcemia     Hyperlipidemia     Hyperparathyroidism (HCC)     Hypertension     Inflammatory bowel disease     Osteopenia     Severe cervical dysplasia, histologically confirmed     Skin cancer     squamous cell    Thyroid nodule     Vitamin D deficiency        Current Outpatient Medications   Medication Sig Dispense Refill    amLODIPine (NORVASC) 5 mg tablet Take 1 tablet (5 mg total) by mouth daily 90 tablet 2    aspirin 81 MG tablet Take 81 mg by mouth daily      atenolol (TENORMIN) 25 mg tablet TAKE 1 TABLET TWICE A  tablet 1    bimatoprost (LUMIGAN) 0.01 % ophthalmic drops Administer 1 drop to both eyes daily at bedtime      brinzolamide (AZOPT) 1 % ophthalmic suspension 1 drop 2 (two) times a day      Calcium Carbonate Antacid (TUMS EXTRA STRENGTH 750 PO) Take 1,500 mg by mouth 2 (two) times a day with meals Use before meals with a vitmain C tablet for improved absoprtion       calcium citrate (CALCITRATE) 950 (200 Ca) MG tablet TAKE 1 TABLET BY MOUTH EVERY DAY 90 tablet 1    Cholecalciferol (VITAMIN D3 PO) Take 2,000 Units by mouth 2 (two) times a day      cyclobenzaprine (FLEXERIL) 5 mg tablet Take 1 tablet (5 mg total) by mouth 3 (three) times a day as needed for muscle spasms 30 tablet 0    denosumab (PROLIA) 60 mg/mL Inject under the skin      Diclofenac Sodium (VOLTAREN) 1 % Apply 2 g topically 4 (four) times a day 100 g 1    diphenoxylate-atropine (LOMOTIL) 2.5-0.025 mg per tablet Take 1 tablet by mouth 3 (three) times a day as needed (Patient not taking: Reported on 6/5/2024)      escitalopram (LEXAPRO) 10 mg tablet TAKE 1 TABLET DAILY 90 tablet 1    Ferrous Sulfate (IRON PO) Take by mouth      irbesartan (AVAPRO) 300 mg tablet TAKE 1 TABLET DAILY AT BEDTIME 90 tablet 3    mesalamine (LIALDA) 1.2 g EC tablet TAKE 4 TABLETS BY MOUTH EVERY DAY AFTER BREAKFAST      methylPREDNISolone 4 MG tablet therapy pack Use as directed on package 21 each 0     rosuvastatin (CRESTOR) 5 mg tablet TAKE ONE AND ONE-HALF TABLETS DAILY 135 tablet 3    Stelara 45 MG/0.5ML injection q 2 months      timolol (TIMOPTIC) 0.5 % ophthalmic solution        Current Facility-Administered Medications   Medication Dose Route Frequency Provider Last Rate Last Admin    denosumab (PROLIA) subcutaneous injection 60 mg  60 mg Subcutaneous Q6 Months Tanvi Persaud MD   60 mg at 05/03/24 1439       Past Surgical History:   Procedure Laterality Date    APPENDECTOMY      BREAST SURGERY      reduction procedure    CAROTID ENDARTARECTOMY Right     carotid thromboendarterectomy     COLONOSCOPY      EVACUATION OF HEMATOMA N/A 9/29/2021    Procedure: EVACUATION/ DRAINAGE HEMATOMA;  Surgeon: Donald Gerard MD;  [facility];  Service: ENT    EXPLORATION CAROTID ARTERY      HYSTERECTOMY      age 56    OOPHORECTOMY Bilateral     age 56    MA COLONOSCOPY FLX DX W/COLLJ SPEC WHEN PFRMD N/A 7/28/2017    Procedure: EGD AND COLONOSCOPY;  Surgeon: Debra Pritchard MD;  [facility];  Service: Colorectal    MA PARATHYROIDECTOMY/EXPLORATION PARATHYROIDS Right 9/29/2021    Procedure: RIGHT INFERIOR PARATHYROIDECTOMY (INTRAOP PTH);  Surgeon: Donald Gerard MD;  [facility];  Service: ENT    REDUCTION MAMMAPLASTY Bilateral 1987       Social History     Socioeconomic History    Marital status:      Spouse name: Not on file    Number of children: Not on file    Years of education: Not on file    Highest education level: Not on file   Occupational History    Occupation: Retired   Tobacco Use    Smoking status: Never    Smokeless tobacco: Never   Vaping Use    Vaping status: Never Used   Substance and Sexual Activity    Alcohol use: Yes     Alcohol/week: 4.0 standard drinks of alcohol     Types: 4 Glasses of wine per week     Comment: being a social drinker; drinks wine    Drug use: No    Sexual activity: Not Currently     Partners: Male     Birth control/protection: Surgical   Other  "Topics Concern    Not on file   Social History Narrative    Drinks 2 cups coffee/day    Lack of exercise     Social Determinants of Health     Financial Resource Strain: Low Risk  (6/13/2023)    Overall Financial Resource Strain (CARDIA)     Difficulty of Paying Living Expenses: Not hard at all   Food Insecurity: Not on file   Transportation Needs: No Transportation Needs (6/13/2023)    PRAPARE - Transportation     Lack of Transportation (Medical): No     Lack of Transportation (Non-Medical): No   Physical Activity: Not on file   Stress: Not on file   Social Connections: Not on file   Intimate Partner Violence: Not on file   Housing Stability: Not on file       No Known Allergies    Review of Systems  General- denies fever/chills  HEENT- denies hearing loss or sore throat  Eyes- denies eye pain or visual disturbances, denies red eyes  Respiratory- denies cough or SOB  Cardio- denies chest pain or palpitations  GI- denies abdominal pain  Endocrine- denies urinary frequency  Urinary- denies pain with urination  Musculoskeletal- Negative except noted above  Skin- denies rashes or wounds  Neurological- denies dizziness or headache  Psychiatric- denies anxiety or difficulty concentrating    Physical Exam  /70   Pulse 58   Ht 5' 4\" (1.626 m)   Wt 78.9 kg (173 lb 15.1 oz)   BMI 29.86 kg/m²     General/Constitutional: No apparent distress: well-nourished and well developed.  Lymphatic: No appreciable lymphadenopathy  Respiratory: Non-labored breathing  Vascular: No edema, swelling or tenderness, except as noted in detailed exam.  Integumentary: No impressive skin lesions present, except as noted in detailed exam.  Psych: Normal mood and affect, oriented to person, place and time.  MSK: normal other than stated in HPI and exam  Gait & balance: no evidence of myelopathic gait, ambulates with a Cane    Lumbar spine range of motion:  -Forward flexion to 90  -Extension to neutral  -Lateral bend 25 right, 25 " "left  -Rotation 25 right, 25 left  There tenderness with palpation along lumbar paraspinal musculature, no midline tenderness     Neurologic:    Lower Extremity Motor Function    Right  Left    Iliopsoas  5/5  5/5    Quadriceps 5/5 5/5   Tibialis anterior  5/5  5/5    EHL  5/5  5/5    Gastroc. muscle  5/5  5/5    Heel rise  5/5  5/5    Toe rise  5/5  5/5      Sensory: light touch is intact to bilateral upper and lower extremities     Reflexes:    Right Left   Patellar 1+ 1+   Achilles 1+ 1+   Babinski neg neg     Other tests:  Straight Leg Raise: negative  Thomas SI: negative  ELYSE SI: negative  Greater troch: no tenderness   Internal/external hip ROM: intact, no pain   Flexion/extension knee ROM: intact, no pain   Vascular: WWP extremities, 2+DP bilateral    Tenderness to deep palpation over left-side SI joint    Diagnostic Tests   IMAGING: I have personally reviewed the images and these are my findings:  Lumbar Spine X-rays from 6/14/2024: multi level lumbar spondylosis with loss of disc height (most prominent at L2-3, L1-2 and degenerative scoliosis, osteophyte/ formation and facet hypertrophy, no apparent spondylolisthesis, no appreciated lytic/blastic lesions, no obvious instability    Lumbar Spine MRI from 7/13/24: multi level lumbar disc degeneration with disc desiccation, loss of disc height, facet and ligamentum hypertrophy, L1-3 degenerative scoliosis, varying degrees of lateral recess and foraminal stenosis bilateral L2-S1 without significant central stenosis    Electronic Medical Records were reviewed including notes from primary care physician.    Procedures, if performed today     None performed       Portions of the record may have been created with voice recognition software.  Occasional wrong word or \"sound a like\" substitutions may have occurred due to the inherent limitations of voice recognition software.  Read the chart carefully and recognize, using context, where substitutions have occurred. "

## 2024-07-30 DIAGNOSIS — I10 PRIMARY HYPERTENSION: ICD-10-CM

## 2024-07-30 RX ORDER — AMLODIPINE BESYLATE 5 MG/1
5 TABLET ORAL DAILY
Qty: 100 TABLET | Refills: 1 | Status: SHIPPED | OUTPATIENT
Start: 2024-07-30

## 2024-07-30 NOTE — TELEPHONE ENCOUNTER
Reason for call:   [x] Refill   [] Prior Auth  [] Other:     Office:   [x] PCP/Provider -   [] Specialty/Provider -     Medication: amLODIPine (NORVASC) 5 mg tablet     Dose/Frequency:  Take 1 tablet (5 mg total) by mouth daily,     Quantity: 90 tablet     Pharmacy: [id number] - [place], [place] - [address] [phone]     Does the patient have enough for 3 days?   [x] Yes   [] No - Send as HP to POD

## 2024-08-20 ENCOUNTER — HOSPITAL ENCOUNTER (OUTPATIENT)
Dept: RADIOLOGY | Age: 84
Discharge: HOME | End: 2024-08-20

## 2024-08-20 DIAGNOSIS — M81.0 AGE-RELATED OSTEOPOROSIS WITHOUT CURRENT PATHOLOGICAL FRACTURE: Chronic | ICD-10-CM

## 2024-08-21 ENCOUNTER — OFFICE VISIT (OUTPATIENT)
Dept: INTERNAL MEDICINE CLINIC | Facility: CLINIC | Age: 84
End: 2024-08-21
Payer: MEDICARE

## 2024-08-21 ENCOUNTER — TELEPHONE (OUTPATIENT)
Age: 84
End: 2024-08-21

## 2024-08-21 VITALS
TEMPERATURE: 98 F | HEART RATE: 84 BPM | DIASTOLIC BLOOD PRESSURE: 78 MMHG | SYSTOLIC BLOOD PRESSURE: 122 MMHG | WEIGHT: 173 LBS | BODY MASS INDEX: 29.7 KG/M2 | OXYGEN SATURATION: 98 %

## 2024-08-21 DIAGNOSIS — S81.802A LEG WOUND, LEFT, INITIAL ENCOUNTER: ICD-10-CM

## 2024-08-21 DIAGNOSIS — Z00.00 MEDICARE ANNUAL WELLNESS VISIT, SUBSEQUENT: ICD-10-CM

## 2024-08-21 DIAGNOSIS — R60.0 PEDAL EDEMA: Primary | ICD-10-CM

## 2024-08-21 PROCEDURE — G0439 PPPS, SUBSEQ VISIT: HCPCS | Performed by: INTERNAL MEDICINE

## 2024-08-21 PROCEDURE — 99214 OFFICE O/P EST MOD 30 MIN: CPT | Performed by: INTERNAL MEDICINE

## 2024-08-21 RX ORDER — FUROSEMIDE 20 MG
TABLET ORAL
Qty: 7 TABLET | Refills: 0 | Status: SHIPPED | OUTPATIENT
Start: 2024-08-21

## 2024-08-21 RX ORDER — MUPIROCIN 20 MG/G
OINTMENT TOPICAL 3 TIMES DAILY
Qty: 30 G | Refills: 0 | Status: SHIPPED | OUTPATIENT
Start: 2024-08-21

## 2024-08-21 NOTE — PATIENT INSTRUCTIONS
Bactroban ointment 2 times per day to wound with non-stick gauze  Venous duplex of legs to make sure you do not have a DVT/clot  If the ultrasound does not show a clot -> start Lasix 1 tablet on day 1, then 1/2 tablet for the next 4-5 days, then call the office wtih an update

## 2024-08-21 NOTE — PROGRESS NOTES
Ambulatory Visit  Name: Wendy Raygoza      : 1940      MRN: [id number]  Encounter Provider: Ramon Parham DO  Encounter Date: 2024   Encounter department: [facility] INTERNAL MEDICINE    Assessment & Plan   1. Pedal edema  Comments:  check venous duplex today or tomorrow to r/o DVT.  precautions advised  Orders:  -     furosemide (LASIX) 20 mg tablet; Take 1/2 to 1 tablet daily as directed, then stop  -      VAS VENOUS DUPLEX - LOWER LIMB BILATERAL; Future; Expected date: 2024  2. Leg wound, left, initial encounter  Comments:  appears not infected.  c/w local wound care and abx ointment.  precautions advised  Orders:  -     mupirocin (BACTROBAN) 2 % ointment; Apply topically 3 (three) times a day  3. Medicare annual wellness visit, subsequent    At end of the appt, asked Ailyn at my  to schedule Wendy for STAT venous duplex for today or tomorrow.    Depression Screening and Follow-up Plan: Patient was screened for depression during today's encounter. They screened negative with a PHQ-2 score of 0.      History of Present Illness     HPI    Here for follow up, Wendy c/o leg swelling b/l over the last few weeks.  She had an injury to her left lower leg and was wearing a boot for sometime till it healed.  She was sedentary during that time.  Since then, she has had b/l leg swelling but no leg pain or fever.  She bumped her left leg on corner of bed yesterday and got a laceration as well.  She cleaned it off yesterday and it is not bleeding today.  She takes stelara for crohn's disease.  No new medications and no previous issues with leg swelling.  ROS otherwise negative, no other complaints.    Review of Systems   Constitutional:  Negative for fever.   HENT:  Negative for congestion.    Eyes:  Negative for visual disturbance.   Respiratory:  Negative for shortness of breath.    Cardiovascular:  Positive for leg swelling. Negative for chest pain.   Gastrointestinal:   Negative for abdominal pain.   Endocrine: Negative for polyuria.   Genitourinary:  Negative for difficulty urinating.   Musculoskeletal:  Negative for gait problem.   Skin:  Positive for wound.   Allergic/Immunologic: Positive for immunocompromised state (takes stelara).   Neurological:  Negative for dizziness.   Psychiatric/Behavioral:  Negative for dysphoric mood.      Current Outpatient Medications on File Prior to Visit   Medication Sig Dispense Refill    amLODIPine (NORVASC) 5 mg tablet Take 1 tablet (5 mg total) by mouth daily 100 tablet 1    aspirin 81 MG tablet Take 81 mg by mouth daily      atenolol (TENORMIN) 25 mg tablet TAKE 1 TABLET TWICE A  tablet 1    bimatoprost (LUMIGAN) 0.01 % ophthalmic drops Administer 1 drop to both eyes daily at bedtime      brinzolamide (AZOPT) 1 % ophthalmic suspension 1 drop 2 (two) times a day      Calcium Carbonate Antacid (TUMS EXTRA STRENGTH 750 PO) Take 1,500 mg by mouth 2 (two) times a day with meals Use before meals with a vitmain C tablet for improved absoprtion       calcium citrate (CALCITRATE) 950 (200 Ca) MG tablet TAKE 1 TABLET BY MOUTH EVERY DAY 90 tablet 1    Cholecalciferol (VITAMIN D3 PO) Take 2,000 Units by mouth 2 (two) times a day      denosumab (PROLIA) 60 mg/mL Inject under the skin      escitalopram (LEXAPRO) 10 mg tablet TAKE 1 TABLET DAILY 90 tablet 1    Ferrous Sulfate (IRON PO) Take by mouth      irbesartan (AVAPRO) 300 mg tablet TAKE 1 TABLET DAILY AT BEDTIME 90 tablet 3    mesalamine (LIALDA) 1.2 g EC tablet TAKE 4 TABLETS BY MOUTH EVERY DAY AFTER BREAKFAST      rosuvastatin (CRESTOR) 5 mg tablet TAKE ONE AND ONE-HALF TABLETS DAILY 135 tablet 3    Stelara 45 MG/0.5ML injection q 2 months      timolol (TIMOPTIC) 0.5 % ophthalmic solution       diphenoxylate-atropine (LOMOTIL) 2.5-0.025 mg per tablet Take 1 tablet by mouth 3 (three) times a day as needed (Patient not taking: Reported on 6/5/2024)      [DISCONTINUED] cyclobenzaprine  (FLEXERIL) 5 mg tablet Take 1 tablet (5 mg total) by mouth 3 (three) times a day as needed for muscle spasms 30 tablet 0    [DISCONTINUED] Diclofenac Sodium (VOLTAREN) 1 % Apply 2 g topically 4 (four) times a day 100 g 1    [DISCONTINUED] methylPREDNISolone 4 MG tablet therapy pack Use as directed on package 21 each 0     Current Facility-Administered Medications on File Prior to Visit   Medication Dose Route Frequency Provider Last Rate Last Admin    denosumab (PROLIA) subcutaneous injection 60 mg  60 mg Subcutaneous Q6 Months Tanvi Persaud MD   60 mg at 24 1439      Objective     /78 (BP Location: Left arm, Patient Position: Sitting, Cuff Size: Standard)   Pulse 84   Temp 98 °F (36.7 °C)   Wt 78.5 kg (173 lb)   SpO2 98%   BMI 29.70 kg/m²     Physical Exam  Vitals reviewed.   Constitutional:       General: She is not in acute distress.     Appearance: Normal appearance.   HENT:      Head: Normocephalic and atraumatic.      Right Ear: Tympanic membrane normal.      Left Ear: Tympanic membrane normal.   Eyes:      Conjunctiva/sclera: Conjunctivae normal.   Cardiovascular:      Rate and Rhythm: Normal rate and regular rhythm.      Heart sounds: No murmur heard.  Pulmonary:      Effort: Pulmonary effort is normal.      Breath sounds: No wheezing or rales.   Abdominal:      General: Abdomen is flat. Bowel sounds are normal.      Palpations: Abdomen is soft.      Tenderness: There is no abdominal tenderness.   Musculoskeletal:         General: Tenderness (around wound site of left leg) present. No signs of injury.      Right lower le+ Pitting Edema present.      Left lower le+ Pitting Edema present.   Lymphadenopathy:      Cervical: No cervical adenopathy.   Skin:     Findings: Wound present.             Comments: Wound was dressed with non-adherent gauze and abx ointment(bactroban or bacitracin) by Sandi BRITTON MA   Neurological:      Mental Status: She is alert. Mental status is at baseline.    Psychiatric:         Mood and Affect: Mood normal.         Behavior: Behavior normal.       Administrative Statements

## 2024-08-21 NOTE — PROGRESS NOTES
Wendy is here for her Subsequent Wellness visit.     Health Risk Assessment:   Patient rates overall health as good. Patient feels that their physical health rating is slightly better. Patient is very satisfied with their life. Eyesight was rated as same. Hearing was rated as same. Patient feels that their emotional and mental health rating is same. Patients states they are never, rarely angry. Patient states they are never, rarely unusually tired/fatigued. Pain experienced in the last 7 days has been none. Patient states that she has experienced no weight loss or gain in last 6 months.     Depression Screening:   PHQ-2 Score: 0      Fall Risk Screening:   In the past year, patient has experienced: no history of falling in past year      Urinary Incontinence Screening:   Patient has not leaked urine accidently in the last six months.     Home Safety:  Patient does not have trouble with stairs inside or outside of their home. Patient has working smoke alarms and has working carbon monoxide detector. Home safety hazards include: none.     Nutrition:   Current diet is Regular.     Medications:   Patient is currently taking over-the-counter supplements. OTC medications include: see medication list. Patient is able to manage medications.     Activities of Daily Living (ADLs)/Instrumental Activities of Daily Living (IADLs):   Walk and transfer into and out of bed and chair?: Yes  Dress and groom yourself?: Yes    Bathe or shower yourself?: Yes    Feed yourself? Yes  Do your laundry/housekeeping?: Yes  Manage your money, pay your bills and track your expenses?: Yes  Make your own meals?: Yes    Do your own shopping?: Yes    Previous Hospitalizations:   Any hospitalizations or ED visits within the last 12 months?: No      Advance Care Planning:   Living will: Yes    Durable POA for healthcare: Yes    Advanced directive: Yes      PREVENTIVE SCREENINGS      Cardiovascular Screening:    General: Screening Not Indicated and  History Lipid Disorder      Diabetes Screening:     General: Screening Current      Colorectal Cancer Screening:     General: Screening Not Indicated      Breast Cancer Screening:     General: Screening Current      Cervical Cancer Screening:    General: Screening Not Indicated      Osteoporosis Screening:    General: Screening Not Indicated and History Osteoporosis      Lung Cancer Screening:     General: Screening Not Indicated      Hepatitis C Screening:    General: Screening Current    Screening, Brief Intervention, and Referral to Treatment (SBIRT)    Screening      AUDIT-C Screenin) How often did you have a drink containing alcohol in the past year? monthly or less  3) How often did you have 6 or more drinks on one occasion in the past year? never    Single Item Drug Screening:  How often have you used an illegal drug (including marijuana) or a prescription medication for non-medical reasons in the past year? never    Single Item Drug Screen Score: 0  Interpretation: Negative screen for possible drug use disorder

## 2024-08-21 NOTE — TELEPHONE ENCOUNTER
Patient called with concerns of having new onset of swelling in feet and legs throughout the day. States that in the morning it is good, and then gets worse during the day. We made an appoinment for her today with pcp.

## 2024-08-22 ENCOUNTER — HOSPITAL ENCOUNTER (OUTPATIENT)
Dept: VASCULAR ULTRASOUND | Facility: HOSPITAL | Age: 84
Discharge: HOME | End: 2024-08-22
Attending: INTERNAL MEDICINE
Payer: MEDICARE

## 2024-08-22 ENCOUNTER — TELEPHONE (OUTPATIENT)
Dept: INTERNAL MEDICINE CLINIC | Facility: CLINIC | Age: 84
End: 2024-08-22

## 2024-08-22 DIAGNOSIS — R60.0 PEDAL EDEMA: ICD-10-CM

## 2024-08-22 PROCEDURE — 93970 EXTREMITY STUDY: CPT

## 2024-08-22 PROCEDURE — 93970 EXTREMITY STUDY: CPT | Performed by: SURGERY

## 2024-08-22 NOTE — TELEPHONE ENCOUNTER
----- Message from Ramon Parham DO sent at 8/22/2024  1:24 PM EDT -----  Ultrasound results are back & there is no clot in either leg.  Please advise Wendy to take furosemide for 5-6 days as was discussed at her appointment yesterday & call with an update next week on her swelling.  It is okay to drink fluids to stay hydrated while taking furosemide.  thanks

## 2024-08-23 ENCOUNTER — TELEPHONE (OUTPATIENT)
Dept: INTERNAL MEDICINE CLINIC | Facility: CLINIC | Age: 84
End: 2024-08-23

## 2024-08-23 NOTE — TELEPHONE ENCOUNTER
Called pt back she got my message she will take meds and call us next week to advise of her leg swelling

## 2024-09-01 DIAGNOSIS — E21.3 HYPERPARATHYROIDISM (HCC): Chronic | ICD-10-CM

## 2024-09-01 RX ORDER — IBUPROFEN 200 MG
1 CAPSULE ORAL DAILY
Qty: 90 TABLET | Refills: 1 | Status: SHIPPED | OUTPATIENT
Start: 2024-09-01

## 2024-09-06 ENCOUNTER — OFFICE VISIT (OUTPATIENT)
Dept: FAMILY MEDICINE CLINIC | Facility: CLINIC | Age: 84
End: 2024-09-06
Payer: MEDICARE

## 2024-09-06 VITALS
RESPIRATION RATE: 15 BRPM | WEIGHT: 175 LBS | DIASTOLIC BLOOD PRESSURE: 74 MMHG | HEIGHT: 64 IN | BODY MASS INDEX: 29.88 KG/M2 | SYSTOLIC BLOOD PRESSURE: 124 MMHG

## 2024-09-06 DIAGNOSIS — R60.0 PEDAL EDEMA: ICD-10-CM

## 2024-09-06 DIAGNOSIS — S81.802D WOUND OF LEFT LOWER EXTREMITY, SUBSEQUENT ENCOUNTER: ICD-10-CM

## 2024-09-06 DIAGNOSIS — I87.2 VENOUS INSUFFICIENCY: Primary | ICD-10-CM

## 2024-09-06 PROBLEM — S81.802A WOUND OF LEFT LEG: Status: ACTIVE | Noted: 2024-09-06

## 2024-09-06 PROCEDURE — 99214 OFFICE O/P EST MOD 30 MIN: CPT | Performed by: FAMILY MEDICINE

## 2024-09-06 PROCEDURE — G2211 COMPLEX E/M VISIT ADD ON: HCPCS | Performed by: FAMILY MEDICINE

## 2024-09-06 RX ORDER — FUROSEMIDE 20 MG
TABLET ORAL
Qty: 7 TABLET | Refills: 0 | Status: SHIPPED | OUTPATIENT
Start: 2024-09-06

## 2024-09-06 NOTE — ASSESSMENT & PLAN NOTE
- patient sustained wound over the left anterior aspect of her lower extremity after reportedly hitting a table  - Patient has been bandaging and applying antibiotic ointment regularly to the site  - Examination notable for healing wound with overlying scab  - No signs of expanding erythema, drainage or abnormal tenderness to palpation    Plan  - Advised patient that she may stop covering and applying antibiotic ointment over area in order to allow the scab to fully form and remain attached  - F/u as needed for signs/symptoms of infection

## 2024-09-06 NOTE — ASSESSMENT & PLAN NOTE
- patient with chronic ongoing lower extremity pitting edema due to venous insufficiency  - Patient having recently undergone venous duplex US w/o signs of clots  - Currently on lasix 10 mg daily PRN for edema   - Physical examination notable for 1+ pitting edema and healing wound over the anterior aspect of the left lower extremity     Plan  - Advised patient that she may increase the lasix dose up to 40 mg PRN to control her edema as the med is individual response dependant   - patient encouraged to monitor BP and for symptom side effects  - F/u BMP  - Encourage use of compression stockings and elevation of legs

## 2024-09-06 NOTE — PROGRESS NOTES
Ambulatory Visit  Name: Wendy Raygoza      : 1940      MRN: [id number]  Encounter Provider: Zander Ely DO  Encounter Date: 2024   Encounter department: [facility]    Assessment & Plan   1. Venous insufficiency  Assessment & Plan:  - patient with chronic ongoing lower extremity pitting edema due to venous insufficiency  - Patient having recently undergone venous duplex US w/o signs of clots  - Currently on lasix 10 mg daily PRN for edema   - Physical examination notable for 1+ pitting edema and healing wound over the anterior aspect of the left lower extremity     Plan  - Advised patient that she may increase the lasix dose up to 40 mg PRN to control her edema as the med is individual response dependant   - patient encouraged to monitor BP and for symptom side effects  - F/u BMP  - Encourage use of compression stockings and elevation of legs  2. Wound of left lower extremity, subsequent encounter  Assessment & Plan:  - patient sustained wound over the left anterior aspect of her lower extremity after reportedly hitting a table  - Patient has been bandaging and applying antibiotic ointment regularly to the site  - Examination notable for healing wound with overlying scab  - No signs of expanding erythema, drainage or abnormal tenderness to palpation    Plan  - Advised patient that she may stop covering and applying antibiotic ointment over area in order to allow the scab to fully form and remain attached  - F/u as needed for signs/symptoms of infection  3. Pedal edema  Comments:  check venous duplex today or tomorrow to r/o DVT.  precautions advised  Orders:  -     Basic metabolic panel; Future  -     furosemide (LASIX) 20 mg tablet; Take 1/2 to 1 tablet daily as directed, then stop       History of Present Illness   83 yoF presenting with CC of recurring lower extremity edema and for reexamination of wound over the left anterior aspect of her tibia that was  obtained after bumping into a table. Patient reports that the lasix that was prescribed at prior office visit has been helping to reduce edema in her lower extremities. The wound in question has been continually bandaged and covered with antibiotic ointment daily. Removal of bandage revealing a scab that has now developed over the wound and is otherwise without any signs of significant irritation or infection. She denies any pain/tenderness in the region. No associated fever, chills, N/V, SOB, Soni, general malaise, worsening lower extremity edema or generalized leg pain/tenderness.         Review of Systems   Constitutional:  Negative for fatigue.   HENT:  Negative for congestion and rhinorrhea.    Eyes:  Negative for visual disturbance.   Respiratory:  Negative for chest tightness and shortness of breath.    Cardiovascular:  Positive for leg swelling. Negative for chest pain and palpitations.   Gastrointestinal:  Positive for diarrhea. Negative for constipation, nausea and vomiting.   Endocrine: Negative for polydipsia and polyuria.   Genitourinary:  Negative for frequency.   Musculoskeletal:  Negative for myalgias.   Skin:  Positive for wound (left anterior tibia).   Neurological:  Negative for dizziness, light-headedness and headaches.     Pertinent Medical History   Venous insufficiency, UC, diverticulosis, immunosuppression due to drug therapy    Current Outpatient Medications on File Prior to Visit   Medication Sig Dispense Refill    [DISCONTINUED] furosemide (LASIX) 20 mg tablet Take 1/2 to 1 tablet daily as directed, then stop 7 tablet 0    amLODIPine (NORVASC) 5 mg tablet Take 1 tablet (5 mg total) by mouth daily 100 tablet 1    aspirin 81 MG tablet Take 81 mg by mouth daily      atenolol (TENORMIN) 25 mg tablet TAKE 1 TABLET TWICE A  tablet 1    bimatoprost (LUMIGAN) 0.01 % ophthalmic drops Administer 1 drop to both eyes daily at bedtime      brinzolamide (AZOPT) 1 % ophthalmic suspension 1 drop 2  "(two) times a day      Calcium Carbonate Antacid (TUMS EXTRA STRENGTH 750 PO) Take 1,500 mg by mouth 2 (two) times a day with meals Use before meals with a vitmain C tablet for improved absoprtion       calcium citrate (CALCITRATE) 950 (200 Ca) MG tablet TAKE 1 TABLET BY MOUTH EVERY DAY 90 tablet 1    Cholecalciferol (VITAMIN D3 PO) Take 2,000 Units by mouth 2 (two) times a day      denosumab (PROLIA) 60 mg/mL Inject under the skin      diphenoxylate-atropine (LOMOTIL) 2.5-0.025 mg per tablet Take 1 tablet by mouth 3 (three) times a day as needed (Patient not taking: Reported on 6/5/2024)      escitalopram (LEXAPRO) 10 mg tablet TAKE 1 TABLET DAILY 90 tablet 1    Ferrous Sulfate (IRON PO) Take by mouth      irbesartan (AVAPRO) 300 mg tablet TAKE 1 TABLET DAILY AT BEDTIME 90 tablet 3    mesalamine (LIALDA) 1.2 g EC tablet TAKE 4 TABLETS BY MOUTH EVERY DAY AFTER BREAKFAST      mupirocin (BACTROBAN) 2 % ointment Apply topically 3 (three) times a day 30 g 0    rosuvastatin (CRESTOR) 5 mg tablet TAKE ONE AND ONE-HALF TABLETS DAILY 135 tablet 3    Stelara 45 MG/0.5ML injection q 2 months      timolol (TIMOPTIC) 0.5 % ophthalmic solution        Current Facility-Administered Medications on File Prior to Visit   Medication Dose Route Frequency Provider Last Rate Last Admin    denosumab (PROLIA) subcutaneous injection 60 mg  60 mg Subcutaneous Q6 Months Tanvi Persaud MD   60 mg at 05/03/24 1439      Objective     /74   Resp 15   Ht 5' 4\" (1.626 m)   Wt 79.4 kg (175 lb)   BMI 30.04 kg/m²     Physical Exam  Vitals and nursing note reviewed.   Constitutional:       General: She is not in acute distress.     Appearance: Normal appearance. She is well-developed. She is not ill-appearing.   HENT:      Head: Normocephalic and atraumatic.      Right Ear: External ear normal.      Left Ear: External ear normal.   Eyes:      Conjunctiva/sclera: Conjunctivae normal.   Cardiovascular:      Rate and Rhythm: Normal rate and " regular rhythm.      Pulses: Normal pulses.      Heart sounds: Normal heart sounds. No murmur heard.  Pulmonary:      Effort: Pulmonary effort is normal. No respiratory distress.      Breath sounds: Normal breath sounds.   Abdominal:      Palpations: Abdomen is soft.      Tenderness: There is no abdominal tenderness.   Musculoskeletal:         General: No swelling.      Cervical back: Neck supple.      Right lower leg: Edema present.      Left lower leg: Edema present.   Skin:     General: Skin is warm and dry.      Capillary Refill: Capillary refill takes less than 2 seconds.   Neurological:      Mental Status: She is alert.   Psychiatric:         Mood and Affect: Mood normal.

## 2024-09-10 ENCOUNTER — HOSPITAL ENCOUNTER (OUTPATIENT)
Dept: RADIOLOGY | Facility: IMAGING CENTER | Age: 84
Discharge: HOME | End: 2024-09-10
Payer: MEDICARE

## 2024-09-10 PROCEDURE — 77080 DXA BONE DENSITY AXIAL: CPT

## 2024-09-11 ENCOUNTER — APPOINTMENT (OUTPATIENT)
Dept: LAB | Age: 84
End: 2024-09-11
Payer: MEDICARE

## 2024-09-11 ENCOUNTER — TELEPHONE (OUTPATIENT)
Age: 84
End: 2024-09-11

## 2024-09-11 DIAGNOSIS — E21.3 HYPERPARATHYROIDISM (HCC): Chronic | ICD-10-CM

## 2024-09-11 DIAGNOSIS — R60.0 PEDAL EDEMA: ICD-10-CM

## 2024-09-11 DIAGNOSIS — E55.9 VITAMIN D DEFICIENCY: ICD-10-CM

## 2024-09-11 LAB
25(OH)D3 SERPL-MCNC: 55.8 NG/ML (ref 30–100)
ALBUMIN SERPL BCG-MCNC: 4.1 G/DL (ref 3.5–5)
ALP SERPL-CCNC: 53 U/L (ref 34–104)
ALT SERPL W P-5'-P-CCNC: 19 U/L (ref 7–52)
ANION GAP SERPL CALCULATED.3IONS-SCNC: 7 MMOL/L (ref 4–13)
AST SERPL W P-5'-P-CCNC: 15 U/L (ref 13–39)
BILIRUB SERPL-MCNC: 0.37 MG/DL (ref 0.2–1)
BUN SERPL-MCNC: 15 MG/DL (ref 5–25)
CALCIUM SERPL-MCNC: 9.6 MG/DL (ref 8.4–10.2)
CHLORIDE SERPL-SCNC: 106 MMOL/L (ref 96–108)
CO2 SERPL-SCNC: 26 MMOL/L (ref 21–32)
CREAT SERPL-MCNC: 0.64 MG/DL (ref 0.6–1.3)
GFR SERPL CREATININE-BSD FRML MDRD: 83 ML/MIN/1.73SQ M
GLUCOSE SERPL-MCNC: 74 MG/DL (ref 65–140)
PHOSPHATE SERPL-MCNC: 2.4 MG/DL (ref 2.3–4.1)
POTASSIUM SERPL-SCNC: 4.6 MMOL/L (ref 3.5–5.3)
PROT SERPL-MCNC: 6.9 G/DL (ref 6.4–8.4)
PTH-INTACT SERPL-MCNC: 107.3 PG/ML (ref 12–88)
SODIUM SERPL-SCNC: 139 MMOL/L (ref 135–147)

## 2024-09-11 PROCEDURE — 36415 COLL VENOUS BLD VENIPUNCTURE: CPT

## 2024-09-11 PROCEDURE — 82306 VITAMIN D 25 HYDROXY: CPT

## 2024-09-11 PROCEDURE — 83970 ASSAY OF PARATHORMONE: CPT

## 2024-09-11 PROCEDURE — 84100 ASSAY OF PHOSPHORUS: CPT

## 2024-09-11 PROCEDURE — 80053 COMPREHEN METABOLIC PANEL: CPT

## 2024-09-11 NOTE — TELEPHONE ENCOUNTER
Patient called to find out if she should be getting the COVID vaccine now, or should she get that in October. Please advise.

## 2024-09-11 NOTE — TELEPHONE ENCOUNTER
Hold the aldactone and reduce bumex to 1mg daily prior to discharge. Follow up appointment made for 11/7/2022 at [facility] primary care. Please let her know that if starts experiencing blurry vision, dizziness, chest pain, shortness of breath, syncope prior to this appointment, she has to go to the ED.     Eli Marshall MD She can get it now. ty

## 2024-10-04 DIAGNOSIS — I10 PRIMARY HYPERTENSION: ICD-10-CM

## 2024-10-04 RX ORDER — IRBESARTAN 300 MG/1
300 TABLET ORAL
Qty: 90 TABLET | Refills: 1 | Status: SHIPPED | OUTPATIENT
Start: 2024-10-04

## 2024-10-04 NOTE — TELEPHONE ENCOUNTER
Reason for call:   [x] Refill   [] Prior Auth  [] Other:     Office:   [x] PCP/Provider -[facility]    [] Specialty/Provider -     Medication: irbesartan (AVAPRO) 300 mg tablet     Dose/Frequency: TAKE 1 TABLET DAILY AT BEDTIME     Quantity: 90    Pharmacy: [facility] - [address]     Does the patient have enough for 3 days?   [x] Yes   [] No - Send as HP to POD

## 2024-10-14 DIAGNOSIS — E78.5 HYPERLIPIDEMIA, UNSPECIFIED HYPERLIPIDEMIA TYPE: ICD-10-CM

## 2024-10-15 ENCOUNTER — HOSPITAL ENCOUNTER (OUTPATIENT)
Dept: RADIOLOGY | Age: 84
Discharge: HOME | End: 2024-10-15
Payer: MEDICARE

## 2024-10-15 DIAGNOSIS — E21.3 HYPERPARATHYROIDISM (HCC): ICD-10-CM

## 2024-10-15 PROCEDURE — 70492 CT SFT TSUE NCK W/O & W/DYE: CPT

## 2024-10-15 RX ORDER — ROSUVASTATIN CALCIUM 5 MG/1
TABLET, COATED ORAL
Qty: 135 TABLET | Refills: 1 | Status: SHIPPED | OUTPATIENT
Start: 2024-10-15

## 2024-10-15 RX ADMIN — IOHEXOL 85 ML: 350 INJECTION, SOLUTION INTRAVENOUS at 10:55

## 2024-10-17 ENCOUNTER — OFFICE VISIT (OUTPATIENT)
Dept: FAMILY MEDICINE CLINIC | Facility: CLINIC | Age: 84
End: 2024-10-17
Payer: MEDICARE

## 2024-10-17 VITALS
HEART RATE: 54 BPM | RESPIRATION RATE: 16 BRPM | BODY MASS INDEX: 30.01 KG/M2 | OXYGEN SATURATION: 96 % | WEIGHT: 175.8 LBS | HEIGHT: 64 IN | TEMPERATURE: 97.3 F | SYSTOLIC BLOOD PRESSURE: 130 MMHG | DIASTOLIC BLOOD PRESSURE: 80 MMHG

## 2024-10-17 DIAGNOSIS — Z13.1 SCREENING FOR DIABETES MELLITUS: ICD-10-CM

## 2024-10-17 DIAGNOSIS — E78.2 MIXED HYPERLIPIDEMIA: ICD-10-CM

## 2024-10-17 DIAGNOSIS — R60.9 EDEMA, UNSPECIFIED TYPE: ICD-10-CM

## 2024-10-17 DIAGNOSIS — Z23 ENCOUNTER FOR IMMUNIZATION: ICD-10-CM

## 2024-10-17 DIAGNOSIS — I10 PRIMARY HYPERTENSION: Primary | ICD-10-CM

## 2024-10-17 PROCEDURE — G0008 ADMIN INFLUENZA VIRUS VAC: HCPCS | Performed by: FAMILY MEDICINE

## 2024-10-17 PROCEDURE — 99213 OFFICE O/P EST LOW 20 MIN: CPT | Performed by: FAMILY MEDICINE

## 2024-10-17 PROCEDURE — 90662 IIV NO PRSV INCREASED AG IM: CPT | Performed by: FAMILY MEDICINE

## 2024-10-17 NOTE — PROGRESS NOTES
Ambulatory Visit  Name: Wendy Raygoza      : 1940      MRN: [id number]  Encounter Provider: Maty Ferraro MD  Encounter Date: 10/17/2024   Encounter department: [facility]    Assessment & Plan  Primary hypertension  Patient's blood pressure is controlled.  Upon arrival it was high because she gets nervous at the doctor's office.  Patient has no acute cardiovascular neurosymptoms from it.  Patient advised to cut down on the salt condiment etc.  Hydrate well.  Continue current therapy.  Check her labs.  Orders:    CBC and differential; Future    Comprehensive metabolic panel; Future    TSH, 3rd generation with Free T4 reflex; Future    UA w Reflex to Microscopic w Reflex to Culture; Future    B-Type Natriuretic Peptide(BNP); Future    Edema, unspecified type  Discussed with patient.  Patient advised to cut down on the salt and condiments from her diet.  Elevate her leg/legs when eating.  Check her labs.  Orders:    Comprehensive metabolic panel; Future    B-Type Natriuretic Peptide(BNP); Future    Mixed hyperlipidemia  Discussed with patient.  Patient advised to have less fats starches and sweets.  Lose weight with a better diet and exercise as tolerated.  Continue current therapy.  And check her labs.  Orders:    Comprehensive metabolic panel; Future    Lipid panel; Future    Screening for diabetes mellitus  Patient also be screened for diabetes with her next blood work.  Orders:    Hemoglobin A1C; Future    Encounter for immunization  Patient received the flu vaccine with us today.  Orders:    influenza vaccine, high-dose, PF 0.5 mL (Fluzone High Dose)         RTO 3 months and do the blood work and urine before.  Patient can see me prior to that for anything else in between.          History of Present Illness     83-year-old female here for her follow-up on her hypertension and edema history: She was seen by her previous PCP in August.  Patient was given Lasix at a low  "dose for short while.  Venous Doppler was also ordered at that time.  Patient is doing better from that now.  And she was given topical mupirocin on her left mid anterior shin.  And that has also improved.  Patient does admit to eating frozen foods.  Patient received the COVID-19 updated vaccine in September already.  Patient is here today to get the annual flu vaccine.  Patient denies any adverse reactions to vaccines in the past.  Patient is up-to-date with her vaccines including her RSV vaccine which she got earlier this year.  Patient denies tobacco.  Of note patient had her last Medicare annual well visit with her previous PCP on 8/21/2024.          Review of Systems   Constitutional:  Negative for chills, fatigue, fever and unexpected weight change.   HENT:  Negative for congestion and sore throat.    Eyes:  Negative for visual disturbance.   Respiratory:  Negative for cough and stridor.    Cardiovascular:  Positive for leg swelling. Negative for chest pain and palpitations.   Gastrointestinal:  Negative for abdominal pain.   Genitourinary:  Negative for dysuria.   Neurological:  Negative for dizziness and headaches.   Psychiatric/Behavioral:  Negative for dysphoric mood. The patient is not nervous/anxious.            Objective     /80 (BP Location: Left arm, Patient Position: Sitting, Cuff Size: Adult)   Pulse (!) 54   Temp (!) 97.3 °F (36.3 °C) (Temporal)   Resp 16   Ht 5' 4\" (1.626 m)   Wt 79.7 kg (175 lb 12.8 oz)   SpO2 96%   BMI 30.18 kg/m²     Physical Exam  Vitals reviewed.   Constitutional:       General: She is not in acute distress.     Appearance: Normal appearance. She is obese. She is not ill-appearing.   HENT:      Head: Normocephalic and atraumatic.   Neck:      Vascular: No carotid bruit.   Cardiovascular:      Rate and Rhythm: Normal rate and regular rhythm.   Pulmonary:      Effort: Pulmonary effort is normal.      Breath sounds: Normal breath sounds.   Musculoskeletal:      " Right lower leg: No edema.      Left lower leg: Edema present.      Comments: Very mild swelling of her left lower extremity.  Patient is much improved.  No calf tenderness bilateral.   Skin:     Findings: Lesion present.      Comments: The left lower extremities anterior mid shin skin lesion also resolved almost with mupirocin topical.   Neurological:      General: No focal deficit present.      Mental Status: She is alert and oriented to person, place, and time.   Psychiatric:         Mood and Affect: Mood normal.         Behavior: Behavior normal.         Thought Content: Thought content normal.         Answers submitted by the patient for this visit:  Medicare Annual Wellness Visit (Submitted on 10/16/2024)  How would you rate your overall health?: good  Compared to last year, how is your physical health?: slightly better  In general, how satisfied are you with your life?: very satisfied  Compared to last year, how is your eyesight?: same  Compared to last year, how is your hearing?: same  Compared to last year, how is your emotional/mental health?: same  How often is anger a problem for you?: never, rarely  How often do you feel unusually tired/fatigued?: never, rarely  In the past 7 days, how much pain have you experienced?: none  In the past 6 months, have you lost or gained 10 pounds without trying?: No  One or more falls in the last year: No  In the past 6 months, have you accidentally leaked urine?: No  Do you have trouble with the stairs inside or outside your home?: No  Does your home have working smoke alarms?: Yes  Does your home have a carbon monoxide monitor?: Yes  Which safety hazards (if any) have you experienced in your home? Please select all that apply.: none  How would you describe your current diet? Please select all that apply.: Regular  In addition to prescription medications, are you taking any over-the-counter supplements?: Yes  If yes, what supplements are you taking?: Vitamin D,  calcium,vitamin C, iron  Can you manage your medications?: Yes  Are you currently taking any opioid medications?: No  Can you walk and transfer into and out of your bed and chair?: Yes  Can you dress and groom yourself?: Yes  Can you bathe or shower yourself?: Yes  Can you feed yourself?: Yes  Can you do your laundry/ housekeeping?: Yes  Can you manage your money, pay your bills, and track your expenses?: Yes  Can you make your own meals?: Yes  Can you do your own shopping?: Yes  Within the last 12 months, have you had any hospitalizations or Emergency Department visits?: No  Do you have a living will?: Yes  Do you have a Durable POA (Power of ) for healthcare decisions?: Yes  Do you have an Advanced Directive for end of life decisions?: Yes  How often have you used an illegal drug (including marijuana) or a prescription medication for non-medical reasons in the past year?: never  What is the typical number of drinks you consume in a day?: 0  What is the typical number of drinks you consume in a week?: 2  How often did you have a drink containing alcohol in the past year?: monthly or less  How many drinks did you have on a typical day  when you were drinking in the past year?: 1 to 2  How often did you have 6 or more drinks on one occasion in the past year?: never  Of note patient had her Medicare annual well visit with her previous PCP on 8/21/2024.

## 2024-10-17 NOTE — ASSESSMENT & PLAN NOTE
Discussed with patient.  Patient advised to cut down on the salt and condiments from her diet.  Elevate her leg/legs when eating.  Check her labs.  Orders:    Comprehensive metabolic panel; Future    B-Type Natriuretic Peptide(BNP); Future

## 2024-10-17 NOTE — ASSESSMENT & PLAN NOTE
Patient's blood pressure is controlled.  Upon arrival it was high because she gets nervous at the doctor's office.  Patient has no acute cardiovascular neurosymptoms from it.  Patient advised to cut down on the salt condiment etc.  Hydrate well.  Continue current therapy.  Check her labs.  Orders:    CBC and differential; Future    Comprehensive metabolic panel; Future    TSH, 3rd generation with Free T4 reflex; Future    UA w Reflex to Microscopic w Reflex to Culture; Future    B-Type Natriuretic Peptide(BNP); Future

## 2024-10-17 NOTE — ASSESSMENT & PLAN NOTE
Discussed with patient.  Patient advised to have less fats starches and sweets.  Lose weight with a better diet and exercise as tolerated.  Continue current therapy.  And check her labs.  Orders:    Comprehensive metabolic panel; Future    Lipid panel; Future

## 2024-10-23 ENCOUNTER — TELEPHONE (OUTPATIENT)
Age: 84
End: 2024-10-23

## 2024-10-23 NOTE — TELEPHONE ENCOUNTER
Patient need to reschedule her appointment for Oct  but she need to reschedule  her  appointment  for  November it her Prolia   shot .

## 2024-11-08 ENCOUNTER — OFFICE VISIT (OUTPATIENT)
Dept: ENDOCRINOLOGY | Facility: CLINIC | Age: 84
End: 2024-11-08
Payer: MEDICARE

## 2024-11-08 VITALS
WEIGHT: 175 LBS | SYSTOLIC BLOOD PRESSURE: 120 MMHG | HEART RATE: 58 BPM | TEMPERATURE: 97.9 F | DIASTOLIC BLOOD PRESSURE: 72 MMHG | OXYGEN SATURATION: 95 % | HEIGHT: 64 IN | BODY MASS INDEX: 29.88 KG/M2

## 2024-11-08 DIAGNOSIS — M81.0 AGE-RELATED OSTEOPOROSIS WITHOUT CURRENT PATHOLOGICAL FRACTURE: Primary | ICD-10-CM

## 2024-11-08 PROCEDURE — 96372 THER/PROPH/DIAG INJ SC/IM: CPT | Performed by: INTERNAL MEDICINE

## 2024-11-12 NOTE — PROGRESS NOTES
"Assessment/Plan:    Wendy Raygoza came into the [facility] today 11/12/24 to receive PROLIA injection.      Verbal consent obtained.  Consent given by: patient    patient states patient has been medically healthy with no underlining concerns/complications.      Wendy Raygoza presents with NA symptoms today.       All insturctions were reviewed with the patient.    If the patient should have any questions/concerns, advised patient to contacted [facility].       Subjective:     History provided by: patient    Patient ID: Wendy Raygoza is a 83 y.o. female      Objective:    Vitals:    11/08/24 1316   BP: 120/72   BP Location: Left arm   Patient Position: Sitting   Cuff Size: Standard   Pulse: 58   Temp: 97.9 °F (36.6 °C)   TempSrc: Temporal   SpO2: 95%   Weight: 79.4 kg (175 lb)   Height: 5' 4\" (1.626 m)       Patient tolerated the injection well without any complications.  Injection site/s LEFT ARM.  Medication was provided by OFFICE.    Patient signed consent form yes   Patient signed ABN form yes (If no patient is not a medicare patient).   Patient waited 15 minutes after injection yes (This only applies to patient's receiving first time injection).       Last Visit: 9/1/2024  Next visit:2/14/2025     "

## 2024-11-15 ENCOUNTER — OFFICE VISIT (OUTPATIENT)
Dept: FAMILY MEDICINE CLINIC | Facility: CLINIC | Age: 84
End: 2024-11-15
Payer: MEDICARE

## 2024-11-15 VITALS
OXYGEN SATURATION: 98 % | HEART RATE: 55 BPM | WEIGHT: 178 LBS | SYSTOLIC BLOOD PRESSURE: 138 MMHG | TEMPERATURE: 98.6 F | DIASTOLIC BLOOD PRESSURE: 82 MMHG | HEIGHT: 64 IN | BODY MASS INDEX: 30.39 KG/M2

## 2024-11-15 DIAGNOSIS — I10 PRIMARY HYPERTENSION: ICD-10-CM

## 2024-11-15 DIAGNOSIS — M54.16 LUMBAR RADICULOPATHY: ICD-10-CM

## 2024-11-15 DIAGNOSIS — M54.50 LUMBAR BACK PAIN: Primary | ICD-10-CM

## 2024-11-15 PROCEDURE — G2211 COMPLEX E/M VISIT ADD ON: HCPCS | Performed by: FAMILY MEDICINE

## 2024-11-15 PROCEDURE — 99214 OFFICE O/P EST MOD 30 MIN: CPT | Performed by: FAMILY MEDICINE

## 2024-11-15 RX ORDER — CYCLOBENZAPRINE HCL 5 MG
TABLET ORAL
COMMUNITY
Start: 2024-11-10 | End: 2024-11-15 | Stop reason: ALTCHOICE

## 2024-11-15 RX ORDER — GABAPENTIN 100 MG/1
100 CAPSULE ORAL 3 TIMES DAILY
Qty: 90 CAPSULE | Refills: 5 | Status: SHIPPED | OUTPATIENT
Start: 2024-11-15

## 2024-11-15 RX ORDER — OXYCODONE AND ACETAMINOPHEN 5; 325 MG/1; MG/1
1 TABLET ORAL EVERY 8 HOURS PRN
Qty: 20 TABLET | Refills: 0 | Status: SHIPPED | OUTPATIENT
Start: 2024-11-15 | End: 2024-12-05

## 2024-11-15 RX ORDER — METHYLPREDNISOLONE 4 MG
TABLET, DOSE PACK ORAL
COMMUNITY
Start: 2024-11-10 | End: 2024-11-16

## 2024-11-15 RX ORDER — TIZANIDINE HYDROCHLORIDE 2 MG/1
2 CAPSULE, GELATIN COATED ORAL 3 TIMES DAILY
Qty: 15 CAPSULE | Refills: 0 | Status: SHIPPED | OUTPATIENT
Start: 2024-11-15 | End: 2024-11-20

## 2024-11-15 NOTE — PROGRESS NOTES
Name: Wendy Raygoza      : 1940      MRN: [id number]  Encounter Provider: Zander Ely DO  Encounter Date: 11/15/2024   Encounter department: [facility]  :  Assessment & Plan  Lumbar back pain  - Chronic ongoing low back pain L > R with tingling sensation down her left leg   - Notes pain is 10/10 at times and keeps her up at night   - Currently following up with pain management  - MRI of lumbar back noting left foraminal disc herniation L5-S1 contacting the foraminal left L5 nerve root. If symptomatic, this would result in the left L5 radiculopathy. S-shaped thoracolumbar scoliosis. Mild degenerative changes elsewhere as described.    Plan  - Pain management; percocet, gabapentin, muscle relaxant  - F/u Pain management   - F/u PT        Lumbar radiculopathy  - Detailed above   Orders:    Ambulatory Referral to Physical Therapy; Future    TiZANidine (ZANAFLEX) 2 MG capsule; Take 1 capsule (2 mg total) by mouth 3 (three) times a day for 5 days    gabapentin (NEURONTIN) 100 mg capsule; Take 1 capsule (100 mg total) by mouth 3 (three) times a day    oxyCODONE-acetaminophen (Percocet) 5-325 mg per tablet; Take 1 tablet by mouth every 8 (eight) hours as needed for moderate pain for up to 20 days Max Daily Amount: 3 tablets    Primary hypertension  - BP of 138/82   - Currently on amlodipine 5 mg, atenolol 25 mg               History of Present Illness     83 yoF presenting with CC of acute on chronic left sided low back pain. Patient reports pain radiating down her left leg and affecting her sleep. Pain is reportedly a 10/10 in severity and with associated tingling sensation. She denies any associated numbness or weakness at this time. No change in gait. Symptoms acutely worsening since  due to unspecified reasons. Notes pain since at least  when she was given a card for pain management but failed to follow up. Notes pain is much worse since then.       Review of  Systems   Constitutional:  Negative for chills and fever.   HENT:  Negative for ear pain and sore throat.    Eyes:  Negative for pain and visual disturbance.   Respiratory:  Negative for cough and shortness of breath.    Cardiovascular:  Negative for chest pain and palpitations.   Gastrointestinal:  Negative for abdominal pain and vomiting.   Genitourinary:  Negative for dysuria and hematuria.   Musculoskeletal:  Positive for back pain. Negative for arthralgias.   Skin:  Negative for color change and rash.   Neurological:  Negative for dizziness, weakness, numbness and headaches.   All other systems reviewed and are negative.    Current Outpatient Medications on File Prior to Visit   Medication Sig Dispense Refill    amLODIPine (NORVASC) 5 mg tablet Take 1 tablet (5 mg total) by mouth daily 100 tablet 1    aspirin 81 MG tablet Take 81 mg by mouth daily      atenolol (TENORMIN) 25 mg tablet TAKE 1 TABLET TWICE A  tablet 1    bimatoprost (LUMIGAN) 0.01 % ophthalmic drops Administer 1 drop to both eyes daily at bedtime      brinzolamide (AZOPT) 1 % ophthalmic suspension 1 drop 2 (two) times a day      Calcium Carbonate Antacid (TUMS EXTRA STRENGTH 750 PO) Take 1,500 mg by mouth 2 (two) times a day with meals Use before meals with a vitmain C tablet for improved absoprtion       calcium citrate (CALCITRATE) 950 (200 Ca) MG tablet TAKE 1 TABLET BY MOUTH EVERY DAY 90 tablet 1    Cholecalciferol (VITAMIN D3 PO) Take 5,000 Units by mouth in the morning      denosumab (PROLIA) 60 mg/mL Inject under the skin      diphenoxylate-atropine (LOMOTIL) 2.5-0.025 mg per tablet Take 1 tablet by mouth 3 (three) times a day as needed      escitalopram (LEXAPRO) 10 mg tablet TAKE 1 TABLET DAILY 90 tablet 1    Ferrous Sulfate (IRON PO) Take by mouth      irbesartan (AVAPRO) 300 mg tablet Take 1 tablet (300 mg total) by mouth daily at bedtime 90 tablet 1    Medrol 4 MG tablet therapy pack Take as directed per package instructions.    "   mesalamine (LIALDA) 1.2 g EC tablet TAKE 4 TABLETS BY MOUTH EVERY DAY AFTER BREAKFAST      rosuvastatin (CRESTOR) 5 mg tablet TAKE ONE AND ONE-HALF TABLETS DAILY 135 tablet 1    Stelara 45 MG/0.5ML injection q 2 months      timolol (TIMOPTIC) 0.5 % ophthalmic solution       [DISCONTINUED] cyclobenzaprine (FLEXERIL) 5 mg tablet TAKE 1 TO 2 TABLETS BY MOUTH THREE TIMES A DAY AS NEEDED FOR MUSCLE SPASMS      furosemide (LASIX) 20 mg tablet Take 1/2 to 1 tablet daily as directed, then stop (Patient not taking: Reported on 11/8/2024) 7 tablet 0    mupirocin (BACTROBAN) 2 % ointment Apply topically 3 (three) times a day (Patient not taking: Reported on 11/8/2024) 30 g 0     Current Facility-Administered Medications on File Prior to Visit   Medication Dose Route Frequency Provider Last Rate Last Admin    denosumab (PROLIA) subcutaneous injection 60 mg  60 mg Subcutaneous Q6 Months Tanvi Persaud MD   60 mg at 05/03/24 1439         Objective   /82 (BP Location: Right arm, Patient Position: Sitting, Cuff Size: Standard)   Pulse 55   Temp 98.6 °F (37 °C) (Tympanic)   Ht 5' 4\" (1.626 m)   Wt 80.7 kg (178 lb)   SpO2 98%   BMI 30.55 kg/m²      Physical Exam  Vitals and nursing note reviewed.   Constitutional:       General: She is not in acute distress.     Appearance: Normal appearance. She is well-developed. She is not ill-appearing.   HENT:      Head: Normocephalic and atraumatic.      Right Ear: External ear normal.      Left Ear: External ear normal.      Nose: Nose normal.   Eyes:      Conjunctiva/sclera: Conjunctivae normal.   Cardiovascular:      Rate and Rhythm: Normal rate and regular rhythm.      Pulses: Normal pulses.      Heart sounds: Normal heart sounds. No murmur heard.  Pulmonary:      Effort: Pulmonary effort is normal. No respiratory distress.      Breath sounds: Normal breath sounds.   Abdominal:      Palpations: Abdomen is soft.      Tenderness: There is no abdominal tenderness. "   Musculoskeletal:         General: No swelling.      Cervical back: Neck supple.      Right lower leg: No edema.      Left lower leg: No edema.   Skin:     General: Skin is warm and dry.      Capillary Refill: Capillary refill takes less than 2 seconds.   Neurological:      Mental Status: She is alert.   Psychiatric:         Mood and Affect: Mood normal.

## 2024-11-15 NOTE — PATIENT INSTRUCTIONS
Start with Percocet every 8 hours for pain, if pain is persisting take the muscle relaxant tizanidine. If pain persists despite the first two start taking the gabapentin. Call the office if symptoms are not improving.     Check out Bernard exercises online to help with your low back pain.

## 2024-11-18 ENCOUNTER — OFFICE VISIT (OUTPATIENT)
Dept: OBGYN CLINIC | Facility: HOSPITAL | Age: 84
End: 2024-11-18
Payer: MEDICARE

## 2024-11-18 VITALS
HEIGHT: 64 IN | WEIGHT: 177.91 LBS | HEART RATE: 81 BPM | BODY MASS INDEX: 30.37 KG/M2 | SYSTOLIC BLOOD PRESSURE: 88 MMHG | DIASTOLIC BLOOD PRESSURE: 60 MMHG

## 2024-11-18 DIAGNOSIS — M54.16 RADICULOPATHY, LUMBAR REGION: Primary | ICD-10-CM

## 2024-11-18 DIAGNOSIS — M53.3 SI (SACROILIAC) JOINT DYSFUNCTION: ICD-10-CM

## 2024-11-18 PROCEDURE — 99214 OFFICE O/P EST MOD 30 MIN: CPT | Performed by: ORTHOPAEDIC SURGERY

## 2024-11-18 NOTE — PROGRESS NOTES
Assessment & Plan/Medical Decision Makin y.o. female with Back Pain, Left Radicular Leg Pain, and Left Gluteal Pain and imaging findings most notable for lumbar spondylosis           The clinical, physical and imaging findings were reviewed with the patient.  Wendy  has a constellation of findings consistent with Lumbar Radiculopathy and Sacroiliac Joint Dysfunction in the setting of lumbar degenerative disease.     Fortunately patient remains neurologically intact and functional. Physical exam showing +TTP left SI joint region.  We discussed the treatment options including physical therapy, at home exercises, activity modifications, chiropractic medicine, oral medications, interventional spine procedures.  At this time recommend continued conservative treatments.     Patient has upcoming appointment with spine & pain management scheduled for 2025. Would recommend lumbar GAIL vs left SI joint injection. Patient did not require a new referral at today's visit. Discussed potential role of steroid injection at or near the source of pain to provide targeted relief.  Voltaren rx sent to patient's pharmacy. Discussed reasoning for prescribing medication, proper usage, and potential side effects. Continue with medications as previously prescribed if providing pain/symptom relief.  Would recommend patient start formal physical therapy to work on core and lumbar strengthening and stretching exercises. Referral placed for physical therapy. Maintain at home exercises and stretches.    Patient instructed to return to office/ER sooner if symptoms are not improving, getting worse, or new worrisome/neurologic symptoms arise.  Patient will follow up if pain persists despite conservative treatment.     Subjective:      Chief Complaint: Back Pain    HPI:  Wendy Raygoza is a 83 y.o. female presenting for initial visit with chief complaint of left-sided low back pain that radiates down the back of her left leg.  Pain  began within the past several weeks. She sustained a right ankle fracture in April 2024 and was in a CAM boot for several weeks, which she believes may be contributing to her back pain. The pain goes down he back of her leg and stops at the level of her ankle. Denies numbness or paresthesias. Pain is present with walking, pain will start immediately. Pain is relieved with sitting and laying down. She has started using a cane intermittently due to the pain. Denies previous back pain. Describes pain as sharp in nature.  Located in left-side low back and radiates down the back of her left leg.  Denies numbness . Denies burning. Back pain 25%, Leg pain 75%. Denies any danilo trauma. Denies fever or chills, no night sweats. Denies any bladder or bowel changes.      Conservative therapy includes the following:   Medications: Medrol dose pack x2 (improved pain), flexeril (helps sleeping)    Injections: None     Physical Therapy: None recently  Chiropractic Medicine: has not attempted  Accupunture/Massage Therapy: has not attempted   These therapeutic modalities were ineffective at providing sustained pain relief/functional improvement.     Nicotine dependent: No  Occupation: Retired   Living situation: Lives alone  ADLs: patient is able to perform     Update 7/19/24:  Wendy presents for follow up of left radicular pain. States that her pain has improved. She has not participated in formal PT.  She did obtain a lumbar MRI in the interim. She is here for MRI review.    Update HPI on 11/18/2024:  Wendy is here for follow up. Reports return of back, left gluteal region and left lower extremity radicular symptoms x 2 weeks or so. Pain and tingling into posterior left lower extremity at times. Pain worse with prolonged sitting, lying down, standing and walking. Difficult time getting comfortable when sitting and lying down. Subjective left lower extremity weakness at times, however notes weakness is more due to pain. Pain when  going from seated to standing position. Using cane for ambulation. She was recently started on zanaflex, gabapentin and Percocet without much relief. She has not started taking the gabapentin yet. Was also given medrol dose mara without much relief. She has an upcoming appointment with spine & pain management scheduled for 1/13/2025.    Objective:     Family History   Problem Relation Age of Onset    Osteoporosis Mother     Osteoarthritis Mother     Arthritis Mother     Other Father         heart problem    Hypertension Father     Heart disease Father     Coronary artery disease Family     Hyperlipidemia Family     Glaucoma Sister     No Known Problems Daughter     No Known Problems Maternal Grandmother     No Known Problems Maternal Grandfather     No Known Problems Paternal Grandmother     No Known Problems Paternal Grandfather     No Known Problems Maternal Aunt     No Known Problems Maternal Aunt     No Known Problems Maternal Aunt     No Known Problems Maternal Aunt     No Known Problems Maternal Aunt     No Known Problems Maternal Aunt     No Known Problems Maternal Aunt     Stroke Paternal Aunt         Margaret Dumas    No Known Problems Paternal Aunt     No Known Problems Paternal Aunt        Past Medical History:   Diagnosis Date    Anxiety 2021    My  was ill, and I was feeling stress.    Arthritis     Atherosclerosis     Carotid artery narrowing     Coronary artery disease     Diverticulitis of colon     Diverticulosis     GERD (gastroesophageal reflux disease)     Glaucoma     Hypercalcemia     Hyperlipidemia     Hyperparathyroidism (HCC)     Hypertension     Inflammatory bowel disease     Osteopenia     Severe cervical dysplasia, histologically confirmed     Skin cancer     squamous cell    Thyroid nodule     Vitamin D deficiency        Current Outpatient Medications   Medication Sig Dispense Refill    amLODIPine (NORVASC) 5 mg tablet Take 1 tablet (5 mg total) by mouth daily 100 tablet 1    aspirin  81 MG tablet Take 81 mg by mouth daily      atenolol (TENORMIN) 25 mg tablet TAKE 1 TABLET TWICE A  tablet 1    bimatoprost (LUMIGAN) 0.01 % ophthalmic drops Administer 1 drop to both eyes daily at bedtime      brinzolamide (AZOPT) 1 % ophthalmic suspension 1 drop 2 (two) times a day      Calcium Carbonate Antacid (TUMS EXTRA STRENGTH 750 PO) Take 1,500 mg by mouth 2 (two) times a day with meals Use before meals with a vitmain C tablet for improved absoprtion       calcium citrate (CALCITRATE) 950 (200 Ca) MG tablet TAKE 1 TABLET BY MOUTH EVERY DAY 90 tablet 1    Cholecalciferol (VITAMIN D3 PO) Take 5,000 Units by mouth in the morning      denosumab (PROLIA) 60 mg/mL Inject under the skin      diphenoxylate-atropine (LOMOTIL) 2.5-0.025 mg per tablet Take 1 tablet by mouth 3 (three) times a day as needed      escitalopram (LEXAPRO) 10 mg tablet TAKE 1 TABLET DAILY 90 tablet 1    Ferrous Sulfate (IRON PO) Take by mouth      furosemide (LASIX) 20 mg tablet Take 1/2 to 1 tablet daily as directed, then stop (Patient not taking: Reported on 11/8/2024) 7 tablet 0    gabapentin (NEURONTIN) 100 mg capsule Take 1 capsule (100 mg total) by mouth 3 (three) times a day 90 capsule 5    irbesartan (AVAPRO) 300 mg tablet Take 1 tablet (300 mg total) by mouth daily at bedtime 90 tablet 1    mesalamine (LIALDA) 1.2 g EC tablet TAKE 4 TABLETS BY MOUTH EVERY DAY AFTER BREAKFAST      mupirocin (BACTROBAN) 2 % ointment Apply topically 3 (three) times a day (Patient not taking: Reported on 11/8/2024) 30 g 0    oxyCODONE-acetaminophen (Percocet) 5-325 mg per tablet Take 1 tablet by mouth every 8 (eight) hours as needed for moderate pain for up to 20 days Max Daily Amount: 3 tablets 20 tablet 0    rosuvastatin (CRESTOR) 5 mg tablet TAKE ONE AND ONE-HALF TABLETS DAILY 135 tablet 1    Stelara 45 MG/0.5ML injection q 2 months      timolol (TIMOPTIC) 0.5 % ophthalmic solution       TiZANidine (ZANAFLEX) 2 MG capsule Take 1 capsule (2 mg  total) by mouth 3 (three) times a day for 5 days 15 capsule 0     Current Facility-Administered Medications   Medication Dose Route Frequency Provider Last Rate Last Admin    denosumab (PROLIA) subcutaneous injection 60 mg  60 mg Subcutaneous Q6 Months Tanvi Persaud MD   60 mg at 05/03/24 1439       Past Surgical History:   Procedure Laterality Date    APPENDECTOMY      BREAST SURGERY      reduction procedure    CAROTID ENDARTARECTOMY Right     carotid thromboendarterectomy     COLONOSCOPY      EVACUATION OF HEMATOMA N/A 09/29/2021    Procedure: EVACUATION/ DRAINAGE HEMATOMA;  Surgeon: Donald Gerard MD;  Location: [facility];  Service: ENT    EXPLORATION CAROTID ARTERY      HYSTERECTOMY      age 56    OOPHORECTOMY Bilateral     age 56    HI COLONOSCOPY FLX DX W/COLLJ SPEC WHEN PFRMD N/A 07/28/2017    Procedure: EGD AND COLONOSCOPY;  Surgeon: Debra Pritchard MD;  Location: [facility];  Service: Colorectal    HI PARATHYROIDECTOMY/EXPLORATION PARATHYROIDS Right 09/29/2021    Procedure: RIGHT INFERIOR PARATHYROIDECTOMY (INTRAOP PTH);  Surgeon: Donald Gerard MD;  Location: [facility];  Service: ENT    REDUCTION MAMMAPLASTY Bilateral 1987    TONSILLECTOMY  Childhood       Social History     Socioeconomic History    Marital status:      Spouse name: Not on file    Number of children: Not on file    Years of education: Not on file    Highest education level: Not on file   Occupational History    Occupation: Retired   Tobacco Use    Smoking status: Never    Smokeless tobacco: Never   Vaping Use    Vaping status: Never Used   Substance and Sexual Activity    Alcohol use: Yes     Alcohol/week: 4.0 standard drinks of alcohol     Types: 4 Glasses of wine per week     Comment: being a social drinker; drinks wine    Drug use: Never    Sexual activity: Not Currently     Partners: Male     Birth control/protection: Surgical   Other Topics Concern    Not on file   Social History Narrative    Drinks 2 cups coffee/day    Lack  "of exercise     Social Drivers of Health     Financial Resource Strain: Low Risk  (6/13/2023)    Overall Financial Resource Strain (CARDIA)     Difficulty of Paying Living Expenses: Not hard at all   Food Insecurity: No Food Insecurity (10/11/2024)    Nursing - Inadequate Food Risk Classification     Worried About Running Out of Food in the Last Year: Never true     Ran Out of Food in the Last Year: Never true     Ran Out of Food in the Last Year: Not on file   Transportation Needs: No Transportation Needs (10/11/2024)    PRAPARE - Transportation     Lack of Transportation (Medical): No     Lack of Transportation (Non-Medical): No   Physical Activity: Not on file   Stress: Not on file   Social Connections: Not on file   Intimate Partner Violence: Not on file   Housing Stability: Low Risk  (10/11/2024)    Housing Stability Vital Sign     Unable to Pay for Housing in the Last Year: No     Number of Times Moved in the Last Year: 0     Homeless in the Last Year: No       No Known Allergies    Review of Systems  General- denies fever/chills  HEENT- denies hearing loss or sore throat  Eyes- denies eye pain or visual disturbances, denies red eyes  Respiratory- denies cough or SOB  Cardio- denies chest pain or palpitations  GI- denies abdominal pain  Endocrine- denies urinary frequency  Urinary- denies pain with urination  Musculoskeletal- Negative except noted above  Skin- denies rashes or wounds  Neurological- denies dizziness or headache  Psychiatric- denies anxiety or difficulty concentrating    Physical Exam  BP (!) 88/60   Pulse 81   Ht 5' 4\" (1.626 m)   Wt 80.7 kg (177 lb 14.6 oz)   BMI 30.54 kg/m²     General/Constitutional: No apparent distress: well-nourished and well developed.  Lymphatic: No appreciable lymphadenopathy  Respiratory: Non-labored breathing  Vascular: No edema, swelling or tenderness, except as noted in detailed exam.  Integumentary: No impressive skin lesions present, except as noted in " detailed exam.  Psych: Normal mood and affect, oriented to person, place and time.  MSK: normal other than stated in HPI and exam  Gait & balance: no evidence of myelopathic gait, ambulates with a Cane    Lumbar spine range of motion:  -Forward flexion to 90  -Extension to neutral  -Lateral bend 25 right, 25 left  -Rotation 25 right, 25 left  There tenderness with palpation along lumbar paraspinal musculature, no midline tenderness     Neurologic:  Lower Extremity Motor Function    Right  Left    Iliopsoas  5/5  5/5    Quadriceps 5/5 5/5   Tibialis anterior  5/5  5/5    EHL  5/5  5/5    Gastroc. muscle  5/5  5/5    Heel rise  5/5  5/5    Toe rise  5/5  5/5      Sensory: light touch is intact to bilateral upper and lower extremities     Reflexes:    Right Left   Patellar 1+ 1+   Achilles 1+ 1+   Babinski neg neg     Other tests:  Straight Leg Raise: negative  Thomas SI: negative  ELYSE SI: negative  Greater troch: no tenderness   Internal/external hip ROM: intact, no pain   Flexion/extension knee ROM: intact, no pain   Vascular: WWP extremities, 2+DP bilateral    Tenderness to deep palpation over left-side SI joint    Diagnostic Tests   IMAGING: I have personally reviewed the images and these are my findings:  Lumbar Spine X-rays from 6/14/2024: multi level lumbar spondylosis with loss of disc height (most prominent at L2-3, L1-2 and degenerative scoliosis, osteophyte/ formation and facet hypertrophy, no apparent spondylolisthesis, no appreciated lytic/blastic lesions, no obvious instability    Lumbar Spine MRI from 7/13/24: multi level lumbar disc degeneration with disc desiccation, loss of disc height, facet and ligamentum hypertrophy, L1-3 degenerative scoliosis, varying degrees of lateral recess and foraminal stenosis bilateral L2-S1 without significant central stenosis    Electronic Medical Records were reviewed including notes from primary care physician.    Procedures, if performed today     None performed      "  Portions of the record may have been created with voice recognition software.  Occasional wrong word or \"sound a like\" substitutions may have occurred due to the inherent limitations of voice recognition software.  Read the chart carefully and recognize, using context, where substitutions have occurred.   "

## 2024-11-19 ENCOUNTER — TELEPHONE (OUTPATIENT)
Age: 84
End: 2024-11-19

## 2024-11-19 NOTE — TELEPHONE ENCOUNTER
Caller: Raf    Doctor: Bria ---> Mina    Reason for call: Pt was booked for consult in January however called to see if she could get an appt sooner. I offered her another provider, Dr Enriquez, Was able to move her appt up.      Call back#: [phone]

## 2024-11-19 NOTE — TELEPHONE ENCOUNTER
Reason for call: PA for Rx: Gabapentin  [x] Prior Auth  [] Other:     Caller:  [x] Patient  [] Pharmacy  Name:   Address:   Callback Number:     Medication: gabapentin (NEURONTIN) 100 mg capsule     Dose/Frequency:  Take 1 capsule (100 mg total) by mouth 3 (three) times a day     Quantity: 90 capsule    Ordering Provider:   [x] PCP/Provider -   [] Speciality/Provider -     Has the patient tried other medications and failed? If failed, which medications did they fail?    [] No   [] Yes -     Is the patient's insurance updated in EPIC?   [x] Yes   [] No     Is a copy of the patient's insurance scanned in EPIC?   [x] Yes   [] No

## 2024-11-20 NOTE — TELEPHONE ENCOUNTER
Pt called in regarding the prior authorization. Advised that it has been sent to the team and she will get updates as they come.

## 2024-11-20 NOTE — TELEPHONE ENCOUNTER
PA for Gabapentin (NEURONTIN) 100 mg capsule SUBMITTED to [facility]    via    [x]CMM-KEY: [id number] Case ID #: [id number]  []Surescripts-Case ID #   []Availity-Auth ID #   []Faxed to plan   []Other website   []Phone call Case ID #    [x]PA sent as URGENT    All office notes, labs and other pertaining documents and studies sent. Clinical questions answered. Awaiting determination from insurance company.     Turnaround time for your insurance to make a decision on your Prior Authorization can take 7-21 business days.

## 2024-11-21 NOTE — TELEPHONE ENCOUNTER
PA for Gabapentin (NEURONTIN) 100 mg capsule APPROVED     Date(s) approved until 11/19/2025    Case #: [id number]- [id number]    Patient advised by          []MyChart Message  [x]Phone call   [x]LMOM  []L/M to call office as no active Communication consent on file  []Unable to leave detailed message as VM not approved on Communication consent       Pharmacy advised by    [x]Fax  []Phone call    Approval letter scanned into Media Yes

## 2024-11-22 ENCOUNTER — TELEPHONE (OUTPATIENT)
Age: 84
End: 2024-11-22

## 2024-11-22 ENCOUNTER — CONSULT (OUTPATIENT)
Dept: PAIN MEDICINE | Facility: CLINIC | Age: 84
End: 2024-11-22
Payer: MEDICARE

## 2024-11-22 VITALS — BODY MASS INDEX: 30.22 KG/M2 | HEIGHT: 64 IN | WEIGHT: 177 LBS

## 2024-11-22 DIAGNOSIS — M48.061 LUMBAR FORAMINAL STENOSIS: ICD-10-CM

## 2024-11-22 DIAGNOSIS — M54.16 LUMBAR RADICULOPATHY: Primary | ICD-10-CM

## 2024-11-22 PROCEDURE — 99203 OFFICE O/P NEW LOW 30 MIN: CPT | Performed by: PAIN MEDICINE

## 2024-11-22 RX ORDER — GABAPENTIN 300 MG/1
CAPSULE ORAL
Qty: 90 CAPSULE | Refills: 0 | Status: SHIPPED | OUTPATIENT
Start: 2024-11-22

## 2024-11-22 NOTE — PROGRESS NOTES
Assessment  1. Lumbar radiculopathy  -     gabapentin (NEURONTIN) 300 mg capsule; Take 1 tablet at bedtime for 3 days, then 1 tablet twice daily for 3 days, then 1 tablet 3 times daily  -     FL spine and pain procedure; Future; Expected date: 11/22/2024  2. Lumbar foraminal stenosis  -     gabapentin (NEURONTIN) 300 mg capsule; Take 1 tablet at bedtime for 3 days, then 1 tablet twice daily for 3 days, then 1 tablet 3 times daily  -     FL spine and pain procedure; Future; Expected date: 11/22/2024        Wendy is a pleasant 83-year-old female history of left leg pain in the L5-S1 distribution MRI based on my interpretation shows L5-S1 foraminal stenosis severe on the left side due to foraminal disc protrusion.  Will recommend at this time a left L5-S1 S1 TFESI.    Start Gabapentin 300 mg tid            My impressions and treatment recommendations were discussed in detail with the patient who verbalized understanding and had no further questions.  Discharge instructions were provided. I personally saw and examined the patient and I agree with the above discussed plan of care.    Plan      New Medications Ordered This Visit   Medications    gabapentin (NEURONTIN) 300 mg capsule     Sig: Take 1 tablet at bedtime for 3 days, then 1 tablet twice daily for 3 days, then 1 tablet 3 times daily     Dispense:  90 capsule     Refill:  0       Orders Placed This Encounter   Procedures    FL spine and pain procedure     Standing Status:   Future     Expected Date:   11/22/2024     Expiration Date:   11/22/2028     Reason for Exam::   Left L5-s1, S1 TFESI     Anticoagulant hold needed?:   none       History of Present Illness    Wendy Raygoza is a 83 y.o. female with relevant PMH of low back and left leg pain ongoing since June 2024 symptoms worse with walking improved with sitting pain rates in the left calf and back of the leg pain can increase to 7 out of 10 with ambulation, impacting quality life she is done physical  therapy 6 weeks in last 6 months no prior surgeries in the spine she recent start gabapentin 100 mg 3 times daily.  Is unclear if it is helping.  Pain is moderate to severe in intensity.    I have personally reviewed and/or updated the patient's past medical history, past surgical history, family history, social history, current medications, allergies, and vital signs today.     Review of Systems   Musculoskeletal:  Positive for arthralgias, joint swelling and myalgias.   All other systems reviewed and are negative.      Patient Active Problem List   Diagnosis    Abnormal female pelvic exam    Diverticulosis    Glaucoma    Mixed hyperlipidemia    Hyperparathyroidism (HCC)    Primary hypertension    Age-related osteoporosis without current pathological fracture    Severe cervical dysplasia, histologically confirmed    Vitamin D deficiency    Ulcerative rectosigmoiditis without complication (HCC)    Edema    Anxiety    History of benign neoplasm of parathyroid gland    Immunosuppression due to drug therapy  (HCC)    Status post parathyroidectomy    Closed nondisplaced fracture of medial malleolus of right tibia, initial encounter    Venous insufficiency    Wound of left leg       Past Medical History:   Diagnosis Date    Anxiety 2021    My  was ill, and I was feeling stress.    Arthritis     Atherosclerosis     Carotid artery narrowing     Coronary artery disease     Diverticulitis of colon     Diverticulosis     GERD (gastroesophageal reflux disease)     Glaucoma     Hypercalcemia     Hyperlipidemia     Hyperparathyroidism (HCC)     Hypertension     Inflammatory bowel disease     Osteopenia     Severe cervical dysplasia, histologically confirmed     Skin cancer     squamous cell    Thyroid nodule     Vitamin D deficiency        Past Surgical History:   Procedure Laterality Date    APPENDECTOMY      BREAST SURGERY      reduction procedure    CAROTID ENDARTARECTOMY Right     carotid thromboendarterectomy      COLONOSCOPY      EVACUATION OF HEMATOMA N/A 09/29/2021    Procedure: EVACUATION/ DRAINAGE HEMATOMA;  Surgeon: Donald Gerard MD;  Location: [facility];  Service: ENT    EXPLORATION CAROTID ARTERY      HYSTERECTOMY      age 56    OOPHORECTOMY Bilateral     age 56    HI COLONOSCOPY FLX DX W/COLLJ SPEC WHEN PFRMD N/A 07/28/2017    Procedure: EGD AND COLONOSCOPY;  Surgeon: Debra Pritchard MD;  Location: [facility];  Service: Colorectal    HI PARATHYROIDECTOMY/EXPLORATION PARATHYROIDS Right 09/29/2021    Procedure: RIGHT INFERIOR PARATHYROIDECTOMY (INTRAOP PTH);  Surgeon: Donald Gerard MD;  Location: [facility];  Service: ENT    REDUCTION MAMMAPLASTY Bilateral 1987    TONSILLECTOMY  Childhood       Family History   Problem Relation Age of Onset    Osteoporosis Mother     Osteoarthritis Mother     Arthritis Mother     Other Father         heart problem    Hypertension Father     Heart disease Father     Coronary artery disease Family     Hyperlipidemia Family     Glaucoma Sister     No Known Problems Daughter     No Known Problems Maternal Grandmother     No Known Problems Maternal Grandfather     No Known Problems Paternal Grandmother     No Known Problems Paternal Grandfather     No Known Problems Maternal Aunt     No Known Problems Maternal Aunt     No Known Problems Maternal Aunt     No Known Problems Maternal Aunt     No Known Problems Maternal Aunt     No Known Problems Maternal Aunt     No Known Problems Maternal Aunt     Stroke Paternal Aunt         Margaret Piter    No Known Problems Paternal Aunt     No Known Problems Paternal Aunt        Social History     Occupational History    Occupation: Retired   Tobacco Use    Smoking status: Never    Smokeless tobacco: Never   Vaping Use    Vaping status: Never Used   Substance and Sexual Activity    Alcohol use: Yes     Alcohol/week: 4.0 standard drinks of alcohol     Types: 4 Glasses of wine per week     Comment: being a social drinker; drinks wine    Drug use: Never     Sexual activity: Not Currently     Partners: Male     Birth control/protection: Surgical       Current Outpatient Medications on File Prior to Visit   Medication Sig    amLODIPine (NORVASC) 5 mg tablet Take 1 tablet (5 mg total) by mouth daily    aspirin 81 MG tablet Take 81 mg by mouth daily    atenolol (TENORMIN) 25 mg tablet TAKE 1 TABLET TWICE A DAY    bimatoprost (LUMIGAN) 0.01 % ophthalmic drops Administer 1 drop to both eyes daily at bedtime    brinzolamide (AZOPT) 1 % ophthalmic suspension 1 drop 2 (two) times a day    Calcium Carbonate Antacid (TUMS EXTRA STRENGTH 750 PO) Take 1,500 mg by mouth 2 (two) times a day with meals Use before meals with a vitmain C tablet for improved absoprtion     calcium citrate (CALCITRATE) 950 (200 Ca) MG tablet TAKE 1 TABLET BY MOUTH EVERY DAY    Cholecalciferol (VITAMIN D3 PO) Take 5,000 Units by mouth in the morning    denosumab (PROLIA) 60 mg/mL Inject under the skin    Diclofenac Sodium (VOLTAREN) 1 % Apply 2 g topically 4 (four) times a day as needed (apply topically 4 times daily as needed)    diphenoxylate-atropine (LOMOTIL) 2.5-0.025 mg per tablet Take 1 tablet by mouth 3 (three) times a day as needed    escitalopram (LEXAPRO) 10 mg tablet TAKE 1 TABLET DAILY    Ferrous Sulfate (IRON PO) Take by mouth    gabapentin (NEURONTIN) 100 mg capsule Take 1 capsule (100 mg total) by mouth 3 (three) times a day    irbesartan (AVAPRO) 300 mg tablet Take 1 tablet (300 mg total) by mouth daily at bedtime    mesalamine (LIALDA) 1.2 g EC tablet TAKE 4 TABLETS BY MOUTH EVERY DAY AFTER BREAKFAST    oxyCODONE-acetaminophen (Percocet) 5-325 mg per tablet Take 1 tablet by mouth every 8 (eight) hours as needed for moderate pain for up to 20 days Max Daily Amount: 3 tablets    rosuvastatin (CRESTOR) 5 mg tablet TAKE ONE AND ONE-HALF TABLETS DAILY    Stelara 45 MG/0.5ML injection q 2 months    timolol (TIMOPTIC) 0.5 % ophthalmic solution     furosemide (LASIX) 20 mg tablet Take 1/2 to 1  "tablet daily as directed, then stop (Patient not taking: Reported on 11/8/2024)    mupirocin (BACTROBAN) 2 % ointment Apply topically 3 (three) times a day (Patient not taking: Reported on 11/8/2024)    TiZANidine (ZANAFLEX) 2 MG capsule Take 1 capsule (2 mg total) by mouth 3 (three) times a day for 5 days     Current Facility-Administered Medications on File Prior to Visit   Medication    denosumab (PROLIA) subcutaneous injection 60 mg       No Known Allergies      Physical Exam    Ht 5' 4\" (1.626 m)   Wt 80.3 kg (177 lb)   BMI 30.38 kg/m²         MSK:      Lumbar Spine:  No masses or atrophy,    Range of motion - Decreased extension/flexion  Palpation -  Tenderness to palpation in the lumbar parapsinals   PSIS tenderness no  Ander's/ELYSE no  Gaenslen's no  SLR + SLR left       Strength Right Left   Hip flexion L1,2 5 5   Knee extension L3 5 5   Ankle dorsiflexion L4 5 5   Great toe extension L5 5 5   Ankle Plantarflexion S1 5 5       Sensory Exam:  intact to light touch bilateral LE       Reflexes:     Right Left   Biceps 2+ 2+   Triceps 2+ 2+   Brachioradialis 2+ 2+   Patellar 1+ 1+   Achilles 1+ 1+   Babinski neg neg        Gait antalgic , walking with cane                  Imaging      MRI L spine    L4-L5: Moderate facet hypertrophy with ligamentous infolding identified. There is mild annular bulge with marginal osteophytes resulting in mild right foraminal narrowing. Minimal central stenosis.     L5-S1: Severe facet hypertrophy with ligamentous infolding. Left foraminal protrusion type disc herniation contacts the left L5 foraminal nerve root. If symptomatic, this would result in a left L5 radiculopathy.  "

## 2024-11-25 ENCOUNTER — TELEPHONE (OUTPATIENT)
Age: 84
End: 2024-11-25

## 2024-11-25 NOTE — TELEPHONE ENCOUNTER
Caller: alva Nguyen    Doctor: Nafisa    Reason for call: pt would like to know if she can take tylenol in between her doses of gabapentin    Call back#: [phone]

## 2024-11-26 ENCOUNTER — EVALUATION (OUTPATIENT)
Dept: PHYSICAL THERAPY | Facility: CLINIC | Age: 84
End: 2024-11-26
Payer: MEDICARE

## 2024-11-26 DIAGNOSIS — M54.16 LUMBAR RADICULOPATHY: Primary | ICD-10-CM

## 2024-11-26 DIAGNOSIS — M54.16 RADICULOPATHY, LUMBAR REGION: ICD-10-CM

## 2024-11-26 DIAGNOSIS — M53.3 SI (SACROILIAC) JOINT DYSFUNCTION: ICD-10-CM

## 2024-11-26 PROCEDURE — 97162 PT EVAL MOD COMPLEX 30 MIN: CPT | Performed by: PHYSICAL THERAPIST

## 2024-11-26 PROCEDURE — 97112 NEUROMUSCULAR REEDUCATION: CPT | Performed by: PHYSICAL THERAPIST

## 2024-11-26 NOTE — PROGRESS NOTES
PT Evaluation     Today's date: 2024  Patient name: Wendy Raygoza  : 1940  MRN: [id number]  Referring provider: Silas Hatfield MD  Dx:   Encounter Diagnosis     ICD-10-CM    1. Lumbar radiculopathy  M54.16                      Assessment  Impairments: abnormal gait, abnormal or restricted ROM, abnormal movement, activity intolerance, impaired physical strength, lacks appropriate home exercise program, pain with function, weight-bearing intolerance, poor posture  and poor body mechanics    Assessment details: Pt presents with s/s consistent with lumbar stenosis. She will benefit from skilled PT services to address her impairments in order to decrease pain and restore function.  Understanding of Dx/Px/POC: good     Prognosis: fair    Goals  STG - 4 wks  1) pt will normalize sitting posture  2) pt will improve pain in WB 50%    LTG - 8 wks  1) pt will normalize standing posture  2) pt will improve pain in WB 75%    Plan  Patient would benefit from: skilled physical therapy    Planned therapy interventions: abdominal trunk stabilization, joint mobilization, manual therapy, neuromuscular re-education, postural training, strengthening, stretching, therapeutic activities, therapeutic exercise, gait training, flexibility, functional ROM exercises, body mechanics training and home exercise program    Frequency: 1-2x week  Duration in weeks: 8  Treatment plan discussed with: patient        Subjective Evaluation    History of Present Illness  Mechanism of injury: Pt is a 83 y.o. female with PMHx significant for HTN, diverticulosis, osteoporosis, R ankle fx Mar '24, anxiety. She reports acute-on-chronic L LBP radiating into the L buttock, post thigh, calf, and medial ankle 3-4 wks ago. She denies red flags, N/T.    Patient Goals  Patient goals for therapy: decreased pain    Pain  Current pain ratin  At best pain rating: 3  At worst pain rating: 10  Location: L LB, L buttock, post thigh, calf, and medial  "ankle  Quality: sharp and radiating  Relieving factors: rest (sitting)  Exacerbated by: WB, lying supine.  Progression: worsening      Diagnostic Tests  MRI studies: abnormal (mild DJD, L L5/S1 HNP)  Treatments  No previous or current treatments        Objective     Concurrent Complaints  Positive for disturbed sleep. Negative for night pain, bladder dysfunction, saddle (S4) numbness, history of cancer, history of trauma and infection    Postural Observations  Seated posture: poor  Standing posture: poor  Correction of posture: makes symptoms worse      Neurological Testing     Sensation     Lumbar   Left   Intact: light touch    Right   Intact: light touch    Comments   Left light touch: L1-S1  Right light touch: L1-S1    Reflexes   Left   Patellar (L4): trace (1+)  Achilles (S1): trace (1+)    Right   Patellar (L4): trace (1+)  Achilles (S1): trace (1+)    Active Range of Motion     Lumbar   Flexion:  WFL  Extension:  with pain Restriction level: maximal  Mechanical Assessment    Cervical      Thoracic      Lumbar    Lying flexion: repeated movements  A,NB  Sitting flexion: repeated movements  A,NB  Standing extension: repeated movements  PE'ng, W    Strength/Myotome Testing     Left Hip   Planes of Motion   Flexion: 4-    Right Hip   Planes of Motion   Flexion: 4    Left Knee   Flexion: 4  Extension: 4+    Right Knee   Flexion: 4  Extension: 4+    Left Ankle/Foot   Dorsiflexion: 4+  Plantar flexion: 4  Great toe extension: 4+    Right Ankle/Foot   Dorsiflexion: 4+  Plantar flexion: 4-  Great toe extension: 4             Precautions: PMHx: HTN, diverticulosis, osteoporosis, R ankle fx Mar '24, anxiety  Dx: lumbar stenosis with a flexion DP    Manuals 11/26                                                                Neuro Re-Ed             Postural correction and awareness training 8 min            RFISit 3x10            RFIL 5\"/  2x10            Abdominal bracing 5\"x15            Supine DLS marches           "   bridges             Back extension machine                                                    Ther Ex                                                                                                                     Ther Activity             STS   1x10  2x10                     Gait Training             Gait training on floor with abdominal bracing   150 ft  300 ft                     Modalities

## 2024-11-27 ENCOUNTER — TELEPHONE (OUTPATIENT)
Age: 84
End: 2024-11-27

## 2024-11-27 NOTE — TELEPHONE ENCOUNTER
Caller: alva Nguyen    Doctor: Nafisa    Reason for call: pt calling to schedule a procedure    Call back#: [phone]

## 2024-12-02 ENCOUNTER — TELEPHONE (OUTPATIENT)
Dept: PAIN MEDICINE | Facility: CLINIC | Age: 84
End: 2024-12-02

## 2024-12-02 ENCOUNTER — OFFICE VISIT (OUTPATIENT)
Dept: PHYSICAL THERAPY | Facility: CLINIC | Age: 84
End: 2024-12-02
Payer: MEDICARE

## 2024-12-02 DIAGNOSIS — M54.16 LUMBAR RADICULOPATHY: Primary | ICD-10-CM

## 2024-12-02 DIAGNOSIS — M54.16 RADICULOPATHY, LUMBAR REGION: ICD-10-CM

## 2024-12-02 DIAGNOSIS — M53.3 SI (SACROILIAC) JOINT DYSFUNCTION: ICD-10-CM

## 2024-12-02 PROCEDURE — 97112 NEUROMUSCULAR REEDUCATION: CPT | Performed by: PHYSICAL THERAPIST

## 2024-12-02 NOTE — TELEPHONE ENCOUNTER
Caller: Wendy    Doctor: Anthony     Reason for call: patient states her pain is getting worse in the left leg the Gabapentin is not working please advise     Call back#: [phone]

## 2024-12-02 NOTE — TELEPHONE ENCOUNTER
S/w pt. Pt is taking Gabapentin 300 mg tid which was increased from 100 tid. Per pt she has only been at the 300 mg tid dose for 4-5 days. Pt advised it takes 2 weeks at the prescribed dose to know the benefit. Pt verbalized understanding and is scheduled for an epidural on 1/9.     Pt was prescribed Tizanidine 2 mg tid prn by her PCP for a 5 day supply. Per pt this med did provide relief and denies side effects but pt is out of this medication.Pt is requesting to have Tizanidine 2 mg tid prn ordered. Please advise-

## 2024-12-02 NOTE — PROGRESS NOTES
"Daily Note     Today's date: 2024  Patient name: Wendy Raygoza  : 1940  MRN: [id number]  Referring provider: Silas Hatfield MD  Dx:   Encounter Diagnosis     ICD-10-CM    1. Lumbar radiculopathy  M54.16       2. Radiculopathy, lumbar region  M54.16       3. SI (sacroiliac) joint dysfunction  M53.3                      Subjective: Pt reports good compliance and temporary relief with HEP.    Objective: See treatment diary below    Assessment: Pt demonstrated confirmation of flexion DP NWB > seated and fair tolerance to core stab.    Plan: Cont POC.     Precautions: PMHx: HTN, diverticulosis, osteoporosis, R ankle fx Mar '24, anxiety  Dx: lumbar stenosis with a flexion DP    Manuals                                                                Neuro Re-Ed             Postural correction and awareness training 8 min 5 min           RFISit 3x10 3\"/  3x10           RFIL 5\"/  2x10 5\"/  3x10           Abdominal bracing 5\"x15 5\"x20           Supine DLS marches  1x20 ea           bridges  1x10           Back extension machine                                                    Ther Ex                                                                                                                     Ther Activity             STS   1x10  2x10                     Gait Training             Gait training on floor with abdominal bracing  100 ft                        Modalities                                            "

## 2024-12-03 NOTE — TELEPHONE ENCOUNTER
Caller: alva Nguyen    Doctor: Nafsia    Reason for call: pt would like to know if 12/6 is still available for her procedure    Please call pt and advise    Call back#: [phone]

## 2024-12-04 ENCOUNTER — OFFICE VISIT (OUTPATIENT)
Dept: PHYSICAL THERAPY | Facility: CLINIC | Age: 84
End: 2024-12-04
Payer: MEDICARE

## 2024-12-04 DIAGNOSIS — M54.16 RADICULOPATHY, LUMBAR REGION: ICD-10-CM

## 2024-12-04 DIAGNOSIS — M54.16 LUMBAR RADICULOPATHY: Primary | ICD-10-CM

## 2024-12-04 DIAGNOSIS — M53.3 SI (SACROILIAC) JOINT DYSFUNCTION: ICD-10-CM

## 2024-12-04 PROCEDURE — 97112 NEUROMUSCULAR REEDUCATION: CPT | Performed by: PHYSICAL THERAPIST

## 2024-12-04 NOTE — PROGRESS NOTES
"Daily Note     Today's date: 2024  Patient name: Wendy Raygoza  : 1940  MRN: [id number]  Referring provider: Silas Hatfield MD  Dx:   Encounter Diagnosis     ICD-10-CM    1. Lumbar radiculopathy  M54.16       2. Radiculopathy, lumbar region  M54.16       3. SI (sacroiliac) joint dysfunction  M53.3                      Subjective: Pt reports improved WB tolerance. She is scheduled for LESI on Friday.    Objective: See treatment diary below    Assessment: Pt demonstrated improved standing posture and WB tolerance.    Plan: Cont POC.     Precautions: PMHx: HTN, diverticulosis, osteoporosis, R ankle fx Mar '24, anxiety  Dx: lumbar stenosis with a flexion DP    Manuals                                                               Neuro Re-Ed             Postural correction and awareness training 8 min 5 min 2 min          RFISit 3x10 3\"/  3x10 3\"/  3x10          RFIL 5\"/  2x10 5\"/  3x10 5\"/  3x10          Abdominal bracing 5\"x15 5\"x20 5\"x20          Supine DLS marches  1x20 ea 3x10 ea          Supine DLS dying bug   2x10 ea          bridges  1x10 1x10          Back extension machine                                                    Ther Ex                                                                                                                     Ther Activity             STS   1x10  2x10                     Gait Training             Gait training on floor with abdominal bracing  100 ft 2x75 ft                       Modalities                                            "

## 2024-12-05 DIAGNOSIS — F41.9 ANXIETY: ICD-10-CM

## 2024-12-06 ENCOUNTER — HOSPITAL ENCOUNTER (OUTPATIENT)
Dept: RADIOLOGY | Facility: HOSPITAL | Age: 84
Discharge: HOME | End: 2024-12-06
Attending: PAIN MEDICINE
Payer: MEDICARE

## 2024-12-06 VITALS
SYSTOLIC BLOOD PRESSURE: 165 MMHG | HEART RATE: 66 BPM | DIASTOLIC BLOOD PRESSURE: 77 MMHG | OXYGEN SATURATION: 95 % | TEMPERATURE: 97.7 F | RESPIRATION RATE: 17 BRPM

## 2024-12-06 DIAGNOSIS — M54.16 LUMBAR RADICULOPATHY: ICD-10-CM

## 2024-12-06 DIAGNOSIS — M48.061 LUMBAR FORAMINAL STENOSIS: ICD-10-CM

## 2024-12-06 PROCEDURE — 64484 NJX AA&/STRD TFRM EPI L/S EA: CPT | Performed by: PAIN MEDICINE

## 2024-12-06 PROCEDURE — 64483 NJX AA&/STRD TFRM EPI L/S 1: CPT | Performed by: PAIN MEDICINE

## 2024-12-06 RX ORDER — BUPIVACAINE HCL/PF 2.5 MG/ML
2 VIAL (ML) INJECTION ONCE
Status: COMPLETED | OUTPATIENT
Start: 2024-12-06 | End: 2024-12-06

## 2024-12-06 RX ORDER — METHYLPREDNISOLONE ACETATE 80 MG/ML
80 INJECTION, SUSPENSION INTRA-ARTICULAR; INTRALESIONAL; INTRAMUSCULAR; PARENTERAL; SOFT TISSUE ONCE
Status: COMPLETED | OUTPATIENT
Start: 2024-12-06 | End: 2024-12-06

## 2024-12-06 RX ADMIN — METHYLPREDNISOLONE ACETATE 80 MG: 80 INJECTION, SUSPENSION INTRA-ARTICULAR; INTRALESIONAL; INTRAMUSCULAR; SOFT TISSUE at 09:44

## 2024-12-06 RX ADMIN — IOHEXOL 1 ML: 300 INJECTION, SOLUTION INTRAVENOUS at 09:44

## 2024-12-06 RX ADMIN — BUPIVACAINE HYDROCHLORIDE 2 ML: 2.5 INJECTION, SOLUTION EPIDURAL; INFILTRATION; INTRACAUDAL at 09:45

## 2024-12-06 NOTE — DISCHARGE INSTR - LAB
Epidural Steroid Injection   WHAT YOU NEED TO KNOW:   An epidural steroid injection (GAIL) is a procedure to inject steroid medicine into the epidural space. The epidural space is between your spinal cord and vertebrae. Steroids reduce inflammation and fluid buildup in your spine that may be causing pain. You may be given pain medicine along with the steroids.          ACTIVITY  Do not drive or operate machinery today.  No strenuous activity today - bending, lifting, etc.  You may resume normal activites starting tomorrow - start slowly and as tolerated.  You may shower today, but no tub baths or hot tubs.  You may have numbness for several hours from the local anesthetic. Please use caution and common sense, especially with weight-bearing activities.    CARE OF THE INJECTION SITE  If you have soreness or pain, apply ice to the area today (20 minutes on/20 minutes off).  Starting tomorrow, you may use warm, moist heat or ice if needed.  You may have an increase or change in your discomfort for 36-48 hours after your treatment.  Apply ice and continue with any pain medication you have been prescribed.  Notify the [facility] if you have any of the following: redness, drainage, swelling, headache, stiff neck or fever above 100°F.    SPECIAL INSTRUCTIONS  Our office will contact you in approximately 14 days for a progress report.    MEDICATIONS  Continue to take all routine medications.  Our office may have instructed you to hold some medications.    As no general anesthesia was used in today's procedure, you should not experience any side effects related to anesthesia.     If you are diabetic, the steroids used in today's injection may temporarily increase your blood sugar levels after the first few days after your injection. Please keep a close eye on your sugars and alert the doctor who manages your diabetes if your sugars are significantly high from your baseline or you are symptomatic.     If you have a  problem specifically related to your procedure, please call our office at [phone].  Problems not related to your procedure should be directed to your primary care physician.

## 2024-12-06 NOTE — H&P
History of Present Illness: The patient is a 83 y.o. female who presents with complaints of low  back left leg pain.    Past Medical History:   Diagnosis Date    Anxiety 2021    My  was ill, and I was feeling stress.    Arthritis     Atherosclerosis     Carotid artery narrowing     Coronary artery disease     Diverticulitis of colon     Diverticulosis     GERD (gastroesophageal reflux disease)     Glaucoma     Hypercalcemia     Hyperlipidemia     Hyperparathyroidism (HCC)     Hypertension     Inflammatory bowel disease     Osteopenia     Severe cervical dysplasia, histologically confirmed     Skin cancer     squamous cell    Thyroid nodule     Vitamin D deficiency        Past Surgical History:   Procedure Laterality Date    APPENDECTOMY      BREAST SURGERY      reduction procedure    CAROTID ENDARTARECTOMY Right     carotid thromboendarterectomy     COLONOSCOPY      EVACUATION OF HEMATOMA N/A 09/29/2021    Procedure: EVACUATION/ DRAINAGE HEMATOMA;  Surgeon: Donald Gerard MD;  [facility];  Service: ENT    EXPLORATION CAROTID ARTERY      HYSTERECTOMY      age 56    OOPHORECTOMY Bilateral     age 56    PA COLONOSCOPY FLX DX W/COLLJ SPEC WHEN PFRMD N/A 07/28/2017    Procedure: EGD AND COLONOSCOPY;  Surgeon: Debra Pritchard MD;  [facility];  Service: Colorectal    PA PARATHYROIDECTOMY/EXPLORATION PARATHYROIDS Right 09/29/2021    Procedure: RIGHT INFERIOR PARATHYROIDECTOMY (INTRAOP PTH);  Surgeon: Donald Gerard MD;  [facility];  Service: ENT    REDUCTION MAMMAPLASTY Bilateral 1987    TONSILLECTOMY  Childhood         Current Outpatient Medications:     amLODIPine (NORVASC) 5 mg tablet, Take 1 tablet (5 mg total) by mouth daily, Disp: 100 tablet, Rfl: 1    aspirin 81 MG tablet, Take 81 mg by mouth daily, Disp: , Rfl:     atenolol (TENORMIN) 25 mg tablet, TAKE 1 TABLET TWICE A DAY, Disp: 180 tablet, Rfl: 1    bimatoprost (LUMIGAN) 0.01 % ophthalmic drops, Administer 1 drop to both eyes daily  at bedtime, Disp: , Rfl:     brinzolamide (AZOPT) 1 % ophthalmic suspension, 1 drop 2 (two) times a day, Disp: , Rfl:     Calcium Carbonate Antacid (TUMS EXTRA STRENGTH 750 PO), Take 1,500 mg by mouth 2 (two) times a day with meals Use before meals with a vitmain C tablet for improved absoprtion , Disp: , Rfl:     calcium citrate (CALCITRATE) 950 (200 Ca) MG tablet, TAKE 1 TABLET BY MOUTH EVERY DAY, Disp: 90 tablet, Rfl: 1    Cholecalciferol (VITAMIN D3 PO), Take 5,000 Units by mouth in the morning, Disp: , Rfl:     denosumab (PROLIA) 60 mg/mL, Inject under the skin, Disp: , Rfl:     Diclofenac Sodium (VOLTAREN) 1 %, Apply 2 g topically 4 (four) times a day as needed (apply topically 4 times daily as needed), Disp: 100 g, Rfl: 0    diphenoxylate-atropine (LOMOTIL) 2.5-0.025 mg per tablet, Take 1 tablet by mouth 3 (three) times a day as needed, Disp: , Rfl:     escitalopram (LEXAPRO) 10 mg tablet, TAKE 1 TABLET DAILY, Disp: 90 tablet, Rfl: 1    Ferrous Sulfate (IRON PO), Take by mouth, Disp: , Rfl:     furosemide (LASIX) 20 mg tablet, Take 1/2 to 1 tablet daily as directed, then stop (Patient not taking: Reported on 11/8/2024), Disp: 7 tablet, Rfl: 0    gabapentin (NEURONTIN) 100 mg capsule, Take 1 capsule (100 mg total) by mouth 3 (three) times a day, Disp: 90 capsule, Rfl: 5    gabapentin (NEURONTIN) 300 mg capsule, Take 1 tablet at bedtime for 3 days, then 1 tablet twice daily for 3 days, then 1 tablet 3 times daily, Disp: 90 capsule, Rfl: 0    irbesartan (AVAPRO) 300 mg tablet, Take 1 tablet (300 mg total) by mouth daily at bedtime, Disp: 90 tablet, Rfl: 1    mesalamine (LIALDA) 1.2 g EC tablet, TAKE 4 TABLETS BY MOUTH EVERY DAY AFTER BREAKFAST, Disp: , Rfl:     mupirocin (BACTROBAN) 2 % ointment, Apply topically 3 (three) times a day (Patient not taking: Reported on 11/8/2024), Disp: 30 g, Rfl: 0    rosuvastatin (CRESTOR) 5 mg tablet, TAKE ONE AND ONE-HALF TABLETS DAILY, Disp: 135 tablet, Rfl: 1    Stelara 45  MG/0.5ML injection, q 2 months, Disp: , Rfl:     timolol (TIMOPTIC) 0.5 % ophthalmic solution, , Disp: , Rfl:     TiZANidine (ZANAFLEX) 2 MG capsule, Take 1 capsule (2 mg total) by mouth 3 (three) times a day for 5 days, Disp: 15 capsule, Rfl: 0    Current Facility-Administered Medications:     denosumab (PROLIA) subcutaneous injection 60 mg, 60 mg, Subcutaneous, Q6 Months, Tanvi Persaud MD, 60 mg at 05/03/24 1439    No Known Allergies    Physical Exam:   Vitals:    12/06/24 0933   Pulse: 64   Resp: 17   Temp: 97.7 °F (36.5 °C)   SpO2: 96%     General: Awake, Alert, Oriented x 3, Mood and affect appropriate  Respiratory: Respirations even and unlabored  Cardiovascular: Peripheral pulses intact; no edema  Musculoskeletal Exam: + SLR left    ASA Score: 2  Patient/Chart Verification  Patient ID Verified: Verbal  ID Band Applied: No  Consents Confirmed: Procedural  H&P( within 30 days) Verified: To be obtained in the Procedural area  Allergies Reviewed: Yes  Anticoag/NSAID held?: NA  Currently on antibiotics?: No    Assessment:   1. Lumbar radiculopathy    2. Lumbar foraminal stenosis        Plan: Left L5-s1, S1 TFESI    The risks of the procedure were discussed in detail.  These risks include infection, increased pain, paralysis, bleeding.  Patient understands the risks and is willing to pursue with the procedure

## 2024-12-08 RX ORDER — ESCITALOPRAM OXALATE 10 MG/1
TABLET ORAL
Qty: 90 TABLET | Refills: 1 | Status: SHIPPED | OUTPATIENT
Start: 2024-12-08

## 2024-12-08 NOTE — ASSESSMENT & PLAN NOTE
- Detailed above   Orders:    Ambulatory Referral to Physical Therapy; Future    TiZANidine (ZANAFLEX) 2 MG capsule; Take 1 capsule (2 mg total) by mouth 3 (three) times a day for 5 days    gabapentin (NEURONTIN) 100 mg capsule; Take 1 capsule (100 mg total) by mouth 3 (three) times a day    oxyCODONE-acetaminophen (Percocet) 5-325 mg per tablet; Take 1 tablet by mouth every 8 (eight) hours as needed for moderate pain for up to 20 days Max Daily Amount: 3 tablets

## 2024-12-09 ENCOUNTER — OFFICE VISIT (OUTPATIENT)
Dept: PHYSICAL THERAPY | Facility: CLINIC | Age: 84
End: 2024-12-09
Payer: MEDICARE

## 2024-12-09 DIAGNOSIS — M53.3 SI (SACROILIAC) JOINT DYSFUNCTION: ICD-10-CM

## 2024-12-09 DIAGNOSIS — M54.16 LUMBAR RADICULOPATHY: Primary | ICD-10-CM

## 2024-12-09 DIAGNOSIS — M54.16 RADICULOPATHY, LUMBAR REGION: ICD-10-CM

## 2024-12-09 PROCEDURE — 97112 NEUROMUSCULAR REEDUCATION: CPT | Performed by: PHYSICAL THERAPIST

## 2024-12-09 NOTE — PROGRESS NOTES
"Daily Note     Today's date: 2024  Patient name: Wendy Raygoza  : 1940  MRN: [id number]  Referring provider: Silas Hatfield MD  Dx:   Encounter Diagnosis     ICD-10-CM    1. Lumbar radiculopathy  M54.16       2. Radiculopathy, lumbar region  M54.16       3. SI (sacroiliac) joint dysfunction  M53.3                      Subjective: Pt presents to PT s/p L4/5 GAIL 3 days ago. She reports improved LLE pain.    Objective: See treatment diary below    Assessment: Pt demonstrated improved standing posture and ambulation tolerance.    Plan: Cont POC.     Precautions: PMHx: HTN, diverticulosis, osteoporosis, R ankle fx Mar '24, anxiety  Dx: lumbar stenosis with a flexion DP    Manuals                                                              Neuro Re-Ed             Postural correction and awareness training 8 min 5 min 2 min          RFISit 3x10 3\"/  3x10 3\"/  3x10 3\"/  4x10         RFIL 5\"/  2x10 5\"/  3x10 5\"/  3x10 5\"/  4x10         Abdominal bracing 5\"x15 5\"x20 5\"x20 5\"x20         Supine DLS marches  1x20 ea 3x10 ea 1#  3x10 ea         Supine DLS dying bug   2x10 ea 3x10 ea         bridges  1x10 1x10 1x10         Back extension machine    nv                                                Ther Ex                                                                                                                     Ther Activity             STS   1x10 nv 2x10                     Gait Training             Gait training on floor with abdominal bracing  100 ft 2x75 ft 1x150 ft    1x200 ft                      Modalities                                              "

## 2024-12-11 ENCOUNTER — OFFICE VISIT (OUTPATIENT)
Dept: PHYSICAL THERAPY | Facility: CLINIC | Age: 84
End: 2024-12-11
Payer: MEDICARE

## 2024-12-11 DIAGNOSIS — M54.16 RADICULOPATHY, LUMBAR REGION: ICD-10-CM

## 2024-12-11 DIAGNOSIS — M54.16 LUMBAR RADICULOPATHY: Primary | ICD-10-CM

## 2024-12-11 DIAGNOSIS — M53.3 SI (SACROILIAC) JOINT DYSFUNCTION: ICD-10-CM

## 2024-12-11 PROCEDURE — 97112 NEUROMUSCULAR REEDUCATION: CPT | Performed by: PHYSICAL THERAPIST

## 2024-12-11 NOTE — PROGRESS NOTES
"Daily Note     Today's date: 2024  Patient name: Wendy Raygoza  : 1940  MRN: [id number]  Referring provider: Silas Hatfield MD  Dx:   Encounter Diagnosis     ICD-10-CM    1. Lumbar radiculopathy  M54.16       2. Radiculopathy, lumbar region  M54.16       3. SI (sacroiliac) joint dysfunction  M53.3                      Subjective: Pt reports improved pain.    Objective: See treatment diary below    Assessment: Pt demonstrated improved standing posture, ambulation tolerance, and core stability albeit with cueing.    Plan: Cont POC.     Precautions: PMHx: HTN, diverticulosis, osteoporosis, R ankle fx Mar '24, anxiety  Dx: lumbar stenosis with a flexion DP    Manuals                                                             Neuro Re-Ed             Postural correction and awareness training 8 min 5 min 2 min          RFISit 3x10 3\"/  3x10 3\"/  3x10 3\"/  4x10 3\"/  4x10        RFIL 5\"/  2x10 5\"/  3x10 5\"/  3x10 5\"/  4x10 5\"/  3x10        Abdominal bracing 5\"x15 5\"x20 5\"x20 5\"x20 5\"x20        Supine DLS marches  1x20 ea 3x10 ea 1#  3x10 ea 1.5#  3x10 ea        Supine DLS dying bug   2x10 ea 3x10 ea 3x10 ea        bridges  1x10 1x10 1x10 2x10        Back extension machine    nv                                                Ther Ex                                                                                                                     Ther Activity             STS   1x10 nv 2x10                     Gait Training             Gait training on floor with abdominal bracing  100 ft 2x75 ft 1x150 ft    1x200 ft 2x200 ft                     Modalities                                              "

## 2024-12-13 ENCOUNTER — TELEPHONE (OUTPATIENT)
Age: 84
End: 2024-12-13

## 2024-12-13 NOTE — TELEPHONE ENCOUNTER
Patient called in, has appointment on Jan 2nd, asks if she needs labs?  No orders in system.  If so, she would like to do them around 12/18. Please advise and call patient.

## 2024-12-14 DIAGNOSIS — M54.16 LUMBAR RADICULOPATHY: ICD-10-CM

## 2024-12-14 DIAGNOSIS — M48.061 LUMBAR FORAMINAL STENOSIS: ICD-10-CM

## 2024-12-16 ENCOUNTER — OFFICE VISIT (OUTPATIENT)
Dept: PHYSICAL THERAPY | Facility: CLINIC | Age: 84
End: 2024-12-16
Payer: MEDICARE

## 2024-12-16 DIAGNOSIS — M53.3 SI (SACROILIAC) JOINT DYSFUNCTION: ICD-10-CM

## 2024-12-16 DIAGNOSIS — M54.16 LUMBAR RADICULOPATHY: Primary | ICD-10-CM

## 2024-12-16 DIAGNOSIS — M54.16 RADICULOPATHY, LUMBAR REGION: ICD-10-CM

## 2024-12-16 PROCEDURE — 97530 THERAPEUTIC ACTIVITIES: CPT | Performed by: PHYSICAL THERAPIST

## 2024-12-16 PROCEDURE — 97112 NEUROMUSCULAR REEDUCATION: CPT | Performed by: PHYSICAL THERAPIST

## 2024-12-16 NOTE — PROGRESS NOTES
"Daily Note     Today's date: 2024  Patient name: Wendy Raygoza  : 1940  MRN: [id number]  Referring provider: Silas Hatfield MD  Dx:   Encounter Diagnosis     ICD-10-CM    1. Lumbar radiculopathy  M54.16       2. Radiculopathy, lumbar region  M54.16       3. SI (sacroiliac) joint dysfunction  M53.3                      Subjective: Pt reports improved pain.    Objective: See treatment diary below    KARRIE NE, NE    Assessment: Pt demonstrated NE, NE with KARRIE f/b mildly improved ability to maintain postural correction in standing.    Plan: Cont POC.     Precautions: PMHx: HTN, diverticulosis, osteoporosis, R ankle fx Mar '24, anxiety  Dx: lumbar stenosis with a flexion DP    Manuals                                                            Neuro Re-Ed             Postural correction and awareness training 8 min 5 min 2 min          RFISit 3x10 3\"/  3x10 3\"/  3x10 3\"/  4x10 3\"/  4x10 3\"/  2x10       RFIL 5\"/  2x10 5\"/  3x10 5\"/  3x10 5\"/  4x10 5\"/  3x10 HEP       Abdominal bracing 5\"x15 5\"x20 5\"x20 5\"x20 5\"x20        Supine DLS marches  1x20 ea 3x10 ea 1#  3x10 ea 1.5#  3x10 ea 2#  3x10 ea       Supine DLS dying bug   2x10 ea 3x10 ea 3x10 ea 1# ea  3x10 ea       bridges  1x10 1x10 1x10 2x10 3x10       Back extension machine    nv  40#  3x10       KARRIE      1x10       Standing hip flexor stretch      nv                    Ther Ex                                                                                                                     Ther Activity             STS   1x10 nv 2x10 2x10                    Gait Training             Gait training on floor with abdominal bracing  100 ft 2x75 ft 1x150 ft    1x200 ft 2x200 ft 2x300 ft                    Modalities                                              " povidone-iodine ( under 2 weeks of age or 1500 grams)

## 2024-12-18 ENCOUNTER — OFFICE VISIT (OUTPATIENT)
Dept: PHYSICAL THERAPY | Facility: CLINIC | Age: 84
End: 2024-12-18
Payer: MEDICARE

## 2024-12-18 DIAGNOSIS — M53.3 SI (SACROILIAC) JOINT DYSFUNCTION: ICD-10-CM

## 2024-12-18 DIAGNOSIS — M54.16 LUMBAR RADICULOPATHY: Primary | ICD-10-CM

## 2024-12-18 DIAGNOSIS — M54.16 RADICULOPATHY, LUMBAR REGION: ICD-10-CM

## 2024-12-18 PROCEDURE — 97112 NEUROMUSCULAR REEDUCATION: CPT | Performed by: PHYSICAL THERAPIST

## 2024-12-18 NOTE — PROGRESS NOTES
"Daily Note     Today's date: 2024  Patient name: Wendy Raygoza  : 1940  MRN: [id number]  Referring provider: Silas Hatfield MD  Dx:   Encounter Diagnosis     ICD-10-CM    1. Lumbar radiculopathy  M54.16       2. Radiculopathy, lumbar region  M54.16       3. SI (sacroiliac) joint dysfunction  M53.3                        Subjective: Pt reports improved pain and posture.    Objective: See treatment diary below    ESCOBAR MEDRANO    Assessment: Pt demonstrated good safety walking on level surfaces, short distances without AD.    Plan: Cont POC.     Precautions: PMHx: HTN, diverticulosis, osteoporosis, R ankle fx Mar '24, anxiety  Dx: lumbar stenosis with a flexion DP    Manuals                                                           Neuro Re-Ed             Postural correction and awareness training 8 min 5 min 2 min          RFISit 3x10 3\"/  3x10 3\"/  3x10 3\"/  4x10 3\"/  4x10 3\"/  2x10 3\"/  5x10      RFIL 5\"/  2x10 5\"/  3x10 5\"/  3x10 5\"/  4x10 5\"/  3x10 HEP       Abdominal bracing 5\"x15 5\"x20 5\"x20 5\"x20 5\"x20        Supine DLS marches  1x20 ea 3x10 ea 1#  3x10 ea 1.5#  3x10 ea 2#  3x10 ea 2#  3x10 ea      Supine DLS dying bug   2x10 ea 3x10 ea 3x10 ea 1# ea  3x10 ea 1# ea  3x10 ea      bridges  1x10 1x10 1x10 2x10 3x10 3x10      Back extension machine    nv  40#  3x10 nv      KARRIE      1x10 1x10      Standing hip flexor stretch      nv                    Ther Ex                                                                                                                     Ther Activity             STS   1x10 nv 2x10 2x10 3x10                   Gait Training             Gait training on floor with abdominal bracing  100 ft 2x75 ft 1x150 ft    1x200 ft 2x200 ft 2x300 ft SPC  2x350 ft                   Modalities                                              "

## 2024-12-20 ENCOUNTER — TELEPHONE (OUTPATIENT)
Dept: PAIN MEDICINE | Facility: MEDICAL CENTER | Age: 84
End: 2024-12-20

## 2024-12-20 NOTE — TELEPHONE ENCOUNTER
Caller: Wendy  Doctor/office: DOMINGO YAN#: [phone]    % of improvement: 80%  Pain Scale (1-10): 7/10

## 2024-12-22 RX ORDER — GABAPENTIN 300 MG/1
CAPSULE ORAL
Qty: 90 CAPSULE | Refills: 0 | Status: SHIPPED | OUTPATIENT
Start: 2024-12-22

## 2024-12-24 DIAGNOSIS — I10 HYPERTENSION, UNSPECIFIED TYPE: ICD-10-CM

## 2024-12-24 RX ORDER — ATENOLOL 25 MG/1
25 TABLET ORAL 2 TIMES DAILY
Qty: 180 TABLET | Refills: 1 | Status: SHIPPED | OUTPATIENT
Start: 2024-12-24

## 2025-01-03 ENCOUNTER — OFFICE VISIT (OUTPATIENT)
Dept: PAIN MEDICINE | Facility: CLINIC | Age: 85
End: 2025-01-03
Payer: MEDICARE

## 2025-01-03 VITALS
HEIGHT: 64 IN | DIASTOLIC BLOOD PRESSURE: 70 MMHG | WEIGHT: 177 LBS | BODY MASS INDEX: 30.22 KG/M2 | SYSTOLIC BLOOD PRESSURE: 114 MMHG

## 2025-01-03 DIAGNOSIS — M48.061 LUMBAR FORAMINAL STENOSIS: ICD-10-CM

## 2025-01-03 DIAGNOSIS — M54.16 LUMBAR RADICULOPATHY: Primary | ICD-10-CM

## 2025-01-03 PROCEDURE — 99213 OFFICE O/P EST LOW 20 MIN: CPT | Performed by: PAIN MEDICINE

## 2025-01-03 NOTE — PROGRESS NOTES
Assessment  1. Lumbar radiculopathy  2. Lumbar foraminal stenosis          Wendy is a pleasant 83-year-old female history of left leg pain in the L5-S1 distribution MRI based on my interpretation shows L5-S1 foraminal stenosis severe on the left side due to foraminal disc protrusion.      Status post left L5-S1 TFESI doing well    F/u 4 weeks            My impressions and treatment recommendations were discussed in detail with the patient who verbalized understanding and had no further questions.  Discharge instructions were provided. I personally saw and examined the patient and I agree with the above discussed plan of care.    Plan      No orders of the defined types were placed in this encounter.      No orders of the defined types were placed in this encounter.      History of Present Illness    Follow-up 1/3/2025  Wendy comes for follow-up status post left L5-S1, S1 TFESI completed 12/6/2024 she reports 80 to 90% improvement in her left leg pain she has been improved walking without pain symptoms.  Currently taking gabapentin 300 milligrams 3 times daily.    Consult  Wendy Raygoza is a 84 y.o. female with relevant PMH of low back and left leg pain ongoing since June 2024 symptoms worse with walking improved with sitting pain rates in the left calf and back of the leg pain can increase to 7 out of 10 with ambulation, impacting quality life she is done physical therapy 6 weeks in last 6 months no prior surgeries in the spine she recent start gabapentin 100 mg 3 times daily.  Is unclear if it is helping.  Pain is moderate to severe in intensity.    I have personally reviewed and/or updated the patient's past medical history, past surgical history, family history, social history, current medications, allergies, and vital signs today.     Review of Systems   Musculoskeletal:  Positive for arthralgias, joint swelling and myalgias.   All other systems reviewed and are negative.      Patient Active Problem List    Diagnosis   • Abnormal female pelvic exam   • Diverticulosis   • Glaucoma   • Mixed hyperlipidemia   • Hyperparathyroidism (HCC)   • Primary hypertension   • Age-related osteoporosis without current pathological fracture   • Severe cervical dysplasia, histologically confirmed   • Vitamin D deficiency   • Ulcerative rectosigmoiditis without complication (HCC)   • Edema   • Anxiety   • History of benign neoplasm of parathyroid gland   • Immunosuppression due to drug therapy  (HCC)   • Status post parathyroidectomy   • Closed nondisplaced fracture of medial malleolus of right tibia, initial encounter   • Venous insufficiency   • Wound of left leg   • Lumbar radiculopathy       Past Medical History:   Diagnosis Date   • Anxiety 2021    My  was ill, and I was feeling stress.   • Arthritis    • Atherosclerosis    • Carotid artery narrowing    • Coronary artery disease    • Diverticulitis of colon    • Diverticulosis    • GERD (gastroesophageal reflux disease)    • Glaucoma    • Hypercalcemia    • Hyperlipidemia    • Hyperparathyroidism (HCC)    • Hypertension    • Inflammatory bowel disease    • Osteopenia    • Severe cervical dysplasia, histologically confirmed    • Skin cancer     squamous cell   • Thyroid nodule    • Vitamin D deficiency        Past Surgical History:   Procedure Laterality Date   • APPENDECTOMY     • BREAST SURGERY      reduction procedure   • CAROTID ENDARTARECTOMY Right     carotid thromboendarterectomy    • COLONOSCOPY     • EVACUATION OF HEMATOMA N/A 09/29/2021    Procedure: EVACUATION/ DRAINAGE HEMATOMA;  Surgeon: Donald Gerard MD;  [facility];  Service: ENT   • EXPLORATION CAROTID ARTERY     • HYSTERECTOMY      age 56   • OOPHORECTOMY Bilateral     age 56   • MO COLONOSCOPY FLX DX W/COLLJ SPEC WHEN PFRMD N/A 07/28/2017    Procedure: EGD AND COLONOSCOPY;  Surgeon: Debra Pritchard MD;  [facility];  Service: Colorectal   • MO PARATHYROIDECTOMY/EXPLORATION PARATHYROIDS  Right 09/29/2021    Procedure: RIGHT INFERIOR PARATHYROIDECTOMY (INTRAOP PTH);  Surgeon: Donald Gerard MD;  [facility];  Service: ENT   • REDUCTION MAMMAPLASTY Bilateral 1987   • TONSILLECTOMY  Childhood       Family History   Problem Relation Age of Onset   • Osteoporosis Mother    • Osteoarthritis Mother    • Arthritis Mother    • Other Father         heart problem   • Hypertension Father    • Heart disease Father    • Coronary artery disease Family    • Hyperlipidemia Family    • Glaucoma Sister    • No Known Problems Daughter    • No Known Problems Maternal Grandmother    • No Known Problems Maternal Grandfather    • No Known Problems Paternal Grandmother    • No Known Problems Paternal Grandfather    • No Known Problems Maternal Aunt    • No Known Problems Maternal Aunt    • No Known Problems Maternal Aunt    • No Known Problems Maternal Aunt    • No Known Problems Maternal Aunt    • No Known Problems Maternal Aunt    • No Known Problems Maternal Aunt    • Stroke Paternal Aunt         Margaret Dumas   • No Known Problems Paternal Aunt    • No Known Problems Paternal Aunt        Social History     Occupational History   • Occupation: Retired   Tobacco Use   • Smoking status: Never   • Smokeless tobacco: Never   Vaping Use   • Vaping status: Never Used   Substance and Sexual Activity   • Alcohol use: Yes     Alcohol/week: 4.0 standard drinks of alcohol     Types: 4 Glasses of wine per week     Comment: being a social drinker; drinks wine   • Drug use: Never   • Sexual activity: Not Currently     Partners: Male     Birth control/protection: Surgical       Current Outpatient Medications on File Prior to Visit   Medication Sig   • amLODIPine (NORVASC) 5 mg tablet Take 1 tablet (5 mg total) by mouth daily   • aspirin 81 MG tablet Take 81 mg by mouth daily   • atenolol (TENORMIN) 25 mg tablet Take 1 tablet (25 mg total) by mouth 2 (two) times a day   • bimatoprost (LUMIGAN) 0.01 % ophthalmic drops Administer 1  drop to both eyes daily at bedtime   • brinzolamide (AZOPT) 1 % ophthalmic suspension 1 drop 2 (two) times a day   • Calcium Carbonate Antacid (TUMS EXTRA STRENGTH 750 PO) Take 1,500 mg by mouth 2 (two) times a day with meals Use before meals with a vitmain C tablet for improved absoprtion    • calcium citrate (CALCITRATE) 950 (200 Ca) MG tablet TAKE 1 TABLET BY MOUTH EVERY DAY   • Cholecalciferol (VITAMIN D3 PO) Take 5,000 Units by mouth in the morning   • denosumab (PROLIA) 60 mg/mL Inject under the skin   • diphenoxylate-atropine (LOMOTIL) 2.5-0.025 mg per tablet Take 1 tablet by mouth 3 (three) times a day as needed   • escitalopram (LEXAPRO) 10 mg tablet TAKE 1 TABLET DAILY   • Ferrous Sulfate (IRON PO) Take by mouth   • gabapentin (NEURONTIN) 300 mg capsule TAKE 1 CAPSULE BY MOUTH AT BEDTIME FOR 3 DAYS, TAKE 1 CAPSULE BY MOUTH TWO TIMES DAILY FOR 3 DAYS, THEN 1 CAPSULE THREE TIMES DAILY   • irbesartan (AVAPRO) 300 mg tablet Take 1 tablet (300 mg total) by mouth daily at bedtime   • mesalamine (LIALDA) 1.2 g EC tablet TAKE 4 TABLETS BY MOUTH EVERY DAY AFTER BREAKFAST   • rosuvastatin (CRESTOR) 5 mg tablet TAKE ONE AND ONE-HALF TABLETS DAILY   • Stelara 45 MG/0.5ML injection q 2 months   • timolol (TIMOPTIC) 0.5 % ophthalmic solution    • Diclofenac Sodium (VOLTAREN) 1 % Apply 2 g topically 4 (four) times a day as needed (apply topically 4 times daily as needed)   • furosemide (LASIX) 20 mg tablet Take 1/2 to 1 tablet daily as directed, then stop (Patient not taking: Reported on 11/8/2024)   • gabapentin (NEURONTIN) 100 mg capsule Take 1 capsule (100 mg total) by mouth 3 (three) times a day   • mupirocin (BACTROBAN) 2 % ointment Apply topically 3 (three) times a day (Patient not taking: Reported on 11/8/2024)   • TiZANidine (ZANAFLEX) 2 MG capsule Take 1 capsule (2 mg total) by mouth 3 (three) times a day for 5 days     Current Facility-Administered Medications on File Prior to Visit   Medication   • denosumab  "(PROLIA) subcutaneous injection 60 mg       No Known Allergies      Physical Exam    /70   Ht 5' 4\" (1.626 m)   Wt 80.3 kg (177 lb)   BMI 30.38 kg/m²         MSK:      Lumbar Spine:  No masses or atrophy,    Range of motion - Decreased extension/flexion  Palpation -  Tenderness to palpation in the lumbar parapsinals   PSIS tenderness no  Ander's/ELYSE no  Gaenslen's no  SLR + SLR left       Strength Right Left   Hip flexion L1,2 5 5   Knee extension L3 5 5   Ankle dorsiflexion L4 5 5   Great toe extension L5 5 5   Ankle Plantarflexion S1 5 5       Sensory Exam:  intact to light touch bilateral LE       Reflexes:     Right Left   Biceps 2+ 2+   Triceps 2+ 2+   Brachioradialis 2+ 2+   Patellar 1+ 1+   Achilles 1+ 1+   Babinski neg neg        Gait antalgic , walking with cane                  Imaging      MRI L spine    L4-L5: Moderate facet hypertrophy with ligamentous infolding identified. There is mild annular bulge with marginal osteophytes resulting in mild right foraminal narrowing. Minimal central stenosis.     L5-S1: Severe facet hypertrophy with ligamentous infolding. Left foraminal protrusion type disc herniation contacts the left L5 foraminal nerve root. If symptomatic, this would result in a left L5 radiculopathy.  "

## 2025-01-08 DIAGNOSIS — I10 HYPERTENSION, UNSPECIFIED TYPE: ICD-10-CM

## 2025-01-08 RX ORDER — ATENOLOL 25 MG/1
25 TABLET ORAL 2 TIMES DAILY
Qty: 180 TABLET | Refills: 3 | Status: SHIPPED | OUTPATIENT
Start: 2025-01-08

## 2025-01-13 ENCOUNTER — EVALUATION (OUTPATIENT)
Dept: PHYSICAL THERAPY | Facility: CLINIC | Age: 85
End: 2025-01-13
Payer: MEDICARE

## 2025-01-13 DIAGNOSIS — M53.3 SI (SACROILIAC) JOINT DYSFUNCTION: ICD-10-CM

## 2025-01-13 DIAGNOSIS — M54.16 LUMBAR RADICULOPATHY: Primary | ICD-10-CM

## 2025-01-13 DIAGNOSIS — M54.16 RADICULOPATHY, LUMBAR REGION: ICD-10-CM

## 2025-01-13 PROCEDURE — 97112 NEUROMUSCULAR REEDUCATION: CPT | Performed by: PHYSICAL THERAPIST

## 2025-01-13 PROCEDURE — 97164 PT RE-EVAL EST PLAN CARE: CPT | Performed by: PHYSICAL THERAPIST

## 2025-01-13 NOTE — PROGRESS NOTES
"Daily Note     Today's date: 2025  Patient name: Wendy Raygoza  : 1940  MRN: [id number]  Referring provider: Silas Hatfield MD  Dx:   Encounter Diagnosis     ICD-10-CM    1. Lumbar radiculopathy  M54.16       2. Radiculopathy, lumbar region  M54.16       3. SI (sacroiliac) joint dysfunction  M53.3                        Subjective: Pt returns to PT after a 3.5 wk absence. She reports re-aggravating her LLE pain 1 wk ago when she was reaching into a high OH shelf repeatedly. She reports 7/10 L LBP and lateral L LL pain at worst with WB > 10 min and no pain when sitting.    Objective: See treatment diary below    RFISit NE,NE  RFISit pt OP PE'ng, W  RFIL NE,NE  Prone lying A,B  Prone on elbows A,B  REIL PE'ng, NW    Assessment: Pt presents with a mid-ROM NWB extension DP.    Plan: Cont POC.     Precautions: PMHx: HTN, diverticulosis, osteoporosis, R ankle fx Mar '24, anxiety  Dx: lumbar stenosis with a flexion DP    Manuals                                                          Neuro Re-Ed             Postural correction and awareness training 8 min 5 min 2 min     8 min     RFISit 3x10 3\"/  3x10 3\"/  3x10 3\"/  4x10 3\"/  4x10 3\"/  2x10 3\"/  5x10 1x20     RFISit pt OP        5\"/  1x20     RFIL 5\"/  2x10 5\"/  3x10 5\"/  3x10 5\"/  4x10 5\"/  3x10 HEP  5\"/  3x10     Prone lying        5x1 min     Prone on elbows        10\"/  5x5     REIL        1x10                               Abdominal bracing 5\"x15 5\"x20 5\"x20 5\"x20 5\"x20        Supine DLS marches  1x20 ea 3x10 ea 1#  3x10 ea 1.5#  3x10 ea 2#  3x10 ea 2#  3x10 ea      Supine DLS dying bug   2x10 ea 3x10 ea 3x10 ea 1# ea  3x10 ea 1# ea  3x10 ea      bridges  1x10 1x10 1x10 2x10 3x10 3x10      Back extension machine    nv  40#  3x10 nv      KARRIE      1x10 1x10 held     Standing hip flexor stretch      nv                    Ther Ex                                                                                  "                                    Ther Activity             STS   1x10 nv 2x10 2x10 3x10                   Gait Training             Gait training on floor with abdominal bracing  100 ft 2x75 ft 1x150 ft    1x200 ft 2x200 ft 2x300 ft SPC  2x350 ft                   Modalities

## 2025-01-14 NOTE — PROGRESS NOTES
PT Re-Evaluation     Today's date: 2025  Patient name: Wendy Raygoza  : 1940  MRN: [id number]  Referring provider: Silas Hatfield MD  Dx:   Encounter Diagnosis     ICD-10-CM    1. Lumbar radiculopathy  M54.16       2. Radiculopathy, lumbar region  M54.16       3. SI (sacroiliac) joint dysfunction  M53.3           Start Time: 1200  Stop Time: 1240  Total time in clinic (min): 40 minutes    Assessment  Impairments: abnormal gait, abnormal or restricted ROM, abnormal movement, activity intolerance, impaired physical strength, lacks appropriate home exercise program, pain with function, weight-bearing intolerance, poor posture  and poor body mechanics    Assessment details: Pt presents with s/s consistent with an acute lumbar derangement with a responder to mid-ROM lumbar extension. She will benefit from skilled PT services to address her impairments in order to decrease pain and restore function.  Understanding of Dx/Px/POC: good     Prognosis: fair    Goals  STG - 4 wks  1) pt will normalize sitting posture  2) pt will improve pain in WB 50%    LTG - 8 wks  1) pt will normalize standing posture  2) pt will improve pain in WB 75%    Plan  Patient would benefit from: skilled physical therapy    Planned therapy interventions: abdominal trunk stabilization, joint mobilization, manual therapy, neuromuscular re-education, postural training, strengthening, stretching, therapeutic activities, therapeutic exercise, gait training, flexibility, functional ROM exercises, body mechanics training and home exercise program    Frequency: 1-2x week  Duration in weeks: 8  Treatment plan discussed with: patient      Subjective Evaluation    History of Present Illness  Mechanism of injury: Pt is a 83 y.o. female with PMHx significant for HTN, diverticulosis, osteoporosis, R ankle fx Mar '24, anxiety. She returns to PT after a 3.5 wk absence. She reports re-aggravating her LLE pain 1 wk ago when she was reaching into a  high OH shelf repeatedly. She reports 7/10 L LBP and lateral L LL pain at worst with WB > 10 min and no pain when sitting.  Patient Goals  Patient goals for therapy: decreased pain    Pain  Current pain ratin  At best pain ratin  At worst pain ratin  Location: L LB, L buttock, lateral LL  Quality: sharp and radiating  Relieving factors: rest (sitting)  Exacerbated by: WB, lying supine.      Diagnostic Tests  MRI studies: abnormal (mild DJD, L L5/S1 HNP)  Treatments  Previous treatment: physical therapy      Objective     Concurrent Complaints  Positive for disturbed sleep. Negative for night pain, bladder dysfunction, saddle (S4) numbness, history of cancer, history of trauma and infection    Postural Observations  Seated posture: poor  Standing posture: poor  Correction of posture: has no consistent effect      Neurological Testing     Sensation     Lumbar   Left   Intact: light touch    Right   Intact: light touch    Comments   Left light touch: L1-S1  Right light touch: L1-S1    Reflexes   Left   Patellar (L4): trace (1+)  Achilles (S1): trace (1+)    Right   Patellar (L4): trace (1+)  Achilles (S1): trace (1+)    Active Range of Motion     Lumbar   Flexion:  WFL and with pain  Extension:  with pain Restriction level: moderate  Left lateral flexion:  with pain Restriction level: maximal  Right lateral flexion:  with pain Restriction level: moderate    Additional Active Range of Motion Details  Pt demonstrated (+) difficulty WB on L 2/2 pain, (-) lateral shift  Mechanical Assessment    Cervical      Thoracic      Lumbar    Lying flexion: repeated movements  NE,NE  Sitting flexion: repeated movements  PE'ng,W with pt OP  Standing extension: repeated movements  PE'ng, W  Lying extension: repeated movements  REIL+ PE'ng, W; prone on elbows A,NB    Strength/Myotome Testing     Left Hip   Planes of Motion   Flexion: 4-    Right Hip   Planes of Motion   Flexion: 4    Left Knee   Flexion: 4  Extension:  4+    Right Knee   Flexion: 4  Extension: 4+    Left Ankle/Foot   Dorsiflexion: 4+  Plantar flexion: 4  Great toe extension: 4+    Right Ankle/Foot   Dorsiflexion: 4+  Plantar flexion: 4-  Great toe extension: 4

## 2025-01-17 ENCOUNTER — OFFICE VISIT (OUTPATIENT)
Dept: PHYSICAL THERAPY | Facility: CLINIC | Age: 85
End: 2025-01-17
Payer: MEDICARE

## 2025-01-17 DIAGNOSIS — M53.3 SI (SACROILIAC) JOINT DYSFUNCTION: ICD-10-CM

## 2025-01-17 DIAGNOSIS — M54.16 LUMBAR RADICULOPATHY: Primary | ICD-10-CM

## 2025-01-17 DIAGNOSIS — M54.16 RADICULOPATHY, LUMBAR REGION: ICD-10-CM

## 2025-01-17 PROCEDURE — 97112 NEUROMUSCULAR REEDUCATION: CPT | Performed by: PHYSICAL THERAPIST

## 2025-01-17 PROCEDURE — 97140 MANUAL THERAPY 1/> REGIONS: CPT | Performed by: PHYSICAL THERAPIST

## 2025-01-17 NOTE — PROGRESS NOTES
"Daily Note     Today's date: 2025  Patient name: Wendy Raygoza  : 1940  MRN: [id number]  Referring provider: Silas Hatfield MD  Dx:   Encounter Diagnosis     ICD-10-CM    1. Lumbar radiculopathy  M54.16       2. Radiculopathy, lumbar region  M54.16       3. SI (sacroiliac) joint dysfunction  M53.3                      Subjective: Pt reports good compliance with HEP 5xD. She reports continued high pain with WB.    Objective: See treatment diary below    REIL CE'desmond, B  NA alfonso D,B    Assessment: Pt demonstrated improved standing posture and LLE WB tolerance following tx.    Plan: Cont POC. Trial prone press-ups and laser nv.     Precautions: PMHx: HTN, diverticulosis, osteoporosis, R ankle fx Mar '24, anxiety  Dx: lumbar stenosis with a flexion DP    Manuals     Gr II-III S1 PA mobs         3x20    Laser L/S         nv                              Neuro Re-Ed             Postural correction and awareness training 8 min 5 min 2 min     8 min 5 min    RFISit 3x10 3\"/  3x10 3\"/  3x10 3\"/  4x10 3\"/  4x10 3\"/  2x10 3\"/  5x10 1x20     RFISit pt OP        5\"/  1x20     RFIL 5\"/  2x10 5\"/  3x10 5\"/  3x10 5\"/  4x10 5\"/  3x10 HEP  5\"/  3x10     Prone lying        5x1 min 5x1 min    Prone on elbows        10\"/  5x5 10\"/  2x10  1x5    REIL        1x10                               Abdominal bracing 5\"x15 5\"x20 5\"x20 5\"x20 5\"x20        Supine DLS marches  1x20 ea 3x10 ea 1#  3x10 ea 1.5#  3x10 ea 2#  3x10 ea 2#  3x10 ea      Supine DLS dying bug   2x10 ea 3x10 ea 3x10 ea 1# ea  3x10 ea 1# ea  3x10 ea      bridges  1x10 1x10 1x10 2x10 3x10 3x10      Back extension machine    nv  40#  3x10 nv      KARRIE      1x10 1x10 held     Standing hip flexor stretch      nv                    Ther Ex                                                                                                                     Ther Activity             STS   1x10 nv 2x10 2x10 3x10              "      Gait Training             Gait training on floor with abdominal bracing  100 ft 2x75 ft 1x150 ft    1x200 ft 2x200 ft 2x300 ft SPC  2x350 ft                   Modalities

## 2025-01-20 ENCOUNTER — APPOINTMENT (OUTPATIENT)
Dept: PHYSICAL THERAPY | Facility: CLINIC | Age: 85
End: 2025-01-20
Payer: MEDICARE

## 2025-01-21 ENCOUNTER — APPOINTMENT (OUTPATIENT)
Dept: LAB | Age: 85
End: 2025-01-21
Payer: MEDICARE

## 2025-01-21 DIAGNOSIS — I10 PRIMARY HYPERTENSION: ICD-10-CM

## 2025-01-21 DIAGNOSIS — Z13.1 SCREENING FOR DIABETES MELLITUS: ICD-10-CM

## 2025-01-21 DIAGNOSIS — R60.9 EDEMA, UNSPECIFIED TYPE: ICD-10-CM

## 2025-01-21 DIAGNOSIS — E78.2 MIXED HYPERLIPIDEMIA: ICD-10-CM

## 2025-01-21 LAB
ALBUMIN SERPL BCG-MCNC: 3.5 G/DL (ref 3.5–5)
ALP SERPL-CCNC: 51 U/L (ref 34–104)
ALT SERPL W P-5'-P-CCNC: 13 U/L (ref 7–52)
ANION GAP SERPL CALCULATED.3IONS-SCNC: 8 MMOL/L (ref 4–13)
AST SERPL W P-5'-P-CCNC: 13 U/L (ref 13–39)
BACTERIA UR QL AUTO: ABNORMAL /HPF
BASOPHILS # BLD AUTO: 0.06 THOUSANDS/ΜL (ref 0–0.1)
BASOPHILS NFR BLD AUTO: 1 % (ref 0–1)
BILIRUB SERPL-MCNC: 0.47 MG/DL (ref 0.2–1)
BILIRUB UR QL STRIP: NEGATIVE
BNP SERPL-MCNC: 80 PG/ML (ref 0–100)
BUN SERPL-MCNC: 16 MG/DL (ref 5–25)
CALCIUM SERPL-MCNC: 9.6 MG/DL (ref 8.4–10.2)
CHLORIDE SERPL-SCNC: 107 MMOL/L (ref 96–108)
CHOLEST SERPL-MCNC: 133 MG/DL (ref ?–200)
CLARITY UR: CLEAR
CO2 SERPL-SCNC: 27 MMOL/L (ref 21–32)
COLOR UR: YELLOW
CREAT SERPL-MCNC: 0.83 MG/DL (ref 0.6–1.3)
EOSINOPHIL # BLD AUTO: 0.26 THOUSAND/ΜL (ref 0–0.61)
EOSINOPHIL NFR BLD AUTO: 5 % (ref 0–6)
ERYTHROCYTE [DISTWIDTH] IN BLOOD BY AUTOMATED COUNT: 14 % (ref 11.6–15.1)
EST. AVERAGE GLUCOSE BLD GHB EST-MCNC: 105 MG/DL
GFR SERPL CREATININE-BSD FRML MDRD: 65 ML/MIN/1.73SQ M
GLUCOSE P FAST SERPL-MCNC: 87 MG/DL (ref 65–99)
GLUCOSE UR STRIP-MCNC: NEGATIVE MG/DL
HBA1C MFR BLD: 5.3 %
HCT VFR BLD AUTO: 40.9 % (ref 34.8–46.1)
HDLC SERPL-MCNC: 52 MG/DL
HGB BLD-MCNC: 13.4 G/DL (ref 11.5–15.4)
HGB UR QL STRIP.AUTO: NEGATIVE
IMM GRANULOCYTES # BLD AUTO: 0.02 THOUSAND/UL (ref 0–0.2)
IMM GRANULOCYTES NFR BLD AUTO: 0 % (ref 0–2)
KETONES UR STRIP-MCNC: NEGATIVE MG/DL
LDLC SERPL CALC-MCNC: 54 MG/DL (ref 0–100)
LEUKOCYTE ESTERASE UR QL STRIP: NEGATIVE
LYMPHOCYTES # BLD AUTO: 1.12 THOUSANDS/ΜL (ref 0.6–4.47)
LYMPHOCYTES NFR BLD AUTO: 21 % (ref 14–44)
MCH RBC QN AUTO: 31.2 PG (ref 26.8–34.3)
MCHC RBC AUTO-ENTMCNC: 32.8 G/DL (ref 31.4–37.4)
MCV RBC AUTO: 95 FL (ref 82–98)
MONOCYTES # BLD AUTO: 0.6 THOUSAND/ΜL (ref 0.17–1.22)
MONOCYTES NFR BLD AUTO: 11 % (ref 4–12)
MUCOUS THREADS UR QL AUTO: ABNORMAL
NEUTROPHILS # BLD AUTO: 3.22 THOUSANDS/ΜL (ref 1.85–7.62)
NEUTS SEG NFR BLD AUTO: 62 % (ref 43–75)
NITRITE UR QL STRIP: NEGATIVE
NON-SQ EPI CELLS URNS QL MICRO: ABNORMAL /HPF
NONHDLC SERPL-MCNC: 81 MG/DL
NRBC BLD AUTO-RTO: 0 /100 WBCS
PH UR STRIP.AUTO: 7 [PH]
PLATELET # BLD AUTO: 211 THOUSANDS/UL (ref 149–390)
PMV BLD AUTO: 10.8 FL (ref 8.9–12.7)
POTASSIUM SERPL-SCNC: 4 MMOL/L (ref 3.5–5.3)
PROT SERPL-MCNC: 6.3 G/DL (ref 6.4–8.4)
PROT UR STRIP-MCNC: ABNORMAL MG/DL
RBC # BLD AUTO: 4.29 MILLION/UL (ref 3.81–5.12)
RBC #/AREA URNS AUTO: ABNORMAL /HPF
SODIUM SERPL-SCNC: 142 MMOL/L (ref 135–147)
SP GR UR STRIP.AUTO: 1.02 (ref 1–1.03)
TRIGL SERPL-MCNC: 133 MG/DL (ref ?–150)
TSH SERPL DL<=0.05 MIU/L-ACNC: 1.65 UIU/ML (ref 0.45–4.5)
UROBILINOGEN UR STRIP-ACNC: <2 MG/DL
WBC # BLD AUTO: 5.28 THOUSAND/UL (ref 4.31–10.16)
WBC #/AREA URNS AUTO: ABNORMAL /HPF

## 2025-01-21 PROCEDURE — 36415 COLL VENOUS BLD VENIPUNCTURE: CPT

## 2025-01-21 PROCEDURE — 83036 HEMOGLOBIN GLYCOSYLATED A1C: CPT

## 2025-01-21 PROCEDURE — 85025 COMPLETE CBC W/AUTO DIFF WBC: CPT

## 2025-01-21 PROCEDURE — 80061 LIPID PANEL: CPT

## 2025-01-21 PROCEDURE — 83880 ASSAY OF NATRIURETIC PEPTIDE: CPT

## 2025-01-21 PROCEDURE — 80053 COMPREHEN METABOLIC PANEL: CPT

## 2025-01-21 PROCEDURE — 81001 URINALYSIS AUTO W/SCOPE: CPT

## 2025-01-21 PROCEDURE — 84443 ASSAY THYROID STIM HORMONE: CPT

## 2025-01-22 ENCOUNTER — OFFICE VISIT (OUTPATIENT)
Dept: PHYSICAL THERAPY | Facility: CLINIC | Age: 85
End: 2025-01-22
Payer: MEDICARE

## 2025-01-22 DIAGNOSIS — M53.3 SI (SACROILIAC) JOINT DYSFUNCTION: ICD-10-CM

## 2025-01-22 DIAGNOSIS — M48.061 LUMBAR FORAMINAL STENOSIS: ICD-10-CM

## 2025-01-22 DIAGNOSIS — M54.16 RADICULOPATHY, LUMBAR REGION: ICD-10-CM

## 2025-01-22 DIAGNOSIS — M54.16 LUMBAR RADICULOPATHY: ICD-10-CM

## 2025-01-22 DIAGNOSIS — M54.16 LUMBAR RADICULOPATHY: Primary | ICD-10-CM

## 2025-01-22 PROCEDURE — 97112 NEUROMUSCULAR REEDUCATION: CPT | Performed by: PHYSICAL THERAPIST

## 2025-01-22 RX ORDER — GABAPENTIN 300 MG/1
CAPSULE ORAL
Qty: 90 CAPSULE | Refills: 0 | Status: SHIPPED | OUTPATIENT
Start: 2025-01-22

## 2025-01-22 NOTE — PROGRESS NOTES
"Daily Note     Today's date: 2025  Patient name: Wendy Raygoza  : 1940  MRN: [id number]  Referring provider: Silas Hatfield MD  Dx:   Encounter Diagnosis     ICD-10-CM    1. Lumbar radiculopathy  M54.16       2. Radiculopathy, lumbar region  M54.16       3. SI (sacroiliac) joint dysfunction  M53.3                      Subjective: Pt reports good compliance with HEP 5xD and improved WB tolerance > pain.    Objective: See treatment diary below    Assessment: Pt peripheralized with prone press-ups but abolished with prone on elbows.    Plan: Cont POC.      Precautions: PMHx: HTN, diverticulosis, osteoporosis, R ankle fx Mar '24, anxiety  Dx: lumbar stenosis with a flexion DP    Manuals    Gr II-III S1 PA mobs         3x20 held   Laser L/S         nv 5000J                             Neuro Re-Ed             Postural correction and awareness training 8 min 5 min 2 min     8 min 5 min    RFISit 3x10 3\"/  3x10 3\"/  3x10 3\"/  4x10 3\"/  4x10 3\"/  2x10 3\"/  5x10 1x20     RFISit pt OP        5\"/  1x20     RFIL 5\"/  2x10 5\"/  3x10 5\"/  3x10 5\"/  4x10 5\"/  3x10 HEP  5\"/  3x10     Prone lying        5x1 min 5x1 min 5x1 min   Prone on elbows        10\"/  5x5 10\"/  2x10  1x5 3\"/    Low  1x10    Mid  3x15   REIL        1x10  1x10                             Abdominal bracing 5\"x15 5\"x20 5\"x20 5\"x20 5\"x20        Supine DLS marches  1x20 ea 3x10 ea 1#  3x10 ea 1.5#  3x10 ea 2#  3x10 ea 2#  3x10 ea      Supine DLS dying bug   2x10 ea 3x10 ea 3x10 ea 1# ea  3x10 ea 1# ea  3x10 ea      bridges  1x10 1x10 1x10 2x10 3x10 3x10   2x10   Back extension machine    nv  40#  3x10 nv      KARRIE      1x10 1x10 held     Standing hip flexor stretch      nv                    Ther Ex                                                                                                                     Ther Activity             STS   1x10 nv 2x10 2x10 3x10   Foam nv                Gait " Training             Gait training on floor with abdominal bracing  100 ft 2x75 ft 1x150 ft    1x200 ft 2x200 ft 2x300 ft SPC  2x350 ft                   Modalities

## 2025-01-24 ENCOUNTER — OFFICE VISIT (OUTPATIENT)
Dept: PHYSICAL THERAPY | Facility: CLINIC | Age: 85
End: 2025-01-24
Payer: MEDICARE

## 2025-01-24 DIAGNOSIS — M54.16 RADICULOPATHY, LUMBAR REGION: ICD-10-CM

## 2025-01-24 DIAGNOSIS — M53.3 SI (SACROILIAC) JOINT DYSFUNCTION: ICD-10-CM

## 2025-01-24 DIAGNOSIS — M54.16 LUMBAR RADICULOPATHY: Primary | ICD-10-CM

## 2025-01-24 PROCEDURE — 97112 NEUROMUSCULAR REEDUCATION: CPT | Performed by: PHYSICAL THERAPIST

## 2025-01-24 NOTE — PROGRESS NOTES
"Daily Note     Today's date: 2025  Patient name: Wendy Raygoza  : 1940  MRN: [id number]  Referring provider: Silas Hatfield MD  Dx:   Encounter Diagnosis     ICD-10-CM    1. Lumbar radiculopathy  M54.16       2. Radiculopathy, lumbar region  M54.16       3. SI (sacroiliac) joint dysfunction  M53.3                      Subjective: Pt reports less intense LLE pain and improved WB tolerance.    Objective: See treatment diary below    Assessment: Pt demonstrate difficulty maintaining reduction during core stab but able to abolish quickly.    Plan: Cont POC.      Precautions: PMHx: HTN, diverticulosis, osteoporosis, R ankle fx Mar '24, anxiety  Dx: lumbar stenosis with a flexion DP    Manuals    Gr II-III S1 PA mobs          held   Laser L/S 5000J         5000J                             Neuro Re-Ed                                                                 Prone lying 5x1 min         5x1 min   Prone on elbows Low  2x15    Mid  1x15    High  1x15         10\"/  5x5 10\"/  2x10  1x5 3\"/    Low  1x10    Mid  3x15   REIL np       1x10  1x10                                                                    bridges 3x10         2x10                                                       Ther Ex                                                                                                                     Ther Activity             STS Foam x1    1x10         Foam nv                Gait Training             Gait training on floor with abdominal bracing                          Modalities                                                "

## 2025-01-27 ENCOUNTER — OFFICE VISIT (OUTPATIENT)
Dept: PHYSICAL THERAPY | Facility: CLINIC | Age: 85
End: 2025-01-27
Payer: MEDICARE

## 2025-01-27 ENCOUNTER — RESULTS FOLLOW-UP (OUTPATIENT)
Dept: FAMILY MEDICINE CLINIC | Facility: CLINIC | Age: 85
End: 2025-01-27

## 2025-01-27 DIAGNOSIS — M53.3 SI (SACROILIAC) JOINT DYSFUNCTION: ICD-10-CM

## 2025-01-27 DIAGNOSIS — M54.16 LUMBAR RADICULOPATHY: Primary | ICD-10-CM

## 2025-01-27 DIAGNOSIS — M54.16 RADICULOPATHY, LUMBAR REGION: ICD-10-CM

## 2025-01-27 PROCEDURE — 97112 NEUROMUSCULAR REEDUCATION: CPT | Performed by: PHYSICAL THERAPIST

## 2025-01-27 NOTE — PROGRESS NOTES
Daily Note     Today's date: 2025  Patient name: Wendy Raygoza  : 1940  MRN: [id number]  Referring provider: Silas Hatfield MD  Dx:   Encounter Diagnosis     ICD-10-CM    1. Lumbar radiculopathy  M54.16       2. Radiculopathy, lumbar region  M54.16       3. SI (sacroiliac) joint dysfunction  M53.3                      Subjective: Pt reports no pain currently, improved pain overall.    Objective: See treatment diary below    Assessment: Pt demonstrated improved ability to maintain reduction.    Plan: Cont POC.      Precautions: PMHx: HTN, diverticulosis, osteoporosis, R ankle fx Mar '24, anxiety  Dx: acute lumbar derangement with a response to NWB extension and underlying lumbar stenosis     Manuals            Gr II-III S1 PA mobs             Laser L/S 5000J 5000J                                     Neuro Re-Ed                                                                 Prone lying 5x1 min 5x1 min           Prone on elbows Low  2x15    Mid  1x15    High  1x15   Low  1x15    Mid  1x15    High  1x15           REIL np                                                                             bridges 3x10 3x10                                                               Ther Ex                                                                                                                     Ther Activity             STS Foam x1    1x10 Foam  X1    3x10                        Gait Training             Gait training on floor with abdominal bracing  No AD  300 ft                        Modalities

## 2025-01-28 ENCOUNTER — OFFICE VISIT (OUTPATIENT)
Dept: FAMILY MEDICINE CLINIC | Facility: CLINIC | Age: 85
End: 2025-01-28
Payer: MEDICARE

## 2025-01-28 VITALS
SYSTOLIC BLOOD PRESSURE: 134 MMHG | TEMPERATURE: 97 F | OXYGEN SATURATION: 95 % | DIASTOLIC BLOOD PRESSURE: 61 MMHG | RESPIRATION RATE: 16 BRPM | BODY MASS INDEX: 29.78 KG/M2 | HEIGHT: 64 IN | HEART RATE: 62 BPM | WEIGHT: 174.4 LBS

## 2025-01-28 DIAGNOSIS — D84.821 IMMUNOSUPPRESSION DUE TO DRUG THERAPY  (HCC): ICD-10-CM

## 2025-01-28 DIAGNOSIS — N18.2 CKD (CHRONIC KIDNEY DISEASE) STAGE 2, GFR 60-89 ML/MIN: ICD-10-CM

## 2025-01-28 DIAGNOSIS — K51.30 ULCERATIVE RECTOSIGMOIDITIS WITHOUT COMPLICATION (HCC): ICD-10-CM

## 2025-01-28 DIAGNOSIS — I10 PRIMARY HYPERTENSION: Primary | ICD-10-CM

## 2025-01-28 DIAGNOSIS — Z79.899 IMMUNOSUPPRESSION DUE TO DRUG THERAPY  (HCC): ICD-10-CM

## 2025-01-28 DIAGNOSIS — E78.2 MIXED HYPERLIPIDEMIA: ICD-10-CM

## 2025-01-28 DIAGNOSIS — M54.16 LUMBAR RADICULOPATHY: ICD-10-CM

## 2025-01-28 DIAGNOSIS — M81.0 AGE-RELATED OSTEOPOROSIS WITHOUT CURRENT PATHOLOGICAL FRACTURE: ICD-10-CM

## 2025-01-28 PROCEDURE — 99214 OFFICE O/P EST MOD 30 MIN: CPT | Performed by: FAMILY MEDICINE

## 2025-01-28 PROCEDURE — G2211 COMPLEX E/M VISIT ADD ON: HCPCS | Performed by: FAMILY MEDICINE

## 2025-01-28 RX ORDER — RISANKIZUMAB-RZAA 60 MG/ML
INJECTION INTRAVENOUS
COMMUNITY
Start: 2025-01-09

## 2025-01-28 NOTE — ASSESSMENT & PLAN NOTE
Recent exacerbation.  Patient is using a cane from the physical therapist as well.  Patient says he is getting better.  Will follow-up with her orthopedic as well.  Denies neuro red flags.  And continue her Neurontin.

## 2025-01-28 NOTE — ASSESSMENT & PLAN NOTE
Controlled.  And improved LFTs normal.  Her total cholesterol went down to 133 from 154 from December 2023, triglycerides went up to 133 from 122, HDL went down to 52 from 53 and her LDL went down to 54 from 77.  Patient advised to continue cutting down her fats starches and sweets.  Lose weight with a better diet and exercise as tolerated.  Continue current therapy.  Recheck labs in 6 months.  Orders:  •  Comprehensive metabolic panel; Future  •  Lipid panel; Future

## 2025-01-28 NOTE — ASSESSMENT & PLAN NOTE
Secondary to her ulcerative colitis and its treatment.  Stable.  Follow-up with her specialist.

## 2025-01-28 NOTE — ASSESSMENT & PLAN NOTE
Discussed with patient.  Patient is up-to-date with her DEXA scan.  And she is getting treated with Prolia injections every 6 months with her endocrinologist.

## 2025-01-28 NOTE — PROGRESS NOTES
Name: Wendy Raygoza      : 1940      MRN: [id number]  Encounter Provider: Maty Ferraro MD  Encounter Date: 2025   Encounter department: [facility]  :  Assessment & Plan  Primary hypertension  Controlled.  Advised patient to hydrate well/more.  Low-sodium diet.  Continue current therapy.  Labs reviewed.  Recheck labs in 6 months also.  Orders:  •  Comprehensive metabolic panel; Future  •  UA w Reflex to Microscopic w Reflex to Culture; Future    CKD (chronic kidney disease) stage 2, GFR 60-89 ml/min  Lab Results   Component Value Date    EGFR 65 2025    EGFR 83 2024    EGFR 86 2024    CREATININE 0.83 2025    CREATININE 0.64 2024    CREATININE 0.69 2024     Controlled but slightly worse this last time.  Patient does admit to not drinking enough water.  Patient appropriately advised.  Labs reviewed.  Will recheck labs again in 6 months.  Orders:  •  Comprehensive metabolic panel; Future  •  UA w Reflex to Microscopic w Reflex to Culture; Future    Mixed hyperlipidemia  Controlled.  And improved LFTs normal.  Her total cholesterol went down to 133 from 154 from 2023, triglycerides went up to 133 from 122, HDL went down to 52 from 53 and her LDL went down to 54 from 77.  Patient advised to continue cutting down her fats starches and sweets.  Lose weight with a better diet and exercise as tolerated.  Continue current therapy.  Recheck labs in 6 months.  Orders:  •  Comprehensive metabolic panel; Future  •  Lipid panel; Future    Lumbar radiculopathy  Recent exacerbation.  Patient is using a cane from the physical therapist as well.  Patient says he is getting better.  Will follow-up with her orthopedic as well.  Denies neuro red flags.  And continue her Neurontin.       Age-related osteoporosis without current pathological fracture  Discussed with patient.  Patient is up-to-date with her DEXA scan.  And she is getting treated  with Prolia injections every 6 months with her endocrinologist.       Ulcerative rectosigmoiditis without complication (HCC)  Stable.  Patient was recently started on Skyrizi.  Follow-up with her specialist.       Immunosuppression due to drug therapy  (HCC)  Secondary to her ulcerative colitis and its treatment.  Stable.  Follow-up with her specialist.           RTO 6 months for her Medicare annual well visit and do the blood work and urine before.  Patient can see me prior to that for thing else in between.       History of Present Illness   84-year-old female here for an evaluation of her hypertension and hyperlipidemia.  Patient did blood work and urine recently.  Her lower back has been acting up recently because she reach for something high up.  Patient is using a cane and started to take her Neurontin again.  Patient denies any neuro red flags.  And patient is being treated by her specialist.  Patient also has osteoporosis and is treated with Prolia by her endocrinologist.  And she has ulcerative colitis for which she is on Skyrizi right now; status post first dose only.  Patient denies tobacco.  And she is up-to-date with her vaccines.      Review of Systems   Constitutional:  Negative for chills, fatigue, fever and unexpected weight change.   HENT:  Negative for congestion and sore throat.    Eyes:  Negative for visual disturbance.   Respiratory:  Negative for cough and shortness of breath.    Cardiovascular:  Negative for chest pain and palpitations.   Gastrointestinal:  Negative for abdominal pain and blood in stool.   Genitourinary:  Negative for dysuria and hematuria.   Musculoskeletal:  Positive for arthralgias and back pain.   Skin:  Negative for rash.   Neurological:  Negative for dizziness and headaches.   Psychiatric/Behavioral:  Negative for dysphoric mood. The patient is not nervous/anxious.        Objective   /61 (BP Location: Left arm, Patient Position: Sitting, Cuff Size: Adult)   Pulse  "62   Temp (!) 97 °F (36.1 °C) (Temporal)   Resp 16   Ht 5' 4\" (1.626 m)   Wt 79.1 kg (174 lb 6.4 oz)   SpO2 95%   BMI 29.94 kg/m²      Physical Exam  Vitals reviewed.   Constitutional:       General: She is not in acute distress.     Appearance: Normal appearance. She is not ill-appearing.   Neck:      Vascular: No carotid bruit.   Cardiovascular:      Rate and Rhythm: Normal rate and regular rhythm.   Pulmonary:      Effort: Pulmonary effort is normal.      Breath sounds: Normal breath sounds.   Musculoskeletal:      Right lower leg: No edema.      Left lower leg: No edema.      Comments: No calf tenderness bilateral.   Neurological:      General: No focal deficit present.      Mental Status: She is alert and oriented to person, place, and time.   Psychiatric:         Mood and Affect: Mood normal.         Behavior: Behavior normal.         Thought Content: Thought content normal.         "

## 2025-01-28 NOTE — ASSESSMENT & PLAN NOTE
Lab Results   Component Value Date    EGFR 65 01/21/2025    EGFR 83 09/11/2024    EGFR 86 06/18/2024    CREATININE 0.83 01/21/2025    CREATININE 0.64 09/11/2024    CREATININE 0.69 06/18/2024     Controlled but slightly worse this last time.  Patient does admit to not drinking enough water.  Patient appropriately advised.  Labs reviewed.  Will recheck labs again in 6 months.  Orders:  •  Comprehensive metabolic panel; Future  •  UA w Reflex to Microscopic w Reflex to Culture; Future

## 2025-01-28 NOTE — ASSESSMENT & PLAN NOTE
Controlled.  Advised patient to hydrate well/more.  Low-sodium diet.  Continue current therapy.  Labs reviewed.  Recheck labs in 6 months also.  Orders:  •  Comprehensive metabolic panel; Future  •  UA w Reflex to Microscopic w Reflex to Culture; Future

## 2025-01-29 ENCOUNTER — OFFICE VISIT (OUTPATIENT)
Dept: PHYSICAL THERAPY | Facility: CLINIC | Age: 85
End: 2025-01-29
Payer: MEDICARE

## 2025-01-29 DIAGNOSIS — M53.3 SI (SACROILIAC) JOINT DYSFUNCTION: ICD-10-CM

## 2025-01-29 DIAGNOSIS — M54.16 RADICULOPATHY, LUMBAR REGION: ICD-10-CM

## 2025-01-29 DIAGNOSIS — M54.16 LUMBAR RADICULOPATHY: Primary | ICD-10-CM

## 2025-01-29 PROCEDURE — 97112 NEUROMUSCULAR REEDUCATION: CPT | Performed by: PHYSICAL THERAPIST

## 2025-01-29 PROCEDURE — 97116 GAIT TRAINING THERAPY: CPT | Performed by: PHYSICAL THERAPIST

## 2025-01-29 NOTE — PROGRESS NOTES
Daily Note     Today's date: 2025  Patient name: Wendy Raygoza  : 1940  MRN: [id number]  Referring provider: Silas Hatfield MD  Dx:   Encounter Diagnosis     ICD-10-CM    1. Lumbar radiculopathy  M54.16       2. Radiculopathy, lumbar region  M54.16       3. SI (sacroiliac) joint dysfunction  M53.3                      Subjective: Pt reports mild lateral L calf pain currently but continues to have improving pain.    Objective: See treatment diary below    KARRIE Velasquez, JAYCE    Assessment: Pt now able to centralize in WB.    Plan: Cont POC.      Precautions: PMHx: HTN, diverticulosis, osteoporosis, R ankle fx Mar '24, anxiety  Dx: acute lumbar derangement with a response to NWB extension and underlying lumbar stenosis     Manuals           Gr II-III S1 PA mobs             Laser L/S 5000J 5000J 6000J                                    Neuro Re-Ed                                                                 Prone lying 5x1 min 5x1 min 5x1 min          Prone on elbows Low  2x15    Mid  1x15    High  1x15   Low  1x15    Mid  1x15    High  1x15 Low  1x15    Mid  1x15    High  1x15          KARRIE   3x10                                                                           bridges 3x10 3x10 3x10                                                              Ther Ex                                                                                                                     Ther Activity             STS Foam x1    1x10 Foam  X1    3x10 np                       Gait Training             Gait training on floor with abdominal bracing  No AD  300 ft No AD  2x300 ft                       Modalities

## 2025-01-31 ENCOUNTER — OFFICE VISIT (OUTPATIENT)
Dept: PAIN MEDICINE | Facility: CLINIC | Age: 85
End: 2025-01-31
Payer: MEDICARE

## 2025-01-31 ENCOUNTER — TELEPHONE (OUTPATIENT)
Age: 85
End: 2025-01-31

## 2025-01-31 VITALS — HEIGHT: 64 IN | WEIGHT: 174 LBS | BODY MASS INDEX: 29.71 KG/M2

## 2025-01-31 DIAGNOSIS — M54.16 LUMBAR RADICULOPATHY: Primary | ICD-10-CM

## 2025-01-31 DIAGNOSIS — M51.9 FORAMINAL STENOSIS DUE TO INTERVERTEBRAL DISC DISEASE: ICD-10-CM

## 2025-01-31 DIAGNOSIS — M99.79 FORAMINAL STENOSIS DUE TO INTERVERTEBRAL DISC DISEASE: ICD-10-CM

## 2025-01-31 PROCEDURE — 99214 OFFICE O/P EST MOD 30 MIN: CPT | Performed by: PAIN MEDICINE

## 2025-01-31 RX ORDER — GABAPENTIN 400 MG/1
400 CAPSULE ORAL 3 TIMES DAILY
Qty: 90 CAPSULE | Refills: 1 | Status: SHIPPED | OUTPATIENT
Start: 2025-01-31

## 2025-01-31 NOTE — TELEPHONE ENCOUNTER
PA for GABAPENTIN 300 MG  APPROVED     Date(s) approved UNTIL 11/19/2025        Patient advised by          [x]MyChart Message  []Phone call   [x]LMOM  []L/M to call office as no active Communication consent on file  []Unable to leave detailed message as VM not approved on Communication consent       Pharmacy advised by    [x]Fax  []Phone call    Approval letter scanned into [employer] Yes

## 2025-01-31 NOTE — TELEPHONE ENCOUNTER
PA for GABAPENTIN 400SUBMITTED to  [employer]     via    [x]CMM-KEY: [id number]  []Surescripts-Case ID #    []Availity-Auth ID #  NDC #    []Faxed to plan   []Other website    []Phone call Case ID #      [x]PA sent as URGENT    All office notes, labs and other pertaining documents and studies sent. Clinical questions answered. Awaiting determination from insurance company.     Turnaround time for your insurance to make a decision on your Prior Authorization can take 7-21 business days.

## 2025-01-31 NOTE — PROGRESS NOTES
Assessment  1. Lumbar radiculopathy  -     FL spine and pain procedure; Future; Expected date: 01/31/2025  -     gabapentin (NEURONTIN) 400 mg capsule; Take 1 capsule (400 mg total) by mouth 3 (three) times a day  2. 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      Status post left L5-S1 TFESI doing well however now her symptoms are coming back can progress increase to 7-10 depending on activity level will recommend repeat epidural will schedule left L5-S1 interlaminar epidural steroid injection    Increase gabapentin to 400 mg 3 times daily.    The risks of the procedure were discussed in detail.  These risks include infection, increased pain, paralysis, bleeding.  Patient understands the risks and is willing to pursue with the procedure              My impressions and treatment recommendations were discussed in detail with the patient who verbalized understanding and had no further questions.  Discharge instructions were provided. I personally saw and examined the patient and I agree with the above discussed plan of care.    Plan      New Medications Ordered This Visit   Medications   • gabapentin (NEURONTIN) 400 mg capsule     Sig: Take 1 capsule (400 mg total) by mouth 3 (three) times a day     Dispense:  90 capsule     Refill:  1       Orders Placed This Encounter   Procedures   • FL spine and pain procedure     Standing Status:   Future     Expected Date:   1/31/2025     Expiration Date:   1/31/2029     Reason for Exam::   left L5-s1 interlaminar epidural     Anticoagulant hold needed?:   none       History of Present Illness  Follow-up 1/31/2025  Wendy comes for follow-up status post epidural 12/6/2024 left L5-S1 S1 TFESI still reporting about 50% improvement in her overall left  leg pain doing well with gabapentin 100 mg 3 times daily.  Her pain symptoms are coming back currently at a 4 out of 10, but can increase with activity to 7-8 out of 10.      Follow-up 1/3/2025  Wendy comes for follow-up status post left L5-S1, S1 TFESI completed 12/6/2024 she reports 80 to 90% improvement in her left leg pain she has been improved walking without pain symptoms.  Currently taking gabapentin 300 milligrams 3 times daily.    Consult  Wendy Raygoza is a 84 y.o. female with relevant PMH of low back and left leg pain ongoing since June 2024 symptoms worse with walking improved with sitting pain rates in the left calf and back of the leg pain can increase to 7 out of 10 with ambulation, impacting quality life she is done physical therapy 6 weeks in last 6 months no prior surgeries in the spine she recent start gabapentin 100 mg 3 times daily.  Is unclear if it is helping.  Pain is moderate to severe in intensity.    I have personally reviewed and/or updated the patient's past medical history, past surgical history, family history, social history, current medications, allergies, and vital signs today.     Review of Systems   Musculoskeletal:  Positive for arthralgias, joint swelling and myalgias.   All other systems reviewed and are negative.      Patient Active Problem List   Diagnosis   • Abnormal female pelvic exam   • Diverticulosis   • Glaucoma   • Mixed hyperlipidemia   • Hyperparathyroidism (HCC)   • Primary hypertension   • Age-related osteoporosis without current pathological fracture   • Severe cervical dysplasia, histologically confirmed   • Vitamin D deficiency   • Ulcerative rectosigmoiditis without complication (HCC)   • Edema   • Anxiety   • History of benign neoplasm of parathyroid gland   • Immunosuppression due to drug therapy  (HCC)   • Status post parathyroidectomy   • Closed nondisplaced fracture of medial malleolus of right tibia, initial encounter   • Venous insufficiency   • Wound  of left leg   • Lumbar radiculopathy   • CKD (chronic kidney disease) stage 2, GFR 60-89 ml/min       Past Medical History:   Diagnosis Date   • Anxiety 2021    My  was ill, and I was feeling stress.   • Arthritis    • Atherosclerosis    • Carotid artery narrowing    • Coronary artery disease    • Diverticulitis of colon    • Diverticulosis    • GERD (gastroesophageal reflux disease)    • Glaucoma    • Hypercalcemia    • Hyperlipidemia    • Hyperparathyroidism (HCC)    • Hypertension    • Inflammatory bowel disease    • Osteoarthritis    • Osteopenia    • Severe cervical dysplasia, histologically confirmed    • Skin cancer     squamous cell   • Thyroid nodule    • Vitamin D deficiency        Past Surgical History:   Procedure Laterality Date   • APPENDECTOMY     • BREAST SURGERY      reduction procedure   • CAROTID ENDARTARECTOMY Right     carotid thromboendarterectomy    • COLONOSCOPY     • EVACUATION OF HEMATOMA N/A 09/29/2021    Procedure: EVACUATION/ DRAINAGE HEMATOMA;  Surgeon: Donald Gerard MD;  Location: [facility];  Service: ENT   • EXPLORATION CAROTID ARTERY     • HYSTERECTOMY      age 56   • NECK SURGERY     • OOPHORECTOMY Bilateral     age 56   • NC COLONOSCOPY FLX DX W/COLLJ SPEC WHEN PFRMD N/A 07/28/2017    Procedure: EGD AND COLONOSCOPY;  Surgeon: Debra Pritchard MD;  Location: [facility];  Service: Colorectal   • NC PARATHYROIDECTOMY/EXPLORATION PARATHYROIDS Right 09/29/2021    Procedure: RIGHT INFERIOR PARATHYROIDECTOMY (INTRAOP PTH);  Surgeon: Donald Gerard MD;  Location: [facility];  Service: ENT   • REDUCTION MAMMAPLASTY Bilateral 1987   • TONSILLECTOMY  Childhood       Family History   Problem Relation Age of Onset   • Osteoporosis Mother    • Osteoarthritis Mother    • Arthritis Mother    • Other Father         heart problem   • Hypertension Father    • Heart disease Father    • Coronary artery disease Family    • Hyperlipidemia Family    • Glaucoma Sister    • No Known Problems Daughter     • No Known Problems Maternal Grandmother    • No Known Problems Maternal Grandfather    • No Known Problems Paternal Grandmother    • No Known Problems Paternal Grandfather    • No Known Problems Maternal Aunt    • No Known Problems Maternal Aunt    • No Known Problems Maternal Aunt    • No Known Problems Maternal Aunt    • No Known Problems Maternal Aunt    • No Known Problems Maternal Aunt    • No Known Problems Maternal Aunt    • Stroke Paternal Aunt         Margaret Dumas   • No Known Problems Paternal Aunt    • No Known Problems Paternal Aunt        Social History     Occupational History   • Occupation: Retired   Tobacco Use   • Smoking status: Never   • Smokeless tobacco: Never   Vaping Use   • Vaping status: Never Used   Substance and Sexual Activity   • Alcohol use: Yes     Alcohol/week: 4.0 standard drinks of alcohol     Types: 4 Glasses of wine per week     Comment: being a social drinker; drinks wine   • Drug use: Never   • Sexual activity: Not Currently     Partners: Male     Birth control/protection: Surgical       Current Outpatient Medications on File Prior to Visit   Medication Sig   • amLODIPine (NORVASC) 5 mg tablet Take 1 tablet (5 mg total) by mouth daily   • aspirin 81 MG tablet Take 81 mg by mouth daily   • atenolol (TENORMIN) 25 mg tablet Take 1 tablet (25 mg total) by mouth 2 (two) times a day   • bimatoprost (LUMIGAN) 0.01 % ophthalmic drops Administer 1 drop to both eyes daily at bedtime   • brinzolamide (AZOPT) 1 % ophthalmic suspension 1 drop 2 (two) times a day   • Calcium Carbonate Antacid (TUMS EXTRA STRENGTH 750 PO) Take 1,500 mg by mouth 2 (two) times a day with meals Use before meals with a vitmain C tablet for improved absoprtion    • calcium citrate (CALCITRATE) 950 (200 Ca) MG tablet TAKE 1 TABLET BY MOUTH EVERY DAY   • Cholecalciferol (VITAMIN D3 PO) Take 5,000 Units by mouth in the morning   • denosumab (PROLIA) 60 mg/mL Inject under the skin   • diphenoxylate-atropine  "(LOMOTIL) 2.5-0.025 mg per tablet Take 1 tablet by mouth 3 (three) times a day as needed   • escitalopram (LEXAPRO) 10 mg tablet TAKE 1 TABLET DAILY   • Ferrous Sulfate (IRON PO) Take by mouth   • irbesartan (AVAPRO) 300 mg tablet Take 1 tablet (300 mg total) by mouth daily at bedtime   • mesalamine (LIALDA) 1.2 g EC tablet TAKE 4 TABLETS BY MOUTH EVERY DAY AFTER BREAKFAST   • rosuvastatin (CRESTOR) 5 mg tablet TAKE ONE AND ONE-HALF TABLETS DAILY   • Skyrizi 600 MG/10ML SOLN    • timolol (TIMOPTIC) 0.5 % ophthalmic solution    • [DISCONTINUED] gabapentin (NEURONTIN) 300 mg capsule TAKE 1 CAPSULE BY MOUTH AT BEDTIME FOR 3 DAYS, TAKE 1 CAPSULE BY MOUTH TWO TIMES DAILY FOR 3 DAYS, THEN 1 CAPSULE THREE TIMES DAILY     Current Facility-Administered Medications on File Prior to Visit   Medication   • denosumab (PROLIA) subcutaneous injection 60 mg       No Known Allergies      Physical Exam    Ht 5' 4\" (1.626 m)   Wt 78.9 kg (174 lb)   BMI 29.87 kg/m²         MSK:      Lumbar Spine:  No masses or atrophy,    Range of motion - Decreased extension/flexion  Palpation -  Tenderness to palpation in the lumbar parapsinals   PSIS tenderness no  Ander's/ELYSE no  Gaenslen's no  SLR + SLR left       Strength Right Left   Hip flexion L1,2 5 5   Knee extension L3 5 5   Ankle dorsiflexion L4 5 5   Great toe extension L5 5 5   Ankle Plantarflexion S1 5 5       Sensory Exam:  intact to light touch bilateral LE       Reflexes:     Right Left   Biceps 2+ 2+   Triceps 2+ 2+   Brachioradialis 2+ 2+   Patellar 1+ 1+   Achilles 1+ 1+   Babinski neg neg        Gait antalgic , walking with cane                  Imaging      MRI L spine    L4-L5: Moderate facet hypertrophy with ligamentous infolding identified. There is mild annular bulge with marginal osteophytes resulting in mild right foraminal narrowing. Minimal central stenosis.     L5-S1: Severe facet hypertrophy with ligamentous infolding. Left foraminal protrusion type disc " herniation contacts the left L5 foraminal nerve root. If symptomatic, this would result in a left L5 radiculopathy.      Procedure  Left L5-S1 S1 TFESI 12/6/2024

## 2025-02-04 ENCOUNTER — APPOINTMENT (OUTPATIENT)
Dept: LAB | Age: 85
End: 2025-02-04
Payer: MEDICARE

## 2025-02-04 DIAGNOSIS — N18.2 CKD (CHRONIC KIDNEY DISEASE) STAGE 2, GFR 60-89 ML/MIN: ICD-10-CM

## 2025-02-04 DIAGNOSIS — E78.2 MIXED HYPERLIPIDEMIA: ICD-10-CM

## 2025-02-04 DIAGNOSIS — I10 PRIMARY HYPERTENSION: ICD-10-CM

## 2025-02-04 LAB
ALBUMIN SERPL BCG-MCNC: 4.2 G/DL (ref 3.5–5)
ALP SERPL-CCNC: 53 U/L (ref 34–104)
ALT SERPL W P-5'-P-CCNC: 14 U/L (ref 7–52)
ANION GAP SERPL CALCULATED.3IONS-SCNC: 10 MMOL/L (ref 4–13)
AST SERPL W P-5'-P-CCNC: 15 U/L (ref 13–39)
BACTERIA UR QL AUTO: ABNORMAL /HPF
BILIRUB SERPL-MCNC: 0.47 MG/DL (ref 0.2–1)
BILIRUB UR QL STRIP: NEGATIVE
BUN SERPL-MCNC: 17 MG/DL (ref 5–25)
CALCIUM SERPL-MCNC: 9.3 MG/DL (ref 8.4–10.2)
CHLORIDE SERPL-SCNC: 107 MMOL/L (ref 96–108)
CHOLEST SERPL-MCNC: 138 MG/DL (ref ?–200)
CLARITY UR: CLEAR
CO2 SERPL-SCNC: 24 MMOL/L (ref 21–32)
COLOR UR: YELLOW
CREAT SERPL-MCNC: 0.72 MG/DL (ref 0.6–1.3)
GFR SERPL CREATININE-BSD FRML MDRD: 77 ML/MIN/1.73SQ M
GLUCOSE P FAST SERPL-MCNC: 83 MG/DL (ref 65–99)
GLUCOSE UR STRIP-MCNC: NEGATIVE MG/DL
HDLC SERPL-MCNC: 54 MG/DL
HGB UR QL STRIP.AUTO: NEGATIVE
HYALINE CASTS #/AREA URNS LPF: ABNORMAL /LPF
KETONES UR STRIP-MCNC: NEGATIVE MG/DL
LDLC SERPL CALC-MCNC: 63 MG/DL (ref 0–100)
LEUKOCYTE ESTERASE UR QL STRIP: NEGATIVE
MUCOUS THREADS UR QL AUTO: ABNORMAL
NITRITE UR QL STRIP: NEGATIVE
NON-SQ EPI CELLS URNS QL MICRO: ABNORMAL /HPF
NONHDLC SERPL-MCNC: 84 MG/DL
PH UR STRIP.AUTO: 6 [PH]
POTASSIUM SERPL-SCNC: 4.2 MMOL/L (ref 3.5–5.3)
PROT SERPL-MCNC: 6.4 G/DL (ref 6.4–8.4)
PROT UR STRIP-MCNC: ABNORMAL MG/DL
RBC #/AREA URNS AUTO: ABNORMAL /HPF
SODIUM SERPL-SCNC: 141 MMOL/L (ref 135–147)
SP GR UR STRIP.AUTO: 1.02 (ref 1–1.03)
TRIGL SERPL-MCNC: 107 MG/DL (ref ?–150)
UROBILINOGEN UR STRIP-ACNC: <2 MG/DL
WBC #/AREA URNS AUTO: ABNORMAL /HPF

## 2025-02-04 PROCEDURE — 80061 LIPID PANEL: CPT

## 2025-02-04 PROCEDURE — 81001 URINALYSIS AUTO W/SCOPE: CPT

## 2025-02-04 PROCEDURE — 36415 COLL VENOUS BLD VENIPUNCTURE: CPT

## 2025-02-04 PROCEDURE — 80053 COMPREHEN METABOLIC PANEL: CPT

## 2025-02-06 ENCOUNTER — TELEPHONE (OUTPATIENT)
Age: 85
End: 2025-02-06

## 2025-02-06 NOTE — TELEPHONE ENCOUNTER
Patient called to say she randomly has diarrhea and the imodium she takes says take no more than 4 tablets in 24 hours. She would like to ask Dr Ferraro if it is safe for her to take more than that. Please advise

## 2025-02-07 DIAGNOSIS — R19.7 DIARRHEA, UNSPECIFIED TYPE: Primary | ICD-10-CM

## 2025-02-07 NOTE — TELEPHONE ENCOUNTER
Tell her if she can but no more than 5 to 6/day, and that if she really needs to.  However the diarrhea should be investigated and I ordered her stool studies that she can get at the lab.  Ask her if she wants to pass by the lab to get the tubes or vials or cups etc. that she can take home to do the stool sample and into bring it back to the lab.  ty

## 2025-02-10 NOTE — TELEPHONE ENCOUNTER
Called pt and advised her she can take up to 6 imodium but do not exceed that amount in 24 hrs. Advised her to complete the stool sample that was ordered for her.     Pt had concerns, says she seen pain management for a pinched nerve and is currently taking gabapentin. Pt says she was reading the side effects and it can have affects on her kidney. Pt is worried because she said PCP mentioned her having an issue with her kidney. So, she wants to know if she could continue the gabapentin without it causing any issues.     Discussed with you in person, pt isn't having any issue with her kidney.

## 2025-02-10 NOTE — TELEPHONE ENCOUNTER
Please advised patient to hydrate well.  And I can order her another kidney function test about 3 to 4 weeks after she starts the new dose she is concerned so we can monitor that as well.  Thank you

## 2025-02-11 ENCOUNTER — RESULTS FOLLOW-UP (OUTPATIENT)
Dept: FAMILY MEDICINE CLINIC | Facility: CLINIC | Age: 85
End: 2025-02-11

## 2025-02-13 DIAGNOSIS — I10 PRIMARY HYPERTENSION: ICD-10-CM

## 2025-02-13 NOTE — TELEPHONE ENCOUNTER
Called unable to leave mess, wanted to know if she is using [facility] or [facility] for her rx refills

## 2025-02-13 NOTE — TELEPHONE ENCOUNTER
Patient had called back in and stated she likes to use both. [facility] is used for medications that aren't urgent, and [facility] is for medication that is needed asap.

## 2025-02-14 RX ORDER — AMLODIPINE BESYLATE 5 MG/1
5 TABLET ORAL DAILY
Qty: 100 TABLET | Refills: 3 | Status: SHIPPED | OUTPATIENT
Start: 2025-02-14

## 2025-02-18 ENCOUNTER — APPOINTMENT (OUTPATIENT)
Dept: LAB | Age: 85
End: 2025-02-18

## 2025-02-21 ENCOUNTER — APPOINTMENT (OUTPATIENT)
Dept: LAB | Age: 85
End: 2025-02-21
Payer: MEDICARE

## 2025-02-21 DIAGNOSIS — R19.7 DIARRHEA, UNSPECIFIED TYPE: ICD-10-CM

## 2025-02-21 PROCEDURE — 87209 SMEAR COMPLEX STAIN: CPT

## 2025-02-21 PROCEDURE — 87177 OVA AND PARASITES SMEARS: CPT

## 2025-02-21 PROCEDURE — 87505 NFCT AGENT DETECTION GI: CPT

## 2025-02-22 LAB
C COLI+JEJUNI TUF STL QL NAA+PROBE: NEGATIVE
C DIFF TOX GENS STL QL NAA+PROBE: NEGATIVE
EC STX1+STX2 GENES STL QL NAA+PROBE: NEGATIVE
SALMONELLA SP SPAO STL QL NAA+PROBE: NEGATIVE
SHIGELLA SP+EIEC IPAH STL QL NAA+PROBE: NEGATIVE

## 2025-02-25 ENCOUNTER — RESULTS FOLLOW-UP (OUTPATIENT)
Dept: FAMILY MEDICINE CLINIC | Facility: CLINIC | Age: 85
End: 2025-02-25

## 2025-02-27 ENCOUNTER — TELEPHONE (OUTPATIENT)
Age: 85
End: 2025-02-27

## 2025-02-27 NOTE — TELEPHONE ENCOUNTER
Caller: Patient    Doctor: Dr. LANE    Reason for call: Would like to reschedule her procedure    Call back#:

## 2025-02-27 NOTE — TELEPHONE ENCOUNTER
Caller: PT    Doctor: Dr. TORO    Reason for call: Pt would like to speak to  about her appt on March 7.    Please assist.     Call back#: [phone]

## 2025-02-27 NOTE — TELEPHONE ENCOUNTER
Called patient to r/s, she states she is feeling better and would like to hold on the injection at this time.  She will call back if the pain comes back.

## 2025-03-05 DIAGNOSIS — E21.3 HYPERPARATHYROIDISM (HCC): Chronic | ICD-10-CM

## 2025-03-05 RX ORDER — IBUPROFEN 200 MG
1 CAPSULE ORAL DAILY
Qty: 90 TABLET | Refills: 1 | Status: SHIPPED | OUTPATIENT
Start: 2025-03-05

## 2025-03-17 ENCOUNTER — TELEPHONE (OUTPATIENT)
Age: 85
End: 2025-03-17

## 2025-03-17 DIAGNOSIS — M54.16 LUMBAR RADICULOPATHY: ICD-10-CM

## 2025-03-17 RX ORDER — GABAPENTIN 400 MG/1
400 CAPSULE ORAL 3 TIMES DAILY
Qty: 90 CAPSULE | Refills: 0 | Status: SHIPPED | OUTPATIENT
Start: 2025-03-17

## 2025-03-17 NOTE — TELEPHONE ENCOUNTER
Patient calling in to see which type of kidney test Dr. Ferraro wanted to order for her. They discussed this during her last visit on 1/28/25.she said she dated her calendar for around now but is unsure what should be done if anything.     Please call to discuss. Thank you!         Progress Note - Infectious Disease   Nico Farooqcat 63 y o  male MRN: [id number]  Unit/Bed#: E2 -01 Encounter: [id number]      Impression/Plan:  1  Sepsis, POA  Patient presented with fever along with tachycardia and leukocytosis  This was initially thought to be due to a complicated urinary tract infection however multifactorial given problems 2 and 3  Patient remains clinically stable  Multiple surgical evaluations as below  Continue antibiotics as below  Continue to trend fever curve/vitals  Repeat CBC/chemistry tomorrow  Ongoing follow-up by wound care, general surgery and Urology  Additional care as per primary    2  Perineal abscess communicating with sacral ulcer and likely involving penile prosthesis  Repeat imaging was indicative of abscess which seems to continue to spontaneously drain from the patient's sacral ulcer  He has been evaluated by 3 different surgical attendings and his wound bed is not felt to be acutely infected and so is not recommended for surgical debridement  My suspicion remains high for involvement of the patient's penile prosthesis given problem 3  At this time will plan to treat this patient has a complicated wound infection given the ongoing and spontaneous drainage  Current culture data reviewed  Will transition patient to meropenem with doxycycline  Continue to trend fever curve/vitals while admitted  Repeat labs tomorrow  Ongoing follow-up by wound care, General surgery and Urology  Will plan to treat for a total of 2 weeks of therapy through 10/1  Patient will require a PICC line  He will ultimately need follow-up with both Urology and General surgery  Additional supportive care as per primary    3  Urethral erosion  Patient over the course of this admission was found to have urethral erosion his Solomon catheter was palpable through his sacral ulcer  He now has suprapubic catheter in place    The reason for this catheter was reviewed with the patient today by myself  Given this finding my suspicion again is for involvement of the patient's penile prosthesis given its proximity to this defect  Patient has now been evaluated by 2 separate urology attending and no surgical interventions are recommended at this time and it is not felt that his prosthesis is involved in this process  Unfortunately if it is the patient is at very high risk of relapse and significant comorbidity  Plan for antibiotics as above  Continue to trend fever curve/vitals  Repeat labs tomorrow  Ongoing follow-up and interventions as per Urology  Additional care as per primary      4  Positive blood culture--likely contaminant  Repeat cultures remain without growth  Antibiotics as above  Repeat cultures if patient spikes fever again  Additional care as per primary    5  Chronic sacral ulcer  Patient with chronic sacral decubitus along with osteomyelitis  As above has been evaluated by surgery as well as wound care  Would continue local wound care as per their recommendations  Additional care as per primary service  6  Scrotal excoriations  This is likely related to the above along with patient remaining bed-bound  Would continue local wound care as per wound care recommendation  Additional care otherwise as per primary  7  Paraplegia    8  History of C diff  Patient has reported history of C diff  He is not having significant change in his ostomy output at this time  Will maintain on C diff prophylaxis  Plan to continue for 72 hours after broad-spectrum antibiotic therapy  Additional care as per primary    9  Colonization with ESBL E coli and MRSA and Pseudomonas  Patient unfortunately has isolated multiple different organisms from his urine and he has now developed urethral erosion as above  Suspect that these organisms are likely involved in his perineal process    Antibiotics as above  Maintain contact isolation while admitted  Additional care as per primary      Above plan was discussed in detail with current Urology attending yesterday  Above plan discussed in detail with primary service attending today  Above plan was discussed in detail with the patient prior to see urology and surgical documentation and so will review the above with the patient again tomorrow in detail  ID consult service will continue to follow closely  Antibiotics:  Vancomycin/Zosyn 4  Oral vancomycin prophylaxis  Total antibiotic 8    24 hour events:  Patient is currently afebrile and without leukocytosis  No new culture data  Patient's other vitals are stable  Labs otherwise unremarkable  No acute events noted overnight on chart review    Subjective:  Patient currently denies having any nausea, vomiting, chest pain or shortness of breath  He again remains without sensation in the lower half of his body  He has no new complaints at this time  Objective:  Vitals:  Temp:  [97 °F (36 1 °C)-98 5 °F (36 9 °C)] 97 9 °F (36 6 °C)  HR:  [] 65  Resp:  [18] 18  BP: (100-129)/(70-87) 119/76  SpO2:  [97 %-98 %] 97 %  Temp (24hrs), Av 8 °F (36 6 °C), Min:97 °F (36 1 °C), Max:98 5 °F (36 9 °C)  Current: Temperature: 97 9 °F (36 6 °C)    Physical Exam:   General Appearance:  Alert, interactive, nontoxic, no acute distress  Patient is pleasant on exam    Throat: Oropharynx moist without lesions  Lungs:   Clear to auscultation bilaterally; no wheezes, rhonchi or rales; respirations unlabored on room air   Heart:  RRR; no murmur, rub or gallop   Abdomen:   Soft, non-tender, non-distended, positive bowel sounds  Extremities: No clubbing, cyanosis or edema   Skin: No new rashes or lesions  No draining wounds noted  Patient's sacral wound on past and he again has very healthy tissue and all palpation of the 7 o'clock position I am able to express pus  His scrotum remains enlarged  Penis has retracted and there is pus around the tip  Suprapubic catheter without any issue    Ongoing excoriations along the base of the scrotum and penis  Labs, Imaging, & Other studies:   All pertinent labs and imaging studies were personally reviewed  Results from last 7 days   Lab Units 09/21/19  0619 09/20/19  0551 09/19/19  0627   WBC Thousand/uL 7 24 6 68 8 98   HEMOGLOBIN g/dL 7 7* 7 6* 7 6*   PLATELETS Thousands/uL 550* 540* 497*     Results from last 7 days   Lab Units 09/21/19  0619  09/17/19  0456  09/14/19  2201   POTASSIUM mmol/L 3 6   < > 3 6   < > 3 3*   CHLORIDE mmol/L 105   < > 101   < > 98   CO2 mmol/L 28   < > 28   < > 27   BUN mg/dL 12   < > 7   < > 18   CREATININE mg/dL 0 56*   < > 0 51*   < > 0 50*   EGFR ml/min/1 73sq m 114   < > 118   < > 119   CALCIUM mg/dL 8 8   < > 9 0   < > 8 4   AST U/L  --   --  21  --  19   ALT U/L  --   --  18  --  8*   ALK PHOS U/L  --   --  70  --  95    < > = values in this interval not displayed  Results from last 7 days   Lab Units 09/17/19  0459 09/17/19  0349 09/16/19  1000 09/14/19 2202 09/14/19 2201 09/14/19  1943   BLOOD CULTURE  No Growth After 4 Days  No Growth After 4 Days  No Growth After 4 Days  Staphylococcus coagulase negative* No Growth After 5 Days    --    GRAM STAIN RESULT   --   --   --  Gram positive cocci in clusters*  --   --    URINE CULTURE   --   --   --   --   --  20,000-29,000 cfu/ml Pseudomonas aeruginosa*  10,000-19,000 cfu/ml Escherichia coli ESBL*  <10,000 cfu/ml Methicillin Resistant Staphylococcus aureus*  10,000-19,000 cfu/ml Enterococcus faecalis*  40,000-49,000 cfu/ml Alpha Hemolytic Streptococcus NOT Enterococcus*

## 2025-03-18 DIAGNOSIS — N18.2 CKD (CHRONIC KIDNEY DISEASE) STAGE 2, GFR 60-89 ML/MIN: Primary | ICD-10-CM

## 2025-03-26 ENCOUNTER — OFFICE VISIT (OUTPATIENT)
Dept: ENDOCRINOLOGY | Facility: CLINIC | Age: 85
End: 2025-03-26
Payer: MEDICARE

## 2025-03-26 VITALS
DIASTOLIC BLOOD PRESSURE: 76 MMHG | WEIGHT: 175 LBS | TEMPERATURE: 98.5 F | HEIGHT: 64 IN | HEART RATE: 55 BPM | OXYGEN SATURATION: 94 % | SYSTOLIC BLOOD PRESSURE: 132 MMHG | BODY MASS INDEX: 29.88 KG/M2 | RESPIRATION RATE: 12 BRPM

## 2025-03-26 DIAGNOSIS — E55.9 VITAMIN D DEFICIENCY: ICD-10-CM

## 2025-03-26 DIAGNOSIS — E78.2 MIXED HYPERLIPIDEMIA: ICD-10-CM

## 2025-03-26 DIAGNOSIS — M81.0 AGE-RELATED OSTEOPOROSIS WITHOUT CURRENT PATHOLOGICAL FRACTURE: Chronic | ICD-10-CM

## 2025-03-26 DIAGNOSIS — Z98.890 STATUS POST PARATHYROIDECTOMY: ICD-10-CM

## 2025-03-26 DIAGNOSIS — N18.2 CKD (CHRONIC KIDNEY DISEASE) STAGE 2, GFR 60-89 ML/MIN: ICD-10-CM

## 2025-03-26 DIAGNOSIS — Z90.89 STATUS POST PARATHYROIDECTOMY: ICD-10-CM

## 2025-03-26 DIAGNOSIS — E21.3 HYPERPARATHYROIDISM (HCC): Primary | Chronic | ICD-10-CM

## 2025-03-26 PROCEDURE — 99214 OFFICE O/P EST MOD 30 MIN: CPT | Performed by: INTERNAL MEDICINE

## 2025-03-26 PROCEDURE — G2211 COMPLEX E/M VISIT ADD ON: HCPCS | Performed by: INTERNAL MEDICINE

## 2025-03-26 NOTE — PROGRESS NOTES
"    Follow-up Patient Progress Note      CC: osteoporosis     History of Present Illness:   81 yr female with hx of PHPT for 8 years suspected 2\" Rt lower pole parathyroid adenoma confirmed by sestamibi 7/13/2021 and s/p parathyroid adenoma resection 9/29/21(PTH drop from 189 to 28pg/mL), new elevated PTH, osteoporosis, GERD, Ulcerative colitis, carotid stenosis s/p CEA and HLD. Last visit was 1/30/24.     Since last visit, she gained 19 lbs.  No symptoms.     She continues to be on calcium 3gm daily, vitamin C, 4000IU daily vit D3. She is also doing weight bearing exercise.  She continues to have normocalcemic hyperparathyroidism.  Last level was 96 pg/mL.  Recent 24-hour urine for calcium was 226 mg.     Osteoporosis: cannot recall using any medications.  9/10/24 DXA: T-scores -1.7 LTH, -2.5 LFN, -2.7 left forearm.  10-year FRAX score 36% hip and 47% major osteoporotic fracture. Last prolia #2 11/08/24.     8/21/23 NM parathyroid: suspicion for left sided parathyroid adenoma but not confirmatory - recommend CT parathyroid.  10/15/24 CT parathyroid: No parathyroid adenoma identified. Zuckerkandl tubercle of left thyroid gland corresponds with findings seen on NM parathyroid scan with SPECT 8/21/2023.     Physical Exam:  Body mass index is 30.04 kg/m².  /76 (BP Location: Left arm, Patient Position: Sitting)   Pulse 55   Temp 98.5 °F (36.9 °C) (Tympanic)   Resp 12   Ht 5' 4\" (1.626 m)   Wt 79.4 kg (175 lb)   SpO2 94%   BMI 30.04 kg/m²    Vitals:    03/26/25 0910   Weight: 79.4 kg (175 lb)        Physical Exam  Constitutional:       General: She is not in acute distress.     Appearance: She is well-developed.   HENT:      Head: Normocephalic and atraumatic.      Nose: Nose normal.   Eyes:      Conjunctiva/sclera: Conjunctivae normal.   Pulmonary:      Effort: Pulmonary effort is normal.   Abdominal:      General: There is no distension.   Musculoskeletal:      Cervical back: Normal range of motion and " "neck supple.   Skin:     Findings: No rash.      Comments: No icterus   Neurological:      Mental Status: She is alert and oriented to person, place, and time.         Labs:   Lab Results   Component Value Date    HGBA1C 5.3 01/21/2025       Lab Results   Component Value Date    SFI7UBSMYUUZ 1.653 01/21/2025    TSH 2.07 07/14/2021       Lab Results   Component Value Date    CREATININE 0.82 02/20/2025    CREATININE 0.72 02/04/2025    CREATININE 0.83 01/21/2025    BUN 17 02/20/2025     (L) 07/09/2015    K 3.9 02/20/2025     02/20/2025    CO2 26 02/20/2025     eGFRcr   Date Value Ref Range Status   02/20/2025 71 >59 Final       Lab Results   Component Value Date    ALT 15 02/20/2025    AST 15 02/20/2025    ALKPHOS 51 02/20/2025    BILITOT 0.57 07/09/2015       Lab Results   Component Value Date    CHOLESTEROL 138 02/04/2025    CHOLESTEROL 133 01/21/2025    CHOLESTEROL 154 12/04/2023     Lab Results   Component Value Date    HDL 54 02/04/2025    HDL 52 01/21/2025    HDL 53 12/04/2023     Lab Results   Component Value Date    TRIG 107 02/04/2025    TRIG 133 01/21/2025    TRIG 122 12/04/2023     Lab Results   Component Value Date    NONHDLC 84 02/04/2025    NONHDLC 81 01/21/2025         Assessment/Plan:    1. Hyperparathyroidism (HCC)  Assessment & Plan:  She has a normocalcemic hyperparathyroidism  possible 2\" lt parathyroid adenoma.    She is not inclined to consider surgery.    Repeat DXA shows stable osteoporosis.    8/21/23 NM parathyroid: suspicion for left sided parathyroid adenoma but not confirmatory - recommend CT parathyroid.    Hx:PHPT for 8 years suspected 2\" Rt lower pole parathyroid adenoma confirmed by sestamibi 7/13/2021 and s/p parathyroid adenoma resection 9/29/21(PTH drop from 189 to 28pg/mL)    We agreed to wait and watch at this time. She is asymptomatic.    Orders:  -     Vitamin D 25 hydroxy; Future  -     PTH, intact; Future  -     Phosphorus; Future  -     Comprehensive metabolic " panel; Future  2. Age-related osteoporosis without current pathological fracture  Assessment & Plan:  She has severe osteoporosis with high fracture risk.    9/10/24 DXA: T-scores -1.7 LTH, -2.5 LFN, -2.7 left forearm.  10-year FRAX score 36% hip and 47% major osteoporotic fracture. Last prolia #2 11/08/24.     Continue prolia in meantime.  3. CKD (chronic kidney disease) stage 2, GFR 60-89 ml/min  4. Status post parathyroidectomy  5. Mixed hyperlipidemia  6. Vitamin D deficiency        I have spent a total time of  minutes on 03/26/25 in caring for this patient including greater than 50% of this time was spent in counseling/coordination of care as listed above.       Discussed with the patient and all questioned fully answered. She will contact me with concerns.    Tanvi Persaud

## 2025-03-26 NOTE — ASSESSMENT & PLAN NOTE
"She has a normocalcemic hyperparathyroidism  possible 2\" lt parathyroid adenoma.    She is not inclined to consider surgery.    Repeat DXA shows stable osteoporosis.    8/21/23 NM parathyroid: suspicion for left sided parathyroid adenoma but not confirmatory - recommend CT parathyroid.    Hx:PHPT for 8 years suspected 2\" Rt lower pole parathyroid adenoma confirmed by sestamibi 7/13/2021 and s/p parathyroid adenoma resection 9/29/21(PTH drop from 189 to 28pg/mL)    We agreed to wait and watch at this time. She is asymptomatic.    "

## 2025-03-26 NOTE — ASSESSMENT & PLAN NOTE
She has severe osteoporosis with high fracture risk.    9/10/24 DXA: T-scores -1.7 LTH, -2.5 LFN, -2.7 left forearm.  10-year FRAX score 36% hip and 47% major osteoporotic fracture. Last prolia #2 11/08/24.     Continue prolia in meantime.

## 2025-03-28 DIAGNOSIS — I10 PRIMARY HYPERTENSION: ICD-10-CM

## 2025-03-28 RX ORDER — IRBESARTAN 300 MG/1
300 TABLET ORAL
Qty: 100 TABLET | Refills: 3 | Status: SHIPPED | OUTPATIENT
Start: 2025-03-28

## 2025-03-28 NOTE — TELEPHONE ENCOUNTER
Medication: irbesartan (AVAPRO) 300 mg tablet      Dose/Frequency: Take 1 tablet (300 mg total) by mouth daily at bedtime     Quantity: 90    Pharmacy: [facility] - [address]     Office:   [x] PCP/Provider -   [] Speciality/Provider -     Does the patient have enough for 3 days?   [x] Yes   [] No - Send as HP to POD      Wendy would also like to know if she should be doing a kidney test soon? She thought Dr. Ferraro told her she should do one, but didn't receive any details. Please reach out to clarify.

## 2025-04-10 DIAGNOSIS — E78.5 HYPERLIPIDEMIA, UNSPECIFIED HYPERLIPIDEMIA TYPE: ICD-10-CM

## 2025-04-10 RX ORDER — ROSUVASTATIN CALCIUM 5 MG/1
7.5 TABLET, COATED ORAL DAILY
Qty: 135 TABLET | Refills: 1 | Status: SHIPPED | OUTPATIENT
Start: 2025-04-10

## 2025-04-24 DIAGNOSIS — M54.16 LUMBAR RADICULOPATHY: ICD-10-CM

## 2025-04-24 RX ORDER — GABAPENTIN 400 MG/1
400 CAPSULE ORAL 3 TIMES DAILY
Qty: 90 CAPSULE | Refills: 0 | Status: SHIPPED | OUTPATIENT
Start: 2025-04-24

## 2025-05-05 ENCOUNTER — APPOINTMENT (OUTPATIENT)
Dept: LAB | Age: 85
End: 2025-05-05
Payer: MEDICARE

## 2025-05-05 ENCOUNTER — OFFICE VISIT (OUTPATIENT)
Dept: FAMILY MEDICINE CLINIC | Facility: CLINIC | Age: 85
End: 2025-05-05
Payer: MEDICARE

## 2025-05-05 VITALS
DIASTOLIC BLOOD PRESSURE: 61 MMHG | BODY MASS INDEX: 30.39 KG/M2 | RESPIRATION RATE: 18 BRPM | SYSTOLIC BLOOD PRESSURE: 142 MMHG | OXYGEN SATURATION: 96 % | HEART RATE: 54 BPM | HEIGHT: 64 IN | TEMPERATURE: 96.5 F | WEIGHT: 178 LBS

## 2025-05-05 DIAGNOSIS — R60.9 EDEMA, UNSPECIFIED TYPE: ICD-10-CM

## 2025-05-05 DIAGNOSIS — I10 PRIMARY HYPERTENSION: ICD-10-CM

## 2025-05-05 DIAGNOSIS — R60.9 EDEMA, UNSPECIFIED TYPE: Primary | ICD-10-CM

## 2025-05-05 DIAGNOSIS — N18.2 CKD (CHRONIC KIDNEY DISEASE) STAGE 2, GFR 60-89 ML/MIN: ICD-10-CM

## 2025-05-05 DIAGNOSIS — E21.3 HYPERPARATHYROIDISM (HCC): Chronic | ICD-10-CM

## 2025-05-05 LAB
25(OH)D3 SERPL-MCNC: 55.8 NG/ML (ref 30–100)
ALBUMIN SERPL BCG-MCNC: 4.2 G/DL (ref 3.5–5)
ALP SERPL-CCNC: 53 U/L (ref 34–104)
ALT SERPL W P-5'-P-CCNC: 12 U/L (ref 7–52)
ANION GAP SERPL CALCULATED.3IONS-SCNC: 7 MMOL/L (ref 4–13)
AST SERPL W P-5'-P-CCNC: 13 U/L (ref 13–39)
BILIRUB SERPL-MCNC: 0.38 MG/DL (ref 0.2–1)
BNP SERPL-MCNC: 102 PG/ML (ref 0–100)
BUN SERPL-MCNC: 13 MG/DL (ref 5–25)
CALCIUM SERPL-MCNC: 9.7 MG/DL (ref 8.4–10.2)
CHLORIDE SERPL-SCNC: 107 MMOL/L (ref 96–108)
CO2 SERPL-SCNC: 27 MMOL/L (ref 21–32)
CREAT SERPL-MCNC: 0.65 MG/DL (ref 0.6–1.3)
GFR SERPL CREATININE-BSD FRML MDRD: 82 ML/MIN/1.73SQ M
GLUCOSE SERPL-MCNC: 83 MG/DL (ref 65–140)
PHOSPHATE SERPL-MCNC: 3.4 MG/DL (ref 2.3–4.1)
POTASSIUM SERPL-SCNC: 3.7 MMOL/L (ref 3.5–5.3)
PROT SERPL-MCNC: 7.1 G/DL (ref 6.4–8.4)
PTH-INTACT SERPL-MCNC: 73.1 PG/ML (ref 12–88)
SODIUM SERPL-SCNC: 141 MMOL/L (ref 135–147)

## 2025-05-05 PROCEDURE — 82306 VITAMIN D 25 HYDROXY: CPT

## 2025-05-05 PROCEDURE — 84100 ASSAY OF PHOSPHORUS: CPT

## 2025-05-05 PROCEDURE — 36415 COLL VENOUS BLD VENIPUNCTURE: CPT

## 2025-05-05 PROCEDURE — 80053 COMPREHEN METABOLIC PANEL: CPT

## 2025-05-05 PROCEDURE — 83880 ASSAY OF NATRIURETIC PEPTIDE: CPT

## 2025-05-05 PROCEDURE — 99213 OFFICE O/P EST LOW 20 MIN: CPT | Performed by: FAMILY MEDICINE

## 2025-05-05 PROCEDURE — 83970 ASSAY OF PARATHORMONE: CPT

## 2025-05-05 PROCEDURE — G2211 COMPLEX E/M VISIT ADD ON: HCPCS | Performed by: FAMILY MEDICINE

## 2025-05-05 RX ORDER — HYDROCHLOROTHIAZIDE 12.5 MG/1
12.5 TABLET ORAL DAILY PRN
Qty: 30 TABLET | Refills: 0 | Status: SHIPPED | OUTPATIENT
Start: 2025-05-05

## 2025-05-05 RX ORDER — RISANKIZUMAB-RZAA 360 MG/2.4
WEARABLE INJECTOR SUBCUTANEOUS
COMMUNITY
Start: 2025-04-03

## 2025-05-05 NOTE — ASSESSMENT & PLAN NOTE
Lab Results   Component Value Date    EGFR 71 02/20/2025    EGFR 77 02/04/2025    EGFR 65 01/21/2025    CREATININE 0.82 02/20/2025    CREATININE 0.72 02/04/2025    CREATININE 0.83 01/21/2025   Stable.  Discussed with patient.  Patient reeducation.  Patient advised blood pressure control.  Low-sodium diet.  Hydrate well.  BMP below will help.    Orders:  •  Basic metabolic panel; Future

## 2025-05-05 NOTE — ASSESSMENT & PLAN NOTE
Slightly elevated systolic secondary to her reason for the visit and slightly anxious about it.  Patient denies any acute cardiovascular or neurosymptoms from it.  Advised patient low-sodium diet.  Continue current therapy.  Continue to monitor and follow-up.  Orders:  •  Basic metabolic panel; Future  •  B-Type Natriuretic Peptide(BNP); Future  •  Echo complete w/ contrast if indicated; Future

## 2025-05-05 NOTE — ASSESSMENT & PLAN NOTE
Not new.  Has had in the past as per patient.  Discussed with patient.  Patient in no acute distress.  Advised patient to have a low-sodium diet.  No condiments etc. either.  Elevate her legs when possible.  Get the blood work below.  Patient sent for the venous Doppler below as well.  And patient sent for an echo also patient will take the hydrochlorothiazide 12.5 mg once in the morning as needed.  And patient advised that if her symptoms worsen changing her reasons feel comfortable with her symptoms and patient to go to the emergency room especially if she has any chest pain palpitation shortness of breath cough etc.  Orders:  •   VAS VENOUS DUPLEX - LOWER LIMB BILATERAL; Future  •  Basic metabolic panel; Future  •  B-Type Natriuretic Peptide(BNP); Future  •  hydroCHLOROthiazide 12.5 mg tablet; Take 1 tablet (12.5 mg total) by mouth daily as needed (Edema)  •  Echo complete w/ contrast if indicated; Future

## 2025-05-05 NOTE — PROGRESS NOTES
Name: Wendy Raygoza      : 1940      MRN: [id number]  Encounter Provider: Maty Ferraro MD  Encounter Date: 2025   Encounter department: [facility]  :  Assessment & Plan  Edema, unspecified type  Not new.  Has had in the past as per patient.  Discussed with patient.  Patient in no acute distress.  Advised patient to have a low-sodium diet.  No condiments etc. either.  Elevate her legs when possible.  Get the blood work below.  Patient sent for the venous Doppler below as well.  And patient sent for an echo also patient will take the hydrochlorothiazide 12.5 mg once in the morning as needed.  And patient advised that if her symptoms worsen changing her reasons feel comfortable with her symptoms and patient to go to the emergency room especially if she has any chest pain palpitation shortness of breath cough etc.  Orders:  •   VAS VENOUS DUPLEX - LOWER LIMB BILATERAL; Future  •  Basic metabolic panel; Future  •  B-Type Natriuretic Peptide(BNP); Future  •  hydroCHLOROthiazide 12.5 mg tablet; Take 1 tablet (12.5 mg total) by mouth daily as needed (Edema)  •  Echo complete w/ contrast if indicated; Future    Primary hypertension  Slightly elevated systolic secondary to her reason for the visit and slightly anxious about it.  Patient denies any acute cardiovascular or neurosymptoms from it.  Advised patient low-sodium diet.  Continue current therapy.  Continue to monitor and follow-up.  Orders:  •  Basic metabolic panel; Future  •  B-Type Natriuretic Peptide(BNP); Future  •  Echo complete w/ contrast if indicated; Future    CKD (chronic kidney disease) stage 2, GFR 60-89 ml/min  Lab Results   Component Value Date    EGFR 71 2025    EGFR 77 2025    EGFR 65 2025    CREATININE 0.82 2025    CREATININE 0.72 2025    CREATININE 0.83 2025   Stable.  Discussed with patient.  Patient reeducation.  Patient advised blood pressure control.  Low-sodium  "diet.  Hydrate well.  BMP below will help.    Orders:  •  Basic metabolic panel; Future        RTO for her neck scheduled visit.         History of Present Illness   84-year-old female here for an evaluation of her bilateral lower extremity swelling/edema.  Patient states has been there for a few months.  No specific inciting factor.  No new medication.  No history of travel.  No chest pain palpitations, shortness of breath or cough.  No history of trauma.  Patient denies tobacco.      Review of Systems   Constitutional:  Negative for chills, fatigue, fever and unexpected weight change.   HENT:  Negative for congestion and sore throat.    Eyes:  Negative for visual disturbance.   Respiratory:  Negative for cough and shortness of breath.    Cardiovascular:  Positive for leg swelling. Negative for chest pain and palpitations.   Gastrointestinal:  Negative for abdominal pain.   Genitourinary:  Negative for dysuria.   Neurological:  Negative for dizziness and headaches.   Psychiatric/Behavioral:  Negative for dysphoric mood. The patient is not nervous/anxious.        Objective   /61 (BP Location: Left arm, Patient Position: Sitting, Cuff Size: Large)   Pulse (!) 54   Temp (!) 96.5 °F (35.8 °C) (Tympanic)   Resp 18   Ht 5' 4\" (1.626 m)   Wt 80.7 kg (178 lb)   SpO2 96%   BMI 30.55 kg/m²      Physical Exam  Vitals reviewed.   Constitutional:       General: She is not in acute distress.     Appearance: Normal appearance. She is obese. She is not ill-appearing.   Neck:      Vascular: No carotid bruit.   Cardiovascular:      Rate and Rhythm: Normal rate and regular rhythm.   Pulmonary:      Effort: Pulmonary effort is normal.      Breath sounds: Normal breath sounds.   Musculoskeletal:      Right lower leg: Edema present.      Left lower leg: Edema present.      Comments: No calf tenderness bilateral.    Left lower leg slightly more edematous than the right lower leg.  No pitting bilateral.   Skin:     General: " Skin is warm.   Neurological:      General: No focal deficit present.      Mental Status: She is alert and oriented to person, place, and time.   Psychiatric:         Mood and Affect: Mood normal.         Behavior: Behavior normal.         Thought Content: Thought content normal.

## 2025-05-06 ENCOUNTER — HOSPITAL ENCOUNTER (OUTPATIENT)
Dept: NON INVASIVE DIAGNOSTICS | Facility: CLINIC | Age: 85
Discharge: HOME | End: 2025-05-06
Attending: FAMILY MEDICINE
Payer: MEDICARE

## 2025-05-06 DIAGNOSIS — R60.9 EDEMA, UNSPECIFIED TYPE: ICD-10-CM

## 2025-05-06 PROCEDURE — 93970 EXTREMITY STUDY: CPT

## 2025-05-07 PROCEDURE — 93970 EXTREMITY STUDY: CPT | Performed by: SURGERY

## 2025-05-14 ENCOUNTER — HOSPITAL ENCOUNTER (OUTPATIENT)
Dept: NON INVASIVE DIAGNOSTICS | Facility: HOSPITAL | Age: 85
Discharge: HOME | End: 2025-05-14
Attending: FAMILY MEDICINE
Payer: MEDICARE

## 2025-05-14 VITALS
SYSTOLIC BLOOD PRESSURE: 142 MMHG | WEIGHT: 178 LBS | DIASTOLIC BLOOD PRESSURE: 61 MMHG | HEART RATE: 54 BPM | HEIGHT: 64 IN | BODY MASS INDEX: 30.39 KG/M2

## 2025-05-14 DIAGNOSIS — I10 PRIMARY HYPERTENSION: ICD-10-CM

## 2025-05-14 DIAGNOSIS — R60.9 EDEMA, UNSPECIFIED TYPE: ICD-10-CM

## 2025-05-14 LAB
AORTIC ROOT: 2.7 CM
ASCENDING AORTA: 3.5 CM
BSA FOR ECHO PROCEDURE: 1.86 M2
E WAVE DECELERATION TIME: 154 MS
E/A RATIO: 0.99
FRACTIONAL SHORTENING: 35 (ref 28–44)
INTERVENTRICULAR SEPTUM IN DIASTOLE (PARASTERNAL SHORT AXIS VIEW): 1.3 CM
INTERVENTRICULAR SEPTUM: 1.3 CM (ref 0.6–1.1)
LAAS-AP2: 23.7 CM2
LAAS-AP4: 24.1 CM2
LEFT ATRIUM SIZE: 3.1 CM
LEFT ATRIUM VOLUME (MOD BIPLANE): 81 ML
LEFT ATRIUM VOLUME INDEX (MOD BIPLANE): 43.5 ML/M2
LEFT INTERNAL DIMENSION IN SYSTOLE: 2.4 CM (ref 2.1–4)
LEFT VENTRICLE DIASTOLIC VOLUME (MOD BIPLANE): 60 ML
LEFT VENTRICLE DIASTOLIC VOLUME INDEX (MOD BIPLANE): 32.3 ML/M2
LEFT VENTRICLE SYSTOLIC VOLUME (MOD BIPLANE): 24 ML
LEFT VENTRICLE SYSTOLIC VOLUME INDEX (MOD BIPLANE): 12.9 ML/M2
LEFT VENTRICULAR INTERNAL DIMENSION IN DIASTOLE: 3.7 CM (ref 3.5–6)
LEFT VENTRICULAR POSTERIOR WALL IN END DIASTOLE: 1.4 CM
LEFT VENTRICULAR STROKE VOLUME: 37 ML
LV EF BIPLANE MOD: 61 %
LV EF US.2D.A4C+ESTIMATED: 69 %
LVSV (TEICH): 37 ML
MV E'TISSUE VEL-SEP: 5 CM/S
MV PEAK A VEL: 0.73 M/S
MV PEAK E VEL: 72 CM/S
MV STENOSIS PRESSURE HALF TIME: 45 MS
MV VALVE AREA P 1/2 METHOD: 4.89
RA PRESSURE ESTIMATED: 3 MMHG
RIGHT ATRIUM AREA SYSTOLE A4C: 16.3 CM2
RIGHT VENTRICLE ID DIMENSION: 3.7 CM
SL CV LEFT ATRIUM LENGTH A2C: 6.1 CM
SL CV LV EF: 65
SL CV PED ECHO LEFT VENTRICLE DIASTOLIC VOLUME (MOD BIPLANE) 2D: 58 ML
SL CV PED ECHO LEFT VENTRICLE SYSTOLIC VOLUME (MOD BIPLANE) 2D: 20 ML
TRICUSPID ANNULAR PLANE SYSTOLIC EXCURSION: 2.1 CM

## 2025-05-14 PROCEDURE — 93306 TTE W/DOPPLER COMPLETE: CPT | Performed by: STUDENT IN AN ORGANIZED HEALTH CARE EDUCATION/TRAINING PROGRAM

## 2025-05-14 PROCEDURE — 93306 TTE W/DOPPLER COMPLETE: CPT

## 2025-05-15 DIAGNOSIS — M54.16 LUMBAR RADICULOPATHY: ICD-10-CM

## 2025-05-15 RX ORDER — GABAPENTIN 400 MG/1
400 CAPSULE ORAL 3 TIMES DAILY
Qty: 90 CAPSULE | Refills: 0 | Status: SHIPPED | OUTPATIENT
Start: 2025-05-15

## 2025-05-16 ENCOUNTER — RESULTS FOLLOW-UP (OUTPATIENT)
Dept: FAMILY MEDICINE CLINIC | Facility: CLINIC | Age: 85
End: 2025-05-16

## 2025-05-18 DIAGNOSIS — R60.9 EDEMA, UNSPECIFIED TYPE: ICD-10-CM

## 2025-05-19 RX ORDER — HYDROCHLOROTHIAZIDE 12.5 MG/1
TABLET ORAL
Qty: 30 TABLET | Refills: 0 | Status: SHIPPED | OUTPATIENT
Start: 2025-05-19

## 2025-05-29 DIAGNOSIS — R93.1 ABNORMAL ECHOCARDIOGRAM: Primary | ICD-10-CM

## 2025-06-04 DIAGNOSIS — F41.9 ANXIETY: ICD-10-CM

## 2025-06-04 RX ORDER — ESCITALOPRAM OXALATE 10 MG/1
TABLET ORAL
Qty: 90 TABLET | Refills: 1 | Status: SHIPPED | OUTPATIENT
Start: 2025-06-04

## 2025-06-12 DIAGNOSIS — M54.16 LUMBAR RADICULOPATHY: ICD-10-CM

## 2025-06-12 DIAGNOSIS — R60.9 EDEMA, UNSPECIFIED TYPE: ICD-10-CM

## 2025-06-12 RX ORDER — GABAPENTIN 400 MG/1
400 CAPSULE ORAL 3 TIMES DAILY
Qty: 90 CAPSULE | Refills: 0 | Status: SHIPPED | OUTPATIENT
Start: 2025-06-12

## 2025-06-12 RX ORDER — HYDROCHLOROTHIAZIDE 12.5 MG/1
TABLET ORAL
Qty: 30 TABLET | Refills: 0 | Status: SHIPPED | OUTPATIENT
Start: 2025-06-12

## 2025-07-13 DIAGNOSIS — M54.16 LUMBAR RADICULOPATHY: ICD-10-CM

## 2025-07-13 DIAGNOSIS — R60.9 EDEMA, UNSPECIFIED TYPE: ICD-10-CM

## 2025-07-13 RX ORDER — GABAPENTIN 400 MG/1
400 CAPSULE ORAL 3 TIMES DAILY
Qty: 90 CAPSULE | Refills: 0 | Status: SHIPPED | OUTPATIENT
Start: 2025-07-13

## 2025-07-14 ENCOUNTER — OFFICE VISIT (OUTPATIENT)
Dept: FAMILY MEDICINE CLINIC | Facility: CLINIC | Age: 85
End: 2025-07-14
Payer: MEDICARE

## 2025-07-14 ENCOUNTER — HOSPITAL ENCOUNTER (OUTPATIENT)
Dept: RADIOLOGY | Age: 85
Discharge: HOME | End: 2025-07-14
Payer: MEDICARE

## 2025-07-14 VITALS — BODY MASS INDEX: 29.71 KG/M2 | HEIGHT: 64 IN | WEIGHT: 174 LBS

## 2025-07-14 VITALS
WEIGHT: 174 LBS | RESPIRATION RATE: 18 BRPM | DIASTOLIC BLOOD PRESSURE: 65 MMHG | BODY MASS INDEX: 29.87 KG/M2 | OXYGEN SATURATION: 98 % | HEART RATE: 60 BPM | TEMPERATURE: 98 F | SYSTOLIC BLOOD PRESSURE: 140 MMHG

## 2025-07-14 DIAGNOSIS — I87.2 VENOUS INSUFFICIENCY: Primary | ICD-10-CM

## 2025-07-14 DIAGNOSIS — Z12.31 VISIT FOR SCREENING MAMMOGRAM: ICD-10-CM

## 2025-07-14 DIAGNOSIS — R09.82 POST-NASAL DRIP: ICD-10-CM

## 2025-07-14 PROCEDURE — 77067 SCR MAMMO BI INCL CAD: CPT

## 2025-07-14 PROCEDURE — 99213 OFFICE O/P EST LOW 20 MIN: CPT

## 2025-07-14 PROCEDURE — 77063 BREAST TOMOSYNTHESIS BI: CPT

## 2025-07-14 PROCEDURE — G2211 COMPLEX E/M VISIT ADD ON: HCPCS

## 2025-07-14 RX ORDER — HYDROCHLOROTHIAZIDE 12.5 MG/1
TABLET ORAL
Qty: 30 TABLET | Refills: 5 | Status: SHIPPED | OUTPATIENT
Start: 2025-07-14

## 2025-07-14 RX ORDER — FLUTICASONE PROPIONATE 50 MCG
1 SPRAY, SUSPENSION (ML) NASAL DAILY
Qty: 32 G | Refills: 1 | Status: SHIPPED | OUTPATIENT
Start: 2025-07-14

## 2025-07-14 NOTE — ASSESSMENT & PLAN NOTE
Pt c/o b/l lower extremity edema, ecchymosis first  noticed on L foot resolved now has ecchymosis between 2nd and 3rd toe of R foot. Pt denies pain, on exam pt has 1+ pitting b/l lower extremity edema, varicosity to b/l lower legs, pt denies pain. Pt had negative lower extremity duplex study 5/6/2, echo was abnormal and referred to  cardiology for  follow-up appt scheduled for 9/9/25.  Pt has HCTZ 12.5 mg prn prescribed for edema however does not take as much secondary to increased urination. Recommend compression socks/stockings, elevate legs, low Na diet, exercise as tolerated and refer to  vascular surgery for further evaluation  Orders:  •  Ambulatory Referral to Vascular Surgery; Future

## 2025-07-14 NOTE — PROGRESS NOTES
Name: Wendy Raygoza      : 1940      MRN: [id number]  Encounter Provider: JORGE Leos  Encounter Date: 2025   Encounter department: [facility]  :  Assessment & Plan  Venous insufficiency  Pt c/o b/l lower extremity edema, ecchymosis first  noticed on L foot resolved now has ecchymosis between 2nd and 3rd toe of R foot. Pt denies pain, on exam pt has 1+ pitting b/l lower extremity edema, varicosity to b/l lower legs, pt denies pain. Pt had negative lower extremity duplex study , echo was abnormal and referred to  cardiology for  follow-up appt scheduled for 25.  Pt has HCTZ 12.5 mg prn prescribed for edema however does not take as much secondary to increased urination. Recommend compression socks/stockings, elevate legs, low Na diet, exercise as tolerated and refer to  vascular surgery for further evaluation  Orders:  •  Ambulatory Referral to Vascular Surgery; Future    Post-nasal drip  Pt c/o occasional dry cough, constant clearing of throat. Denies fever or h/a. Lungs clear on exam, reports poss allergy related symptoms. Start nasal steroid spray as prescribed and educate f/u for worsening sxs.   Orders:  •  fluticasone (FLONASE) 50 mcg/act nasal spray; 1 spray into each nostril daily           History of Present Illness   Pt present with c/o black and blue lower extremity and swelling. Pt has a hx of b/l lower extremity edema and is prescribed HCTZ 12.5 mg prn however does not very often secondary to  increased urination. Pt had duplex study done in May that was negative, abnormal echo and was referred to  cardiology appt scheduled for September. Pt denies pain in lower extremities, is concerned about discoloration in toes of R foot, discoloration started in L foot a few months ago  however thought she may have experienced some trauma. Pt denies pain in R foot does not recall any trauma however has lower extremity venous insufficiency and is aware  of poor circulation.    Pt requesting to have lungs checked has occasional cough for the past few days, could be allergy  related as recently returned from vacation where it was very  humid.       Review of Systems   Constitutional:  Negative for appetite change, chills, diaphoresis, fatigue and fever.   HENT:  Positive for postnasal drip. Negative for congestion, drooling, ear discharge, ear pain and facial swelling.    Respiratory:  Positive for cough. Negative for apnea, choking, chest tightness, shortness of breath, wheezing and stridor.    Gastrointestinal:  Negative for nausea and vomiting.   Musculoskeletal:  Negative for back pain, gait problem and joint swelling.   Skin:  Positive for color change. Negative for pallor, rash and wound.   Allergic/Immunologic: Positive for environmental allergies.       Objective   /65 (BP Location: Left arm, Patient Position: Sitting, Cuff Size: Standard)   Pulse 60   Temp 98 °F (36.7 °C)   Resp 18   Wt 78.9 kg (174 lb)   SpO2 98%   BMI 29.87 kg/m²      Physical Exam  Vitals and nursing note reviewed.   Constitutional:       General: She is not in acute distress.     Appearance: Normal appearance. She is well-developed. She is not ill-appearing, toxic-appearing or diaphoretic.   HENT:      Head: Normocephalic and atraumatic.      Nose: No congestion or rhinorrhea.      Mouth/Throat:      Mouth: Mucous membranes are moist.      Pharynx: Oropharynx is clear. Postnasal drip present.     Eyes:      Conjunctiva/sclera: Conjunctivae normal.       Cardiovascular:      Rate and Rhythm: Normal rate and regular rhythm.      Heart sounds: No murmur heard.  Pulmonary:      Effort: Pulmonary effort is normal. No respiratory distress.      Breath sounds: Normal breath sounds. No stridor. No rhonchi.   Chest:      Chest wall: No tenderness.   Abdominal:      Palpations: Abdomen is soft.      Tenderness: There is no abdominal tenderness.     Musculoskeletal:         General:  Deformity present. No swelling, tenderness or signs of injury.      Cervical back: Neck supple.      Right lower leg: No tenderness or bony tenderness. 1+ Pitting Edema present.      Left lower leg: No tenderness or bony tenderness. 1+ Pitting Edema present.      Right foot: Normal range of motion and normal capillary refill. Swelling present. No bony tenderness. Normal pulse.      Left foot: Normal range of motion and normal capillary refill. Swelling present. No bony tenderness or crepitus. Normal pulse.      Comments: B/l lower extremity varicosity   Feet:      Right foot:      Skin integrity: No skin breakdown or warmth.      Left foot:      Skin integrity: No skin breakdown or warmth.     Skin:     General: Skin is warm and dry.      Capillary Refill: Capillary refill takes less than 2 seconds.     Neurological:      Mental Status: She is alert and oriented to person, place, and time.     Psychiatric:         Mood and Affect: Mood normal.         Behavior: Behavior is cooperative.

## 2025-07-30 ENCOUNTER — TELEPHONE (OUTPATIENT)
Age: 85
End: 2025-07-30

## (undated) DEVICE — SUT SILK 3-0 SH 30 IN K832H

## (undated) DEVICE — SYRINGE 10ML LL

## (undated) DEVICE — SUT MONOCRYL 4-0 PS-2 18 IN Y496G

## (undated) DEVICE — BIPOLAR CORD DISP

## (undated) DEVICE — VIAL DECANTER

## (undated) DEVICE — LIGHT HANDLE COVER SLEEVE DISP BLUE STELLAR

## (undated) DEVICE — TIBURON SPLIT SHEET: Brand: CONVERTORS

## (undated) DEVICE — GLOVE SRG BIOGEL ORTHOPEDIC 7

## (undated) DEVICE — INTENDED FOR TISSUE SEPARATION, AND OTHER PROCEDURES THAT REQUIRE A SHARP SURGICAL BLADE TO PUNCTURE OR CUT.: Brand: BARD-PARKER SAFETY BLADES SIZE 15, STERILE

## (undated) DEVICE — SURGICEL 4 X 8

## (undated) DEVICE — 3M™ STERI-STRIP™ REINFORCED ADHESIVE SKIN CLOSURES, R1542, 1/4 IN X 1-1/2 IN (6 MM X 38 MM), 6 STRIPS/ENVELOPE: Brand: 3M™ STERI-STRIP™

## (undated) DEVICE — SUT VICRYL 3-0 SH 27 IN J416H

## (undated) DEVICE — SUT SILK 3-0 18 IN A184H

## (undated) DEVICE — 3M™ STERI-STRIP™ REINFORCED ADHESIVE SKIN CLOSURES, R1547, 1/2 IN X 4 IN (12 MM X 100 MM), 6 STRIPS/ENVELOPE: Brand: 3M™ STERI-STRIP™

## (undated) DEVICE — PACK UNIVERSAL NECK

## (undated) DEVICE — PROBE PTEYE-1 FIBER OPTIC: Brand: PTEYE

## (undated) DEVICE — SURGICEL 2 X 3

## (undated) DEVICE — NEEDLE 25G X 1 1/2

## (undated) DEVICE — CHLORAPREP HI-LITE 26ML ORANGE